# Patient Record
Sex: MALE | Race: WHITE | Employment: OTHER | URBAN - METROPOLITAN AREA
[De-identification: names, ages, dates, MRNs, and addresses within clinical notes are randomized per-mention and may not be internally consistent; named-entity substitution may affect disease eponyms.]

---

## 2017-01-01 ENCOUNTER — APPOINTMENT (OUTPATIENT)
Dept: CARDIOLOGY | Facility: CLINIC | Age: 72
End: 2017-01-01
Attending: INTERNAL MEDICINE
Payer: MEDICARE

## 2017-01-01 ENCOUNTER — TELEPHONE (OUTPATIENT)
Dept: CARDIOLOGY | Facility: CLINIC | Age: 72
End: 2017-01-01

## 2017-01-01 ENCOUNTER — APPOINTMENT (OUTPATIENT)
Dept: MEDSURG UNIT | Facility: CLINIC | Age: 72
End: 2017-01-01
Attending: INTERNAL MEDICINE
Payer: MEDICARE

## 2017-01-01 ENCOUNTER — TELEPHONE (OUTPATIENT)
Dept: FAMILY MEDICINE | Facility: CLINIC | Age: 72
End: 2017-01-01

## 2017-01-01 ENCOUNTER — APPOINTMENT (OUTPATIENT)
Dept: LAB | Facility: CLINIC | Age: 72
End: 2017-01-01
Attending: INTERNAL MEDICINE
Payer: MEDICARE

## 2017-01-01 ENCOUNTER — OFFICE VISIT (OUTPATIENT)
Dept: CARDIOLOGY | Facility: CLINIC | Age: 72
End: 2017-01-01
Attending: THORACIC SURGERY (CARDIOTHORACIC VASCULAR SURGERY)
Payer: MEDICARE

## 2017-01-01 ENCOUNTER — CARE COORDINATION (OUTPATIENT)
Dept: CARDIOLOGY | Facility: CLINIC | Age: 72
End: 2017-01-01

## 2017-01-01 ENCOUNTER — HOSPITAL ENCOUNTER (OUTPATIENT)
Facility: CLINIC | Age: 72
Discharge: HOME | End: 2017-12-20
Attending: INTERNAL MEDICINE | Admitting: INTERNAL MEDICINE
Payer: MEDICARE

## 2017-01-01 ENCOUNTER — OFFICE VISIT (OUTPATIENT)
Dept: FAMILY MEDICINE | Facility: CLINIC | Age: 72
End: 2017-01-01
Payer: MEDICARE

## 2017-01-01 ENCOUNTER — HOSPITAL ENCOUNTER (OUTPATIENT)
Facility: CLINIC | Age: 72
Setting detail: OBSERVATION
Discharge: HOME | End: 2017-11-18
Attending: INTERNAL MEDICINE | Admitting: INTERNAL MEDICINE
Payer: MEDICARE

## 2017-01-01 VITALS
TEMPERATURE: 97.9 F | WEIGHT: 205 LBS | DIASTOLIC BLOOD PRESSURE: 71 MMHG | OXYGEN SATURATION: 98 % | BODY MASS INDEX: 33.59 KG/M2 | RESPIRATION RATE: 24 BRPM | HEART RATE: 60 BPM | SYSTOLIC BLOOD PRESSURE: 139 MMHG

## 2017-01-01 VITALS
DIASTOLIC BLOOD PRESSURE: 63 MMHG | OXYGEN SATURATION: 99 % | HEIGHT: 66 IN | RESPIRATION RATE: 16 BRPM | TEMPERATURE: 97.3 F | SYSTOLIC BLOOD PRESSURE: 118 MMHG | HEART RATE: 69 BPM | WEIGHT: 220.02 LBS | BODY MASS INDEX: 35.36 KG/M2

## 2017-01-01 VITALS
SYSTOLIC BLOOD PRESSURE: 146 MMHG | DIASTOLIC BLOOD PRESSURE: 76 MMHG | WEIGHT: 203 LBS | OXYGEN SATURATION: 97 % | BODY MASS INDEX: 32.62 KG/M2 | TEMPERATURE: 98.3 F | HEIGHT: 66 IN | HEART RATE: 71 BPM

## 2017-01-01 VITALS
HEART RATE: 64 BPM | BODY MASS INDEX: 32.28 KG/M2 | SYSTOLIC BLOOD PRESSURE: 106 MMHG | RESPIRATION RATE: 20 BRPM | WEIGHT: 197 LBS | TEMPERATURE: 97.3 F | OXYGEN SATURATION: 97 % | DIASTOLIC BLOOD PRESSURE: 65 MMHG

## 2017-01-01 VITALS
SYSTOLIC BLOOD PRESSURE: 133 MMHG | RESPIRATION RATE: 16 BRPM | HEIGHT: 65 IN | BODY MASS INDEX: 33.32 KG/M2 | OXYGEN SATURATION: 99 % | WEIGHT: 200 LBS | TEMPERATURE: 98.3 F | DIASTOLIC BLOOD PRESSURE: 73 MMHG

## 2017-01-01 DIAGNOSIS — I25.10 CORONARY ARTERY DISEASE DUE TO LIPID RICH PLAQUE: ICD-10-CM

## 2017-01-01 DIAGNOSIS — Z98.61 POSTSURGICAL PERCUTANEOUS TRANSLUMINAL CORONARY ANGIOPLASTY STATUS: ICD-10-CM

## 2017-01-01 DIAGNOSIS — R79.89 ELEVATED SERUM CREATININE: ICD-10-CM

## 2017-01-01 DIAGNOSIS — I25.83 CORONARY ARTERY DISEASE DUE TO LIPID RICH PLAQUE: Primary | ICD-10-CM

## 2017-01-01 DIAGNOSIS — K62.5 RECTAL BLEEDING: Primary | ICD-10-CM

## 2017-01-01 DIAGNOSIS — L72.3 INFECTED SEBACEOUS CYST OF SKIN: Primary | ICD-10-CM

## 2017-01-01 DIAGNOSIS — I25.118 CORONARY ARTERY DISEASE OF NATIVE ARTERY OF NATIVE HEART WITH STABLE ANGINA PECTORIS (H): ICD-10-CM

## 2017-01-01 DIAGNOSIS — I25.83 CORONARY ARTERY DISEASE DUE TO LIPID RICH PLAQUE: ICD-10-CM

## 2017-01-01 DIAGNOSIS — Z98.61 STATUS POST CORONARY ANGIOPLASTY: ICD-10-CM

## 2017-01-01 DIAGNOSIS — I25.10 CORONARY ARTERY DISEASE DUE TO LIPID RICH PLAQUE: Primary | ICD-10-CM

## 2017-01-01 DIAGNOSIS — M54.50 BILATERAL LOW BACK PAIN WITHOUT SCIATICA, UNSPECIFIED CHRONICITY: ICD-10-CM

## 2017-01-01 DIAGNOSIS — Z98.61 STATUS POST PERCUTANEOUS TRANSLUMINAL CORONARY ANGIOPLASTY: Primary | ICD-10-CM

## 2017-01-01 DIAGNOSIS — I25.118 CORONARY ARTERY DISEASE OF NATIVE ARTERY OF NATIVE HEART WITH STABLE ANGINA PECTORIS (H): Primary | ICD-10-CM

## 2017-01-01 DIAGNOSIS — I25.10 CORONARY ARTERY DISEASE INVOLVING NATIVE HEART WITHOUT ANGINA PECTORIS, UNSPECIFIED VESSEL OR LESION TYPE: ICD-10-CM

## 2017-01-01 DIAGNOSIS — Z98.61 POSTSURGICAL PERCUTANEOUS TRANSLUMINAL CORONARY ANGIOPLASTY STATUS: Primary | ICD-10-CM

## 2017-01-01 DIAGNOSIS — D64.9 ANEMIA: Primary | ICD-10-CM

## 2017-01-01 DIAGNOSIS — L08.9 INFECTED SEBACEOUS CYST OF SKIN: Primary | ICD-10-CM

## 2017-01-01 LAB
ABO + RH BLD: NORMAL
ABO + RH BLD: NORMAL
ANION GAP SERPL CALCULATED.3IONS-SCNC: 10 MMOL/L (ref 3–14)
ANION GAP SERPL CALCULATED.3IONS-SCNC: 11 MMOL/L (ref 3–14)
ANION GAP SERPL CALCULATED.3IONS-SCNC: 11 MMOL/L (ref 3–14)
ANION GAP SERPL CALCULATED.3IONS-SCNC: 12 MMOL/L (ref 3–14)
ANION GAP SERPL CALCULATED.3IONS-SCNC: 13 MMOL/L (ref 3–14)
ANION GAP SERPL CALCULATED.3IONS-SCNC: 8 MMOL/L (ref 3–14)
APTT PPP: 33 SEC (ref 22–37)
BLD GP AB SCN SERPL QL: NORMAL
BLOOD BANK CMNT PATIENT-IMP: NORMAL
BUN SERPL-MCNC: 23 MG/DL (ref 7–30)
BUN SERPL-MCNC: 27 MG/DL (ref 7–30)
BUN SERPL-MCNC: 33 MG/DL (ref 7–30)
BUN SERPL-MCNC: 34 MG/DL (ref 7–30)
CALCIUM SERPL-MCNC: 8.7 MG/DL (ref 8.5–10.1)
CALCIUM SERPL-MCNC: 8.9 MG/DL (ref 8.5–10.1)
CALCIUM SERPL-MCNC: 9.1 MG/DL (ref 8.5–10.1)
CALCIUM SERPL-MCNC: 9.2 MG/DL (ref 8.5–10.1)
CALCIUM SERPL-MCNC: 9.4 MG/DL (ref 8.5–10.1)
CALCIUM SERPL-MCNC: 9.5 MG/DL (ref 8.5–10.1)
CHLORIDE SERPL-SCNC: 107 MMOL/L (ref 94–109)
CHLORIDE SERPL-SCNC: 110 MMOL/L (ref 94–109)
CHLORIDE SERPL-SCNC: 110 MMOL/L (ref 94–109)
CHLORIDE SERPL-SCNC: 114 MMOL/L (ref 94–109)
CO2 SERPL-SCNC: 17 MMOL/L (ref 20–32)
CO2 SERPL-SCNC: 17 MMOL/L (ref 20–32)
CO2 SERPL-SCNC: 18 MMOL/L (ref 20–32)
CO2 SERPL-SCNC: 18 MMOL/L (ref 20–32)
CO2 SERPL-SCNC: 19 MMOL/L (ref 20–32)
CO2 SERPL-SCNC: 20 MMOL/L (ref 20–32)
CREAT SERPL-MCNC: 2.43 MG/DL (ref 0.66–1.25)
CREAT SERPL-MCNC: 2.75 MG/DL (ref 0.66–1.25)
CREAT SERPL-MCNC: 2.77 MG/DL (ref 0.66–1.25)
CREAT SERPL-MCNC: 2.77 MG/DL (ref 0.66–1.25)
CREAT SERPL-MCNC: 2.89 MG/DL (ref 0.66–1.25)
CREAT SERPL-MCNC: 3.36 MG/DL (ref 0.66–1.25)
ERYTHROCYTE [DISTWIDTH] IN BLOOD BY AUTOMATED COUNT: 13.1 % (ref 10–15)
ERYTHROCYTE [DISTWIDTH] IN BLOOD BY AUTOMATED COUNT: 13.6 % (ref 10–15)
ERYTHROCYTE [DISTWIDTH] IN BLOOD BY AUTOMATED COUNT: 13.7 % (ref 10–15)
ERYTHROCYTE [DISTWIDTH] IN BLOOD BY AUTOMATED COUNT: 13.8 % (ref 10–15)
ERYTHROCYTE [DISTWIDTH] IN BLOOD BY AUTOMATED COUNT: 13.9 % (ref 10–15)
ERYTHROCYTE [DISTWIDTH] IN BLOOD BY AUTOMATED COUNT: 13.9 % (ref 10–15)
GFR SERPL CREATININE-BSD FRML MDRD: 18 ML/MIN/1.7M2
GFR SERPL CREATININE-BSD FRML MDRD: 21 ML/MIN/1.7M2
GFR SERPL CREATININE-BSD FRML MDRD: 23 ML/MIN/1.7M2
GFR SERPL CREATININE-BSD FRML MDRD: 26 ML/MIN/1.7M2
GLUCOSE SERPL-MCNC: 103 MG/DL (ref 70–99)
GLUCOSE SERPL-MCNC: 104 MG/DL (ref 70–99)
GLUCOSE SERPL-MCNC: 139 MG/DL (ref 70–99)
GLUCOSE SERPL-MCNC: 87 MG/DL (ref 70–99)
GLUCOSE SERPL-MCNC: 90 MG/DL (ref 70–99)
GLUCOSE SERPL-MCNC: 96 MG/DL (ref 70–99)
HCT VFR BLD AUTO: 27.7 % (ref 40–53)
HCT VFR BLD AUTO: 29.6 % (ref 40–53)
HCT VFR BLD AUTO: 32.4 % (ref 40–53)
HCT VFR BLD AUTO: 32.8 % (ref 40–53)
HCT VFR BLD AUTO: 33.3 % (ref 40–53)
HCT VFR BLD AUTO: 36.5 % (ref 40–53)
HGB BLD-MCNC: 10 G/DL (ref 13.3–17.7)
HGB BLD-MCNC: 10.5 G/DL (ref 13.3–17.7)
HGB BLD-MCNC: 10.9 G/DL (ref 13.3–17.7)
HGB BLD-MCNC: 11 G/DL (ref 13.3–17.7)
HGB BLD-MCNC: 11.2 G/DL (ref 13.3–17.7)
HGB BLD-MCNC: 12.3 G/DL (ref 13.3–17.7)
HGB BLD-MCNC: 8.9 G/DL (ref 13.3–17.7)
HGB BLD-MCNC: 9.8 G/DL (ref 13.3–17.7)
INR PPP: 1.02 (ref 0.86–1.14)
INR PPP: 1.24 (ref 0.86–1.14)
INTERPRETATION ECG - MUSE: NORMAL
KCT BLD-ACNC: 148 SEC (ref 105–167)
KCT BLD-ACNC: 176 SEC (ref 105–167)
KCT BLD-ACNC: 208 SEC (ref 105–167)
KCT BLD-ACNC: 249 SEC (ref 105–167)
KCT BLD-ACNC: 257 SEC (ref 105–167)
KCT BLD-ACNC: 257 SEC (ref 105–167)
KCT BLD-ACNC: 270 SEC (ref 105–167)
KCT BLD-ACNC: 302 SEC (ref 105–167)
MCH RBC QN AUTO: 28.9 PG (ref 26.5–33)
MCH RBC QN AUTO: 30.4 PG (ref 26.5–33)
MCH RBC QN AUTO: 30.6 PG (ref 26.5–33)
MCH RBC QN AUTO: 30.9 PG (ref 26.5–33)
MCH RBC QN AUTO: 31.4 PG (ref 26.5–33)
MCH RBC QN AUTO: 31.8 PG (ref 26.5–33)
MCHC RBC AUTO-ENTMCNC: 32.1 G/DL (ref 31.5–36.5)
MCHC RBC AUTO-ENTMCNC: 32.4 G/DL (ref 31.5–36.5)
MCHC RBC AUTO-ENTMCNC: 33.1 G/DL (ref 31.5–36.5)
MCHC RBC AUTO-ENTMCNC: 33.2 G/DL (ref 31.5–36.5)
MCHC RBC AUTO-ENTMCNC: 33.6 G/DL (ref 31.5–36.5)
MCHC RBC AUTO-ENTMCNC: 33.7 G/DL (ref 31.5–36.5)
MCV RBC AUTO: 90 FL (ref 78–100)
MCV RBC AUTO: 92 FL (ref 78–100)
MCV RBC AUTO: 92 FL (ref 78–100)
MCV RBC AUTO: 93 FL (ref 78–100)
MCV RBC AUTO: 94 FL (ref 78–100)
MCV RBC AUTO: 96 FL (ref 78–100)
PLATELET # BLD AUTO: 164 10E9/L (ref 150–450)
PLATELET # BLD AUTO: 170 10E9/L (ref 150–450)
PLATELET # BLD AUTO: 176 10E9/L (ref 150–450)
PLATELET # BLD AUTO: 184 10E9/L (ref 150–450)
PLATELET # BLD AUTO: 196 10E9/L (ref 150–450)
PLATELET # BLD AUTO: 276 10E9/L (ref 150–450)
POTASSIUM SERPL-SCNC: 4 MMOL/L (ref 3.4–5.3)
POTASSIUM SERPL-SCNC: 4.1 MMOL/L (ref 3.4–5.3)
POTASSIUM SERPL-SCNC: 4.3 MMOL/L (ref 3.4–5.3)
POTASSIUM SERPL-SCNC: 4.3 MMOL/L (ref 3.4–5.3)
POTASSIUM SERPL-SCNC: 4.7 MMOL/L (ref 3.4–5.3)
POTASSIUM SERPL-SCNC: 5.1 MMOL/L (ref 3.4–5.3)
RBC # BLD AUTO: 3.08 10E12/L (ref 4.4–5.9)
RBC # BLD AUTO: 3.08 10E12/L (ref 4.4–5.9)
RBC # BLD AUTO: 3.45 10E12/L (ref 4.4–5.9)
RBC # BLD AUTO: 3.56 10E12/L (ref 4.4–5.9)
RBC # BLD AUTO: 3.62 10E12/L (ref 4.4–5.9)
RBC # BLD AUTO: 3.92 10E12/L (ref 4.4–5.9)
SODIUM SERPL-SCNC: 135 MMOL/L (ref 133–144)
SODIUM SERPL-SCNC: 140 MMOL/L (ref 133–144)
SODIUM SERPL-SCNC: 141 MMOL/L (ref 133–144)
SODIUM SERPL-SCNC: 144 MMOL/L (ref 133–144)
SPECIMEN EXP DATE BLD: NORMAL
WBC # BLD AUTO: 10.1 10E9/L (ref 4–11)
WBC # BLD AUTO: 8.1 10E9/L (ref 4–11)
WBC # BLD AUTO: 8.6 10E9/L (ref 4–11)
WBC # BLD AUTO: 8.6 10E9/L (ref 4–11)
WBC # BLD AUTO: 8.8 10E9/L (ref 4–11)
WBC # BLD AUTO: 9.3 10E9/L (ref 4–11)

## 2017-01-01 PROCEDURE — 80048 BASIC METABOLIC PNL TOTAL CA: CPT | Performed by: INTERNAL MEDICINE

## 2017-01-01 PROCEDURE — 36415 COLL VENOUS BLD VENIPUNCTURE: CPT | Performed by: INTERNAL MEDICINE

## 2017-01-01 PROCEDURE — 25000132 ZZH RX MED GY IP 250 OP 250 PS 637: Mod: GY

## 2017-01-01 PROCEDURE — G0378 HOSPITAL OBSERVATION PER HR: HCPCS

## 2017-01-01 PROCEDURE — 86850 RBC ANTIBODY SCREEN: CPT | Performed by: STUDENT IN AN ORGANIZED HEALTH CARE EDUCATION/TRAINING PROGRAM

## 2017-01-01 PROCEDURE — 25000132 ZZH RX MED GY IP 250 OP 250 PS 637: Mod: GY | Performed by: INTERNAL MEDICINE

## 2017-01-01 PROCEDURE — 99153 MOD SED SAME PHYS/QHP EA: CPT

## 2017-01-01 PROCEDURE — 25000125 ZZHC RX 250: Performed by: INTERNAL MEDICINE

## 2017-01-01 PROCEDURE — 27210760 ZZH DEVICE INFLATION CR7

## 2017-01-01 PROCEDURE — 40000065 ZZH STATISTIC EKG NON-CHARGEABLE

## 2017-01-01 PROCEDURE — 85027 COMPLETE CBC AUTOMATED: CPT | Performed by: HOSPITALIST

## 2017-01-01 PROCEDURE — 40000172 ZZH STATISTIC PROCEDURE PREP ONLY

## 2017-01-01 PROCEDURE — 25000128 H RX IP 250 OP 636: Performed by: INTERNAL MEDICINE

## 2017-01-01 PROCEDURE — 02703ZZ DILATION OF CORONARY ARTERY, ONE ARTERY, PERCUTANEOUS APPROACH: ICD-10-PCS | Performed by: INTERNAL MEDICINE

## 2017-01-01 PROCEDURE — 4A033BC MEASUREMENT OF ARTERIAL PRESSURE, CORONARY, PERCUTANEOUS APPROACH: ICD-10-PCS | Performed by: INTERNAL MEDICINE

## 2017-01-01 PROCEDURE — 86900 BLOOD TYPING SEROLOGIC ABO: CPT | Performed by: STUDENT IN AN ORGANIZED HEALTH CARE EDUCATION/TRAINING PROGRAM

## 2017-01-01 PROCEDURE — 85027 COMPLETE CBC AUTOMATED: CPT | Performed by: NURSE PRACTITIONER

## 2017-01-01 PROCEDURE — 99152 MOD SED SAME PHYS/QHP 5/>YRS: CPT

## 2017-01-01 PROCEDURE — 99354 ZZC PROLONGED SERV,OFFICE,1ST HR: CPT | Performed by: INTERNAL MEDICINE

## 2017-01-01 PROCEDURE — C1769 GUIDE WIRE: HCPCS

## 2017-01-01 PROCEDURE — 27211236 ZZH CATHETER -FFR CR18

## 2017-01-01 PROCEDURE — 27210787 ZZH MANIFOLD CR2

## 2017-01-01 PROCEDURE — 27210998 ZZH ACCESS HEART CATH CR6

## 2017-01-01 PROCEDURE — 86901 BLOOD TYPING SEROLOGIC RH(D): CPT | Performed by: STUDENT IN AN ORGANIZED HEALTH CARE EDUCATION/TRAINING PROGRAM

## 2017-01-01 PROCEDURE — 92928 PRQ TCAT PLMT NTRAC ST 1 LES: CPT | Mod: RC | Performed by: INTERNAL MEDICINE

## 2017-01-01 PROCEDURE — 85730 THROMBOPLASTIN TIME PARTIAL: CPT | Performed by: HOSPITALIST

## 2017-01-01 PROCEDURE — 40000141 ZZH STATISTIC PERIPHERAL IV START W/O US GUIDANCE

## 2017-01-01 PROCEDURE — 85610 PROTHROMBIN TIME: CPT | Performed by: HOSPITALIST

## 2017-01-01 PROCEDURE — 92928 PRQ TCAT PLMT NTRAC ST 1 LES: CPT | Mod: LD | Performed by: INTERNAL MEDICINE

## 2017-01-01 PROCEDURE — 80048 BASIC METABOLIC PNL TOTAL CA: CPT | Performed by: HOSPITALIST

## 2017-01-01 PROCEDURE — A9270 NON-COVERED ITEM OR SERVICE: HCPCS | Mod: GY | Performed by: INTERNAL MEDICINE

## 2017-01-01 PROCEDURE — 25000132 ZZH RX MED GY IP 250 OP 250 PS 637: Mod: GY | Performed by: HOSPITALIST

## 2017-01-01 PROCEDURE — 85027 COMPLETE CBC AUTOMATED: CPT | Performed by: INTERNAL MEDICINE

## 2017-01-01 PROCEDURE — 96376 TX/PRO/DX INJ SAME DRUG ADON: CPT

## 2017-01-01 PROCEDURE — 99214 OFFICE O/P EST MOD 30 MIN: CPT | Performed by: NURSE PRACTITIONER

## 2017-01-01 PROCEDURE — 36415 COLL VENOUS BLD VENIPUNCTURE: CPT | Performed by: HOSPITALIST

## 2017-01-01 PROCEDURE — 36415 COLL VENOUS BLD VENIPUNCTURE: CPT | Performed by: NURSE PRACTITIONER

## 2017-01-01 PROCEDURE — C1887 CATHETER, GUIDING: HCPCS

## 2017-01-01 PROCEDURE — 80048 BASIC METABOLIC PNL TOTAL CA: CPT | Performed by: NURSE PRACTITIONER

## 2017-01-01 PROCEDURE — 85610 PROTHROMBIN TIME: CPT | Performed by: NURSE PRACTITIONER

## 2017-01-01 PROCEDURE — 27211089 ZZH KIT ACIST INJECTOR CR3

## 2017-01-01 PROCEDURE — 27210742 ZZH CATH CR1

## 2017-01-01 PROCEDURE — A9270 NON-COVERED ITEM OR SERVICE: HCPCS | Mod: GY | Performed by: HOSPITALIST

## 2017-01-01 PROCEDURE — C1894 INTRO/SHEATH, NON-LASER: HCPCS

## 2017-01-01 PROCEDURE — 93571 IV DOP VEL&/PRESS C FLO 1ST: CPT

## 2017-01-01 PROCEDURE — C1725 CATH, TRANSLUMIN NON-LASER: HCPCS

## 2017-01-01 PROCEDURE — 99217 ZZC OBSERVATION CARE DISCHARGE: CPT | Mod: GC | Performed by: INTERNAL MEDICINE

## 2017-01-01 PROCEDURE — C1874 STENT, COATED/COV W/DEL SYS: HCPCS

## 2017-01-01 PROCEDURE — 93005 ELECTROCARDIOGRAM TRACING: CPT

## 2017-01-01 PROCEDURE — 93010 ELECTROCARDIOGRAM REPORT: CPT | Performed by: INTERNAL MEDICINE

## 2017-01-01 PROCEDURE — 96374 THER/PROPH/DIAG INJ IV PUSH: CPT

## 2017-01-01 PROCEDURE — 92921 ZZHC PRQ TRLUML CORONARY ANGIOPLASTY ADDL BRANCH: CPT | Mod: LD

## 2017-01-01 PROCEDURE — 99220 ZZC INITIAL OBSERVATION CARE,LEVL III: CPT | Mod: AI | Performed by: INTERNAL MEDICINE

## 2017-01-01 PROCEDURE — C9600 PERC DRUG-EL COR STENT SING: HCPCS | Mod: LD

## 2017-01-01 PROCEDURE — C9600 PERC DRUG-EL COR STENT SING: HCPCS

## 2017-01-01 PROCEDURE — 36415 COLL VENOUS BLD VENIPUNCTURE: CPT | Performed by: STUDENT IN AN ORGANIZED HEALTH CARE EDUCATION/TRAINING PROGRAM

## 2017-01-01 PROCEDURE — 027036Z DILATION OF CORONARY ARTERY, ONE ARTERY WITH THREE DRUG-ELUTING INTRALUMINAL DEVICES, PERCUTANEOUS APPROACH: ICD-10-PCS | Performed by: INTERNAL MEDICINE

## 2017-01-01 PROCEDURE — 40000556 ZZH STATISTIC PERIPHERAL IV START W US GUIDANCE

## 2017-01-01 PROCEDURE — 25000125 ZZHC RX 250: Performed by: STUDENT IN AN ORGANIZED HEALTH CARE EDUCATION/TRAINING PROGRAM

## 2017-01-01 PROCEDURE — 85347 COAGULATION TIME ACTIVATED: CPT

## 2017-01-01 PROCEDURE — 85018 HEMOGLOBIN: CPT | Performed by: HOSPITALIST

## 2017-01-01 PROCEDURE — 85027 COMPLETE CBC AUTOMATED: CPT | Mod: 91 | Performed by: STUDENT IN AN ORGANIZED HEALTH CARE EDUCATION/TRAINING PROGRAM

## 2017-01-01 PROCEDURE — 27210946 ZZH KIT HC TOTES DISP CR8

## 2017-01-01 PROCEDURE — 99495 TRANSJ CARE MGMT MOD F2F 14D: CPT | Performed by: NURSE PRACTITIONER

## 2017-01-01 PROCEDURE — 92929 ZZHC PRQ TRLUML CORONARY BM STENT W/ANGIO ADDL ART/BRNCH: CPT | Mod: LD | Performed by: INTERNAL MEDICINE

## 2017-01-01 PROCEDURE — 93010 ELECTROCARDIOGRAM REPORT: CPT | Mod: 59 | Performed by: INTERNAL MEDICINE

## 2017-01-01 PROCEDURE — 99213 OFFICE O/P EST LOW 20 MIN: CPT | Mod: ZF

## 2017-01-01 PROCEDURE — A9270 NON-COVERED ITEM OR SERVICE: HCPCS | Mod: GY

## 2017-01-01 PROCEDURE — 99215 OFFICE O/P EST HI 40 MIN: CPT | Mod: GC | Performed by: INTERNAL MEDICINE

## 2017-01-01 RX ORDER — METOPROLOL TARTRATE 1 MG/ML
5 INJECTION, SOLUTION INTRAVENOUS EVERY 5 MIN PRN
Status: DISCONTINUED | OUTPATIENT
Start: 2017-01-01 | End: 2017-01-01 | Stop reason: HOSPADM

## 2017-01-01 RX ORDER — SODIUM CHLORIDE 9 MG/ML
INJECTION, SOLUTION INTRAVENOUS CONTINUOUS
Status: DISCONTINUED | OUTPATIENT
Start: 2017-01-01 | End: 2017-01-01 | Stop reason: HOSPADM

## 2017-01-01 RX ORDER — LORAZEPAM 2 MG/ML
.5-2 INJECTION INTRAMUSCULAR EVERY 4 HOURS PRN
Status: DISCONTINUED | OUTPATIENT
Start: 2017-01-01 | End: 2017-01-01 | Stop reason: HOSPADM

## 2017-01-01 RX ORDER — PROMETHAZINE HYDROCHLORIDE 25 MG/ML
6.25-25 INJECTION, SOLUTION INTRAMUSCULAR; INTRAVENOUS EVERY 4 HOURS PRN
Status: DISCONTINUED | OUTPATIENT
Start: 2017-01-01 | End: 2017-01-01 | Stop reason: HOSPADM

## 2017-01-01 RX ORDER — VERAPAMIL HYDROCHLORIDE 2.5 MG/ML
1-5 INJECTION, SOLUTION INTRAVENOUS
Status: DISCONTINUED | OUTPATIENT
Start: 2017-01-01 | End: 2017-01-01 | Stop reason: HOSPADM

## 2017-01-01 RX ORDER — ACETAMINOPHEN 325 MG/1
325-650 TABLET ORAL EVERY 4 HOURS PRN
Status: DISCONTINUED | OUTPATIENT
Start: 2017-01-01 | End: 2017-01-01 | Stop reason: HOSPADM

## 2017-01-01 RX ORDER — ASPIRIN 81 MG/1
81-324 TABLET, CHEWABLE ORAL
Status: DISCONTINUED | OUTPATIENT
Start: 2017-01-01 | End: 2017-01-01 | Stop reason: HOSPADM

## 2017-01-01 RX ORDER — FLUMAZENIL 0.1 MG/ML
0.2 INJECTION, SOLUTION INTRAVENOUS
Status: DISCONTINUED | OUTPATIENT
Start: 2017-01-01 | End: 2017-01-01 | Stop reason: HOSPADM

## 2017-01-01 RX ORDER — TIZANIDINE 2 MG/1
2-4 TABLET ORAL
Qty: 60 TABLET | Status: ON HOLD | OUTPATIENT
Start: 2017-01-01 | End: 2018-01-01

## 2017-01-01 RX ORDER — DIPHENHYDRAMINE HCL 25 MG
25 CAPSULE ORAL
Status: DISCONTINUED | OUTPATIENT
Start: 2017-01-01 | End: 2017-01-01 | Stop reason: HOSPADM

## 2017-01-01 RX ORDER — DOBUTAMINE HYDROCHLORIDE 200 MG/100ML
2-20 INJECTION INTRAVENOUS CONTINUOUS PRN
Status: DISCONTINUED | OUTPATIENT
Start: 2017-01-01 | End: 2017-01-01 | Stop reason: HOSPADM

## 2017-01-01 RX ORDER — DOPAMINE HYDROCHLORIDE 160 MG/100ML
2-20 INJECTION, SOLUTION INTRAVENOUS CONTINUOUS PRN
Status: DISCONTINUED | OUTPATIENT
Start: 2017-01-01 | End: 2017-01-01 | Stop reason: HOSPADM

## 2017-01-01 RX ORDER — POTASSIUM CHLORIDE 29.8 MG/ML
20 INJECTION INTRAVENOUS
Status: DISCONTINUED | OUTPATIENT
Start: 2017-01-01 | End: 2017-01-01 | Stop reason: HOSPADM

## 2017-01-01 RX ORDER — NITROGLYCERIN 20 MG/100ML
.07-2 INJECTION INTRAVENOUS CONTINUOUS PRN
Status: DISCONTINUED | OUTPATIENT
Start: 2017-01-01 | End: 2017-01-01 | Stop reason: HOSPADM

## 2017-01-01 RX ORDER — ATROPINE SULFATE 0.1 MG/ML
0.5 INJECTION INTRAVENOUS EVERY 5 MIN PRN
Status: DISCONTINUED | OUTPATIENT
Start: 2017-01-01 | End: 2017-01-01 | Stop reason: HOSPADM

## 2017-01-01 RX ORDER — PROTAMINE SULFATE 10 MG/ML
25-100 INJECTION, SOLUTION INTRAVENOUS EVERY 5 MIN PRN
Status: DISCONTINUED | OUTPATIENT
Start: 2017-01-01 | End: 2017-01-01 | Stop reason: HOSPADM

## 2017-01-01 RX ORDER — IOPAMIDOL 755 MG/ML
70 INJECTION, SOLUTION INTRAVASCULAR ONCE
Status: COMPLETED | OUTPATIENT
Start: 2017-01-01 | End: 2017-01-01

## 2017-01-01 RX ORDER — FENTANYL CITRATE 50 UG/ML
25-50 INJECTION, SOLUTION INTRAMUSCULAR; INTRAVENOUS
Status: DISCONTINUED | OUTPATIENT
Start: 2017-01-01 | End: 2017-01-01 | Stop reason: HOSPADM

## 2017-01-01 RX ORDER — DEXTROSE MONOHYDRATE 25 G/50ML
12.5-5 INJECTION, SOLUTION INTRAVENOUS EVERY 30 MIN PRN
Status: DISCONTINUED | OUTPATIENT
Start: 2017-01-01 | End: 2017-01-01 | Stop reason: HOSPADM

## 2017-01-01 RX ORDER — ASPIRIN 81 MG/1
81 TABLET ORAL DAILY
Status: DISCONTINUED | OUTPATIENT
Start: 2017-01-01 | End: 2017-01-01 | Stop reason: HOSPADM

## 2017-01-01 RX ORDER — PHENYLEPHRINE HCL IN 0.9% NACL 1 MG/10 ML
20-100 SYRINGE (ML) INTRAVENOUS
Status: DISCONTINUED | OUTPATIENT
Start: 2017-01-01 | End: 2017-01-01 | Stop reason: HOSPADM

## 2017-01-01 RX ORDER — METHYLPREDNISOLONE SODIUM SUCCINATE 125 MG/2ML
125 INJECTION, POWDER, LYOPHILIZED, FOR SOLUTION INTRAMUSCULAR; INTRAVENOUS
Status: DISCONTINUED | OUTPATIENT
Start: 2017-01-01 | End: 2017-01-01 | Stop reason: HOSPADM

## 2017-01-01 RX ORDER — HYDRALAZINE HYDROCHLORIDE 20 MG/ML
10-20 INJECTION INTRAMUSCULAR; INTRAVENOUS
Status: DISCONTINUED | OUTPATIENT
Start: 2017-01-01 | End: 2017-01-01 | Stop reason: HOSPADM

## 2017-01-01 RX ORDER — SODIUM NITROPRUSSIDE 25 MG/ML
100-200 INJECTION INTRAVENOUS
Status: DISCONTINUED | OUTPATIENT
Start: 2017-01-01 | End: 2017-01-01 | Stop reason: HOSPADM

## 2017-01-01 RX ORDER — NALOXONE HYDROCHLORIDE 0.4 MG/ML
.1-.4 INJECTION, SOLUTION INTRAMUSCULAR; INTRAVENOUS; SUBCUTANEOUS
Status: DISCONTINUED | OUTPATIENT
Start: 2017-01-01 | End: 2017-01-01 | Stop reason: HOSPADM

## 2017-01-01 RX ORDER — HEPARIN SODIUM 1000 [USP'U]/ML
1000-10000 INJECTION, SOLUTION INTRAVENOUS; SUBCUTANEOUS EVERY 5 MIN PRN
Status: DISCONTINUED | OUTPATIENT
Start: 2017-01-01 | End: 2017-01-01 | Stop reason: HOSPADM

## 2017-01-01 RX ORDER — HYDRALAZINE HYDROCHLORIDE 20 MG/ML
10 INJECTION INTRAMUSCULAR; INTRAVENOUS EVERY 6 HOURS PRN
Status: DISCONTINUED | OUTPATIENT
Start: 2017-01-01 | End: 2017-01-01 | Stop reason: HOSPADM

## 2017-01-01 RX ORDER — NITROGLYCERIN 5 MG/ML
100-500 VIAL (ML) INTRAVENOUS
Status: DISCONTINUED | OUTPATIENT
Start: 2017-01-01 | End: 2017-01-01 | Stop reason: HOSPADM

## 2017-01-01 RX ORDER — CLOPIDOGREL BISULFATE 75 MG/1
75 TABLET ORAL
Status: DISCONTINUED | OUTPATIENT
Start: 2017-01-01 | End: 2017-01-01 | Stop reason: HOSPADM

## 2017-01-01 RX ORDER — ONDANSETRON 2 MG/ML
4 INJECTION INTRAMUSCULAR; INTRAVENOUS EVERY 4 HOURS PRN
Status: DISCONTINUED | OUTPATIENT
Start: 2017-01-01 | End: 2017-01-01 | Stop reason: HOSPADM

## 2017-01-01 RX ORDER — ASPIRIN 325 MG
325 TABLET ORAL
Status: DISCONTINUED | OUTPATIENT
Start: 2017-01-01 | End: 2017-01-01 | Stop reason: HOSPADM

## 2017-01-01 RX ORDER — EPTIFIBATIDE 2 MG/ML
1 INJECTION, SOLUTION INTRAVENOUS CONTINUOUS PRN
Status: DISCONTINUED | OUTPATIENT
Start: 2017-01-01 | End: 2017-01-01 | Stop reason: HOSPADM

## 2017-01-01 RX ORDER — NITROGLYCERIN 0.4 MG/1
0.4 TABLET SUBLINGUAL EVERY 5 MIN PRN
Status: DISCONTINUED | OUTPATIENT
Start: 2017-01-01 | End: 2017-01-01 | Stop reason: HOSPADM

## 2017-01-01 RX ORDER — ATROPINE SULFATE 0.1 MG/ML
0.5 INJECTION INTRAVENOUS EVERY 5 MIN PRN
Status: DISCONTINUED | OUTPATIENT
Start: 2017-01-01 | End: 2017-01-01

## 2017-01-01 RX ORDER — NICARDIPINE HYDROCHLORIDE 2.5 MG/ML
100 INJECTION INTRAVENOUS
Status: DISCONTINUED | OUTPATIENT
Start: 2017-01-01 | End: 2017-01-01 | Stop reason: HOSPADM

## 2017-01-01 RX ORDER — NALOXONE HYDROCHLORIDE 0.4 MG/ML
.2-.4 INJECTION, SOLUTION INTRAMUSCULAR; INTRAVENOUS; SUBCUTANEOUS
Status: DISCONTINUED | OUTPATIENT
Start: 2017-01-01 | End: 2017-01-01 | Stop reason: HOSPADM

## 2017-01-01 RX ORDER — ARGATROBAN 1 MG/ML
350 INJECTION, SOLUTION INTRAVENOUS
Status: DISCONTINUED | OUTPATIENT
Start: 2017-01-01 | End: 2017-01-01 | Stop reason: HOSPADM

## 2017-01-01 RX ORDER — POTASSIUM CHLORIDE 7.45 MG/ML
10 INJECTION INTRAVENOUS
Status: DISCONTINUED | OUTPATIENT
Start: 2017-01-01 | End: 2017-01-01 | Stop reason: HOSPADM

## 2017-01-01 RX ORDER — DIPHENHYDRAMINE HYDROCHLORIDE 50 MG/ML
25-50 INJECTION INTRAMUSCULAR; INTRAVENOUS
Status: DISCONTINUED | OUTPATIENT
Start: 2017-01-01 | End: 2017-01-01 | Stop reason: HOSPADM

## 2017-01-01 RX ORDER — FUROSEMIDE 10 MG/ML
20-100 INJECTION INTRAMUSCULAR; INTRAVENOUS
Status: DISCONTINUED | OUTPATIENT
Start: 2017-01-01 | End: 2017-01-01 | Stop reason: HOSPADM

## 2017-01-01 RX ORDER — NITROGLYCERIN 5 MG/ML
100-200 VIAL (ML) INTRAVENOUS
Status: DISCONTINUED | OUTPATIENT
Start: 2017-01-01 | End: 2017-01-01 | Stop reason: HOSPADM

## 2017-01-01 RX ORDER — LIDOCAINE 40 MG/G
CREAM TOPICAL
Status: DISCONTINUED | OUTPATIENT
Start: 2017-01-01 | End: 2017-01-01 | Stop reason: HOSPADM

## 2017-01-01 RX ORDER — PRASUGREL 10 MG/1
10-60 TABLET, FILM COATED ORAL
Status: DISCONTINUED | OUTPATIENT
Start: 2017-01-01 | End: 2017-01-01 | Stop reason: HOSPADM

## 2017-01-01 RX ORDER — MAGNESIUM HYDROXIDE 1200 MG/15ML
1000 LIQUID ORAL CONTINUOUS PRN
Status: DISCONTINUED | OUTPATIENT
Start: 2017-01-01 | End: 2017-01-01 | Stop reason: HOSPADM

## 2017-01-01 RX ORDER — ATROPINE SULFATE 0.1 MG/ML
.5-1 INJECTION INTRAVENOUS
Status: DISCONTINUED | OUTPATIENT
Start: 2017-01-01 | End: 2017-01-01 | Stop reason: HOSPADM

## 2017-01-01 RX ORDER — NALOXONE HYDROCHLORIDE 0.4 MG/ML
0.4 INJECTION, SOLUTION INTRAMUSCULAR; INTRAVENOUS; SUBCUTANEOUS EVERY 5 MIN PRN
Status: DISCONTINUED | OUTPATIENT
Start: 2017-01-01 | End: 2017-01-01 | Stop reason: HOSPADM

## 2017-01-01 RX ORDER — FLUMAZENIL 0.1 MG/ML
0.2 INJECTION, SOLUTION INTRAVENOUS
Status: ACTIVE | OUTPATIENT
Start: 2017-01-01 | End: 2017-01-01

## 2017-01-01 RX ORDER — EPTIFIBATIDE 2 MG/ML
180 INJECTION, SOLUTION INTRAVENOUS EVERY 10 MIN PRN
Status: DISCONTINUED | OUTPATIENT
Start: 2017-01-01 | End: 2017-01-01 | Stop reason: HOSPADM

## 2017-01-01 RX ORDER — HYDROCODONE BITARTRATE AND ACETAMINOPHEN 5; 325 MG/1; MG/1
1-2 TABLET ORAL EVERY 4 HOURS PRN
Status: DISCONTINUED | OUTPATIENT
Start: 2017-01-01 | End: 2017-01-01 | Stop reason: HOSPADM

## 2017-01-01 RX ORDER — LORAZEPAM 2 MG/ML
1 INJECTION INTRAMUSCULAR ONCE
Status: COMPLETED | OUTPATIENT
Start: 2017-01-01 | End: 2017-01-01

## 2017-01-01 RX ORDER — ENALAPRILAT 1.25 MG/ML
1.25-2.5 INJECTION INTRAVENOUS
Status: DISCONTINUED | OUTPATIENT
Start: 2017-01-01 | End: 2017-01-01 | Stop reason: HOSPADM

## 2017-01-01 RX ORDER — LANOLIN ALCOHOL/MO/W.PET/CERES
3 CREAM (GRAM) TOPICAL
Status: DISCONTINUED | OUTPATIENT
Start: 2017-01-01 | End: 2017-01-01 | Stop reason: HOSPADM

## 2017-01-01 RX ORDER — EPINEPHRINE 1 MG/ML
0.3 INJECTION, SOLUTION, CONCENTRATE INTRAVENOUS
Status: DISCONTINUED | OUTPATIENT
Start: 2017-01-01 | End: 2017-01-01 | Stop reason: HOSPADM

## 2017-01-01 RX ORDER — IOPAMIDOL 755 MG/ML
30 INJECTION, SOLUTION INTRAVASCULAR ONCE
Status: COMPLETED | OUTPATIENT
Start: 2017-01-01 | End: 2017-01-01

## 2017-01-01 RX ORDER — CLOPIDOGREL BISULFATE 75 MG/1
300-600 TABLET ORAL
Status: DISCONTINUED | OUTPATIENT
Start: 2017-01-01 | End: 2017-01-01 | Stop reason: HOSPADM

## 2017-01-01 RX ORDER — ADENOSINE 3 MG/ML
12-12000 INJECTION, SOLUTION INTRAVENOUS
Status: DISCONTINUED | OUTPATIENT
Start: 2017-01-01 | End: 2017-01-01 | Stop reason: HOSPADM

## 2017-01-01 RX ORDER — ONDANSETRON 4 MG/1
4 TABLET, ORALLY DISINTEGRATING ORAL EVERY 6 HOURS PRN
Status: DISCONTINUED | OUTPATIENT
Start: 2017-01-01 | End: 2017-01-01 | Stop reason: HOSPADM

## 2017-01-01 RX ORDER — NIFEDIPINE 10 MG/1
10 CAPSULE ORAL
Status: DISCONTINUED | OUTPATIENT
Start: 2017-01-01 | End: 2017-01-01 | Stop reason: HOSPADM

## 2017-01-01 RX ORDER — PROTAMINE SULFATE 10 MG/ML
1-5 INJECTION, SOLUTION INTRAVENOUS
Status: DISCONTINUED | OUTPATIENT
Start: 2017-01-01 | End: 2017-01-01 | Stop reason: HOSPADM

## 2017-01-01 RX ORDER — CARVEDILOL 6.25 MG/1
12.5 TABLET ORAL 2 TIMES DAILY
Qty: 180 TABLET | Refills: 3 | Status: SHIPPED | OUTPATIENT
Start: 2017-01-01 | End: 2018-01-01

## 2017-01-01 RX ORDER — LIDOCAINE HYDROCHLORIDE 10 MG/ML
30 INJECTION, SOLUTION EPIDURAL; INFILTRATION; INTRACAUDAL; PERINEURAL
Status: DISCONTINUED | OUTPATIENT
Start: 2017-01-01 | End: 2017-01-01 | Stop reason: HOSPADM

## 2017-01-01 RX ORDER — FENTANYL CITRATE 50 UG/ML
25-50 INJECTION, SOLUTION INTRAMUSCULAR; INTRAVENOUS
Status: DISPENSED | OUTPATIENT
Start: 2017-01-01 | End: 2017-01-01

## 2017-01-01 RX ORDER — NALOXONE HYDROCHLORIDE 0.4 MG/ML
.2-.4 INJECTION, SOLUTION INTRAMUSCULAR; INTRAVENOUS; SUBCUTANEOUS
Status: ACTIVE | OUTPATIENT
Start: 2017-01-01 | End: 2017-01-01

## 2017-01-01 RX ORDER — SULFAMETHOXAZOLE/TRIMETHOPRIM 800-160 MG
1 TABLET ORAL 2 TIMES DAILY
Qty: 20 TABLET | Refills: 0 | Status: SHIPPED | OUTPATIENT
Start: 2017-01-01 | End: 2017-01-01

## 2017-01-01 RX ORDER — TIZANIDINE 2 MG/1
2-4 TABLET ORAL
Status: DISCONTINUED | OUTPATIENT
Start: 2017-01-01 | End: 2017-01-01 | Stop reason: HOSPADM

## 2017-01-01 RX ORDER — HALOPERIDOL 5 MG/ML
5 INJECTION INTRAMUSCULAR ONCE
Status: COMPLETED | OUTPATIENT
Start: 2017-01-01 | End: 2017-01-01

## 2017-01-01 RX ORDER — ATORVASTATIN CALCIUM 40 MG/1
40 TABLET, FILM COATED ORAL DAILY
Status: DISCONTINUED | OUTPATIENT
Start: 2017-01-01 | End: 2017-01-01 | Stop reason: HOSPADM

## 2017-01-01 RX ORDER — SODIUM CHLORIDE 9 MG/ML
INJECTION, SOLUTION INTRAVENOUS CONTINUOUS
Status: DISCONTINUED | OUTPATIENT
Start: 2017-01-01 | End: 2017-01-01

## 2017-01-01 RX ORDER — ONDANSETRON 2 MG/ML
4 INJECTION INTRAMUSCULAR; INTRAVENOUS EVERY 6 HOURS PRN
Status: DISCONTINUED | OUTPATIENT
Start: 2017-01-01 | End: 2017-01-01 | Stop reason: HOSPADM

## 2017-01-01 RX ORDER — ARGATROBAN 1 MG/ML
150 INJECTION, SOLUTION INTRAVENOUS
Status: DISCONTINUED | OUTPATIENT
Start: 2017-01-01 | End: 2017-01-01 | Stop reason: HOSPADM

## 2017-01-01 RX ORDER — SODIUM CHLORIDE 9 MG/ML
INJECTION, SOLUTION INTRAVENOUS CONTINUOUS
Status: ACTIVE | OUTPATIENT
Start: 2017-01-01 | End: 2017-01-01

## 2017-01-01 RX ADMIN — NITROGLYCERIN 0.4 MG: 0.4 TABLET SUBLINGUAL at 16:38

## 2017-01-01 RX ADMIN — MIDAZOLAM HYDROCHLORIDE 1 MG: 1 INJECTION, SOLUTION INTRAMUSCULAR; INTRAVENOUS at 16:31

## 2017-01-01 RX ADMIN — HALOPERIDOL LACTATE 5 MG: 5 INJECTION, SOLUTION INTRAMUSCULAR at 00:14

## 2017-01-01 RX ADMIN — HEPARIN SODIUM 10000 UNITS: 1000 INJECTION, SOLUTION INTRAVENOUS; SUBCUTANEOUS at 16:46

## 2017-01-01 RX ADMIN — ASPIRIN 325 MG ORAL TABLET 325 MG: 325 PILL ORAL at 14:16

## 2017-01-01 RX ADMIN — IOPAMIDOL 70 ML: 755 INJECTION, SOLUTION INTRAVASCULAR at 18:45

## 2017-01-01 RX ADMIN — TICAGRELOR 90 MG: 90 TABLET ORAL at 19:49

## 2017-01-01 RX ADMIN — TICAGRELOR 90 MG: 90 TABLET ORAL at 08:19

## 2017-01-01 RX ADMIN — HEPARIN SODIUM 6500 UNITS: 1000 INJECTION, SOLUTION INTRAVENOUS; SUBCUTANEOUS at 14:50

## 2017-01-01 RX ADMIN — LORAZEPAM 1 MG: 2 INJECTION INTRAMUSCULAR; INTRAVENOUS at 03:03

## 2017-01-01 RX ADMIN — MIDAZOLAM HYDROCHLORIDE 0.5 MG: 1 INJECTION, SOLUTION INTRAMUSCULAR; INTRAVENOUS at 16:35

## 2017-01-01 RX ADMIN — PANTOPRAZOLE SODIUM 40 MG: 40 INJECTION, POWDER, FOR SOLUTION INTRAVENOUS at 09:57

## 2017-01-01 RX ADMIN — HEPARIN SODIUM 2000 UNITS: 1000 INJECTION, SOLUTION INTRAVENOUS; SUBCUTANEOUS at 15:43

## 2017-01-01 RX ADMIN — HEPARIN SODIUM 1500 UNITS: 1000 INJECTION, SOLUTION INTRAVENOUS; SUBCUTANEOUS at 14:55

## 2017-01-01 RX ADMIN — HEPARIN SODIUM 1500 UNITS: 1000 INJECTION, SOLUTION INTRAVENOUS; SUBCUTANEOUS at 16:46

## 2017-01-01 RX ADMIN — TICAGRELOR 90 MG: 90 TABLET ORAL at 09:56

## 2017-01-01 RX ADMIN — HEPARIN SODIUM 500 UNITS: 1000 INJECTION, SOLUTION INTRAVENOUS; SUBCUTANEOUS at 16:46

## 2017-01-01 RX ADMIN — ATORVASTATIN CALCIUM 40 MG: 40 TABLET, FILM COATED ORAL at 13:35

## 2017-01-01 RX ADMIN — ASPIRIN 81 MG: 81 TABLET, COATED ORAL at 20:12

## 2017-01-01 RX ADMIN — TICAGRELOR 180 MG: 90 TABLET ORAL at 18:06

## 2017-01-01 RX ADMIN — PANTOPRAZOLE SODIUM 40 MG: 40 INJECTION, POWDER, FOR SOLUTION INTRAVENOUS at 08:20

## 2017-01-01 RX ADMIN — PANTOPRAZOLE SODIUM 80 MG: 40 INJECTION, POWDER, FOR SOLUTION INTRAVENOUS at 04:52

## 2017-01-01 RX ADMIN — HEPARIN SODIUM 3000 UNITS: 1000 INJECTION, SOLUTION INTRAVENOUS; SUBCUTANEOUS at 18:06

## 2017-01-01 RX ADMIN — IOPAMIDOL 30 ML: 755 INJECTION, SOLUTION INTRAVASCULAR at 16:15

## 2017-01-01 RX ADMIN — ATORVASTATIN CALCIUM 40 MG: 40 TABLET, FILM COATED ORAL at 08:19

## 2017-01-01 RX ADMIN — FENTANYL CITRATE 25 MCG: 50 INJECTION, SOLUTION INTRAMUSCULAR; INTRAVENOUS at 16:36

## 2017-01-01 RX ADMIN — ASPIRIN 81 MG: 81 TABLET, COATED ORAL at 09:56

## 2017-01-01 RX ADMIN — SODIUM CHLORIDE: 9 INJECTION, SOLUTION INTRAVENOUS at 14:56

## 2017-01-01 RX ADMIN — MIDAZOLAM HYDROCHLORIDE 0.5 MG: 1 INJECTION, SOLUTION INTRAMUSCULAR; INTRAVENOUS at 17:17

## 2017-01-01 RX ADMIN — FENTANYL CITRATE 50 MCG: 50 INJECTION, SOLUTION INTRAMUSCULAR; INTRAVENOUS at 16:30

## 2017-01-01 RX ADMIN — PANTOPRAZOLE SODIUM 40 MG: 40 INJECTION, POWDER, FOR SOLUTION INTRAVENOUS at 19:53

## 2017-01-01 RX ADMIN — LIDOCAINE HYDROCHLORIDE 10 ML: 20 JELLY TOPICAL at 21:57

## 2017-01-01 RX ADMIN — NITROGLYCERIN 200 MCG: 5 INJECTION, SOLUTION INTRAVENOUS at 15:50

## 2017-01-01 RX ADMIN — FENTANYL CITRATE 25 MCG: 50 INJECTION, SOLUTION INTRAMUSCULAR; INTRAVENOUS at 17:17

## 2017-01-01 RX ADMIN — ASPIRIN 81 MG: 81 TABLET, COATED ORAL at 08:19

## 2017-01-01 ASSESSMENT — PAIN DESCRIPTION - DESCRIPTORS: DESCRIPTORS: ACHING

## 2017-01-01 ASSESSMENT — PAIN SCALES - GENERAL: PAINLEVEL: NO PAIN (0)

## 2017-03-14 DIAGNOSIS — N18.4 CKD (CHRONIC KIDNEY DISEASE) STAGE 4, GFR 15-29 ML/MIN (H): Primary | ICD-10-CM

## 2017-03-14 NOTE — NURSING NOTE
Labs per clinic 2A protocol.  Follow up/CKD 3  Last OV: 10/31/16  Xaio Painting LPN  Nephrology  [facility]  [phone]

## 2017-03-17 ENCOUNTER — TELEPHONE (OUTPATIENT)
Dept: NEPHROLOGY | Facility: CLINIC | Age: 72
End: 2017-03-17

## 2017-03-28 ENCOUNTER — TELEPHONE (OUTPATIENT)
Dept: NEPHROLOGY | Facility: CLINIC | Age: 72
End: 2017-03-28

## 2017-03-28 NOTE — TELEPHONE ENCOUNTER
"Per Dr. Vyas, please call patient regarding follow up, ok in 6 months if that works for patient. Patient reports that he appreciated the appointments and was very satisfied with  and he felt that his PCP has good information, patient reports that \"I need to get my mind around what to do next and I do follow my PCP\". Will update Dr. Vyas. Provided number if patient has questions.  Xiao Painting LPN  Nephrology  [facility]  [phone]    "

## 2017-04-04 ENCOUNTER — OFFICE VISIT (OUTPATIENT)
Dept: FAMILY MEDICINE | Facility: CLINIC | Age: 72
End: 2017-04-04
Payer: MEDICARE

## 2017-04-04 VITALS
WEIGHT: 202.5 LBS | SYSTOLIC BLOOD PRESSURE: 179 MMHG | RESPIRATION RATE: 18 BRPM | OXYGEN SATURATION: 96 % | HEART RATE: 85 BPM | BODY MASS INDEX: 33.74 KG/M2 | TEMPERATURE: 97.4 F | HEIGHT: 65 IN | DIASTOLIC BLOOD PRESSURE: 82 MMHG

## 2017-04-04 DIAGNOSIS — I73.9 PERIPHERAL VASCULAR DISEASE (H): ICD-10-CM

## 2017-04-04 DIAGNOSIS — I10 HYPERTENSION GOAL BP (BLOOD PRESSURE) < 130/80: ICD-10-CM

## 2017-04-04 DIAGNOSIS — L02.419 CELLULITIS AND ABSCESS OF LEG: Primary | ICD-10-CM

## 2017-04-04 DIAGNOSIS — L03.119 CELLULITIS AND ABSCESS OF LEG: Primary | ICD-10-CM

## 2017-04-04 DIAGNOSIS — N18.4 CKD (CHRONIC KIDNEY DISEASE) STAGE 4, GFR 15-29 ML/MIN (H): ICD-10-CM

## 2017-04-04 PROCEDURE — 99214 OFFICE O/P EST MOD 30 MIN: CPT | Performed by: NURSE PRACTITIONER

## 2017-04-04 RX ORDER — SULFAMETHOXAZOLE/TRIMETHOPRIM 800-160 MG
1 TABLET ORAL 2 TIMES DAILY
Qty: 20 TABLET | Refills: 0 | Status: SHIPPED | OUTPATIENT
Start: 2017-04-04 | End: 2017-06-26

## 2017-04-04 NOTE — PROGRESS NOTES
SUBJECTIVE:                                                    Michael Lira is a 72 year old male who presents to clinic today for the following health issues:    Derm problem/ Bruise on leg      Duration: x 1 week     Description (location/character/radiation): bruise on right thigh - swollen     Accompanying signs and symptoms: none    History (similar episodes/previous evaluation): None    Therapies tried and outcome: tried cortisone cream - it helps heals it.      Very tender to touch.  No discharge or drainage.  No known injury or trauma.      BP out of control.  Forgot to take his medications the last few days.  Denies chest pain, WELDON, SOB, dizziness or lightheadedness.  No pain radiating to left arm or jaw.  No reflux.        Problem list and histories reviewed & adjusted, as indicated.  Additional history:     Patient Active Problem List   Diagnosis     CVA     Peripheral vascular disease (H)     Cerebral infarction due to occlusion or stenosis of carotid artery     Hyperlipidemia LDL goal <100     Hypertension goal BP (blood pressure) < 130/80     CKD (chronic kidney disease) stage 3, GFR 30-59 ml/min     Advanced directives, counseling/discussion     CKD (chronic kidney disease) stage 4, GFR 15-29 ml/min (H)     Past Surgical History:   Procedure Laterality Date     SURGICAL HISTORY OF -   10/02    angioplasty and stenting of left and internal carotid artery at the bifurcation     SURGICAL HISTORY OF - 11/11    right common iliac artery stent      VASCULAR SURGERY  2001    stent placed in carotid        Social History   Substance Use Topics     Smoking status: Former Smoker     Smokeless tobacco: Former User     Quit date: 8/1/1994      Comment: quit 15 years ago after his stroke     Alcohol use Yes      Comment: every evening 1-2 drinks     Family History   Problem Relation Age of Onset     C.A.D. Father      Hypertension Father      Prostate Cancer Father      C.A.RA Brother      MI      Hypertension Brother      Arthritis Sister      DIABETES No family hx of      CEREBROVASCULAR DISEASE No family hx of      Cancer - colorectal No family hx of          Current Outpatient Prescriptions   Medication Sig Dispense Refill     sulfamethoxazole-trimethoprim (BACTRIM DS/SEPTRA DS) 800-160 MG per tablet Take 1 tablet by mouth 2 times daily 20 tablet 0     losartan (COZAAR) 100 MG tablet Take 1 tablet (100 mg) by mouth daily 90 tablet 1     tiZANidine (ZANAFLEX) 2 MG tablet Take 1-2 tablets (2-4 mg) by mouth nightly as needed 60 tablet PRN     atorvastatin (LIPITOR) 40 MG tablet Take 1 tablet (40 mg) by mouth daily 90 tablet PRN     amLODIPine (NORVASC) 10 MG tablet Take 1 tablet (10 mg) by mouth daily 90 tablet PRN     atenolol (TENORMIN) 100 MG tablet Take 1 tablet (100 mg) by mouth daily 90 tablet PRN     cilostazol (PLETAL) 100 MG tablet Take 1 tablet (100 mg) by mouth 2 times daily on an empty stomach. 180 tablet PRN     Saw Palmetto, Serenoa repens, (SAW PALMETTO PO) Take by mouth daily       [DISCONTINUED] olmesartan-hydrochlorothiazide (BENICAR HCT) 40-25 MG per tablet Take 1 tablet by mouth daily 90 tablet 3     niacin 500 MG tablet Take 1 tablet (500 mg) by mouth At Bedtime 30 tablet PRN     Multiple Vitamins-Minerals (MULTIVITAMIN & MINERAL PO) Take 1 tablet by mouth daily.       diphenhydrAMINE (BENADRYL) 25 MG capsule Take 25 mg by mouth nightly as needed. sleep       lactobacillus rhamnosus, GG, (CULTURELLE) 10 B CELL capsule Take 1 capsule by mouth daily.       ASPIRIN 325 MG OR TBEC 1 tab po QD (Once per day) 100 3     Allergies   Allergen Reactions     No Known Drug Allergies        Reviewed and updated as needed this visit by clinical staff  Tobacco  Allergies  Meds  Problems  Med Hx  Surg Hx  Fam Hx  Soc Hx        Reviewed and updated as needed this visit by Provider  Allergies  Meds  Problems  Med Hx  Surg Hx  Fam Hx         ROS:  Constitutional, HEENT, cardiovascular,  "pulmonary, GI, , musculoskeletal, neuro, skin, endocrine and psych systems are negative, except as otherwise noted.    OBJECTIVE:                                                    /82 (BP Location: Right arm, Patient Position: Chair, Cuff Size: Adult Large)  Pulse 85  Temp 97.4  F (36.3  C) (Oral)  Resp 18  Ht 5' 5.25\" (1.657 m)  Wt 202 lb 8 oz (91.9 kg)  SpO2 96%  BMI 33.44 kg/m2  Body mass index is 33.44 kg/(m^2).  GENERAL: healthy, alert and no distress  EYES: Eyes grossly normal to inspection, PERRL and conjunctivae and sclerae normal  HENT: ear canals and TM's normal, nose and mouth without ulcers or lesions  NECK: no adenopathy, no asymmetry, masses, or scars and thyroid normal to palpation  RESP: lungs clear to auscultation - no rales, rhonchi or wheezes  CV: regular rate and rhythm, normal S1 S2, no S3 or S4, no murmur, click or rub, no peripheral edema and peripheral pulses strong  ABDOMEN: soft, nontender, no hepatosplenomegaly, no masses and bowel sounds normal  MS: no gross musculoskeletal defects noted, no edema  SKIN: 3 cm x 2 cm erythematous firm nodule on posterior right thigh.  Not fluctuant.  Otherwise no suspicious lesions or rashes         ASSESSMENT/PLAN:                                                        ICD-10-CM    1. Cellulitis and abscess of leg L02.419 sulfamethoxazole-trimethoprim (BACTRIM DS/SEPTRA DS) 800-160 MG per tablet    L03.119    2. Peripheral vascular disease (H) I73.9    3. Hypertension goal BP (blood pressure) < 130/80 I10    4. CKD (chronic kidney disease) stage 4, GFR 15-29 ml/min (H) N18.4      Discussed I and D vs oral antibiotics.  I and D less likely effective when not fluctuant.  Will start 10 days bactrim BID, if worsening or persisting may consider I and D.  F/u in 1 week.       See Patient Instructions    CUONG Du [facility]  "

## 2017-04-04 NOTE — MR AVS SNAPSHOT
"              After Visit Summary   2017    Michael Lira    MRN: [id number]           Patient Information     Date Of Birth          1945        Visit Information        Provider Department      2017 11:40 AM Angeles Odell APRN CNP [facility]        Today's Diagnoses     Cellulitis and abscess of leg    -  1    Peripheral vascular disease (H)        Hypertension goal BP (blood pressure) < 130/80        CKD (chronic kidney disease) stage 4, GFR 15-29 ml/min (H)           Follow-ups after your visit        Who to contact     If you have questions or need follow up information about today's clinic visit or your schedule please contact [facility] directly at [phone].  Normal or non-critical lab and imaging results will be communicated to you by [employer], letter or phone within 4 business days after the clinic has received the results. If you do not hear from us within 7 days, please contact the clinic through [employer] or phone. If you have a critical or abnormal lab result, we will notify you by phone as soon as possible.  Submit refill requests through [employer] or call your pharmacy and they will forward the refill request to us. Please allow 3 business days for your refill to be completed.          Additional Information About Your Visit        MyChart Information     [employer] lets you send messages to your doctor, view your test results, renew your prescriptions, schedule appointments and more. To sign up, go to [email or web address] . Click on \"Log in\" on the left side of the screen, which will take you to the Welcome page. Then click on \"Sign up Now\" on the right side of the page.     You will be asked to enter the access code listed below, as well as some personal information. Please follow the directions to create your username and password.     Your access code is: [email or web address]  Expires: 2017  6:30 AM     Your access code will  in " "90 days. If you need help or a new code, please call your [facility] or [phone].        Care EveryWhere ID     This is your Care EveryWhere ID. This could be used by other organizations to access your [place] medical records  [id number]        Your Vitals Were     Pulse Temperature Respirations Height Pulse Oximetry BMI (Body Mass Index)    85 97.4  F (36.3  C) (Oral) 18 5' 5.25\" (1.657 m) 96% 33.44 kg/m2       Blood Pressure from Last 3 Encounters:   04/04/17 179/82   11/14/16 136/83   10/31/16 (!) 179/91    Weight from Last 3 Encounters:   04/04/17 202 lb 8 oz (91.9 kg)   10/31/16 212 lb 11.2 oz (96.5 kg)   10/03/16 210 lb 12.8 oz (95.6 kg)              Today, you had the following     No orders found for display         Today's Medication Changes          These changes are accurate as of: 4/4/17 11:59 PM.  If you have any questions, ask your nurse or doctor.               Start taking these medicines.        Dose/Directions    sulfamethoxazole-trimethoprim 800-160 MG per tablet   Commonly known as:  BACTRIM DS/SEPTRA DS   Used for:  Cellulitis and abscess of leg   Started by:  Angeles Odell APRN CNP        Dose:  1 tablet   Take 1 tablet by mouth 2 times daily   Quantity:  20 tablet   Refills:  0            Where to get your medicines      These medications were sent to [id number] - [facility] [address]  [address] [facility] [address]     Phone:  [phone]     sulfamethoxazole-trimethoprim 800-160 MG per tablet                Primary Care Provider Office Phone # Fax #    Rebeca Sandoval [phone]       [facility] [address]  [facility] [address]        Thank you!     Thank you for choosing [facility]  for your care. Our goal is always to provide you with excellent care. Hearing back from our patients is one way we can continue to improve our services. Please take a few minutes to complete the " written survey that you may receive in the mail after your visit with us. Thank you!             Your Updated Medication List - Protect others around you: Learn how to safely use, store and throw away your medicines at www.disposemymeds.org.          This list is accurate as of: 4/4/17 11:59 PM.  Always use your most recent med list.                   Brand Name Dispense Instructions for use    amLODIPine 10 MG tablet    NORVASC    90 tablet    Take 1 tablet (10 mg) by mouth daily       aspirin 325 MG EC tablet     100    1 tab po QD (Once per day)       atenolol 100 MG tablet    TENORMIN    90 tablet    Take 1 tablet (100 mg) by mouth daily       atorvastatin 40 MG tablet    LIPITOR    90 tablet    Take 1 tablet (40 mg) by mouth daily       BENADRYL 25 MG capsule   Generic drug:  diphenhydrAMINE      Take 25 mg by mouth nightly as needed. sleep       cilostazol 100 MG tablet    PLETAL    180 tablet    Take 1 tablet (100 mg) by mouth 2 times daily on an empty stomach.       lactobacillus rhamnosus (GG) 10 B CELL capsule    CULTURELLE     Take 1 capsule by mouth daily.       losartan 100 MG tablet    COZAAR    90 tablet    Take 1 tablet (100 mg) by mouth daily       MULTIVITAMIN & MINERAL PO      Take 1 tablet by mouth daily.       niacin 500 MG tablet     30 tablet    Take 1 tablet (500 mg) by mouth At Bedtime       SAW PALMETTO PO      Take by mouth daily       sulfamethoxazole-trimethoprim 800-160 MG per tablet    BACTRIM DS/SEPTRA DS    20 tablet    Take 1 tablet by mouth 2 times daily       tiZANidine 2 MG tablet    ZANAFLEX    60 tablet    Take 1-2 tablets (2-4 mg) by mouth nightly as needed

## 2017-04-04 NOTE — NURSING NOTE
"Chief Complaint   Patient presents with     Derm Problem     bruise on leg       Initial /82 (BP Location: Right arm, Patient Position: Chair, Cuff Size: Adult Large)  Pulse 85  Temp 97.4  F (36.3  C) (Oral)  Resp 18  Ht 5' 5.25\" (1.657 m)  Wt 202 lb 8 oz (91.9 kg)  SpO2 96%  BMI 33.44 kg/m2 Estimated body mass index is 33.44 kg/(m^2) as calculated from the following:    Height as of this encounter: 5' 5.25\" (1.657 m).    Weight as of this encounter: 202 lb 8 oz (91.9 kg).  Medication Reconciliation: complete     Mimi Santa MA      "

## 2017-04-27 ENCOUNTER — TELEPHONE (OUTPATIENT)
Dept: FAMILY MEDICINE | Facility: CLINIC | Age: 72
End: 2017-04-27

## 2017-04-27 NOTE — TELEPHONE ENCOUNTER
Triaged call . Patient having rectal bleeding episodes over course of weeks. No dizziness or weakness.  He was told to have repeat colonoscopy in one year after the 2013 procedure due to numerous polyp removal. He wants to see provider first to get labs.  He knows he will need to be seen by GI for another colonscopy.  appt made with SB whom he has seen before.   Carol Holguin RN

## 2017-04-28 ENCOUNTER — OFFICE VISIT (OUTPATIENT)
Dept: FAMILY MEDICINE | Facility: CLINIC | Age: 72
End: 2017-04-28
Payer: MEDICARE

## 2017-04-28 VITALS
BODY MASS INDEX: 33.57 KG/M2 | WEIGHT: 201.5 LBS | DIASTOLIC BLOOD PRESSURE: 79 MMHG | RESPIRATION RATE: 16 BRPM | HEART RATE: 73 BPM | SYSTOLIC BLOOD PRESSURE: 129 MMHG | OXYGEN SATURATION: 97 % | HEIGHT: 65 IN | TEMPERATURE: 97.4 F

## 2017-04-28 DIAGNOSIS — K62.5 RECTAL BLEEDING: Primary | ICD-10-CM

## 2017-04-28 LAB — HGB BLD-MCNC: 11.5 G/DL (ref 13.3–17.7)

## 2017-04-28 PROCEDURE — 85018 HEMOGLOBIN: CPT | Performed by: NURSE PRACTITIONER

## 2017-04-28 PROCEDURE — 99214 OFFICE O/P EST MOD 30 MIN: CPT | Performed by: NURSE PRACTITIONER

## 2017-04-28 PROCEDURE — 36415 COLL VENOUS BLD VENIPUNCTURE: CPT | Performed by: NURSE PRACTITIONER

## 2017-04-28 NOTE — MR AVS SNAPSHOT
After Visit Summary   4/28/2017    Michael Lira    MRN: [id number]           Patient Information     Date Of Birth          1945        Visit Information        Provider Department      4/28/2017 2:20 PM Angeles Odell APRN CNP [facility]        Today's Diagnoses     Rectal bleeding    -  1       Follow-ups after your visit        Additional Services     GASTROENTEROLOGY ADULT REF PROCEDURE ONLY       Last Lab Result: Creatinine (mg/dL)       Date                     Value                 11/14/2016               1.82 (H)         ----------  Body mass index is 33.27 kg/(m^2).     Needed:  No  Language:  English    Patient will be contacted to schedule procedure.     Please be aware that coverage of these services is subject to the terms and limitations of your health insurance plan.  Call member services at your health plan with any benefit or coverage questions.  Any procedures must be performed at a [facility] facility OR coordinated by your clinic's referral office.    Please bring the following with you to your appointment:    (1) Any X-Rays, CTs or MRIs which have been performed.  Contact the facility where they were done to arrange for  prior to your scheduled appointment.    (2) List of current medications   (3) This referral request   (4) Any documents/labs given to you for this referral                  Who to contact     If you have questions or need follow up information about today's clinic visit or your schedule please contact [facility] directly at [phone].  Normal or non-critical lab and imaging results will be communicated to you by MyChart, letter or phone within 4 business days after the clinic has received the results. If you do not hear from us within 7 days, please contact the clinic through MyChart or phone. If you have a critical or abnormal lab result, we will notify you by phone as soon as  "possible.  Submit refill requests through [employer] or call your pharmacy and they will forward the refill request to us. Please allow 3 business days for your refill to be completed.          Additional Information About Your Visit        [employer] Information     [employer] lets you send messages to your doctor, view your test results, renew your prescriptions, schedule appointments and more. To sign up, go to [email or web address] . Click on \"Log in\" on the left side of the screen, which will take you to the Welcome page. Then click on \"Sign up Now\" on the right side of the page.     You will be asked to enter the access code listed below, as well as some personal information. Please follow the directions to create your username and password.     Your access code is: [email or web address]  Expires: 2017  6:30 AM     Your access code will  in 90 days. If you need help or a new code, please call your [place] clinic or [phone].        Care EveryWhere ID     This is your Care EveryWhere ID. This could be used by other organizations to access your [place] medical records  [id number]        Your Vitals Were     Pulse Temperature Respirations Height Pulse Oximetry BMI (Body Mass Index)    73 97.4  F (36.3  C) (Oral) 16 5' 5.25\" (1.657 m) 97% 33.27 kg/m2       Blood Pressure from Last 3 Encounters:   17 129/79   17 179/82   16 136/83    Weight from Last 3 Encounters:   17 201 lb 8 oz (91.4 kg)   17 202 lb 8 oz (91.9 kg)   10/31/16 212 lb 11.2 oz (96.5 kg)              We Performed the Following     GASTROENTEROLOGY ADULT REF PROCEDURE ONLY     Hemoglobin        Primary Care Provider Office Phone # Fax #    Rebeca Sandoval [phone]       [facility] [address]  [facility] [address]        Thank you!     Thank you for choosing [facility]  for your care. Our goal is always to provide you with excellent care. Hearing back from " our patients is one way we can continue to improve our services. Please take a few minutes to complete the written survey that you may receive in the mail after your visit with us. Thank you!             Your Updated Medication List - Protect others around you: Learn how to safely use, store and throw away your medicines at www.disposemymeds.org.          This list is accurate as of: 4/28/17  4:47 PM.  Always use your most recent med list.                   Brand Name Dispense Instructions for use    amLODIPine 10 MG tablet    NORVASC    90 tablet    Take 1 tablet (10 mg) by mouth daily       aspirin 325 MG EC tablet     100    1 tab po QD (Once per day)       atenolol 100 MG tablet    TENORMIN    90 tablet    Take 1 tablet (100 mg) by mouth daily       atorvastatin 40 MG tablet    LIPITOR    90 tablet    Take 1 tablet (40 mg) by mouth daily       BENADRYL 25 MG capsule   Generic drug:  diphenhydrAMINE      Take 25 mg by mouth nightly as needed. sleep       cilostazol 100 MG tablet    PLETAL    180 tablet    Take 1 tablet (100 mg) by mouth 2 times daily on an empty stomach.       lactobacillus rhamnosus (GG) 10 B CELL capsule    CULTURELLE     Take 1 capsule by mouth daily.       losartan 100 MG tablet    COZAAR    90 tablet    Take 1 tablet (100 mg) by mouth daily       MULTIVITAMIN & MINERAL PO      Take 1 tablet by mouth daily.       niacin 500 MG tablet     30 tablet    Take 1 tablet (500 mg) by mouth At Bedtime       SAW PALMETTO PO      Take by mouth daily       sulfamethoxazole-trimethoprim 800-160 MG per tablet    BACTRIM DS/SEPTRA DS    20 tablet    Take 1 tablet by mouth 2 times daily       tiZANidine 2 MG tablet    ZANAFLEX    60 tablet    Take 1-2 tablets (2-4 mg) by mouth nightly as needed

## 2017-04-28 NOTE — PROGRESS NOTES
SUBJECTIVE:                                                    Michael Lira is a 72 year old male who presents to clinic today for the following health issues:    Hemorrhoids      Duration: x 5- 6 days    Description:   Pain: no  Itching: no     Accompanying signs and symptoms:   Blood in stool: YES  Changes in stool pattern: no     History (similar episodes/previous evaluation):yes     Therapies tried and outcome: imodium - helps with the stool      Due for a repeat colonoscopy due to many polyps.  Would like to get hgb checked prior to going in for this.    Had blood on the tissue, then got worse and was dripping into the toilet for 2 days and dripping onto his pants when he stood up.  Took some imodium and the bleeding has since stopped 2 days ago.  Denied lightheadedness  Had MANY polyps removed at last colonoscopy and was due for repeat in 1 year (2014).  Pt never went in for the repeat scope.      Denies nausea or vomiting.  No abd pain or urinary problems.  Normal diet.  No exercise.      Denies chest pain, WELDON, SOB, dizziness or lightheadedness.  No pain radiating to left arm or jaw.  No reflux.      .    Problem list and histories reviewed & adjusted, as indicated.  Additional history: as documented    Patient Active Problem List   Diagnosis     CVA     Peripheral vascular disease (H)     Cerebral infarction due to occlusion or stenosis of carotid artery     Hyperlipidemia LDL goal <100     Hypertension goal BP (blood pressure) < 130/80     CKD (chronic kidney disease) stage 3, GFR 30-59 ml/min     Advanced directives, counseling/discussion     CKD (chronic kidney disease) stage 4, GFR 15-29 ml/min (H)     Past Surgical History:   Procedure Laterality Date     SURGICAL HISTORY OF -   10/02    angioplasty and stenting of left and internal carotid artery at the bifurcation     SURGICAL HISTORY OF - 11/11    right common iliac artery stent      VASCULAR SURGERY  2001    stent placed in carotid         Social History   Substance Use Topics     Smoking status: Former Smoker     Smokeless tobacco: Former User     Quit date: 8/1/1994      Comment: quit 15 years ago after his stroke     Alcohol use Yes      Comment: every evening 1-2 drinks     Family History   Problem Relation Age of Onset     C.A.D. Father      Hypertension Father      Prostate Cancer Father      C.A.D. Brother      MI     Hypertension Brother      Arthritis Sister      DIABETES No family hx of      CEREBROVASCULAR DISEASE No family hx of      Cancer - colorectal No family hx of          Current Outpatient Prescriptions   Medication Sig Dispense Refill     sulfamethoxazole-trimethoprim (BACTRIM DS/SEPTRA DS) 800-160 MG per tablet Take 1 tablet by mouth 2 times daily 20 tablet 0     losartan (COZAAR) 100 MG tablet Take 1 tablet (100 mg) by mouth daily 90 tablet 1     tiZANidine (ZANAFLEX) 2 MG tablet Take 1-2 tablets (2-4 mg) by mouth nightly as needed 60 tablet PRN     atorvastatin (LIPITOR) 40 MG tablet Take 1 tablet (40 mg) by mouth daily 90 tablet PRN     amLODIPine (NORVASC) 10 MG tablet Take 1 tablet (10 mg) by mouth daily 90 tablet PRN     atenolol (TENORMIN) 100 MG tablet Take 1 tablet (100 mg) by mouth daily 90 tablet PRN     cilostazol (PLETAL) 100 MG tablet Take 1 tablet (100 mg) by mouth 2 times daily on an empty stomach. 180 tablet PRN     Saw Palmetto, Serenoa repens, (SAW PALMETTO PO) Take by mouth daily       niacin 500 MG tablet Take 1 tablet (500 mg) by mouth At Bedtime 30 tablet PRN     Multiple Vitamins-Minerals (MULTIVITAMIN & MINERAL PO) Take 1 tablet by mouth daily.       diphenhydrAMINE (BENADRYL) 25 MG capsule Take 25 mg by mouth nightly as needed. sleep       lactobacillus rhamnosus, GG, (CULTURELLE) 10 B CELL capsule Take 1 capsule by mouth daily.       ASPIRIN 325 MG OR TBEC 1 tab po QD (Once per day) 100 3     [DISCONTINUED] olmesartan-hydrochlorothiazide (BENICAR HCT) 40-25 MG per tablet Take 1 tablet by mouth daily  "90 tablet 3     Allergies   Allergen Reactions     No Known Drug Allergies        Reviewed and updated as needed this visit by clinical staff  Tobacco  Allergies  Meds  Problems  Med Hx  Surg Hx  Fam Hx  Soc Hx        Reviewed and updated as needed this visit by Provider  Allergies  Meds  Problems  Med Hx  Surg Hx  Fam Hx         ROS:  Constitutional, HEENT, cardiovascular, pulmonary, GI, , musculoskeletal, neuro, skin, endocrine and psych systems are negative, except as otherwise noted.    OBJECTIVE:                                                    /79 (BP Location: Left arm, Patient Position: Chair, Cuff Size: Adult Large)  Pulse 73  Temp 97.4  F (36.3  C) (Oral)  Resp 16  Ht 5' 5.25\" (1.657 m)  Wt 201 lb 8 oz (91.4 kg)  SpO2 97%  BMI 33.27 kg/m2  Body mass index is 33.27 kg/(m^2).  GENERAL: healthy, alert and no distress  RESP: lungs clear to auscultation - no rales, rhonchi or wheezes  CV: regular rate and rhythm, normal S1 S2, no S3 or S4, no murmur, click or rub, no peripheral edema and peripheral pulses strong  ABDOMEN: soft, nontender, no hepatosplenomegaly, no masses and bowel sounds normal  RECTAL: normal sphincter tone, no rectal masses  MS: no gross musculoskeletal defects noted, no edema  SKIN: no suspicious lesions or rashes    Results for orders placed or performed in visit on 04/28/17   Hemoglobin   Result Value Ref Range    Hemoglobin 11.5 (L) 13.3 - 17.7 g/dL          ASSESSMENT/PLAN:                                                        ICD-10-CM    1. Rectal bleeding K62.5 Hemoglobin     GASTROENTEROLOGY ADULT REF PROCEDURE ONLY     Recommend repeat colonoscopy.  hgb low.  Recommend iron supplement.    See Patient Instructions    CUONG Du [facility]  "

## 2017-04-28 NOTE — NURSING NOTE
"Chief Complaint   Patient presents with     Rectal Problem     blood in stool        Initial /79 (BP Location: Left arm, Patient Position: Chair, Cuff Size: Adult Large)  Pulse 73  Temp 97.4  F (36.3  C) (Oral)  Resp 16  Ht 5' 5.25\" (1.657 m)  Wt 201 lb 8 oz (91.4 kg)  SpO2 97%  BMI 33.27 kg/m2 Estimated body mass index is 33.27 kg/(m^2) as calculated from the following:    Height as of this encounter: 5' 5.25\" (1.657 m).    Weight as of this encounter: 201 lb 8 oz (91.4 kg).  Medication Reconciliation: complete     Mimi Santa MA      "

## 2017-05-05 ENCOUNTER — HOSPITAL ENCOUNTER (OUTPATIENT)
Facility: CLINIC | Age: 72
End: 2017-05-05
Attending: INTERNAL MEDICINE | Admitting: INTERNAL MEDICINE
Payer: COMMERCIAL

## 2017-05-08 ENCOUNTER — TELEPHONE (OUTPATIENT)
Dept: FAMILY MEDICINE | Facility: CLINIC | Age: 72
End: 2017-05-08

## 2017-05-08 NOTE — TELEPHONE ENCOUNTER
Routing to provider - Jaime - please review and advise as appropriate  1. Patient is scheduled for colonoscopy 5/19/217  2. Medication question:  Anticoagulants   Patient requested med list - writer discussed procedure is within [place] and GI should have list available  3. Do you have recommendations for holding anticoagulants prior to colonoscopy   -ASPIRIN 325 MG OR TBEC   -cilostazol (PLETAL) 100 MG tablet?    Thank you,  Sherif Loya RN

## 2017-05-08 NOTE — TELEPHONE ENCOUNTER
Huddled with Jaime - OK to restart 24 hours later - unless directed otherwise by GI    Writer gave return call to patient and discussed provider response    Writer attempted to call and give verbal orders to [facility] - closed      HP Triage - please call [facility] and give orders for holding medications - [phone]       Thank you,  Sherif Loya RN

## 2017-05-09 ENCOUNTER — TELEPHONE (OUTPATIENT)
Dept: GASTROENTEROLOGY | Facility: CLINIC | Age: 72
End: 2017-05-09

## 2017-05-09 DIAGNOSIS — Z12.11 ENCOUNTER FOR SCREENING COLONOSCOPY: Primary | ICD-10-CM

## 2017-05-09 NOTE — TELEPHONE ENCOUNTER
Writer gave a call to KALIA - Rebeca Euceda - left detailed message with orders regarding holding anticoagulants prior to colonoscopy [phone]     Advised in the future this communication patient should not be involved in trying to gather orders -  this should be a direct call from KALIA Loya RN

## 2017-05-09 NOTE — TELEPHONE ENCOUNTER
Patient scheduled for Colonoscopy    Indication for procedure. Rectal bleeding    Referring Provider. Angeles Odell APRN CNP    ? Not Needed    Arrival time verified? Yes, 0915    Facility location verified? Yes, [address]    Instructions given regarding prep and procedure    Prep Type Cande    Are you taking any anticoagulants or blood thinners? Cilostazol and Aspirin    Instructions given? Per nurse Robert from [facility] clinic, patient is to stop Cilostazol and Aspirin 5 days prior to procedure.     Electronic implanted devices? No    Pre procedure teaching completed? Yes    Transportation from procedure? Sister, Sister will stay with patient after procedure    H&P / Pre op physical completed? N/A    Nurse, Yesica, from [facility] working with Rebeca Sandoval NP called and left message stating that patient was to stop Cilostazol and Aspirin 5 days prior to procedure. Confirmed this with patient. Patient states that he was also informed of this by provider.

## 2017-05-19 ENCOUNTER — HOSPITAL ENCOUNTER (OUTPATIENT)
Facility: CLINIC | Age: 72
Discharge: HOME | End: 2017-05-19
Attending: INTERNAL MEDICINE | Admitting: INTERNAL MEDICINE
Payer: MEDICARE

## 2017-05-19 ENCOUNTER — TELEPHONE (OUTPATIENT)
Dept: FAMILY MEDICINE | Facility: CLINIC | Age: 72
End: 2017-05-19

## 2017-05-19 ENCOUNTER — SURGERY (OUTPATIENT)
Age: 72
End: 2017-05-19

## 2017-05-19 ENCOUNTER — TELEPHONE (OUTPATIENT)
Dept: NURSING | Facility: CLINIC | Age: 72
End: 2017-05-19

## 2017-05-19 VITALS
HEART RATE: 61 BPM | BODY MASS INDEX: 36.65 KG/M2 | WEIGHT: 220 LBS | RESPIRATION RATE: 16 BRPM | DIASTOLIC BLOOD PRESSURE: 85 MMHG | HEIGHT: 65 IN | OXYGEN SATURATION: 96 % | SYSTOLIC BLOOD PRESSURE: 141 MMHG

## 2017-05-19 DIAGNOSIS — K63.9 DYSPLASIA-ASSOCIATED LESION OR MASS (DALM) OF COLON: Primary | ICD-10-CM

## 2017-05-19 DIAGNOSIS — K62.9 RECTAL LESION: Primary | ICD-10-CM

## 2017-05-19 LAB — COLONOSCOPY: NORMAL

## 2017-05-19 PROCEDURE — 45381 COLONOSCOPY SUBMUCOUS NJX: CPT | Performed by: INTERNAL MEDICINE

## 2017-05-19 PROCEDURE — 88341 IMHCHEM/IMCYTCHM EA ADD ANTB: CPT | Performed by: INTERNAL MEDICINE

## 2017-05-19 PROCEDURE — 99153 MOD SED SAME PHYS/QHP EA: CPT | Performed by: INTERNAL MEDICINE

## 2017-05-19 PROCEDURE — 88342 IMHCHEM/IMCYTCHM 1ST ANTB: CPT | Performed by: INTERNAL MEDICINE

## 2017-05-19 PROCEDURE — 25000125 ZZHC RX 250: Performed by: INTERNAL MEDICINE

## 2017-05-19 PROCEDURE — 45380 COLONOSCOPY AND BIOPSY: CPT | Performed by: INTERNAL MEDICINE

## 2017-05-19 PROCEDURE — 25000132 ZZH RX MED GY IP 250 OP 250 PS 637: Mod: GY | Performed by: INTERNAL MEDICINE

## 2017-05-19 PROCEDURE — 45385 COLONOSCOPY W/LESION REMOVAL: CPT | Mod: 52 | Performed by: INTERNAL MEDICINE

## 2017-05-19 PROCEDURE — G0500 MOD SEDAT ENDO SERVICE >5YRS: HCPCS | Performed by: INTERNAL MEDICINE

## 2017-05-19 PROCEDURE — 88305 TISSUE EXAM BY PATHOLOGIST: CPT | Performed by: INTERNAL MEDICINE

## 2017-05-19 PROCEDURE — 25000128 H RX IP 250 OP 636: Performed by: INTERNAL MEDICINE

## 2017-05-19 RX ORDER — LIDOCAINE 40 MG/G
CREAM TOPICAL
Status: DISCONTINUED | OUTPATIENT
Start: 2017-05-19 | End: 2017-05-19 | Stop reason: HOSPADM

## 2017-05-19 RX ORDER — FENTANYL CITRATE 50 UG/ML
INJECTION, SOLUTION INTRAMUSCULAR; INTRAVENOUS PRN
Status: DISCONTINUED | OUTPATIENT
Start: 2017-05-19 | End: 2017-05-19 | Stop reason: HOSPADM

## 2017-05-19 RX ORDER — ONDANSETRON 2 MG/ML
4 INJECTION INTRAMUSCULAR; INTRAVENOUS
Status: DISCONTINUED | OUTPATIENT
Start: 2017-05-19 | End: 2017-05-19 | Stop reason: HOSPADM

## 2017-05-19 RX ORDER — SIMETHICONE
LIQUID (ML) MISCELLANEOUS PRN
Status: DISCONTINUED | OUTPATIENT
Start: 2017-05-19 | End: 2017-05-19 | Stop reason: HOSPADM

## 2017-05-19 RX ADMIN — FENTANYL CITRATE 100 MCG: 50 INJECTION, SOLUTION INTRAMUSCULAR; INTRAVENOUS at 11:34

## 2017-05-19 RX ADMIN — MIDAZOLAM HYDROCHLORIDE 1 MG: 1 INJECTION, SOLUTION INTRAMUSCULAR; INTRAVENOUS at 11:34

## 2017-05-19 RX ADMIN — Medication 2 ML: at 11:34

## 2017-05-19 RX ADMIN — MIDAZOLAM HYDROCHLORIDE 1 MG: 1 INJECTION, SOLUTION INTRAMUSCULAR; INTRAVENOUS at 11:37

## 2017-05-19 NOTE — TELEPHONE ENCOUNTER
DAX pt.  She is out of the office.      Last week had BRBPR hgb drpped, ref for colonoscopy    Prior colonoscopy with high grade dysplasia.      Likely cancer.  biopsy pending.      Poor prep so he did not complete the scan    Next steps wait for pathology  CT chest abd and pelvis with contrast.  Repeat colonscopy with 2 gallons prep in the next few days or week.    Refer to colorectal and oncology.      Advised pt, sister and  and the next steps.  He does not drive.  Sister does not live with him.     Do not resume ASA and pletal until cleared by MD due to bleeding risks.       Discussed dizziness, palp or weakness to go to ER.

## 2017-05-19 NOTE — TELEPHONE ENCOUNTER
DOD>>Received call from nurse- requesting that Dr White get a call back by the end of the day.  He performed a colonoscopy on this pt today and found colon cancer.  Please call this doctor back by the end of the day. His contact info is above.  Thanks!     Génesis Johnson RN

## 2017-05-19 NOTE — TELEPHONE ENCOUNTER
Clinic Care Coordination Contact    RN CC spoke with pt family and they will find out more on Monday regarding the plan for scheduling more tests and possible resection next wk  RN CC provided phone contact in the event that they have further questions or needs  Estefania Akers RN Clinic Care Coordinator  [facility] [phone]

## 2017-05-19 NOTE — IP AVS SNAPSHOT
[facility], [place], Endoscopy    [address] [facility] [address]    Phone:  [phone]                                       After Visit Summary   5/19/2017    Michael Lira    MRN: [id number]           After Visit Summary Signature Page     I have received my discharge instructions, and my questions have been answered. I have discussed any challenges I see with this plan with the nurse or doctor.    ..........................................................................................................................................  Patient/Patient Representative Signature      ..........................................................................................................................................  Patient Representative Print Name and Relationship to Patient    ..................................................               ................................................  Date                                            Time    ..........................................................................................................................................  Reviewed by Signature/Title    ...................................................              ..............................................  Date                                                            Time

## 2017-05-19 NOTE — TELEPHONE ENCOUNTER
Pt still in post colonoscopy recovery  Left message on voicemail for sister  Care coordination referral done per Angeles Odell CNP request  Ultimately, we need to help pt get to a CT on Monday- has not been scheduled yet  Génesis Johnson RN

## 2017-05-19 NOTE — TELEPHONE ENCOUNTER
"They told him not to drink before procedure. \"He's an hidden alcoholic\". Sister afraid he'll go into DTs. Please call sister, Kim. Should he completely stop drinking? Does he need to be hospitalized earlier? She thinks Michael hasn't told anyone about his alcoholism.  Please call about this issue. She thinks the clonoscopy is scheduled for Wednesday.  Ania Hoffmann RN-[facility]    "

## 2017-05-19 NOTE — IP AVS SNAPSHOT
MRN:[id number]                      After Visit Summary   5/19/2017    Michael Lira    MRN: [id number]           Thank you!     Thank you for choosing [facility] for your care. Our goal is always to provide you with excellent care. Hearing back from our patients is one way we can continue to improve our services. Please take a few minutes to complete the written survey that you may receive in the mail after you visit with us. Thank you!        Patient Information     Date Of Birth          1945        About your hospital stay     You were admitted on:  May 19, 2017 You last received care in the:  [facility], Endoscopy    You were discharged on:  May 19, 2017       Who to Call     For medical emergencies, please call 911.  For non-urgent questions about your medical care, please call your primary care provider or clinic, [phone]  For questions related to your surgery, please call your surgery clinic        Attending Provider     Provider Specialty    Maximus Dinero MD Hepatology       Primary Care Provider Office Phone # Fax #    Rebeca Sandoval [phone]       [facility] [address] [facility] [address]        Further instructions from your care team       Discharge Instructions after Colonoscopy - Dr. Dinero     Today you had a __x__ Colonoscopy     Activity and Diet  You were given medicine for pain. You may be dizzy or sleepy.  For 24 hours:    Do not drive or use heavy equipment.    Do not make important decisions.    Do not drink any alcohol.  You may return to your normal diet and medicines.    Discomfort    Air was placed in your colon during the exam in order to see it. Walking helps to pass the air.    You may take Tylenol (acetaminophen) for pain unless your doctor has told you not to.  Do not take aspirin or ibuprofen (Advil, Motrin, or other anti-inflammatory  drugs) for _3____ days.    Follow-up  _x___ We took small tissue  "samples or polyps to study. Your doctor will call you or send you the results  within two weeks.    When to call:    Call right away if you have:    Unusual pain in belly or chest pain not relieved with passing air.    More than 1 to 2 Tablespoons of bleeding from your rectum.    Fever above 100.6  F (37.5  C).    If you have severe pain, bleeding, or shortness of breath, go to an emergency room.    If you have questions, call:  Monday to Friday, 7 a.m. to 4:30 p.m.  Endoscopy: [phone] (We may have to call you back)    After hours  Hospital: [phone] (Ask for the GI fellow on call)    Pending Results     No orders found from 2017 to 2017.            Admission Information     Date & Time Provider Department Dept. Phone    2017 Maximus Dinero MD [facility], [place], Endoscopy [phone]      Your Vitals Were     Blood Pressure Pulse Respirations Height Weight Pulse Oximetry    141/85 61 16 1.651 m (5' 5\") 99.8 kg (220 lb) 96%    BMI (Body Mass Index)                   36.61 kg/m2           MyChart Information     [facility] lets you send messages to your doctor, view your test results, renew your prescriptions, schedule appointments and more. To sign up, go to [email or web address] . Click on \"Log in\" on the left side of the screen, which will take you to the Welcome page. Then click on \"Sign up Now\" on the right side of the page.     You will be asked to enter the access code listed below, as well as some personal information. Please follow the directions to create your username and password.     Your access code is: [email or web address]  Expires: 2017  6:30 AM     Your access code will  in 90 days. If you need help or a new code, please call your [place] clinic or [phone].        Care EveryWhere ID     This is your Care EveryWhere ID. This could be used by other organizations to access your [place] medical records  [id number]           Review of your medicines      UNREVIEWED " medicines. Ask your doctor about these medicines        Dose / Directions    amLODIPine 10 MG tablet   Commonly known as:  NORVASC   Used for:  Essential hypertension with goal blood pressure less than 130/80        Dose:  10 mg   Take 1 tablet (10 mg) by mouth daily   Quantity:  90 tablet   Refills:  PRN       aspirin 325 MG EC tablet        1 tab po QD (Once per day)   Quantity:  100   Refills:  3       atenolol 100 MG tablet   Commonly known as:  TENORMIN   Used for:  Essential hypertension with goal blood pressure less than 130/80        Dose:  100 mg   Take 1 tablet (100 mg) by mouth daily   Quantity:  90 tablet   Refills:  PRN       atorvastatin 40 MG tablet   Commonly known as:  LIPITOR   Used for:  Hyperlipidemia LDL goal <100        Dose:  40 mg   Take 1 tablet (40 mg) by mouth daily   Quantity:  90 tablet   Refills:  PRN       BENADRYL 25 MG capsule   Generic drug:  diphenhydrAMINE        Dose:  25 mg   Take 25 mg by mouth nightly as needed. sleep   Refills:  0       cilostazol 100 MG tablet   Commonly known as:  PLETAL   Used for:  Peripheral vascular disease (H)        Dose:  100 mg   Take 1 tablet (100 mg) by mouth 2 times daily on an empty stomach.   Quantity:  180 tablet   Refills:  PRN       lactobacillus rhamnosus (GG) 10 B CELL capsule   Commonly known as:  CULTURELLE        Dose:  1 capsule   Take 1 capsule by mouth daily.   Refills:  0       losartan 100 MG tablet   Commonly known as:  COZAAR   Used for:  Essential hypertension with goal blood pressure less than 130/80        Dose:  100 mg   Take 1 tablet (100 mg) by mouth daily   Quantity:  90 tablet   Refills:  1       MULTIVITAMIN & MINERAL PO        Dose:  1 tablet   Take 1 tablet by mouth daily.   Refills:  0       niacin 500 MG tablet   Used for:  Hyperlipidemia LDL goal <100        Dose:  500 mg   Take 1 tablet (500 mg) by mouth At Bedtime   Quantity:  30 tablet   Refills:  PRN       SAW PALMETTO PO        Take by mouth daily   Refills:  0        sulfamethoxazole-trimethoprim 800-160 MG per tablet   Commonly known as:  BACTRIM DS/SEPTRA DS   Used for:  Cellulitis and abscess of leg        Dose:  1 tablet   Take 1 tablet by mouth 2 times daily   Quantity:  20 tablet   Refills:  0       tiZANidine 2 MG tablet   Commonly known as:  ZANAFLEX   Used for:  Bilateral low back pain without sciatica, unspecified chronicity        Dose:  2-4 mg   Take 1-2 tablets (2-4 mg) by mouth nightly as needed   Quantity:  60 tablet   Refills:  PRN                Protect others around you: Learn how to safely use, store and throw away your medicines at www.disposemymeds.org.             Medication List: This is a list of all your medications and when to take them. Check marks below indicate your daily home schedule. Keep this list as a reference.      Medications           Morning Afternoon Evening Bedtime As Needed    amLODIPine 10 MG tablet   Commonly known as:  NORVASC   Take 1 tablet (10 mg) by mouth daily                                aspirin 325 MG EC tablet   1 tab po QD (Once per day)                                atenolol 100 MG tablet   Commonly known as:  TENORMIN   Take 1 tablet (100 mg) by mouth daily                                atorvastatin 40 MG tablet   Commonly known as:  LIPITOR   Take 1 tablet (40 mg) by mouth daily                                BENADRYL 25 MG capsule   Take 25 mg by mouth nightly as needed. sleep   Generic drug:  diphenhydrAMINE                                cilostazol 100 MG tablet   Commonly known as:  PLETAL   Take 1 tablet (100 mg) by mouth 2 times daily on an empty stomach.                                lactobacillus rhamnosus (GG) 10 B CELL capsule   Commonly known as:  CULTURELLE   Take 1 capsule by mouth daily.                                losartan 100 MG tablet   Commonly known as:  COZAAR   Take 1 tablet (100 mg) by mouth daily                                MULTIVITAMIN & MINERAL PO   Take 1 tablet by mouth daily.                                 niacin 500 MG tablet   Take 1 tablet (500 mg) by mouth At Bedtime                                SAW PALMETTO PO   Take by mouth daily                                sulfamethoxazole-trimethoprim 800-160 MG per tablet   Commonly known as:  BACTRIM DS/SEPTRA DS   Take 1 tablet by mouth 2 times daily                                tiZANidine 2 MG tablet   Commonly known as:  ZANAFLEX   Take 1-2 tablets (2-4 mg) by mouth nightly as needed

## 2017-05-19 NOTE — OR NURSING
Colonoscopy not fully completed do to poor prep. Polypectomy with hot snare (2 effect and 25 vera) and biopsies of mass. Oxygen given per nasal cannula at 2 L. Vital signs stable. Pt with mild discomfort given PRN mediation as instructed.  Tolerated procedure well. Returned to recovery area.

## 2017-05-19 NOTE — DISCHARGE INSTRUCTIONS
Discharge Instructions after Colonoscopy - Dr. Dinero     Today you had a __x__ Colonoscopy     Activity and Diet  You were given medicine for pain. You may be dizzy or sleepy.  For 24 hours:    Do not drive or use heavy equipment.    Do not make important decisions.    Do not drink any alcohol.  You may return to your normal diet and medicines.    Discomfort    Air was placed in your colon during the exam in order to see it. Walking helps to pass the air.    You may take Tylenol (acetaminophen) for pain unless your doctor has told you not to.  Do not take aspirin or ibuprofen (Advil, Motrin, or other anti-inflammatory  drugs) for _3____ days.    Follow-up  _x___ We took small tissue samples or polyps to study. Your doctor will call you or send you the results  within two weeks.    When to call:    Call right away if you have:    Unusual pain in belly or chest pain not relieved with passing air.    More than 1 to 2 Tablespoons of bleeding from your rectum.    Fever above 100.6  F (37.5  C).    If you have severe pain, bleeding, or shortness of breath, go to an emergency room.    If you have questions, call:  Monday to Friday, 7 a.m. to 4:30 p.m.  Endoscopy: [phone] (We may have to call you back)    After hours  Hospital: [phone] (Ask for the GI fellow on call)

## 2017-05-22 ENCOUNTER — TELEPHONE (OUTPATIENT)
Dept: FAMILY MEDICINE | Facility: CLINIC | Age: 72
End: 2017-05-22

## 2017-05-22 ENCOUNTER — CARE COORDINATION (OUTPATIENT)
Dept: GASTROENTEROLOGY | Facility: CLINIC | Age: 72
End: 2017-05-22

## 2017-05-22 ENCOUNTER — CARE COORDINATION (OUTPATIENT)
Dept: CARE COORDINATION | Facility: CLINIC | Age: 72
End: 2017-05-22

## 2017-05-22 ENCOUNTER — TELEPHONE (OUTPATIENT)
Dept: GASTROENTEROLOGY | Facility: CLINIC | Age: 72
End: 2017-05-22

## 2017-05-22 DIAGNOSIS — Z12.11 ENCOUNTER FOR SCREENING COLONOSCOPY: Primary | ICD-10-CM

## 2017-05-22 RX ORDER — BISACODYL 5 MG/1
30 TABLET, DELAYED RELEASE ORAL ONCE
Qty: 6 TABLET | Refills: 0 | Status: SHIPPED | OUTPATIENT
Start: 2017-05-22 | End: 2017-05-22

## 2017-05-22 NOTE — PROGRESS NOTES
Clinic Care Coordination Contact  OUTREACH    Referral Information:  Referral Source: CTS  Reason for Contact: post discharge colonoscopy , unable to have full view of colon and pt will go back for 2nd colonoscopy on 5/24/17    Care Conference: Yes     Universal Utilization:   ED Visits in last year: 0  Hospital visits in last year: 1   Last PCP appointment: 04/28/17     Concerns: alcohol use  Multiple Providers or Specialists: Dr Dinero, gastro    Clinical Concerns:  Current Medical Concerns: pt has cancer of the colon and a partial colonoscopy from last wk as they were unable to visualize past a partial prep  Current Behavioral Concerns: family is concerned with pt's use of a daily drink of whisky   Education Provided to patient: reinforcement of bowel prep that starts today  Clinical Pathway Name: None  Clinical Pathway: None    Medication Management:  Current Outpatient Prescriptions   Medication     bisacodyl (DULCOLAX) 5 MG EC tablet     polyethylene glycol (GOLYTELY/NULYTELY) 236 G suspension     sulfamethoxazole-trimethoprim (BACTRIM DS/SEPTRA DS) 800-160 MG per tablet     losartan (COZAAR) 100 MG tablet     tiZANidine (ZANAFLEX) 2 MG tablet     atorvastatin (LIPITOR) 40 MG tablet     amLODIPine (NORVASC) 10 MG tablet     atenolol (TENORMIN) 100 MG tablet     cilostazol (PLETAL) 100 MG tablet     Saw Palmetto, Serenoa repens, (SAW PALMETTO PO)     [DISCONTINUED] olmesartan-hydrochlorothiazide (BENICAR HCT) 40-25 MG per tablet     niacin 500 MG tablet     Multiple Vitamins-Minerals (MULTIVITAMIN & MINERAL PO)     diphenhydrAMINE (BENADRYL) 25 MG capsule     lactobacillus rhamnosus, GG, (CULTURELLE) 10 B CELL capsule     ASPIRIN 325 MG OR TBEC     No current facility-administered medications for this visit.           Functional Status:  Mobility Status: Independent     Transportation: pt has a ride by his sister for wednesday procedure        Psychosocial:  Current living arrangement:: I live  alone  Financial/Insurance:Medicare       Resources and Interventions:  Current Resources:  Family support                 Goals:   Goal 1 Statement: Patient will be safe for all procedures  Goal 1 Progression Percent: 75%  Goal 1 Progression Date: 05/22/17              Barriers: alcohol use  Strengths: family support  Patient/Caregiver understanding: yes           Plan: RN CC will follow pt as active as he has at least one upcoming procedure and possible surgeries in the future    Estefania Akers RN Clinic Care Coordinator  [facility] [phone]

## 2017-05-22 NOTE — PROGRESS NOTES
Clinic Care Coordination Contact  Care Team Conversations    RN CC received note from RN Triage stating that pt's sister had called concerned with pt's use of alcohol and upcoming procedure in tow days  RN CC outreach to Charge GERMANIA Maira in [facility] center asking duration that pt should be off alcohol  Pt should be off for minimum of 2 days per Crow RN    RN CC LM for Estefania Mcgarry LPN to call back. She is from Dr Dinero's office who is the physician for May 24 procedure    Plan   Outreach to pt once message passed on to Estefania Mcgarry LPN  Outreach to pt within 24 hours  Estefania Akers RN Clinic Care Coordinator  [facility] [phone]

## 2017-05-22 NOTE — TELEPHONE ENCOUNTER
Patient's sister, Kim, called and spoke with writer.    Per Kim:  1. Patient has alcoholism   A. He was told not to drink before repeat colonoscopy   B. First colonoscopy found cancer-which was staged   C. Concerned over danger of sudden alcohol cessation  2. Will patient be admitted?   A. Thinks this will be in his best interest  3. Unsure if patient was drinking over weekend   A. Patient reports drinking 1 whiskey per night  4. Wants to notify surgeon of patient's alcoholism and that patient is off blood thinner medication  5. Patient has history of stroke and kidney failure    Writer explained to Kim:  1. Typically, admission would be determined day of procedure, depending on what is found and what treatment plan is.  Admission would likely originate from GI or Oncology, family practice providers do not admit patient's.  2. Will get message to RN care coordinator, who spoke with family on 5/19/17, PCP and HP triage.    Kim verbalized understanding.    Estefania-Please contact Kim at your earliest convenience.    Arlet and Triage-Please review.  Writer unsure if Dr. White is aware of patient's alcohol use.    Thank you!  NATALIE Hair, BSN, RN

## 2017-05-22 NOTE — LETTER
Health Care Home - Access Care Plan    About Me  Patient Name:  Michael Monk    YOB: 1945  Age:                            72 year old   Cathryn MRN:         [id number] Telephone Information:     Home Phone [phone]   Mobile Not on file.       Address:    [address] [place] [address] [address]  [place] [address] Email address:  No e-mail address on record      Emergency Contact(s)  Name Relationship Lgl Grd Work Phone Home Phone Mobile Phone   1. NALINI NICHOLSON Sister   [phone]   2. ANGELO MONK Sister  [phone]               Health Maintenance:      My Access Plan  Medical Emergency 911   Questions or concerns during clinic hours Primary Clinic Line, I will call the clinic directly: Primary Clinic: [facility] [phone]   24 Hour Appointment Line [phone] or  [phone] ([phone])  (toll free)   24 Hour Nurse Line [phone] (toll free)   Questions or concerns outside clinic hours 24 Hour Appointment Line, I will call the after-hours on-call line:   [facility] [phone] or [phone] ([phone]) (toll-free)   Preferred Urgent Care Preferred Urgent Care: [facility], [phone]   Preferred Hospital Preferred Hospital: [facility]  [phone]   Preferred Pharmacy [facility] Crisis Line [facility], [phone] or [phone]     My Care Team Members  Patient Care Team       Relationship Specialty Notifications Start End    Rebeca Sandoval, NP PCP - General   3/28/03     Phone: [phone] Fax: [phone]         [facility] [address] [place] [facility] [address]    Lance Eason MD Resident Student in organized health care education/training program  7/20/16     Phone: [phone] Fax: [phone]         [facility] [address] [facility] [address]    Tolu  Orion GIL MD MD Radiology  8/16/16     Phone: [phone] Fax: [phone]         [address] [address]    Estefania Akers, RN Registered Nurse  Admissions 5/22/17     Comment:  ELLA Ramos RN [phone]        My Medical and Care Information  Problem List   Patient Active Problem List   Diagnosis     CVA     Peripheral vascular disease (H)     Cerebral infarction due to occlusion or stenosis of carotid artery     Hyperlipidemia LDL goal <100     Hypertension goal BP (blood pressure) < 130/80     CKD (chronic kidney disease) stage 3, GFR 30-59 ml/min     Advanced directives, counseling/discussion     CKD (chronic kidney disease) stage 4, GFR 15-29 ml/min (H)      Current Medications and Allergies:  See printed Medication Report

## 2017-05-22 NOTE — PROGRESS NOTES
Clinic Care Coordination Contact  Care Team Conversations    Pt's sister states that her brother has drank for many years and has at least one whisky every pm  Pt does not act drunk when he drinks and he does not fall  Kim states that he used to drink a lot more in years past  Estefania Akers RN Clinic Care Coordinator  [facility] [phone]

## 2017-05-22 NOTE — PROGRESS NOTES
5/22/2017  1:35 PM      RN Care Coordination   Received a message from the RN TIMOTHY Christine from [facility] with concerns that the above patient has been actively drinking prior to his colonoscopy that will be performed by Dr. Dinero on 5/24/17. I did connect with patient who states he stopped drinking 2 days ago and denies fever, chills, tremors, nausea or any other possible alcohol withdrawal symptom. Patient states he consumes 1-2 drinks daily of hard liquor mixed with water. He states at times he has gone a few days without consuming alcohol. I did update Dr. Dinero on the conversation with patient. Per provider patient can continue with his prep for the colonoscopy and the scheduled date but if patient does start to have symptoms of alcohol withdrawal prior to the procedure, he will need to be evaluated in the ER and the colonoscopy rescheduled. Patient verbalizes understanding and is aware to contact his [facility] PCP with any further questions or concerns.     Both patient and GERMANIA Mac, had questions regarding further f/u with GI or Oncology. The medical concern is GI/ONC related not Liver related. Dr. Dinero is trained in performing a colonoscopy but this does not mean patient has established care in the Hepatology department. I did f/u and recommend for GERMANIA Mac from [facility] clinic, to assist patient with setting up a f/u visit in clinic with either GI/ONC by discussing this with the referring PCP at her clinic. I am more then happy to answer any other questions both parties may have. My direct contact information was given if there are any further questions or concerns.         Ericka Jesus RN, BSN, PHN  [facility]   RN Care Coordinator for Hepatology Specialty Clinic/Program

## 2017-05-22 NOTE — PROGRESS NOTES
"Clinic Care Coordination Contact  Care Team Conversations    RN CC received call from pt sister who dos not have a consent to communicate on file from her brother the pt  \"GERMANIA AGUIRRE encouraged Kim to talk with her brother to ask him to sign papers to allow us to speak with her if needed  Destiny is concerned that \"she and her  will need to be pt's legal guardian if something bad happens to him\"  GERMANIA AGUIRRE encouraged Kim to speak with her brother regarding her concerns and that legal guardian is not instituted until someone is not able to make decisions for themselves  Kim states that she tends to worry a lot and that she is probably way ahead of herself  RN CC encouraged her to have a notebook handy at appointments to write down important information and for questions as she thinks of them  Kim states that she feels better and will obtain a notebook and that she will try not to worry   RN CC encouraged her to speak with her brother and to call clinic any time with concerns although she cannot get answers at this time  Estefania Akers RN Clinic Care Coordinator  [facility] [phone]        "

## 2017-05-22 NOTE — TELEPHONE ENCOUNTER
Patient scheduled for Colonoscopy     Indication for procedure. Rectal lesion     Referring Provider. Maximus Dinero MD     ? Not Needed     Arrival time verified? Yes, 0900     Facility location verified? Yes, [address]     Instructions given regarding prep and procedure     Prep Type 2 day Golyoni (Had past poor prep)     Are you taking any anticoagulants or blood thinners? Cilostazol and Aspirin     Instructions given? Per nurse Robert from Rebeca Sandoval NP clinic, patient is to stop Cilostazol and Aspirin 5 days prior to procedure.      Electronic implanted devices? No     Pre procedure teaching completed? Yes     Transportation from procedure? Brother In [facility]     H&P / Pre op physical completed? N/A     Nurse, Yesica, from [facility] working with Rebeca Sandoval NP called and left message stating that patient was to stop Cilostazol and Aspirin 5 days prior to procedure. Confirmed this with patient. Patient states that he was also informed of this by provider.

## 2017-05-22 NOTE — TELEPHONE ENCOUNTER
Note      Clinic Care Coordination Contact  Care Team Conversations     RN CC received note from RN Triage stating that pt's sister had called concerned with pt's use of alcohol and upcoming procedure in tow days  RN CC outreach to Charge GERMANIA Maria in [facility] asking duration that pt should be off alcohol  Pt should be off for minimum of 2 days per Crow RN     RN CC LM for Estefania Mcgarry LPN to call back. She is from Dr Dinero's office who is the physician for May 24 procedure     Plan   Outreach to pt once message passed on to Estefania Mcgarry LPN  Outreach to pt within 24 hours  Estefania Akers RN Clinic Care Coordinator  [facility] [phone]

## 2017-05-23 NOTE — PROGRESS NOTES
Clinic Care Coordination Contact  Care Team Ericka Alexander RN Frazin, Karen A, RN       Phone Number: [phone]                     Cody Mac,     Patient can go ahead with his colonoscopy. I already updated patient.     Per Dr. Dinero, this is a patient that should have all of his appointments set up by the referring provider for the other concerns. He could either follow up with Oncology or GI after the colonoscopy which will be done on 5/24/17. This patient was never seen in the hepatology clinic by Dr. Dinero, only for an outpatient procedure which is what Dr. Dinero performed. The other follow ups should be scheduled by the referring clinic where he has established care.       Usually for the patients who have established care here in our clinic, if we know they are having a procedure done, we usually schedule the f/u to the referring clinic such as ONC, GI, etc., a week or two later if that helps you in determining when to schedule the follow ups.       If the above patient was seen in our clinic and diagnosed with a liver ailment then we would take over for the concerning liver medical issue. Again, he has never been seen in the Hepatology clinic for a liver condition, he saw Dr. Dinero for the colonoscopy procedure.       Thank you,           Ericka Jesus RN, BSN, PHN   [facility]   RN Care Coordinator for Hepatology Specialty Clinic/Program

## 2017-05-23 NOTE — PROGRESS NOTES
Clinic Care Coordination Contact  [facility]/Voicemail    Referral Source: [facility]  Clinical Data: Care Coordinator Outreach  RN CC outreach attempt as pre procedure call for tomorrow  Phone was busy and unable to leave message on phone  Estefania Akers RN Clinic Care Coordinator  [facility] [phone]

## 2017-05-24 ENCOUNTER — SURGERY (OUTPATIENT)
Age: 72
End: 2017-05-24

## 2017-05-24 ENCOUNTER — HOSPITAL ENCOUNTER (OUTPATIENT)
Facility: CLINIC | Age: 72
Discharge: HOME | End: 2017-05-24
Attending: INTERNAL MEDICINE | Admitting: INTERNAL MEDICINE
Payer: MEDICARE

## 2017-05-24 VITALS
DIASTOLIC BLOOD PRESSURE: 77 MMHG | HEART RATE: 71 BPM | OXYGEN SATURATION: 93 % | WEIGHT: 220 LBS | BODY MASS INDEX: 36.65 KG/M2 | SYSTOLIC BLOOD PRESSURE: 139 MMHG | HEIGHT: 65 IN | RESPIRATION RATE: 20 BRPM

## 2017-05-24 LAB — COLONOSCOPY: NORMAL

## 2017-05-24 PROCEDURE — G0500 MOD SEDAT ENDO SERVICE >5YRS: HCPCS | Performed by: INTERNAL MEDICINE

## 2017-05-24 PROCEDURE — 99153 MOD SED SAME PHYS/QHP EA: CPT | Performed by: INTERNAL MEDICINE

## 2017-05-24 PROCEDURE — 25000128 H RX IP 250 OP 636: Performed by: INTERNAL MEDICINE

## 2017-05-24 PROCEDURE — 25000125 ZZHC RX 250: Performed by: INTERNAL MEDICINE

## 2017-05-24 PROCEDURE — 25000132 ZZH RX MED GY IP 250 OP 250 PS 637: Mod: GY | Performed by: INTERNAL MEDICINE

## 2017-05-24 PROCEDURE — 45385 COLONOSCOPY W/LESION REMOVAL: CPT | Performed by: INTERNAL MEDICINE

## 2017-05-24 PROCEDURE — 88305 TISSUE EXAM BY PATHOLOGIST: CPT | Performed by: INTERNAL MEDICINE

## 2017-05-24 PROCEDURE — 45384 COLONOSCOPY W/LESION REMOVAL: CPT | Performed by: INTERNAL MEDICINE

## 2017-05-24 RX ORDER — FENTANYL CITRATE 50 UG/ML
INJECTION, SOLUTION INTRAMUSCULAR; INTRAVENOUS PRN
Status: DISCONTINUED | OUTPATIENT
Start: 2017-05-24 | End: 2017-05-24 | Stop reason: HOSPADM

## 2017-05-24 RX ORDER — ONDANSETRON 2 MG/ML
4 INJECTION INTRAMUSCULAR; INTRAVENOUS
Status: DISCONTINUED | OUTPATIENT
Start: 2017-05-24 | End: 2017-05-24 | Stop reason: HOSPADM

## 2017-05-24 RX ORDER — LIDOCAINE 40 MG/G
CREAM TOPICAL
Status: DISCONTINUED | OUTPATIENT
Start: 2017-05-24 | End: 2017-05-24 | Stop reason: HOSPADM

## 2017-05-24 RX ORDER — DIPHENHYDRAMINE HYDROCHLORIDE 50 MG/ML
INJECTION INTRAMUSCULAR; INTRAVENOUS PRN
Status: DISCONTINUED | OUTPATIENT
Start: 2017-05-24 | End: 2017-05-24 | Stop reason: HOSPADM

## 2017-05-24 RX ORDER — SIMETHICONE
LIQUID (ML) MISCELLANEOUS PRN
Status: DISCONTINUED | OUTPATIENT
Start: 2017-05-24 | End: 2017-05-24 | Stop reason: HOSPADM

## 2017-05-24 RX ADMIN — DIPHENHYDRAMINE HYDROCHLORIDE 25 MG: 50 INJECTION, SOLUTION INTRAMUSCULAR; INTRAVENOUS at 10:37

## 2017-05-24 RX ADMIN — MIDAZOLAM HYDROCHLORIDE 1 MG: 1 INJECTION, SOLUTION INTRAMUSCULAR; INTRAVENOUS at 10:41

## 2017-05-24 RX ADMIN — MIDAZOLAM HYDROCHLORIDE 1 MG: 1 INJECTION, SOLUTION INTRAMUSCULAR; INTRAVENOUS at 10:38

## 2017-05-24 RX ADMIN — FENTANYL CITRATE 50 MCG: 50 INJECTION, SOLUTION INTRAMUSCULAR; INTRAVENOUS at 10:38

## 2017-05-24 RX ADMIN — Medication 2 ML: at 10:49

## 2017-05-24 RX ADMIN — FENTANYL CITRATE 50 MCG: 50 INJECTION, SOLUTION INTRAMUSCULAR; INTRAVENOUS at 10:41

## 2017-05-24 NOTE — OR NURSING
Colonoscopy done w/ removal of polyps via hot snare, sent to path.  Effect 2, max watt 25, ground on R hip w/ skin C/D/I following ground removal.  tolerated well, on 4L NC weaned to off by procedure end w/ sats in low - mid 90s.

## 2017-05-24 NOTE — IP AVS SNAPSHOT
"                  MRN:[id number]                      After Visit Summary   5/24/2017    Michael Lira    MRN: [id number]           Thank you!     Thank you for choosing [facility] for your care. Our goal is always to provide you with excellent care. Hearing back from our patients is one way we can continue to improve our services. Please take a few minutes to complete the written survey that you may receive in the mail after you visit with us. Thank you!        Patient Information     Date Of Birth          1945        About your hospital stay     You were admitted on:  May 24, 2017 You last received care in the:  [facility], Endoscopy    You were discharged on:  May 24, 2017       Who to Call     For medical emergencies, please call 911.  For non-urgent questions about your medical care, please call your primary care provider or clinic, [phone]  For questions related to your surgery, please call your surgery clinic        Attending Provider     Provider Specialty    Maximus Dinero MD Hepatology       Primary Care Provider Office Phone # Fax #    Rebeca Sandoval [phone]       [facility] [address] [facility] [address]        Pending Results     No orders found from 5/22/2017 to 5/25/2017.            Admission Information     Date & Time Provider Department Dept. Phone    5/24/2017 Maximus Dinero MD [facility], Endoscopy [phone]      Your Vitals Were     Blood Pressure Pulse Respirations Height Weight Pulse Oximetry    90/61 71 10 1.651 m (5' 5\") 99.8 kg (220 lb) 98%    BMI (Body Mass Index)                   36.61 kg/m2           [employer] Information     [employer] lets you send messages to your doctor, view your test results, renew your prescriptions, schedule appointments and more. To sign up, go to [email or web address] . Click on \"Log in\" on the left side of the screen, which will take you to the Welcome page. Then click on \"Sign up Now\" " on the right side of the page.     You will be asked to enter the access code listed below, as well as some personal information. Please follow the directions to create your username and password.     Your access code is: [email or web address]  Expires: 2017  6:30 AM     Your access code will  in 90 days. If you need help or a new code, please call your [place] clinic or [phone].        Care EveryWhere ID     This is your Care EveryWhere ID. This could be used by other organizations to access your [place] medical records  [id number]           Review of your medicines      UNREVIEWED medicines. Ask your doctor about these medicines        Dose / Directions    amLODIPine 10 MG tablet   Commonly known as:  NORVASC   Used for:  Essential hypertension with goal blood pressure less than 130/80        Dose:  10 mg   Take 1 tablet (10 mg) by mouth daily   Quantity:  90 tablet   Refills:  PRN       aspirin 325 MG EC tablet        1 tab po QD (Once per day)   Quantity:  100   Refills:  3       atenolol 100 MG tablet   Commonly known as:  TENORMIN   Used for:  Essential hypertension with goal blood pressure less than 130/80        Dose:  100 mg   Take 1 tablet (100 mg) by mouth daily   Quantity:  90 tablet   Refills:  PRN       atorvastatin 40 MG tablet   Commonly known as:  LIPITOR   Used for:  Hyperlipidemia LDL goal <100        Dose:  40 mg   Take 1 tablet (40 mg) by mouth daily   Quantity:  90 tablet   Refills:  PRN       BENADRYL 25 MG capsule   Generic drug:  diphenhydrAMINE        Dose:  25 mg   Take 25 mg by mouth nightly as needed. sleep   Refills:  0       cilostazol 100 MG tablet   Commonly known as:  PLETAL   Used for:  Peripheral vascular disease (H)        Dose:  100 mg   Take 1 tablet (100 mg) by mouth 2 times daily on an empty stomach.   Quantity:  180 tablet   Refills:  PRN       lactobacillus rhamnosus (GG) 10 B CELL capsule   Commonly known as:  CULTURELLE        Dose:  1 capsule   Take 1 capsule  by mouth daily.   Refills:  0       losartan 100 MG tablet   Commonly known as:  COZAAR   Used for:  Essential hypertension with goal blood pressure less than 130/80        Dose:  100 mg   Take 1 tablet (100 mg) by mouth daily   Quantity:  90 tablet   Refills:  1       MULTIVITAMIN & MINERAL PO        Dose:  1 tablet   Take 1 tablet by mouth daily.   Refills:  0       niacin 500 MG tablet   Used for:  Hyperlipidemia LDL goal <100        Dose:  500 mg   Take 1 tablet (500 mg) by mouth At Bedtime   Quantity:  30 tablet   Refills:  PRN       SAW PALMETTO PO        Take by mouth daily   Refills:  0       sulfamethoxazole-trimethoprim 800-160 MG per tablet   Commonly known as:  BACTRIM DS/SEPTRA DS   Used for:  Cellulitis and abscess of leg        Dose:  1 tablet   Take 1 tablet by mouth 2 times daily   Quantity:  20 tablet   Refills:  0       tiZANidine 2 MG tablet   Commonly known as:  ZANAFLEX   Used for:  Bilateral low back pain without sciatica, unspecified chronicity        Dose:  2-4 mg   Take 1-2 tablets (2-4 mg) by mouth nightly as needed   Quantity:  60 tablet   Refills:  PRN                Protect others around you: Learn how to safely use, store and throw away your medicines at www.disposemymeds.org.             Medication List: This is a list of all your medications and when to take them. Check marks below indicate your daily home schedule. Keep this list as a reference.      Medications           Morning Afternoon Evening Bedtime As Needed    amLODIPine 10 MG tablet   Commonly known as:  NORVASC   Take 1 tablet (10 mg) by mouth daily                                aspirin 325 MG EC tablet   1 tab po QD (Once per day)                                atenolol 100 MG tablet   Commonly known as:  TENORMIN   Take 1 tablet (100 mg) by mouth daily                                atorvastatin 40 MG tablet   Commonly known as:  LIPITOR   Take 1 tablet (40 mg) by mouth daily                                BENADRYL 25  MG capsule   Take 25 mg by mouth nightly as needed. sleep   Generic drug:  diphenhydrAMINE                                cilostazol 100 MG tablet   Commonly known as:  PLETAL   Take 1 tablet (100 mg) by mouth 2 times daily on an empty stomach.                                lactobacillus rhamnosus (GG) 10 B CELL capsule   Commonly known as:  CULTURELLE   Take 1 capsule by mouth daily.                                losartan 100 MG tablet   Commonly known as:  COZAAR   Take 1 tablet (100 mg) by mouth daily                                MULTIVITAMIN & MINERAL PO   Take 1 tablet by mouth daily.                                niacin 500 MG tablet   Take 1 tablet (500 mg) by mouth At Bedtime                                SAW PALMETTO PO   Take by mouth daily                                sulfamethoxazole-trimethoprim 800-160 MG per tablet   Commonly known as:  BACTRIM DS/SEPTRA DS   Take 1 tablet by mouth 2 times daily                                tiZANidine 2 MG tablet   Commonly known as:  ZANAFLEX   Take 1-2 tablets (2-4 mg) by mouth nightly as needed

## 2017-05-24 NOTE — IP AVS SNAPSHOT
[facility], [place], Endoscopy    [address] [facility] [address]    Phone:  [phone]                                       After Visit Summary   5/24/2017    Michael Lira    MRN: [id number]           After Visit Summary Signature Page     I have received my discharge instructions, and my questions have been answered. I have discussed any challenges I see with this plan with the nurse or doctor.    ..........................................................................................................................................  Patient/Patient Representative Signature      ..........................................................................................................................................  Patient Representative Print Name and Relationship to Patient    ..................................................               ................................................  Date                                            Time    ..........................................................................................................................................  Reviewed by Signature/Title    ...................................................              ..............................................  Date                                                            Time

## 2017-05-24 NOTE — OR NURSING
Patient appears to have a tremor in his arms post procedure. Patient states he typically wakes up that way. Not unusual. Family confirms he has had a tremor for a number of years. May Jose RN

## 2017-05-25 LAB
COPATH REPORT: NORMAL
COPATH REPORT: NORMAL

## 2017-06-16 ENCOUNTER — CARE COORDINATION (OUTPATIENT)
Dept: CARE COORDINATION | Facility: CLINIC | Age: 72
End: 2017-06-16

## 2017-06-16 NOTE — PROGRESS NOTES
Dr Jha and Rebeca HUTCHINS    Pt is requesting the results of lates CT  Please advise pt  Thank you, Giuliana SOLO

## 2017-06-19 ENCOUNTER — TELEPHONE (OUTPATIENT)
Dept: GASTROENTEROLOGY | Facility: CLINIC | Age: 72
End: 2017-06-19

## 2017-06-19 NOTE — PROGRESS NOTES
RN CC outreach to pt regarding results  Pt has not heard anything as of yet and he called and LM with Dr Jha's  this am  Pt understands to call back if he does not hear anything by the end of the day today  Estefania Akers RN Clinic Care Coordinator  [facility] [phone]

## 2017-06-19 NOTE — TELEPHONE ENCOUNTER
Patient left message stating he has not hear back about his test results from May 24, 2017. Patient had colonoscopy with [facility]. Will send message to Dr. Rosette Draper RN

## 2017-06-20 NOTE — PROGRESS NOTES
6/19/17  3:55pm    RN Care Coordination Hepatology   Patient contacted the clinic wanting to the results of his MRI, his colonoscopy and all other tests ordered by his PCP Rebeca Sandoval NP. This RN CC connected with Dr. Dinero for further recommendations;       ----- Message -----      From: Maximus Dinero MD      Sent: 6/16/2017   2:23 PM        To: Ericka Jesus RN     All we can share is the colonoscopy. But imaging and the rest of the plan is directed by his PCP and he should follow up with them for those questions, including oncology and colorectal referrals.     Please send him a copy of the pathology report (the last pathology report of the polyps which were not cancerous).     He should've had these appointments by now, I see the referral made since 5/19, what's causing the delay?        I did connect with patient and asked him if he had set up his appointments for f/u with Oncology and Colorectal that were referrals. Patient stated no because he wanted this results first. I did review his colonoscopy results and recommended for patient to set up an appointment with his PCP. I offered to help patient set up an Oncology visit while on the phone and patient refused stating he wanted to speak with his PCP. I asked patient to please set up an appointment with his PCP in the next week since the referrals were placed about a month ago. Patient agreed to POC. Patient did appear forgetful at times, I made sure patient wrote down appointments needed and I also gave patient my direct contact information in case he needs any further assistance. Patient is not a hepatology patient, Dr. Dinero only provided the colonoscopy procedure but I am more then happy to assist with guiding patient to the right care coordinator for continuity of care.           Ericka Jesus RN, BSN, PHN  [facility]   RN Care Coordinator for Hepatology Specialty Clinic/Program

## 2017-06-22 NOTE — PROGRESS NOTES
It looks like he was called with the other colonoscopy results.  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  Can you help get this scheduled?

## 2017-06-26 ENCOUNTER — OFFICE VISIT (OUTPATIENT)
Dept: FAMILY MEDICINE | Facility: CLINIC | Age: 72
End: 2017-06-26
Payer: MEDICARE

## 2017-06-26 VITALS
WEIGHT: 201.25 LBS | OXYGEN SATURATION: 97 % | HEART RATE: 66 BPM | BODY MASS INDEX: 33.49 KG/M2 | TEMPERATURE: 98.1 F | RESPIRATION RATE: 18 BRPM | SYSTOLIC BLOOD PRESSURE: 168 MMHG | DIASTOLIC BLOOD PRESSURE: 80 MMHG

## 2017-06-26 DIAGNOSIS — I73.9 PERIPHERAL VASCULAR DISEASE (H): ICD-10-CM

## 2017-06-26 DIAGNOSIS — I10 ESSENTIAL HYPERTENSION WITH GOAL BLOOD PRESSURE LESS THAN 130/80: ICD-10-CM

## 2017-06-26 DIAGNOSIS — Z00.00 ENCOUNTER FOR ROUTINE ADULT HEALTH EXAMINATION WITHOUT ABNORMAL FINDINGS: Primary | ICD-10-CM

## 2017-06-26 DIAGNOSIS — E78.5 HYPERLIPIDEMIA LDL GOAL <100: ICD-10-CM

## 2017-06-26 DIAGNOSIS — R91.8 PULMONARY NODULES: ICD-10-CM

## 2017-06-26 DIAGNOSIS — C20 RECTAL ADENOCARCINOMA (H): ICD-10-CM

## 2017-06-26 DIAGNOSIS — I25.10 CORONARY ARTERY CALCIFICATION: ICD-10-CM

## 2017-06-26 LAB
ALBUMIN SERPL-MCNC: 3.7 G/DL (ref 3.4–5)
ALP SERPL-CCNC: 97 U/L (ref 40–150)
ALT SERPL W P-5'-P-CCNC: 11 U/L (ref 0–70)
ANION GAP SERPL CALCULATED.3IONS-SCNC: 9 MMOL/L (ref 3–14)
AST SERPL W P-5'-P-CCNC: 14 U/L (ref 0–45)
BILIRUB SERPL-MCNC: 0.6 MG/DL (ref 0.2–1.3)
BUN SERPL-MCNC: 19 MG/DL (ref 7–30)
CALCIUM SERPL-MCNC: 10 MG/DL (ref 8.5–10.1)
CHLORIDE SERPL-SCNC: 106 MMOL/L (ref 94–109)
CHOLEST SERPL-MCNC: 165 MG/DL
CO2 SERPL-SCNC: 25 MMOL/L (ref 20–32)
CREAT SERPL-MCNC: 1.94 MG/DL (ref 0.66–1.25)
ERYTHROCYTE [DISTWIDTH] IN BLOOD BY AUTOMATED COUNT: 12.8 % (ref 10–15)
GFR SERPL CREATININE-BSD FRML MDRD: 34 ML/MIN/1.7M2
GLUCOSE SERPL-MCNC: 100 MG/DL (ref 70–99)
HCT VFR BLD AUTO: 40.1 % (ref 40–53)
HDLC SERPL-MCNC: 46 MG/DL
HGB BLD-MCNC: 14 G/DL (ref 13.3–17.7)
LDLC SERPL CALC-MCNC: 83 MG/DL
MCH RBC QN AUTO: 31.5 PG (ref 26.5–33)
MCHC RBC AUTO-ENTMCNC: 34.9 G/DL (ref 31.5–36.5)
MCV RBC AUTO: 90 FL (ref 78–100)
NONHDLC SERPL-MCNC: 119 MG/DL
PLATELET # BLD AUTO: 217 10E9/L (ref 150–450)
POTASSIUM SERPL-SCNC: 3.9 MMOL/L (ref 3.4–5.3)
PROT SERPL-MCNC: 7.3 G/DL (ref 6.8–8.8)
RBC # BLD AUTO: 4.45 10E12/L (ref 4.4–5.9)
SODIUM SERPL-SCNC: 140 MMOL/L (ref 133–144)
TRIGL SERPL-MCNC: 182 MG/DL
WBC # BLD AUTO: 9.3 10E9/L (ref 4–11)

## 2017-06-26 PROCEDURE — 85027 COMPLETE CBC AUTOMATED: CPT | Performed by: NURSE PRACTITIONER

## 2017-06-26 PROCEDURE — 80061 LIPID PANEL: CPT | Performed by: NURSE PRACTITIONER

## 2017-06-26 PROCEDURE — G0439 PPPS, SUBSEQ VISIT: HCPCS | Performed by: NURSE PRACTITIONER

## 2017-06-26 PROCEDURE — 80053 COMPREHEN METABOLIC PANEL: CPT | Performed by: NURSE PRACTITIONER

## 2017-06-26 PROCEDURE — 36415 COLL VENOUS BLD VENIPUNCTURE: CPT | Performed by: NURSE PRACTITIONER

## 2017-06-26 RX ORDER — AMLODIPINE BESYLATE 10 MG/1
10 TABLET ORAL DAILY
Qty: 90 TABLET | Status: ON HOLD | OUTPATIENT
Start: 2017-06-26 | End: 2018-01-01

## 2017-06-26 RX ORDER — CILOSTAZOL 100 MG/1
100 TABLET ORAL 2 TIMES DAILY
Qty: 180 TABLET | Status: SHIPPED | OUTPATIENT
Start: 2017-06-26 | End: 2017-07-06

## 2017-06-26 RX ORDER — ATENOLOL 100 MG/1
100 TABLET ORAL DAILY
Qty: 90 TABLET | Status: ON HOLD | OUTPATIENT
Start: 2017-06-26 | End: 2017-01-01

## 2017-06-26 RX ORDER — VALSARTAN 320 MG/1
320 TABLET ORAL DAILY
Qty: 90 TABLET | Refills: 3 | Status: ON HOLD | OUTPATIENT
Start: 2017-06-26 | End: 2017-01-01

## 2017-06-26 RX ORDER — ATORVASTATIN CALCIUM 40 MG/1
40 TABLET, FILM COATED ORAL DAILY
Qty: 90 TABLET | Status: SHIPPED | OUTPATIENT
Start: 2017-06-26 | End: 2018-01-01

## 2017-06-26 NOTE — NURSING NOTE
"Chief Complaint   Patient presents with     Medicare Visit       Initial /77  Pulse 66  Temp 98.1  F (36.7  C) (Oral)  Resp 18  Wt 201 lb 4 oz (91.3 kg)  SpO2 97%  BMI 33.49 kg/m2 Estimated body mass index is 33.49 kg/(m^2) as calculated from the following:    Height as of 5/24/17: 5' 5\" (1.651 m).    Weight as of this encounter: 201 lb 4 oz (91.3 kg).  Medication Reconciliation: complete       Anu Lopez MA      "

## 2017-06-26 NOTE — LETTER
[facility]   [address], [place]  [address]  [phone]      June 27, 2017      Michael Lira  [address]   [place] [address]              Dear Mr. Lira,    Kidney function is stable.  Glucose level continues to be borderline elevated.  Cholesterol level is normal.  White blood cell count and hemoglobin are normal.    Results for orders placed or performed in visit on 06/26/17   Lipid Profile with reflex to direct LDL   Result Value Ref Range    Cholesterol 165 <200 mg/dL    Triglycerides 182 (H) <150 mg/dL    HDL Cholesterol 46 >39 mg/dL    LDL Cholesterol Calculated 83 <100 mg/dL    Non HDL Cholesterol 119 <130 mg/dL   Comprehensive metabolic panel   Result Value Ref Range    Sodium 140 133 - 144 mmol/L    Potassium 3.9 3.4 - 5.3 mmol/L    Chloride 106 94 - 109 mmol/L    Carbon Dioxide 25 20 - 32 mmol/L    Anion Gap 9 3 - 14 mmol/L    Glucose 100 (H) 70 - 99 mg/dL    Urea Nitrogen 19 7 - 30 mg/dL    Creatinine 1.94 (H) 0.66 - 1.25 mg/dL    GFR Estimate 34 (L) >60 mL/min/1.7m2    GFR Estimate If Black 41 (L) >60 mL/min/1.7m2    Calcium 10.0 8.5 - 10.1 mg/dL    Bilirubin Total 0.6 0.2 - 1.3 mg/dL    Albumin 3.7 3.4 - 5.0 g/dL    Protein Total 7.3 6.8 - 8.8 g/dL    Alkaline Phosphatase 97 40 - 150 U/L    ALT 11 0 - 70 U/L    AST 14 0 - 45 U/L   CBC with platelets   Result Value Ref Range    WBC 9.3 4.0 - 11.0 10e9/L    RBC Count 4.45 4.4 - 5.9 10e12/L    Hemoglobin 14.0 13.3 - 17.7 g/dL    Hematocrit 40.1 40.0 - 53.0 %    MCV 90 78 - 100 fl    MCH 31.5 26.5 - 33.0 pg    MCHC 34.9 31.5 - 36.5 g/dL    RDW 12.8 10.0 - 15.0 %    Platelet Count 217 150 - 450 10e9/L           Sincerely,    Rebeca Sandoval, VJ/nr

## 2017-06-26 NOTE — PROGRESS NOTES
SUBJECTIVE:                                                            Michael Lira is a 72 year old male who presents for Preventive Visit.    Pt stopped taking Pletal 100 MG on a regular basis. He takes  in the morning when her remembers. Told by colonoscopy doctor to stop taking.   Needs new batteries for BP monitor at home.  Are you in the first 12 months of your Medicare Part B coverage?  No    Healthy Habits:    Do you get at least three servings of calcium containing foods daily (dairy, green leafy vegetables, etc.)? Yes, takes multivitamin.     Amount of exercise or daily activities, outside of work: none and walking. Pt states walking can be painful.     Problems taking medications regularly Yes, forgetting to take medications.     Medication side effects: No     Have you had an eye exam in the past two years? No, due to cost     Do you see a dentist twice per year? No, was getting teeth cleaning regularly in the past. Stopped due to cost.     Do you have sleep apnea, excessive snoring or daytime drowsiness? No, but pt takes several naps during the day.     COGNITIVE SCREEN  1) Repeat 3 items (Banana, Sunrise, Chair)    2) Clock draw: ABNORMAL Clock   3) 3 item recall: Recalls 3 objects   Results: ABNORMAL clock, 3 items recalled: PROBABLE COGNITIVE IMPAIRMENT, **INFORM PROVIDER**    Mini-CogTM Copyright S Amy. Licensed by the author for use in [facility]; reprinted with permission ([email or web address]@.[email or web address]). All rights reserved.    He was recently diagnosed with rectal adenocarcinoma after a biopsy of a rectal mass on partial colonoscopy due to rectal bleeding.  He went back for a full colonoscopy a week later and all other biopsies were benign.  He did have a CT scan of the chest, abdomen, and pelvis.  There was no lymphadenopathy, evidence of hepatic metastatic disease.  There were pulmonary nodules, abdominal aortic ectasia and extensive atherosclerotic calcifications.  He does have PVD  and a history of a CVA.   He does not have an appointment with a colorectal surgeon or oncology yet.             Reviewed and updated as needed this visit by clinical staff  Allergies  Meds         Reviewed and updated as needed this visit by Provider        Social History   Substance Use Topics     Smoking status: Former Smoker     Quit date: 8/5/1994     Smokeless tobacco: Former User     Quit date: 8/1/1994      Comment: quit 15 years ago after his stroke     Alcohol use Yes      Comment: every evening 1-2 drinks       The patient does not drink >3 drinks per day nor >7 drinks per week.    Today's PHQ-2 Score:   PHQ-2 ( 1999 [employer]) 6/26/2017 4/28/2017   Q1: Little interest or pleasure in doing things - 0   Q2: Feeling down, depressed or hopeless - 0   PHQ-2 Score - 0   PHQ-2 Score Incomplete -       Do you feel safe in your environment - Yes    Do you have a Health Care Directive?: No: Advance care planning was reviewed with patient; patient declined at this time.    Current providers sharing in care for this patient include:   Patient Care Team:  Rebeca Sandvoal NP as PCP - General  Lance Eason MD as Resident (Student in organized health care education/training program)  Orion Motley MD as MD (Radiology)  Estefania Akers RN as Registered Nurse      Hearing impairment: No    Ability to successfully perform activities of daily living: Yes, no assistance needed     Fall risk:       Home safety:  none identified      The following health maintenance items are reviewed in Epic and correct as of today:  Health Maintenance   Topic Date Due     ADVANCE DIRECTIVE PLANNING Q5 YRS  08/08/2016     MEDICARE ANNUAL WELLNESS VISIT  09/15/2016     FALL RISK ASSESSMENT  09/15/2016     PNEUMOCOCCAL (2 of 2 - PPSV23) 09/15/2016     LIPID MONITORING Q1 YEAR  07/05/2017     INFLUENZA VACCINE (SYSTEM ASSIGNED)  09/01/2017     MICROALBUMIN Q1 YEAR  10/03/2017     BMP Q1 YR  11/14/2017     HEMOGLOBIN Q1 YR   "04/28/2018     TETANUS IMMUNIZATION (SYSTEM ASSIGNED)  07/16/2023     COLON CANCER SCREEN (SYSTEM ASSIGNED)  05/24/2027     AORTIC ANEURYSM SCREENING (SYSTEM ASSIGNED)  Completed              ROS:  C: NEGATIVE for fever, chills, change in weight  E/M: NEGATIVE for ear, mouth and throat problems  R: NEGATIVE for significant cough or SOB  CV: NEGATIVE for chest pain, palpitations or peripheral edema  GI: NEGATIVE for nausea, abdominal pain, heartburn  MUSCULOSKELETAL: claudication  NEURO: tremor      OBJECTIVE:                                                            /77  Pulse 66  Temp 98.1  F (36.7  C) (Oral)  Resp 18  Wt 201 lb 4 oz (91.3 kg)  SpO2 97%  BMI 33.49 kg/m2 Estimated body mass index is 33.49 kg/(m^2) as calculated from the following:    Height as of 5/24/17: 5' 5\" (1.651 m).    Weight as of this encounter: 201 lb 4 oz (91.3 kg).  EXAM:   GENERAL: healthy, alert and no distress  RESP: lungs clear to auscultation - no rales, rhonchi or wheezes  CV: regular rate and rhythm, normal S1 S2, no S3 or S4, no murmur, click or rub, no peripheral edema and peripheral pulses strong  ABDOMEN: soft, nontender, no hepatosplenomegaly, no masses and bowel sounds normal  PSYCH: mentation appears normal, affect flattened    ASSESSMENT / PLAN:                                                            1. Encounter for routine adult health examination without abnormal findings      2. Rectal adenocarcinoma (H)  Will refer to colorectal surgery, oncology and he will also need cardiac clearance in anticipation of surgery.   - CARDIO  ADULT REFERRAL  - COLORECTAL SURGERY REFERRAL  - ONC/HEME ADULT REFERRAL  - Comprehensive metabolic panel  - CBC with platelets    3. Peripheral vascular disease (H)  PVD, history of stents carotid artery and iliac artery; history of CVA  Refer to cardiology.  - cilostazol (PLETAL) 100 MG tablet; Take 1 tablet (100 mg) by mouth 2 times daily on an empty stomach.  Dispense: " "180 tablet; Refill: PRN  - CARDIO  ADULT REFERRAL    4. Coronary artery calcification  See above.   - CARDIO  ADULT REFERRAL    5. Essential hypertension with goal blood pressure less than 130/80  Not at goal  Will change Losartan to Diovan.  Recheck blood pressure in 2 weeks.  - amLODIPine (NORVASC) 10 MG tablet; Take 1 tablet (10 mg) by mouth daily  Dispense: 90 tablet; Refill: PRN  - atenolol (TENORMIN) 100 MG tablet; Take 1 tablet (100 mg) by mouth daily  Dispense: 90 tablet; Refill: PRN  - valsartan (DIOVAN) 320 MG tablet; Take 1 tablet (320 mg) by mouth daily  Dispense: 90 tablet; Refill: 3  - Comprehensive metabolic panel    Pulmonary Nodules  Plan: Will get repeat CT scan in 3-6 months.      6. Hyperlipidemia LDL goal <100    - atorvastatin (LIPITOR) 40 MG tablet; Take 1 tablet (40 mg) by mouth daily  Dispense: 90 tablet; Refill: PRN  - Lipid Profile with reflex to direct LDL    End of Life Planning:  Patient currently has an advanced directive: No.  I have verified the patient's ablity to prepare an advanced directive/make health care decisions.  Literature was provided to assist patient in preparing an advanced directive.    COUNSELING:  Reviewed preventive health counseling, as reflected in patient instructions        Estimated body mass index is 33.49 kg/(m^2) as calculated from the following:    Height as of 5/24/17: 5' 5\" (1.651 m).    Weight as of this encounter: 201 lb 4 oz (91.3 kg).  Weight management plan: Discussed healthy diet and exercise guidelines and patient will follow up in 12 months in clinic to re-evaluate.   reports that he quit smoking about 22 years ago. He quit smokeless tobacco use about 22 years ago.      Appropriate preventive services were discussed with this patient, including applicable screening as appropriate for cardiovascular disease, diabetes, osteopenia/osteoporosis, and glaucoma.  As appropriate for age/gender, discussed screening for colorectal cancer, " prostate cancer, breast cancer, and cervical cancer. Checklist reviewing preventive services available has been given to the patient.    Reviewed patients plan of care and provided an AVS. The Basic Care Plan (routine screening as documented in Health Maintenance) for Edward meets the Care Plan requirement. This Care Plan has been established and reviewed with the Patient.    Counseling Resources:  ATP IV Guidelines  Pooled Cohorts Equation Calculator  Breast Cancer Risk Calculator  FRAX Risk Assessment  ICSI Preventive Guidelines  Dietary Guidelines for Americans, 2010  USDA's MyPlate  ASA Prophylaxis  Lung CA Screening    Rebeca Sandoval NP  [facility]

## 2017-06-26 NOTE — MR AVS SNAPSHOT
After Visit Summary   6/26/2017    Michael Lira    MRN: [id number]           Patient Information     Date Of Birth          1945        Visit Information        Provider Department      6/26/2017 10:30 AM Rebeca Sandoval NP [facility]        Today's Diagnoses     Encounter for routine adult health examination without abnormal findings    -  1    Rectal adenocarcinoma (H)        Peripheral vascular disease (H)        Coronary artery calcification        Essential hypertension with goal blood pressure less than 130/80        Hyperlipidemia LDL goal <100          Care Instructions      Preventive Health Recommendations:       Male Ages 65 and over    Yearly exam:             See your health care provider every year in order to  o   Review health changes.   o   Discuss preventive care.    o   Review your medicines if your doctor has prescribed any.    Talk with your health care provider about whether you should have a test to screen for prostate cancer (PSA).    Every 3 years, have a diabetes test (fasting glucose). If you are at risk for diabetes, you should have this test more often.    Every 5 years, have a cholesterol test. Have this test more often if you are at risk for high cholesterol or heart disease.     Every 10 years, have a colonoscopy. Or, have a yearly FIT test (stool test). These exams will check for colon cancer.    Talk to with your health care provider about screening for Abdominal Aortic Aneurysm if you have a family history of AAA or have a history of smoking.  Shots:     Get a flu shot each year.     Get a tetanus shot every 10 years.     Talk to your doctor about your pneumonia vaccines. There are now two you should receive - Pneumovax (PPSV 23) and Prevnar (PCV 13).    Talk to your doctor about a shingles vaccine.     Talk to your doctor about the hepatitis B vaccine.  Nutrition:     Eat at least 5 servings of fruits and vegetables each day.      Eat whole-grain bread, whole-wheat pasta and brown rice instead of white grains and rice.     Talk to your doctor about Calcium and Vitamin D.   Lifestyle    Exercise for at least 150 minutes a week (30 minutes a day, 5 days a week). This will help you control your weight and prevent disease.     Limit alcohol to one drink per day.     No smoking.     Wear sunscreen to prevent skin cancer.     See your dentist every six months for an exam and cleaning.     See your eye doctor every 1 to 2 years to screen for conditions such as glaucoma, macular degeneration and cataracts.          Follow-ups after your visit        Additional Services     CARDIO  ADULT REFERRAL       [facility] is referring you to Cardiology Services.       The  Representative will assist you in the coordination of your Cardiology care as prescribed by your physician.    The  Representative will call you within 24 hours to help schedule your appointment, or you may contact the  Representative at: [phone].         Type of Referral: New Cardiology Referral            Timeframe requested: within 1-2 weeks   - will need cardiology clearance prior to surgery - recent diagnosis of rectal cancer    Coverage of these services is subject to the terms and limitations of your health insurance plan.  Please call member services at your health plan with any benefit or coverage questions.      If X-rays, CT or MRI's have been performed, please contact the facility where they were done to arrange for , prior to your scheduled appointment.  Please bring this referral request to your appointment and present it to your specialist.            COLORECTAL SURGERY REFERRAL       Your provider has referred you to: [facility]: Colon and Rectal Surgery Clinic [facility] [phone]   [email or web address]    Referral Reason(s): rectal cancer  Special Concerns:  None  This referral is: Urgent (24 - 72 hours)  It is OK to leave a message on patient's voicemail.    Please be aware that coverage of these services is subject to the terms and limitations of your health insurance plan.  Call member services at your health plan with any benefit or coverage questions.      Please bring the following with you to your appointment:    (1) Any X-Rays, CTs or MRIs which have been performed.  Contact the facility where they were done to arrange for  prior to your scheduled appointment.    (2) List of current medications  (3) This referral request   (4) Any documents/labs given to you for this referral            ONC/HEME ADULT REFERRAL       Your provider has referred you to: [facility]: [facility] - [place] [phone]   [email or web address]    Please be aware that coverage of these services is subject to the terms and limitations of your health insurance plan.  Call member services at your health plan with any benefit or coverage questions.      Please bring the following with you to your appointment:    (1) Any X-Rays, CTs or MRIs which have been performed.  Contact the facility where they were done to arrange for  prior to your scheduled appointment.   (2) List of current medications  (3) This referral request   (4) Any documents/labs given to you for this referral                  Who to contact     If you have questions or need follow up information about today's clinic visit or your schedule please contact [facility] directly at [phone].  Normal or non-critical lab and imaging results will be communicated to you by MyChart, letter or phone within 4 business days after the clinic has received the results. If you do not hear from us within 7 days, please contact the clinic through MyChart or phone. If you have a critical or abnormal lab result, we will notify you by phone as soon as possible.  Submit  "refill requests through [employer] or call your pharmacy and they will forward the refill request to us. Please allow 3 business days for your refill to be completed.          Additional Information About Your Visit        [employer] Information     [employer] [employer] lets you send messages to your doctor, view your test results, renew your prescriptions, schedule appointments and more. To sign up, go to [email or web address] . Click on \"Log in\" on the left side of the screen, which will take you to the Welcome page. Then click on \"Sign up Now\" on the right side of the page.     You will be asked to enter the access code listed below, as well as some personal information. Please follow the directions to create your username and password.     Your access code is: [email or web address]  Expires: 2017 11:36 AM     Your access code will  in 90 days. If you need help or a new code, please call your [facility] clinic or [phone].        Care EveryWhere ID     This is your Care EveryWhere ID. This could be used by other organizations to access your [facility] medical records  [id number]        Your Vitals Were     Pulse Temperature Respirations Pulse Oximetry BMI (Body Mass Index)       66 98.1  F (36.7  C) (Oral) 18 97% 33.49 kg/m2        Blood Pressure from Last 3 Encounters:   17 168/80   17 139/77   17 141/85    Weight from Last 3 Encounters:   17 201 lb 4 oz (91.3 kg)   17 220 lb (99.8 kg)   17 220 lb (99.8 kg)              We Performed the Following     CARDIO  ADULT REFERRAL     CBC with platelets     COLORECTAL SURGERY REFERRAL     Comprehensive metabolic panel     Lipid Profile with reflex to direct LDL     ONC/HEME ADULT REFERRAL          Today's Medication Changes          These changes are accurate as of: 17 11:36 AM.  If you have any questions, ask your nurse or doctor.               Start taking these medicines.        Dose/Directions    valsartan 320 MG tablet "   Commonly known as:  DIOVAN   Used for:  Essential hypertension with goal blood pressure less than 130/80   Replaces:  losartan 100 MG tablet        Dose:  320 mg   Take 1 tablet (320 mg) by mouth daily   Quantity:  90 tablet   Refills:  3         Stop taking these medicines if you haven't already. Please contact your care team if you have questions.     losartan 100 MG tablet   Commonly known as:  COZAAR   Replaced by:  valsartan 320 MG tablet                Where to get your medicines      These medications were sent to [id number] - [place], [place] - [address]  [address] [address]     Phone:  [phone]     amLODIPine 10 MG tablet    atenolol 100 MG tablet    atorvastatin 40 MG tablet    cilostazol 100 MG tablet    valsartan 320 MG tablet                Primary Care Provider Office Phone # Fax #    Rebeca Sandoval [phone]       [facility] [address]  [facility] [address]        Equal Access to Services     RIGO STORY AH: Hadii aad ku hadasho Soomaali, waaxda luqadaha, qaybta kaalmada adeegyada, waxay idiin hayaan adeeg kharareuben la'neetu . So [facility] [phone].    ATENCIÓN: Si habla español, tiene a adorno disposición servicios gratuitos de asistencia lingüística. [facility] [phone].    We comply with applicable federal civil rights laws and [place] laws. We do not discriminate on the basis of race, color, national origin, age, disability sex, sexual orientation or gender identity.            Thank you!     Thank you for choosing [facility]  for your care. Our goal is always to provide you with excellent care. Hearing back from our patients is one way we can continue to improve our services. Please take a few minutes to complete the written survey that you may receive in the mail after your visit with us. Thank you!             Your Updated Medication List - Protect others around you: Learn how to safely use, store  and throw away your medicines at www.disposemymeds.org.          This list is accurate as of: 6/26/17 11:36 AM.  Always use your most recent med list.                   Brand Name Dispense Instructions for use Diagnosis    amLODIPine 10 MG tablet    NORVASC    90 tablet    Take 1 tablet (10 mg) by mouth daily    Essential hypertension with goal blood pressure less than 130/80       aspirin 325 MG EC tablet     100    PRN        atenolol 100 MG tablet    TENORMIN    90 tablet    Take 1 tablet (100 mg) by mouth daily    Essential hypertension with goal blood pressure less than 130/80       atorvastatin 40 MG tablet    LIPITOR    90 tablet    Take 1 tablet (40 mg) by mouth daily    Hyperlipidemia LDL goal <100       BENADRYL 25 MG capsule   Generic drug:  diphenhydrAMINE      Take 25 mg by mouth nightly as needed. sleep        cilostazol 100 MG tablet    PLETAL    180 tablet    Take 1 tablet (100 mg) by mouth 2 times daily on an empty stomach.    Peripheral vascular disease (H)       lactobacillus rhamnosus (GG) 10 B CELL capsule    CULTURELLE     Take 1 capsule by mouth daily.        MULTIVITAMIN & MINERAL PO      Take 1 tablet by mouth daily.        niacin 500 MG tablet     30 tablet    Take 1 tablet (500 mg) by mouth At Bedtime    Hyperlipidemia LDL goal <100       SAW PALMETTO PO      Take by mouth daily        tiZANidine 2 MG tablet    ZANAFLEX    60 tablet    Take 1-2 tablets (2-4 mg) by mouth nightly as needed    Bilateral low back pain without sciatica, unspecified chronicity       valsartan 320 MG tablet    DIOVAN    90 tablet    Take 1 tablet (320 mg) by mouth daily    Essential hypertension with goal blood pressure less than 130/80

## 2017-06-27 ENCOUNTER — TELEPHONE (OUTPATIENT)
Dept: FAMILY MEDICINE | Facility: CLINIC | Age: 72
End: 2017-06-27

## 2017-06-27 ENCOUNTER — TELEPHONE (OUTPATIENT)
Dept: SURGERY | Facility: CLINIC | Age: 72
End: 2017-06-27

## 2017-06-27 PROBLEM — R91.8 PULMONARY NODULES: Status: ACTIVE | Noted: 2017-06-27

## 2017-06-27 NOTE — TELEPHONE ENCOUNTER
Update: for DAX    July 6 sees Dr Raza and then sees oncology following that.   just making sure that everything is covered.   Carol Holguin RN

## 2017-06-27 NOTE — TELEPHONE ENCOUNTER
Please call Zainab @ [facility] @ [phone].  Has questions about the referral sent to their clinic.  OK to leave message on voicemail.

## 2017-06-27 NOTE — TELEPHONE ENCOUNTER
----- Message from Velia Perla sent at 6/27/2017 12:51 PM CDT -----  Regarding: New Rectal Cancer Pt- Needs scheduling   Contact: [phone]  Cody Ramos,     Pt referred to clinic for Rectal adenocarcinoma by PCP Rebeca Sandoval NP. Pt can be reached at [phone] and okay to .     Thank you!     [facility]  Call Center    Please DO NOT send this message and/or reply back to sender.  Call Center Representatives DO NOT respond to messages.

## 2017-06-27 NOTE — TELEPHONE ENCOUNTER
Per task, discussed with Dr. Raza who has agreed to see the patient 07/06/17 at 1:30 PM.  Confirmed with Dr. Raza that we will wait to schedule MRI 3T until clinic consult is completed.  Called and spoke with patient.  Patient confirms date, time, and location. Informed patient that Dr. Raza will perform a  flexible sigmoidoscopy  I explained to patient what a flexible sigmoidoscopy is and what prep it entails.  Patient would prefer to prep in clinic.  Confirmed with patient.  Patient has my direct number for questions or concerns.

## 2017-07-06 ENCOUNTER — ONCOLOGY VISIT (OUTPATIENT)
Dept: ONCOLOGY | Facility: CLINIC | Age: 72
End: 2017-07-06
Attending: INTERNAL MEDICINE
Payer: MEDICARE

## 2017-07-06 ENCOUNTER — OFFICE VISIT (OUTPATIENT)
Dept: SURGERY | Facility: CLINIC | Age: 72
End: 2017-07-06

## 2017-07-06 VITALS
HEART RATE: 60 BPM | BODY MASS INDEX: 33.99 KG/M2 | SYSTOLIC BLOOD PRESSURE: 131 MMHG | HEIGHT: 65 IN | OXYGEN SATURATION: 93 % | TEMPERATURE: 97.6 F | WEIGHT: 204 LBS | DIASTOLIC BLOOD PRESSURE: 73 MMHG

## 2017-07-06 VITALS
BODY MASS INDEX: 34.14 KG/M2 | HEART RATE: 60 BPM | DIASTOLIC BLOOD PRESSURE: 73 MMHG | SYSTOLIC BLOOD PRESSURE: 131 MMHG | WEIGHT: 204.9 LBS | HEIGHT: 65 IN | OXYGEN SATURATION: 93 % | TEMPERATURE: 97.6 F

## 2017-07-06 DIAGNOSIS — K63.9 DYSPLASIA-ASSOCIATED LESION OR MASS (DALM) OF COLON: ICD-10-CM

## 2017-07-06 DIAGNOSIS — C18.7 MALIGNANT TUMOR OF SIGMOID COLON (H): ICD-10-CM

## 2017-07-06 DIAGNOSIS — C20 RECTAL ADENOCARCINOMA (H): ICD-10-CM

## 2017-07-06 DIAGNOSIS — C18.7 MALIGNANT TUMOR OF SIGMOID COLON (H): Primary | ICD-10-CM

## 2017-07-06 DIAGNOSIS — Z86.73 HISTORY OF CVA (CEREBROVASCULAR ACCIDENT): ICD-10-CM

## 2017-07-06 LAB
ALBUMIN SERPL-MCNC: 3.8 G/DL (ref 3.4–5)
ALP SERPL-CCNC: 94 U/L (ref 40–150)
ALT SERPL W P-5'-P-CCNC: 14 U/L (ref 0–70)
ANION GAP SERPL CALCULATED.3IONS-SCNC: 9 MMOL/L (ref 3–14)
AST SERPL W P-5'-P-CCNC: 19 U/L (ref 0–45)
BILIRUB SERPL-MCNC: 0.5 MG/DL (ref 0.2–1.3)
BUN SERPL-MCNC: 25 MG/DL (ref 7–30)
CALCIUM SERPL-MCNC: 9.2 MG/DL (ref 8.5–10.1)
CEA SERPL-MCNC: 1.4 UG/L (ref 0–2.5)
CHLORIDE SERPL-SCNC: 106 MMOL/L (ref 94–109)
CO2 SERPL-SCNC: 25 MMOL/L (ref 20–32)
CREAT SERPL-MCNC: 2.5 MG/DL (ref 0.66–1.25)
ERYTHROCYTE [DISTWIDTH] IN BLOOD BY AUTOMATED COUNT: 12.9 % (ref 10–15)
GFR SERPL CREATININE-BSD FRML MDRD: 25 ML/MIN/1.7M2
GLUCOSE SERPL-MCNC: 94 MG/DL (ref 70–99)
HCT VFR BLD AUTO: 39.6 % (ref 40–53)
HGB BLD-MCNC: 13.6 G/DL (ref 13.3–17.7)
MCH RBC QN AUTO: 30.7 PG (ref 26.5–33)
MCHC RBC AUTO-ENTMCNC: 34.3 G/DL (ref 31.5–36.5)
MCV RBC AUTO: 89 FL (ref 78–100)
PLATELET # BLD AUTO: 256 10E9/L (ref 150–450)
POTASSIUM SERPL-SCNC: 4.1 MMOL/L (ref 3.4–5.3)
PREALB SERPL IA-MCNC: 36 MG/DL (ref 15–45)
PROT SERPL-MCNC: 7.3 G/DL (ref 6.8–8.8)
RBC # BLD AUTO: 4.43 10E12/L (ref 4.4–5.9)
SODIUM SERPL-SCNC: 140 MMOL/L (ref 133–144)
WBC # BLD AUTO: 10.3 10E9/L (ref 4–11)

## 2017-07-06 PROCEDURE — 82378 CARCINOEMBRYONIC ANTIGEN: CPT | Performed by: COLON & RECTAL SURGERY

## 2017-07-06 PROCEDURE — 99205 OFFICE O/P NEW HI 60 MIN: CPT | Mod: ZP | Performed by: INTERNAL MEDICINE

## 2017-07-06 PROCEDURE — 99212 OFFICE O/P EST SF 10 MIN: CPT | Mod: ZF

## 2017-07-06 ASSESSMENT — PAIN SCALES - GENERAL
PAINLEVEL: NO PAIN (0)
PAINLEVEL: NO PAIN (0)

## 2017-07-06 NOTE — NURSING NOTE
"Chief Complaint   Patient presents with     Clinic Care Coordination - Initial     New patient consult, rectal cancer.        Vitals:    07/06/17 1332   BP: 131/73   BP Location: Left arm   Patient Position: Chair   Cuff Size: Adult Large   Pulse: 60   Temp: 97.6  F (36.4  C)   TempSrc: Oral   SpO2: 93%   Weight: 204 lb 14.4 oz   Height: 5' 5\"       Body mass index is 34.1 kg/(m^2).    Dora TRAN LPN                     "

## 2017-07-06 NOTE — LETTER
7/6/2017       RE: Jose Lira  [address]   [place] [address]     Dear Colleague,    Thank you for referring your patient, Jose Lira, to the [facility] COLON AND RECTAL SURGERY at [facility]. Please see a copy of my visit note below.    [facility] Colorectal Surgery Clinic History and Physical    Michael Lira MRN# [id number]   Age: 72 year old YOB: 1945     Primary care provider: Rebeca Sandoval          Assessment and Plan:   Assessment:   72 year old male with distal sigmoid/rectosigmoid adenocarcinoma      Plan:   - We discussed the location of the tumor and management strategies with the patient and his family in detail today in clinic. This appears to be a rectosigmoid cancer so we would recommend treatment with primary surgery.  We will discuss his case at tumor conference this Monday as he does have two small lung nodules, although we suspect these can be followed with repeat imaging in 6 months.  He needs a thorough evaluation of his cardiopulmonary status and carotid stent.  He will see oncology this afternoon and we will arrange for him to get a CEA drawn.  We discussed surgery with the patient including the indications and risks to low anterior resection.  We discussed potential bleeding, infection, cardiovascular and pulmonary complications, anastomotic leak, potential need for a temporary stoma, intra-abdominal abscess and changes in bladder or sexual function.  We also discussed the possibility of low anterior syndrome.  We will arrange for the patients necessary visits, discuss him at tumor board and schedule him for surgery at his earliest convenience barring unforseen events.      The patient was seen with Dr. Raza            Chief Complaint:   - Rectosigmoid cancer    History is obtained from the patient    -  This is a very pleasant 72 year old male here for surgical opinion regarding a  rectosigmoid adenocarcinoma.  The patient has an intermittent history of bleeding per rectum which he attributed to hemorrhoids.  He has had no weight loss, abdominal pain.  He possibly has had some change in stool caliber and some intermittent fatigue.   His appetite decreased but he has gained a little weight as he now eats mainly ice cream.  He presented to his PCP who arranged for colonoscopy which was performed in 2017.  He had two scopes as the prep on the first was poor.  He had a rectosigmoid mass which was biopsied and 10 tubular adenomas removed from the rest of the colon.  Previously on a scope in  he had had 4 tubular adenomas removed, one with high grade dysplasia in the sigmoid colon.  The biopsies returned as adenocarcinoma.  The patient subsequently had a CT chest, abdomen and pelvis and was referred for surgical opinion.           Past Medical History:   I have reviewed this patient's past medical history         Past Surgical History:   I have reviewed this patient's past surgical history  Carotid and iliac stenting, no abdominal operations         Social History:   - Former smoker, quit in   - Former heavy drinker, no drinks 1-2 drinks/day  No drug use  - Lives alone         Family History:   - Negative for colorectal, gastric, endometrial, ovarian cancers.  Father  of prostate cancer         Immunizations:   Immunization status is unknown         Allergies:   All allergies reviewed and addressed         Medications:     Current Outpatient Prescriptions   Medication Sig     atorvastatin (LIPITOR) 40 MG tablet Take 1 tablet (40 mg) by mouth daily     amLODIPine (NORVASC) 10 MG tablet Take 1 tablet (10 mg) by mouth daily     atenolol (TENORMIN) 100 MG tablet Take 1 tablet (100 mg) by mouth daily     valsartan (DIOVAN) 320 MG tablet Take 1 tablet (320 mg) by mouth daily     tiZANidine (ZANAFLEX) 2 MG tablet Take 1-2 tablets (2-4 mg) by mouth nightly as needed     Saw Elena Jesus  repens, (SAW PALMETTO PO) Take by mouth daily     niacin 500 MG tablet Take 1 tablet (500 mg) by mouth At Bedtime     Multiple Vitamins-Minerals (MULTIVITAMIN & MINERAL PO) Take 1 tablet by mouth daily.     diphenhydrAMINE (BENADRYL) 25 MG capsule Take 25 mg by mouth nightly as needed. sleep     lactobacillus rhamnosus, GG, (CULTURELLE) 10 B CELL capsule Take 1 capsule by mouth daily.     ASPIRIN 325 MG OR TBEC PRN     [DISCONTINUED] olmesartan-hydrochlorothiazide (BENICAR HCT) 40-25 MG per tablet Take 1 tablet by mouth daily     No current facility-administered medications for this visit.              Review of Systems:   A comprehensive review of systems was performed and found to be negative except as described in this note    Physical Exam:  Awake and alert  NCAT  PERRL, no icterus  Neck supple, trachea midline  RRR  CTA b/l  Soft, non tender, non distended, obese, no HSM, no stigmata of liver disease  No c/c/e  ARSALAN: normal external exam, no masses or lesions appreciated on internal exam  Neuro: tremor noted in all 4 ext, otherwise grossly intact  Psych: Normal mood and affect  Skin: no rashes or lesions    Flex Sig: After informed consent obtained patient placed in left lateral decubitus position. The patient was prepped previously with enemas. ARSALAN performed without ability to palpate the mass.  Flexible endoscope passed into anal canal.  There was a moderate amount of stool.  Scope advanced to 35 cm and withdrawn.  Proximal and distal tattoos noted.  There is an ulcerating mass at the rectosigmoid junction approximately 1/3 the circumference of the lumen.  The bowel was decompressed and scope removed.  The patient tolerated the procedure well.           Data:   All laboratory data reviewed  All imaging studies reviewed by me.  CT C/A/P - two small <8 mm nodules left apex and right upper lobe, no hepatic metastases, distal sigmoid thickening    Gallito Wright MD  CRS Fellow  Pager        I saw  and examined the patient, led the discussion and edited the resident note.  I agree with the assessment and plan as outlined.  The patient has significant ASCVD and ASPVD and needs further evaluation before surgery.  We discussed the medical and surgical risks associated with the anterior resection, including but not limited to bleeding and transfusion risks, infection, adjacent organ injury, myocardial infarction (increased risk), cerebrovascular accident (increased risk), deep venous thrombosis, pulmonary embolism, pneumonia, anastomotic leak with its attendant and sometimes severe consequences, and death.  We discussed the variable functional consequences following surgery and the possibility of severe low anterior resection syndrome, which can include stool frequency, urgency, clustering, fragmentation, and incontinence.  We specifically discussed the risk of pelvic nerve injury with resultant sexual and or urinary dysfunction.   I answered all the patient's questions to his stated satisfaction.  He expressed his understanding and agreement with the proposed plan.  We will do our best to expedite his evaluation and get his surgery scheduled.    Total time 45 minutes excluding sigmoidscopy time.  >50% of the time was spent counseling.      Sharan Raza MD  Professor of Surgery

## 2017-07-06 NOTE — PROGRESS NOTES
Oncology Initial visit:  Date on this visit: 7/6/2017    Michael Lira  is referred by Dr.Jennifer IDALIA Sandoval for an oncology consultation. 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 He denies any nausea vomiting diarrhea or constipation or any change in the caliber of the stool    He does have a stable low energy which is going on for the last couple of years and he has to take a couple of naps during the day. He does have residual weakness on the left side from his previous stroke but this has been stable. He denies any new neurological problems. He denies any neuropathy. He denies any infections. He denies any weight loss. He denies any trouble breathing.    ROS:  A comprehensive ROS was otherwise neg    Past Medical/Surgical History:  Past Medical History:   Diagnosis Date     Acute, but ill-defined, cerebrovascular disease 1994     Occlusion and stenosis of carotid artery without mention of cerebral infarction      Peripheral vascular disease, unspecified (H)      Pure hypercholesterolemia      Unspecified essential hypertension      Past Surgical History:   Procedure Laterality Date     COLONOSCOPY N/A 5/19/2017    Procedure: COMBINED COLONOSCOPY, SINGLE OR MULTIPLE BIOPSY/POLYPECTOMY BY BIOPSY;  Rectal bleeding;  Surgeon: Maximus Dinero MD;  Location:  GI     SURGICAL HISTORY OF -   10/02    angioplasty and stenting  of left and internal carotid artery at the bifurcation     SURGICAL HISTORY OF -   11/11    right common iliac artery stent      VASCULAR SURGERY  2001    stent placed in carotid      Cancer History:   As above    Allergies:  Allergies as of 07/06/2017 - Hank as Reviewed 07/06/2017   Allergen Reaction Noted     No known drug allergies  11/11/2004     Current Medications:  Current Outpatient Prescriptions   Medication Sig Dispense Refill     atorvastatin (LIPITOR) 40 MG tablet Take 1 tablet (40 mg) by mouth daily 90 tablet PRN     amLODIPine (NORVASC) 10 MG tablet Take 1 tablet (10 mg) by mouth daily 90 tablet PRN     atenolol (TENORMIN) 100 MG tablet Take 1 tablet (100 mg) by mouth daily 90 tablet PRN     valsartan (DIOVAN) 320 MG tablet Take 1 tablet (320 mg) by mouth daily 90 tablet 3     tiZANidine (ZANAFLEX) 2 MG tablet Take 1-2 tablets (2-4 mg) by mouth nightly as needed 60 tablet PRN     Saw Palmetto, Serenoa repens, (SAW PALMETTO PO) Take by mouth daily       [DISCONTINUED] olmesartan-hydrochlorothiazide (BENICAR HCT) 40-25 MG per tablet Take 1 tablet by mouth daily 90 tablet 3     niacin 500 MG tablet Take 1 tablet (500 mg) by mouth At Bedtime 30 tablet PRN     Multiple Vitamins-Minerals (MULTIVITAMIN & MINERAL PO) Take 1 tablet by mouth daily.       diphenhydrAMINE (BENADRYL) 25 MG capsule Take 25 mg by mouth nightly as needed. sleep       lactobacillus rhamnosus, GG, (CULTURELLE) 10 B CELL capsule Take 1 capsule by mouth daily.       ASPIRIN 325 MG OR TBEC  3      Family History:  Family History   Problem Relation Age of Onset     C.A.D. Father      Hypertension Father      Prostate Cancer Father      C.A.D. Brother      MI     Hypertension Brother      Arthritis Sister      DIABETES No family hx of      CEREBROVASCULAR DISEASE No family hx of      Cancer - colorectal No family hx of     father had prostate cancer next Sister. had breast cancer   patient does not have any kids  Social  "History:  Social History     Social History     Marital status: Single     Spouse name: N/A     Number of children: N/A     Years of education: N/A     Occupational History     Not on file.     Social History Main Topics     Smoking status: Former Smoker     Quit date: 8/5/1994     Smokeless tobacco: Former User     Quit date: 8/1/1994      Comment: quit 15 years ago after his stroke     Alcohol use Yes      Comment: every evening 1-2 drinks     Drug use: No     Sexual activity: No     Other Topics Concern     Parent/Sibling W/ Cabg, Mi Or Angioplasty Before 65f 55m? Yes     brother and father     Social History Narrative    he used to smoke but quit in 1994. He used to drink alcohol heavily but now only drinks occasionally. He lives alone  Physical Exam:  /73  Pulse 60  Temp 97.6  F (36.4  C) (Oral)  Ht 1.651 m (5' 5\")  Wt 92.5 kg (204 lb)  SpO2 93%  BMI 33.95 kg/m2  CONSTITUTIONAL: no acute distress  EYES: PERRLA, no palor or icterus.   ENT/MOUTH: no mouth lesions. Oropharynx normal  CVS: s1s2 no m r g .   RESPIRATORY: clear to auscultation b/l  GI: soft non tender no hepatosplenomegaly  NEURO: AAOX3  residual left-sided weakness  INTEGUMENT: no obvious rashes  LYMPHATIC: no palpable cervical, supraclavicular, axillary or inguinal LAD  MUSCULOSKELETAL: Unremarkable. No bony tenderness.   EXTREMITIES: Trace edema  PSYCH: Mentation, mood and affect are normal. Decision making capacity is intact    Laboratory/Imaging Studies  Results for orders placed or performed in visit on 06/26/17   Lipid Profile with reflex to direct LDL   Result Value Ref Range    Cholesterol 165 <200 mg/dL    Triglycerides 182 (H) <150 mg/dL    HDL Cholesterol 46 >39 mg/dL    LDL Cholesterol Calculated 83 <100 mg/dL    Non HDL Cholesterol 119 <130 mg/dL   Comprehensive metabolic panel   Result Value Ref Range    Sodium 140 133 - 144 mmol/L    Potassium 3.9 3.4 - 5.3 mmol/L    Chloride 106 94 - 109 mmol/L    Carbon Dioxide 25 20 - " 32 mmol/L    Anion Gap 9 3 - 14 mmol/L    Glucose 100 (H) 70 - 99 mg/dL    Urea Nitrogen 19 7 - 30 mg/dL    Creatinine 1.94 (H) 0.66 - 1.25 mg/dL    GFR Estimate 34 (L) >60 mL/min/1.7m2    GFR Estimate If Black 41 (L) >60 mL/min/1.7m2    Calcium 10.0 8.5 - 10.1 mg/dL    Bilirubin Total 0.6 0.2 - 1.3 mg/dL    Albumin 3.7 3.4 - 5.0 g/dL    Protein Total 7.3 6.8 - 8.8 g/dL    Alkaline Phosphatase 97 40 - 150 U/L    ALT 11 0 - 70 U/L    AST 14 0 - 45 U/L   CBC with platelets   Result Value Ref Range    WBC 9.3 4.0 - 11.0 10e9/L    RBC Count 4.45 4.4 - 5.9 10e12/L    Hemoglobin 14.0 13.3 - 17.7 g/dL    Hematocrit 40.1 40.0 - 53.0 %    MCV 90 78 - 100 fl    MCH 31.5 26.5 - 33.0 pg    MCHC 34.9 31.5 - 36.5 g/dL    RDW 12.8 10.0 - 15.0 %    Platelet Count 217 150 - 450 10e9/L     COLONOSCOPY 5/24/17  Findings:        The perianal exam findings include non-thrombosed external hemorrhoids.          Ten multi-lobulated and sessile and semi-pedunculated polyps were found          in the descending colon, transverse colon, ascending colon and cecum.          The polyps were 2 to 7 mm in size. These polyps were removed with a hot          snare. Resection and retrieval were complete. Verification of patient          identification for the specimen was done. Estimated blood loss was          minimal.          An infiltrative non-obstructing large mass was found in the rectum. The mass was partially circumferential (involving two-thirds of the lumen circumference). The mass measured at 3-4 cm in length. No bleeding was          present. consistent with the previously biopsied lesion, prelim:          adenocarcinoma          Multiple small and large-mouthed diverticula were found in the          recto-sigmoid colon and descending colon.                                                                                     Impression:            - Non-thrombosed external hemorrhoids found on perianal                          exam.                           - Ten 2 to 7 mm polyps in the descending colon, in the                          transverse colon, in the ascending colon and in the                          cecum, removed with a hot snare. Resected and retrieved.                          - Mass lesion in the rectum as previously described,                          non-bleeding, partially circumferential.                          - Diverticulosis in the recto-sigmoid colon and in the                          descending colon.     CT CAP 6/2/17  IMPRESSION:   1. No focal colonic mass or evidence of hepatic metastatic disease on  this noncontrast exam. (Note known rectal masses not well-visualized).    No lymphadenopathy in the chest, abdomen, or pelvis.  2. Solid and part solid pulmonary nodules measuring 7 mm. Recommend  correlation with previous imaging or follow-up CT in 3-6 months per  Fleischner Society guidelines.  3. Infrarenal abdominal aortic ectasia. Extensive atherosclerotic  calcifications, including the coronary arteries.    5/19/17  SPECIMEN(S):   A: Rectal biopsy, mass   B: Colon polyp, ascending     FINAL DIAGNOSIS:   A: Rectum, mass, biopsy:   - Adenocarcinoma, low-grade (well-differentiated-moderately   differentiated)   - Background high-grade dysplasia present   - See comment for mismatch repair proteins immunohistochemistry results     B: Large intestine, ascending colon polyp, polypectomy:   - Tubular adenoma   - No evidence of high-grade dysplasia or carcinoma     COMMENT:   Immunohistochemical stains for mismatch repair proteins (PMS2, MLH1,   MSH2, MHS6) are performed on part A1 with appropriately reacting   on-slide controls and have intact nuclear staining in the neoplastic   cells.     Report Name: Colon and Rectum Biomarker   Status: Submitted     Part(s) Involved:   A: Rectal biopsy, mass     Synoptic Report:     RESULTS     Mismatch Repair       Immunohistochemistry (IHC) Testing For Mismatch Repair (MMR)   Proteins:            - MLH1           - MSH2           - MSH6           - PMS2           - Background nonneoplastic tissue / internal control with intact   nuclear           expression         MLH1 Result:             - Intact nuclear expression         MSH2 Result:             - Intact nuclear expression         MSH6 Result:             - Intact nuclear expression         PMS2 Result:             - Intact nuclear expression         IHC Interpretation:             - No loss of nuclear expression of MMR proteins: low   probability of             MSI-H     5/24/17    SPECIMEN(S):   A: Colon polyp, ascending   B: Cecal polyp   C: Colon polyp, proximal ascending   D: Colon polyp, transverse   E: Colon polyp, descending     FINAL DIAGNOSIS:   A. COLON, ASCENDING POLYP x 4, POLYPECTOMIES:   - Four tubular adenomas   - Negative for high-grade dysplasia or invasive malignancy     B. COLON, CECAL POLYP, POLYPECTOMY:   - Tubular adenoma   - Negative for high-grade dysplasia or invasive malignancy     C. COLON, PROXIMAL ASCENDING POLYP x 2, POLYPECTOMIES:   - Two tubular adenomas   - Negative for high-grade dysplasia or invasive malignancy     D. COLON, TRANSVERSE POLYP x 2, POLYPECTOMIES:   - Two tubular adenomas   - Negative for high-grade dysplasia or invasive malignancy     E. COLON, DESCENDING POLYP, POLYPECTOMY:   - Tubular adenoma   - Negative for high-grade dysplasia or invasive malignancy     ASSESSMENT/PLAN:  Colorectal Cancer:  He tells me that when Dr. Raza did a flexible sigmoidoscopy today this lesion was above the rectum so the plan is to take him to surgery up front and then based upon that we will decide whether he needs any adjuvant treatment or not  I will discuss with Dr. Raza because if he is truly has a sigmoid cancer than this plan makes sense. But if he has a rectal cancer than I would have liked to do more total workup with a pelvic MRI to see if he needs any neoadjuvant treatment or not    He does have  pulmonary nodules which are indeterminate and at this time my plan would be to repeat his scans in a few months to see if they are growing or not    I would like to see him back a few weeks after his surgery to discuss further therapy options    All questions were answered to his satisfaction and he is agreeable and comfortable with this plan    Cher Yadav

## 2017-07-06 NOTE — MR AVS SNAPSHOT
After Visit Summary   2017    Jose Lira    MRN: [id number]           Patient Information     Date Of Birth          1945        Visit Information        Provider Department      2017 1:30 PM Sharan Raza MD [facility] Colon and Rectal Surgery        Today's Diagnoses     Malignant tumor of sigmoid colon (H)    -  1    History of CVA (cerebrovascular accident)           Follow-ups after your visit        Additional Services     CARDIOLOGY PROCEDURE ADULT REFERRAL           PAC Visit Referral (For [facility] Only)                 Who to contact     Please call your clinic at [phone] to:    Ask questions about your health    Make or cancel appointments    Discuss your medicines    Learn about your test results    Speak to your doctor   If you have compliments or concerns about an experience at your clinic, or if you wish to file a complaint, please contact [facility] Patient Relations at [phone] or email us at [email or web address]         Additional Information About Your Visit        MyChart Information     Usentrict is an electronic gateway that provides easy, online access to your medical records. With [employer], you can request a clinic appointment, read your test results, renew a prescription or communicate with your care team.     To sign up for Usentrict visit the website at [email or web address]   You will be asked to enter the access code listed below, as well as some personal information. Please follow the directions to create your username and password.     Your access code is: [email or web address]  Expires: 2017 11:36 AM     Your access code will  in 90 days. If you need help or a new code, please contact your [facility] Clinic or call [phone] for assistance.        Care EveryWhere ID     This is your Care EveryWhere ID. This could be used by other organizations to access your [facility]  "records  [id number]        Your Vitals Were     Pulse Temperature Height Pulse Oximetry BMI (Body Mass Index)       60 97.6  F (36.4  C) (Oral) 1.651 m (5' 5\") 93% 34.1 kg/m2        Blood Pressure from Last 3 Encounters:   08/03/17 120/78   07/31/17 127/73   07/06/17 131/73    Weight from Last 3 Encounters:   08/03/17 91.5 kg (201 lb 12.8 oz)   07/31/17 91.1 kg (200 lb 12.8 oz)   07/06/17 92.5 kg (204 lb)              We Performed the Following     CARDIOLOGY PROCEDURE ADULT REFERRAL     PAC Visit Referral (For [facility] Only)        Primary Care Provider Office Phone # Fax #    Rebeca Sandoval [phone]       [facility] [address] [facility] [address]        Equal Access to Services     RIGO STORY : Hadii aad ku hadasho Soomaali, waaxda luqadaha, qaybta kaalmada adeegyada, haresh castillo hayromeon kody love . So [facility] [phone].    ATENCIÓN: Si habla español, tiene a adorno disposición servicios gratuitos de asistencia lingüística. Llame al [phone].    We comply with applicable federal civil rights laws and [place] laws. We do not discriminate on the basis of race, color, national origin, age, disability sex, sexual orientation or gender identity.            Thank you!     Thank you for choosing [facility] COLON AND RECTAL SURGERY  for your care. Our goal is always to provide you with excellent care. Hearing back from our patients is one way we can continue to improve our services. Please take a few minutes to complete the written survey that you may receive in the mail after your visit with us. Thank you!             Your Updated Medication List - Protect others around you: Learn how to safely use, store and throw away your medicines at www.disposemymeds.org.          This list is accurate as of: 7/6/17 11:59 PM.  Always use your most recent med list.                   Brand Name Dispense Instructions for use Diagnosis    amLODIPine 10 MG tablet    NORVASC    90 " tablet    Take 1 tablet (10 mg) by mouth daily    Essential hypertension with goal blood pressure less than 130/80       atenolol 100 MG tablet    TENORMIN    90 tablet    Take 1 tablet (100 mg) by mouth daily    Essential hypertension with goal blood pressure less than 130/80       atorvastatin 40 MG tablet    LIPITOR    90 tablet    Take 1 tablet (40 mg) by mouth daily    Hyperlipidemia LDL goal <100       BENADRYL 25 MG capsule   Generic drug:  diphenhydrAMINE      Take 25 mg by mouth nightly as needed. sleep        lactobacillus rhamnosus (GG) 10 B CELL capsule    CULTURELLE     Take 1 capsule by mouth every morning        MULTIVITAMIN & MINERAL PO      Take 1 tablet by mouth every morning        niacin 500 MG tablet     30 tablet    Take 1 tablet (500 mg) by mouth At Bedtime    Hyperlipidemia LDL goal <100       SAW PALMETTO PO      Take by mouth every morning        tiZANidine 2 MG tablet    ZANAFLEX    60 tablet    Take 1-2 tablets (2-4 mg) by mouth nightly as needed    Bilateral low back pain without sciatica, unspecified chronicity       valsartan 320 MG tablet    DIOVAN    90 tablet    Take 1 tablet (320 mg) by mouth daily    Essential hypertension with goal blood pressure less than 130/80

## 2017-07-06 NOTE — PATIENT INSTRUCTIONS
I will discuss with Dr Raza and if the plan is to take you to surgery, then I will see you 4 weeks after the surgery

## 2017-07-06 NOTE — MR AVS SNAPSHOT
"              After Visit Summary   7/6/2017    Michael Lira    MRN: [id number]           Patient Information     Date Of Birth          1945        Visit Information        Provider Department      7/6/2017 2:30 PM Cher Yadav MD [facility]        Today's Diagnoses     Rectal adenocarcinoma (H)        Dysplasia-associated lesion or mass (DALM) of colon          Care Instructions    I will discuss with Dr Raza and if the plan is to take you to surgery, then I will see you 4 weeks after the surgery              Follow-ups after your visit        Who to contact     If you have questions or need follow up information about today's clinic visit or your schedule please contact [facility] directly at [phone].  Normal or non-critical lab and imaging results will be communicated to you by AgilOnehart, letter or phone within 4 business days after the clinic has received the results. If you do not hear from us within 7 days, please contact the clinic through MyChart or phone. If you have a critical or abnormal lab result, we will notify you by phone as soon as possible.  Submit refill requests through Fabbeo or call your pharmacy and they will forward the refill request to us. Please allow 3 business days for your refill to be completed.          Additional Information About Your Visit        AgilOneharStellaris Information     Fabbeo lets you send messages to your doctor, view your test results, renew your prescriptions, schedule appointments and more. To sign up, go to [email or web address] . Click on \"Log in\" on the left side of the screen, which will take you to the Welcome page. Then click on \"Sign up Now\" on the right side of the page.     You will be asked to enter the access code listed below, as well as some personal information. Please follow the directions to create your username and password.     Your access code is: [email or web address]  Expires: 9/24/2017 11:36 AM   " "  Your access code will  in 90 days. If you need help or a new code, please call your [place] clinic or [phone].        Care EveryWhere ID     This is your Care EveryWhere ID. This could be used by other organizations to access your [place] medical records  [id number]        Your Vitals Were     Pulse Temperature Height Pulse Oximetry BMI (Body Mass Index)       60 97.6  F (36.4  C) (Oral) 1.651 m (5' 5\") 93% 33.95 kg/m2        Blood Pressure from Last 3 Encounters:   17 131/73   17 131/73   17 168/80    Weight from Last 3 Encounters:   17 92.5 kg (204 lb)   17 92.9 kg (204 lb 14.4 oz)   17 91.3 kg (201 lb 4 oz)              Today, you had the following     No orders found for display       Primary Care Provider Office Phone # Fax #    Rebeca Sandoval [phone]       [facility] [address] [facility] [address]        Equal Access to Services     [facility]: Hadii aad ku hadasho Soomaali, waaxda luqadaha, qaybta kaalmada adeegyada, waxay idiin hayaan kody love . So [facility] [phone].    ATENCIÓN: Si habla español, tiene a adorno disposición servicios gratuitos de asistencia lingüística. Llame al [phone].    We comply with applicable federal civil rights laws and [place] laws. We do not discriminate on the basis of race, color, national origin, age, disability sex, sexual orientation or gender identity.            Thank you!     Thank you for choosing [facility]  for your care. Our goal is always to provide you with excellent care. Hearing back from our patients is one way we can continue to improve our services. Please take a few minutes to complete the written survey that you may receive in the mail after your visit with us. Thank you!             Your Updated Medication List - Protect others around you: Learn how to safely use, store and throw away your medicines at " www.disposemymeds.org.          This list is accurate as of: 7/6/17 11:59 PM.  Always use your most recent med list.                   Brand Name Dispense Instructions for use Diagnosis    amLODIPine 10 MG tablet    NORVASC    90 tablet    Take 1 tablet (10 mg) by mouth daily    Essential hypertension with goal blood pressure less than 130/80       aspirin 325 MG EC tablet     100    PRN        atenolol 100 MG tablet    TENORMIN    90 tablet    Take 1 tablet (100 mg) by mouth daily    Essential hypertension with goal blood pressure less than 130/80       atorvastatin 40 MG tablet    LIPITOR    90 tablet    Take 1 tablet (40 mg) by mouth daily    Hyperlipidemia LDL goal <100       BENADRYL 25 MG capsule   Generic drug:  diphenhydrAMINE      Take 25 mg by mouth nightly as needed. sleep        lactobacillus rhamnosus (GG) 10 B CELL capsule    CULTURELLE     Take 1 capsule by mouth daily.        MULTIVITAMIN & MINERAL PO      Take 1 tablet by mouth daily.        niacin 500 MG tablet     30 tablet    Take 1 tablet (500 mg) by mouth At Bedtime    Hyperlipidemia LDL goal <100       SAW PALMETTO PO      Take by mouth daily        tiZANidine 2 MG tablet    ZANAFLEX    60 tablet    Take 1-2 tablets (2-4 mg) by mouth nightly as needed    Bilateral low back pain without sciatica, unspecified chronicity       valsartan 320 MG tablet    DIOVAN    90 tablet    Take 1 tablet (320 mg) by mouth daily    Essential hypertension with goal blood pressure less than 130/80

## 2017-07-06 NOTE — NURSING NOTE
"Oncology Rooming Note    July 6, 2017 3:19 PM   Michael Lira is a 72 year old male who presents for:    Chief Complaint   Patient presents with     Oncology Clinic Visit     Patient is seeing provider relating to CT result     Initial Vitals: /73  Pulse 60  Temp 97.6  F (36.4  C) (Oral)  Ht 1.651 m (5' 5\")  Wt 74.9 kg (165 lb 1.6 oz)  SpO2 93%  BMI 27.47 kg/m2 Estimated body mass index is 27.47 kg/(m^2) as calculated from the following:    Height as of this encounter: 1.651 m (5' 5\").    Weight as of this encounter: 74.9 kg (165 lb 1.6 oz). Body surface area is 1.85 meters squared.  No Pain (0) Comment: Data Unavailable   No LMP for male patient.  Allergies reviewed: Yes  Medications reviewed: Yes    Medications: Medication refills not needed today.  Pharmacy name entered into eCardio:    [id number] - [place], [place] - [address]    Clinical concerns: Patient is not in any pain today.     6 minutes for nursing intake (face to face time)     Carmen Colmenares LPN            "

## 2017-07-06 NOTE — LETTER
7/6/2017        RE: Michael Lira  [address]   [place] [address]     Dear Colleague,    Thank you for referring your patient, Michael Lira, to the [facility]. Please see a copy of my visit note below.    Oncology Initial visit:  Date on this visit: 7/6/2017    Michael Lira  is referred by Dr.Jennifer IDALIA Sandoval for an oncology consultation. 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 He denies any nausea vomiting diarrhea or constipation or any change in the caliber of the stool    He does have a stable low energy which is going on for the last couple of years and he has to take a couple of naps during the day. He does have residual weakness on the left side from his previous stroke but this has been stable. He denies any new neurological problems. He denies any neuropathy. He denies any infections. He denies any weight loss. He denies any trouble breathing.    ROS:  A comprehensive ROS was otherwise neg    Past Medical/Surgical History:  Past Medical History:   Diagnosis Date     Acute, but ill-defined, cerebrovascular disease 1994     Occlusion and stenosis of carotid artery without mention of cerebral infarction      Peripheral vascular disease, unspecified (H)      Pure hypercholesterolemia      Unspecified essential hypertension      Past Surgical History:    Procedure Laterality Date     COLONOSCOPY N/A 5/19/2017    Procedure: COMBINED COLONOSCOPY, SINGLE OR MULTIPLE BIOPSY/POLYPECTOMY BY BIOPSY;  Rectal bleeding;  Surgeon: Maximus Dinero MD;  Location:  GI     SURGICAL HISTORY OF -   10/02    angioplasty and stenting of left and internal carotid artery at the bifurcation     SURGICAL HISTORY OF - 11/11    right common iliac artery stent      VASCULAR SURGERY  2001    stent placed in carotid      Cancer History:   As above    Allergies:  Allergies as of 07/06/2017 - Hank as Reviewed 07/06/2017   Allergen Reaction Noted     No known drug allergies  11/11/2004     Current Medications:  Current Outpatient Prescriptions   Medication Sig Dispense Refill     atorvastatin (LIPITOR) 40 MG tablet Take 1 tablet (40 mg) by mouth daily 90 tablet PRN     amLODIPine (NORVASC) 10 MG tablet Take 1 tablet (10 mg) by mouth daily 90 tablet PRN     atenolol (TENORMIN) 100 MG tablet Take 1 tablet (100 mg) by mouth daily 90 tablet PRN     valsartan (DIOVAN) 320 MG tablet Take 1 tablet (320 mg) by mouth daily 90 tablet 3     tiZANidine (ZANAFLEX) 2 MG tablet Take 1-2 tablets (2-4 mg) by mouth nightly as needed 60 tablet PRN     Saw Palmetto, Serenoa repens, (SAW PALMETTO PO) Take by mouth daily       [DISCONTINUED] olmesartan-hydrochlorothiazide (BENICAR HCT) 40-25 MG per tablet Take 1 tablet by mouth daily 90 tablet 3     niacin 500 MG tablet Take 1 tablet (500 mg) by mouth At Bedtime 30 tablet PRN     Multiple Vitamins-Minerals (MULTIVITAMIN & MINERAL PO) Take 1 tablet by mouth daily.       diphenhydrAMINE (BENADRYL) 25 MG capsule Take 25 mg by mouth nightly as needed. sleep       lactobacillus rhamnosus, GG, (CULTURELLE) 10 B CELL capsule Take 1 capsule by mouth daily.       ASPIRIN 325 MG OR TBEC  3      Family History:  Family History   Problem Relation Age of Onset     C.A.D. Father      Hypertension Father      Prostate Cancer Father      C.A.D. Brother      MI      "Hypertension Brother      Arthritis Sister      DIABETES No family hx of      CEREBROVASCULAR DISEASE No family hx of      Cancer - colorectal No family hx of     father had prostate cancer next Sister. had breast cancer   patient does not have any kids  Social History:  Social History     Social History     Marital status: Single     Spouse name: N/A     Number of children: N/A     Years of education: N/A     Occupational History     Not on file.     Social History Main Topics     Smoking status: Former Smoker     Quit date: 8/5/1994     Smokeless tobacco: Former User     Quit date: 8/1/1994      Comment: quit 15 years ago after his stroke     Alcohol use Yes      Comment: every evening 1-2 drinks     Drug use: No     Sexual activity: No     Other Topics Concern     Parent/Sibling W/ Cabg, Mi Or Angioplasty Before 65f 55m? Yes     brother and father     Social History Narrative    he used to smoke but quit in 1994. He used to drink alcohol heavily but now only drinks occasionally. He lives alone  Physical Exam:  /73  Pulse 60  Temp 97.6  F (36.4  C) (Oral)  Ht 1.651 m (5' 5\")  Wt 92.5 kg (204 lb)  SpO2 93%  BMI 33.95 kg/m2  CONSTITUTIONAL: no acute distress  EYES: PERRLA, no palor or icterus.   ENT/MOUTH: no mouth lesions. Oropharynx normal  CVS: s1s2 no m r g .   RESPIRATORY: clear to auscultation b/l  GI: soft non tender no hepatosplenomegaly  NEURO: AAOX3  residual left-sided weakness  INTEGUMENT: no obvious rashes  LYMPHATIC: no palpable cervical, supraclavicular, axillary or inguinal LAD  MUSCULOSKELETAL: Unremarkable. No bony tenderness.   EXTREMITIES: Trace edema  PSYCH: Mentation, mood and affect are normal. Decision making capacity is intact    Laboratory/Imaging Studies  Results for orders placed or performed in visit on 06/26/17   Lipid Profile with reflex to direct LDL   Result Value Ref Range    Cholesterol 165 <200 mg/dL    Triglycerides 182 (H) <150 mg/dL    HDL Cholesterol 46 >39 mg/dL "    LDL Cholesterol Calculated 83 <100 mg/dL    Non HDL Cholesterol 119 <130 mg/dL   Comprehensive metabolic panel   Result Value Ref Range    Sodium 140 133 - 144 mmol/L    Potassium 3.9 3.4 - 5.3 mmol/L    Chloride 106 94 - 109 mmol/L    Carbon Dioxide 25 20 - 32 mmol/L    Anion Gap 9 3 - 14 mmol/L    Glucose 100 (H) 70 - 99 mg/dL    Urea Nitrogen 19 7 - 30 mg/dL    Creatinine 1.94 (H) 0.66 - 1.25 mg/dL    GFR Estimate 34 (L) >60 mL/min/1.7m2    GFR Estimate If Black 41 (L) >60 mL/min/1.7m2    Calcium 10.0 8.5 - 10.1 mg/dL    Bilirubin Total 0.6 0.2 - 1.3 mg/dL    Albumin 3.7 3.4 - 5.0 g/dL    Protein Total 7.3 6.8 - 8.8 g/dL    Alkaline Phosphatase 97 40 - 150 U/L    ALT 11 0 - 70 U/L    AST 14 0 - 45 U/L   CBC with platelets   Result Value Ref Range    WBC 9.3 4.0 - 11.0 10e9/L    RBC Count 4.45 4.4 - 5.9 10e12/L    Hemoglobin 14.0 13.3 - 17.7 g/dL    Hematocrit 40.1 40.0 - 53.0 %    MCV 90 78 - 100 fl    MCH 31.5 26.5 - 33.0 pg    MCHC 34.9 31.5 - 36.5 g/dL    RDW 12.8 10.0 - 15.0 %    Platelet Count 217 150 - 450 10e9/L     COLONOSCOPY 5/24/17  Findings:        The perianal exam findings include non-thrombosed external hemorrhoids.          Ten multi-lobulated and sessile and semi-pedunculated polyps were found          in the descending colon, transverse colon, ascending colon and cecum.          The polyps were 2 to 7 mm in size. These polyps were removed with a hot          snare. Resection and retrieval were complete. Verification of patient          identification for the specimen was done. Estimated blood loss was          minimal.          An infiltrative non-obstructing large mass was found in the rectum. The mass was partially circumferential (involving two-thirds of the lumen circumference). The mass measured at 3-4 cm in length. No bleeding was          present. consistent with the previously biopsied lesion, prelim:          adenocarcinoma          Multiple small and large-mouthed diverticula were  found in the          recto-sigmoid colon and descending colon.                                                                                     Impression:            - Non-thrombosed external hemorrhoids found on perianal                          exam.                          - Ten 2 to 7 mm polyps in the descending colon, in the                          transverse colon, in the ascending colon and in the                          cecum, removed with a hot snare. Resected and retrieved.                          - Mass lesion in the rectum as previously described,                          non-bleeding, partially circumferential.                          - Diverticulosis in the recto-sigmoid colon and in the                          descending colon.     CT CAP 6/2/17  IMPRESSION:   1. No focal colonic mass or evidence of hepatic metastatic disease on  this noncontrast exam. (Note known rectal masses not well-visualized).    No lymphadenopathy in the chest, abdomen, or pelvis.  2. Solid and part solid pulmonary nodules measuring 7 mm. Recommend  correlation with previous imaging or follow-up CT in 3-6 months per  Fleischner Society guidelines.  3. Infrarenal abdominal aortic ectasia. Extensive atherosclerotic  calcifications, including the coronary arteries.    5/19/17  SPECIMEN(S):   A: Rectal biopsy, mass   B: Colon polyp, ascending     FINAL DIAGNOSIS:   A: Rectum, mass, biopsy:   - Adenocarcinoma, low-grade (well-differentiated-moderately   differentiated)   - Background high-grade dysplasia present   - See comment for mismatch repair proteins immunohistochemistry results     B: Large intestine, ascending colon polyp, polypectomy:   - Tubular adenoma   - No evidence of high-grade dysplasia or carcinoma     COMMENT:   Immunohistochemical stains for mismatch repair proteins (PMS2, MLH1,   MSH2, MHS6) are performed on part A1 with appropriately reacting   on-slide controls and have intact nuclear staining in the  neoplastic   cells.     Report Name: Colon and Rectum Biomarker   Status: Submitted     Part(s) Involved:   A: Rectal biopsy, mass     Synoptic Report:     RESULTS     Mismatch Repair       Immunohistochemistry (IHC) Testing For Mismatch Repair (MMR)   Proteins:           - MLH1           - MSH2           - MSH6           - PMS2           - Background nonneoplastic tissue / internal control with intact   nuclear           expression         MLH1 Result:             - Intact nuclear expression         MSH2 Result:             - Intact nuclear expression         MSH6 Result:             - Intact nuclear expression         PMS2 Result:             - Intact nuclear expression         IHC Interpretation:             - No loss of nuclear expression of MMR proteins: low   probability of             MSI-H     5/24/17    SPECIMEN(S):   A: Colon polyp, ascending   B: Cecal polyp   C: Colon polyp, proximal ascending   D: Colon polyp, transverse   E: Colon polyp, descending     FINAL DIAGNOSIS:   A. COLON, ASCENDING POLYP x 4, POLYPECTOMIES:   - Four tubular adenomas   - Negative for high-grade dysplasia or invasive malignancy     B. COLON, CECAL POLYP, POLYPECTOMY:   - Tubular adenoma   - Negative for high-grade dysplasia or invasive malignancy     C. COLON, PROXIMAL ASCENDING POLYP x 2, POLYPECTOMIES:   - Two tubular adenomas   - Negative for high-grade dysplasia or invasive malignancy     D. COLON, TRANSVERSE POLYP x 2, POLYPECTOMIES:   - Two tubular adenomas   - Negative for high-grade dysplasia or invasive malignancy     E. COLON, DESCENDING POLYP, POLYPECTOMY:   - Tubular adenoma   - Negative for high-grade dysplasia or invasive malignancy     ASSESSMENT/PLAN:  Colorectal Cancer:  He tells me that when Dr. Raza did a flexible sigmoidoscopy today this lesion was above the rectum so the plan is to take him to surgery up front and then based upon that we will decide whether he needs any adjuvant treatment or not  I will  discuss with Dr. Raza because if he is truly has a sigmoid cancer than this plan makes sense. But if he has a rectal cancer than I would have liked to do more total workup with a pelvic MRI to see if he needs any neoadjuvant treatment or not    He does have pulmonary nodules which are indeterminate and at this time my plan would be to repeat his scans in a few months to see if they are growing or not    I would like to see him back a few weeks after his surgery to discuss further therapy options    All questions were answered to his satisfaction and he is agreeable and comfortable with this plan    Cher Yadav

## 2017-07-06 NOTE — PROGRESS NOTES
[facility] Colorectal Surgery Clinic History and Physical    Michael Lira MRN# [id number]   Age: 72 year old YOB: 1945     Primary care provider: Rebeca Sandoval          Assessment and Plan:   Assessment:   72 year old male with distal sigmoid/rectosigmoid adenocarcinoma      Plan:   - We discussed the location of the tumor and management strategies with the patient and his family in detail today in clinic. This appears to be a rectosigmoid cancer so we would recommend treatment with primary surgery.  We will discuss his case at tumor conference this Monday as he does have two small lung nodules, although we suspect these can be followed with repeat imaging in 6 months.  He needs a thorough evaluation of his cardiopulmonary status and carotid stent.  He will see oncology this afternoon and we will arrange for him to get a CEA drawn.  We discussed surgery with the patient including the indications and risks to low anterior resection.  We discussed potential bleeding, infection, cardiovascular and pulmonary complications, anastomotic leak, potential need for a temporary stoma, intra-abdominal abscess and changes in bladder or sexual function.  We also discussed the possibility of low anterior syndrome.  We will arrange for the patients necessary visits, discuss him at tumor board and schedule him for surgery at his earliest convenience barring unforseen events.      The patient was seen with Dr. Raza            Chief Complaint:   - Rectosigmoid cancer    History is obtained from the patient    -  This is a very pleasant 72 year old male here for surgical opinion regarding a rectosigmoid adenocarcinoma.  The patient has an intermittent history of bleeding per rectum which he attributed to hemorrhoids.  He has had no weight loss, abdominal pain.  He possibly has had some change in stool caliber and some intermittent fatigue.   His appetite decreased but he has gained a little weight as he now eats  mainly ice cream.  He presented to his PCP who arranged for colonoscopy which was performed in 2017.  He had two scopes as the prep on the first was poor.  He had a rectosigmoid mass which was biopsied and 10 tubular adenomas removed from the rest of the colon.  Previously on a scope in  he had had 4 tubular adenomas removed, one with high grade dysplasia in the sigmoid colon.  The biopsies returned as adenocarcinoma.  The patient subsequently had a CT chest, abdomen and pelvis and was referred for surgical opinion.           Past Medical History:   I have reviewed this patient's past medical history         Past Surgical History:   I have reviewed this patient's past surgical history  Carotid and iliac stenting, no abdominal operations         Social History:   - Former smoker, quit in   - Former heavy drinker, no drinks 1-2 drinks/day  No drug use  - Lives alone         Family History:   - Negative for colorectal, gastric, endometrial, ovarian cancers.  Father  of prostate cancer         Immunizations:   Immunization status is unknown         Allergies:   All allergies reviewed and addressed         Medications:     Current Outpatient Prescriptions   Medication Sig     atorvastatin (LIPITOR) 40 MG tablet Take 1 tablet (40 mg) by mouth daily     amLODIPine (NORVASC) 10 MG tablet Take 1 tablet (10 mg) by mouth daily     atenolol (TENORMIN) 100 MG tablet Take 1 tablet (100 mg) by mouth daily     valsartan (DIOVAN) 320 MG tablet Take 1 tablet (320 mg) by mouth daily     tiZANidine (ZANAFLEX) 2 MG tablet Take 1-2 tablets (2-4 mg) by mouth nightly as needed     Saw Palmetto, Serenoa repens, (SAW PALMETTO PO) Take by mouth daily     niacin 500 MG tablet Take 1 tablet (500 mg) by mouth At Bedtime     Multiple Vitamins-Minerals (MULTIVITAMIN & MINERAL PO) Take 1 tablet by mouth daily.     diphenhydrAMINE (BENADRYL) 25 MG capsule Take 25 mg by mouth nightly as needed. sleep     lactobacillus rhamnosus, GG,  (CULTURELLE) 10 B CELL capsule Take 1 capsule by mouth daily.     ASPIRIN 325 MG OR TBEC PRN     [DISCONTINUED] olmesartan-hydrochlorothiazide (BENICAR HCT) 40-25 MG per tablet Take 1 tablet by mouth daily     No current facility-administered medications for this visit.              Review of Systems:   A comprehensive review of systems was performed and found to be negative except as described in this note    Physical Exam:  Awake and alert  NCAT  PERRL, no icterus  Neck supple, trachea midline  RRR  CTA b/l  Soft, non tender, non distended, obese, no HSM, no stigmata of liver disease  No c/c/e  ARSALAN: normal external exam, no masses or lesions appreciated on internal exam  Neuro: tremor noted in all 4 ext, otherwise grossly intact  Psych: Normal mood and affect  Skin: no rashes or lesions    Flex Sig: After informed consent obtained patient placed in left lateral decubitus position. The patient was prepped previously with enemas. ARSALAN performed without ability to palpate the mass.  Flexible endoscope passed into anal canal.  There was a moderate amount of stool.  Scope advanced to 35 cm and withdrawn.  Proximal and distal tattoos noted.  There is an ulcerating mass at the rectosigmoid junction approximately 1/3 the circumference of the lumen.  The bowel was decompressed and scope removed.  The patient tolerated the procedure well.           Data:   All laboratory data reviewed  All imaging studies reviewed by me.  CT C/A/P - two small <8 mm nodules left apex and right upper lobe, no hepatic metastases, distal sigmoid thickening    Gallito Wright MD  CRS Fellow  Pager        I saw and examined the patient, led the discussion and edited the resident note.  I agree with the assessment and plan as outlined.  The patient has significant ASCVD and ASPVD and needs further evaluation before surgery.  We discussed the medical and surgical risks associated with the anterior resection, including but not limited  to bleeding and transfusion risks, infection, adjacent organ injury, myocardial infarction (increased risk), cerebrovascular accident (increased risk), deep venous thrombosis, pulmonary embolism, pneumonia, anastomotic leak with its attendant and sometimes severe consequences, and death.  We discussed the variable functional consequences following surgery and the possibility of severe low anterior resection syndrome, which can include stool frequency, urgency, clustering, fragmentation, and incontinence.  We specifically discussed the risk of pelvic nerve injury with resultant sexual and or urinary dysfunction.   I answered all the patient's questions to his stated satisfaction.  He expressed his understanding and agreement with the proposed plan.  We will do our best to expedite his evaluation and get his surgery scheduled.    Total time 45 minutes excluding sigmoidscopy time.  >50% of the time was spent counseling.      Sharan Raza MD  Professor of Surgery

## 2017-07-18 ENCOUNTER — TELEPHONE (OUTPATIENT)
Dept: SURGERY | Facility: CLINIC | Age: 72
End: 2017-07-18

## 2017-07-18 NOTE — TELEPHONE ENCOUNTER
I received a call from [place] in Cardiology, stating patient can be seen tomorrow at 2:00 pm for cardiology consult. Called and spoke with patient.  Patient states cardiology did contact him with the appointment, however he will not be able to make tomorrow as he will be in [place].  Patient states that cardiology will be following up to reschedule the appointment.  Patient has my direct number for additional questions or concerns.

## 2017-07-24 DIAGNOSIS — C19 MALIGNANT NEOPLASM OF RECTOSIGMOID JUNCTION (H): Primary | ICD-10-CM

## 2017-07-25 ENCOUNTER — TELEPHONE (OUTPATIENT)
Dept: SURGERY | Facility: CLINIC | Age: 72
End: 2017-07-25

## 2017-07-25 NOTE — TELEPHONE ENCOUNTER
Called patient to discuss surgery date.  Offered 08/09/17 vs. 9/27/17.  Patient states he has a [employer] cleaning inspection for his house on 08/09/17, but will speak with his landlord to see if he can reschedule.  Patient states he will contact me tomorrow with an update.

## 2017-07-27 NOTE — TELEPHONE ENCOUNTER
Patient left a message returning my call.  Patient states he spoke with his landlord, and would like to finalize surgery 08/09/17.  Called and spoke with patient.  Patient is finalized for surgery 08/09/17 at 7:30 am. Patient is also scheduled for the following appointments on 07/31/17:    PAC- 11:00 am  Cardiology consult- 1:30 pm  Carotid U/S- 2:30 pm  WOC- 3:30 pm    Instructed patient to check in at clinic 4J for PAC appointment.  Informed patient I will stop in at that appointment to go over his surgery packet, and instruct him where to check in for all his other appointments.  Patient verbalized understanding, and has my direct number for additional questions or concerns.

## 2017-07-28 DIAGNOSIS — C18.9 COLON CANCER (H): Primary | ICD-10-CM

## 2017-07-28 RX ORDER — METRONIDAZOLE 500 MG/1
500 TABLET ORAL EVERY 8 HOURS
Qty: 3 TABLET | Refills: 0 | Status: SHIPPED | OUTPATIENT
Start: 2017-07-28 | End: 2017-07-29

## 2017-07-28 RX ORDER — NEOMYCIN SULFATE 500 MG/1
1000 TABLET ORAL EVERY 8 HOURS
Qty: 6 TABLET | Refills: 0 | Status: SHIPPED | OUTPATIENT
Start: 2017-07-28 | End: 2017-07-29

## 2017-07-28 RX ORDER — ONDANSETRON 4 MG/1
4 TABLET, FILM COATED ORAL EVERY 6 HOURS PRN
Qty: 3 TABLET | Refills: 0 | Status: ON HOLD | OUTPATIENT
Start: 2017-07-28 | End: 2017-08-15

## 2017-07-31 ENCOUNTER — TELEPHONE (OUTPATIENT)
Dept: SURGERY | Facility: CLINIC | Age: 72
End: 2017-07-31

## 2017-07-31 ENCOUNTER — PRE VISIT (OUTPATIENT)
Dept: CARDIOLOGY | Facility: CLINIC | Age: 72
End: 2017-07-31

## 2017-07-31 ENCOUNTER — OFFICE VISIT (OUTPATIENT)
Dept: CARDIOLOGY | Facility: CLINIC | Age: 72
End: 2017-07-31
Attending: INTERNAL MEDICINE
Payer: MEDICARE

## 2017-07-31 VITALS
DIASTOLIC BLOOD PRESSURE: 73 MMHG | HEIGHT: 65 IN | OXYGEN SATURATION: 96 % | SYSTOLIC BLOOD PRESSURE: 127 MMHG | WEIGHT: 200.8 LBS | BODY MASS INDEX: 33.45 KG/M2 | HEART RATE: 59 BPM

## 2017-07-31 DIAGNOSIS — I10 HYPERTENSION GOAL BP (BLOOD PRESSURE) < 130/80: Primary | ICD-10-CM

## 2017-07-31 DIAGNOSIS — E78.5 HYPERLIPIDEMIA LDL GOAL <100: ICD-10-CM

## 2017-07-31 DIAGNOSIS — Z01.810 PRE-OPERATIVE CARDIOVASCULAR EXAMINATION: ICD-10-CM

## 2017-07-31 PROCEDURE — 99203 OFFICE O/P NEW LOW 30 MIN: CPT | Mod: ZP | Performed by: INTERNAL MEDICINE

## 2017-07-31 PROCEDURE — 99213 OFFICE O/P EST LOW 20 MIN: CPT | Mod: ZF

## 2017-07-31 ASSESSMENT — PAIN SCALES - GENERAL: PAINLEVEL: NO PAIN (0)

## 2017-07-31 NOTE — PROGRESS NOTES
Cardiology Clinic    CC: preoperative assessment    HPI: 73 yo male with history including HTN, HLD, prior CVA, PAD s/p stenting of left internal carotid and right common iliac artery, and CKD 3 recently diagnosed with colorectal carcinoma with plan for surgical resection here for preoperative cardiovascular assessment.    He is very limited in exercise by claudication. He reports being able to walk only about 1/2 block before stopping due to pain. Pain involves his bilateral calves and occasionally the thighs. Pain goes away after a few minutes of resting. He denies chest pain, dyspnea, orthopnea, and PND. He does have LE edema which has been stable over the last 3 years. He is compliant with all medications. Of note, he reports taking a full dose aspirin when he remembers but does not take it daily. He is a former smoker but quit in 1994 after a stroke.    PAST MEDICAL HISTORY:  Past Medical History:   Diagnosis Date     Acute, but ill-defined, cerebrovascular disease 1994     CKD (chronic kidney disease)      History of CVA (cerebrovascular accident)      Occlusion and stenosis of carotid artery without mention of cerebral infarction      Peripheral vascular disease, unspecified (H)      Pure hypercholesterolemia      Unspecified essential hypertension        FAMILY HISTORY:  Family History   Problem Relation Age of Onset     C.A.D. Father      Hypertension Father      Prostate Cancer Father      C.A.D. Brother      MI     Hypertension Brother      Arthritis Sister      DIABETES No family hx of      CEREBROVASCULAR DISEASE No family hx of      Cancer - colorectal No family hx of        SOCIAL HISTORY:  Social History     Social History     Marital status: Single     Spouse name: N/A     Number of children: N/A     Years of education: N/A     Social History Main Topics     Smoking status: Former Smoker     Quit date: 8/5/1994     Smokeless tobacco: Former User     Quit date: 8/1/1994      Comment: quit 15 years  ago after his stroke     Alcohol use Yes      Comment: every evening 1-2 drinks     Drug use: No     Sexual activity: No     Other Topics Concern     Parent/Sibling W/ Cabg, Mi Or Angioplasty Before 65f 55m? Yes     brother and father     Social History Narrative       CURRENT MEDICATIONS:  Current Outpatient Prescriptions   Medication Sig Dispense Refill     atorvastatin (LIPITOR) 40 MG tablet Take 1 tablet (40 mg) by mouth daily 90 tablet PRN     amLODIPine (NORVASC) 10 MG tablet Take 1 tablet (10 mg) by mouth daily 90 tablet PRN     atenolol (TENORMIN) 100 MG tablet Take 1 tablet (100 mg) by mouth daily 90 tablet PRN     valsartan (DIOVAN) 320 MG tablet Take 1 tablet (320 mg) by mouth daily 90 tablet 3     tiZANidine (ZANAFLEX) 2 MG tablet Take 1-2 tablets (2-4 mg) by mouth nightly as needed 60 tablet PRN     Saw Palmetto, Serenoa repens, (SAW PALMETTO PO) Take by mouth daily       niacin 500 MG tablet Take 1 tablet (500 mg) by mouth At Bedtime 30 tablet PRN     Multiple Vitamins-Minerals (MULTIVITAMIN & MINERAL PO) Take 1 tablet by mouth daily.       diphenhydrAMINE (BENADRYL) 25 MG capsule Take 25 mg by mouth nightly as needed. sleep       ASPIRIN 325 MG OR TBEC  3     polyethylene glycol (GOLYTELY/NULYTELY) 236 G suspension Take as directed in your surgery packet. (Patient not taking: Reported on 7/31/2017) 4000 mL 0     ondansetron (ZOFRAN) 4 MG tablet Take 1 tablet (4 mg) by mouth every 6 hours as needed for nausea when taking Neomycin and Flagyl. (Patient not taking: Reported on 7/31/2017) 3 tablet 0     [DISCONTINUED] olmesartan-hydrochlorothiazide (BENICAR HCT) 40-25 MG per tablet Take 1 tablet by mouth daily 90 tablet 3     lactobacillus rhamnosus, GG, (CULTURELLE) 10 B CELL capsule Take 1 capsule by mouth daily.         ROS:   Constitutional: No fever, chills, or sweats. No weight gain/loss.   ENT: No visual disturbance, ear ache, epistaxis, sore throat.   Respiratory: No dyspnea, cough,  "hemoptysis.   Cardiovascular: As per HPI.   GI: No nausea, vomiting, hematemesis, melena, or hematochezia.   : No urinary frequency, dysuria, or hematuria.   Integument: No rash, pururitis.   Psychiatric: No depression, anxiety, insomnia.   Neuro: No weakness, tingling, numbness, dysphasia.   Endocrinology: No heat/cold intolerance, polyuria, polydipsia.   Musculoskeletal: No muscle aches, arthritis, joint swelling.    EXAM:  /73 (BP Location: Left arm, Patient Position: Chair, Cuff Size: Adult Large)  Pulse 59  Ht 1.651 m (5' 5\")  Wt 91.1 kg (200 lb 12.8 oz)  SpO2 96%  BMI 33.41 kg/m2  General: appears comfortable, alert and articulate  HEENT: NC/AT.  PERRLA.  EOMI.  Sclerae white, not injected.  Nares clear.  Pharynx without erythema or exudate.  Dentition intact.    Neck: No adenopathy.  No thyromegaly. Carotids +4/4 bilaterally without bruits.  No jugular venous distension.   Heart: RRR. Normal S1, S2. No murmur, rub, click, or gallop. The PMI is in the 5th ICS in the midclavicular line. There is no heave.    Lungs: CTA.  No ronchi, wheezes, rales.  No dullness to percussion.   Abdomen: Soft, nontender, nondistended. No organomegaly.  No bruits.   Extremities: Trace LE edema. No clubbing, cyanosis.    Neurologic: Alert and oriented to person/place/time, normal speech, gait and affect  Skin: No petechiae, purpura or rash.    Labs:  CBC RESULTS:  Lab Results   Component Value Date    WBC 10.3 07/06/2017    RBC 4.43 07/06/2017    HGB 13.6 07/06/2017    HCT 39.6 (L) 07/06/2017    MCV 89 07/06/2017    MCH 30.7 07/06/2017    MCHC 34.3 07/06/2017    RDW 12.9 07/06/2017     07/06/2017       CMP RESULTS:  Lab Results   Component Value Date     07/06/2017    POTASSIUM 4.1 07/06/2017    CHLORIDE 106 07/06/2017    CO2 25 07/06/2017    ANIONGAP 9 07/06/2017    GLC 94 07/06/2017    BUN 25 07/06/2017    CR 2.50 (H) 07/06/2017    GFRESTIMATED 25 (L) 07/06/2017    GFRESTBLACK 31 (L) 07/06/2017    ADRIAN " "9.2 07/06/2017    BILITOTAL 0.5 07/06/2017    ALBUMIN 3.8 07/06/2017    ALKPHOS 94 07/06/2017    ALT 14 07/06/2017    AST 19 07/06/2017        INR RESULTS:  Lab Results   Component Value Date    INR 1.12 11/09/2011       No components found for: CK  No results found for: MAG  No results found for: NTBNP    Cardiac Studies:  ECG 11/9/11: normal sinus rhythm    Adenosine Stress MPI 9/26/2002  1. No evidence of ischemia  2. Apical akinesis  3. EF 74%    Assessment and Plan: 73 yo male with history including HTN, HLD, prior CVA, PAD s/p stenting of left internal carotid and right common iliac artery, and CKD 3 recently diagnosed with colorectal carcinoma with plan for surgical resection here for preoperative cardiovascular assessment.    1.Preoperative cardiovascular risk assessment: Patient scheduled to undergo surgical resection of a colorectal tumor. He is unable to exercise to 4 METs given claudication. Accordingly, he will require ischemic evaluation prior to surgery. He reports feeling \"wierd\" for 6 months after his last adenosine MPI in 2002 and recommends that he not undergo adenosine/regadenoson. Recommend dobutamine stress echo which will also be beneficial as he has never had an echocardiogram in our system despite extensive vascular disease history.  -dobutamine stress echo pending    2.Peripheral arterial disease: On aspirin and statin. Prior smoker. Will recommend that he take ASA 81 mg daily (as opposed to 325 mg PRN as he is doing now). He is followed closely for his PAD by Dr. Islas.  -aspirin 81 mg daily  -atorvastatin 40 mg daily    3. Hypertension: BP at goal on current regimen.  -amlodipine 10 mg  -atenolol 100 mg  -valsartan 320 mg    4.Hyperlipidemia: On high-intensity statin.  -continue atorvastatin 40 mg daily  -continue niacin    RTC PRN    Patient seen and discussed with Dr. Jose Willett, attending physician, who agrees with the assessment and plan above.   Idris Singh MD  Cardiovascular " Disease Fellow  [phone]    Patient Care Team:  Rebeca Sandoval NP as PCP - General  Lance Eason MD as Resident (Student in organized health care education/training program)  Orion Motley MD as MD (Radiology)  Estefania Akers, RN as Registered Nurse  Cher Yadav MD as MD (Hematology & Oncology)  Cynthia Mondragon, GERMANIA as Nurse Coordinator (Oncology)      ATTENDING ATTESTATION: Patient has been seen and evaluated by me on 7/31/17. I have reviewed today's vital signs, medications, labs, and imaging results. I have reviewed and edited, as necessary, the history, review of systems, physical examination, and assessment and plan. I have discussed my assessment and plan with the fellow.     Thank you for allowing us to take part in the care of this very pleasant patient.    Jose Willett MD, PhD  Interventional/Critical Care Cardiology  [phone]    July 31, 2017

## 2017-07-31 NOTE — PATIENT INSTRUCTIONS
You were seen at the [facility] today.  You saw Dr. Willett  Here are your Instructions:    1.  Take 81mg daily.  2.  Dobutamine stress test.        Felecia Bundy RN  Nurse Care Coordinator  Office:  [phone] option #1, then #3 & ask for Felecia (nurse line)  Fax:  [phone]  After Hours:  [phone]  Appointments:  [phone] option #1, then option #1

## 2017-07-31 NOTE — MR AVS SNAPSHOT
After Visit Summary   7/31/2017    Jose Lira    MRN: [id number]           Patient Information     Date Of Birth          1945        Visit Information        Provider Department      7/31/2017 1:30 PM Jose Willett MD [facility]        Today's Diagnoses     Hypertension goal BP (blood pressure) < 130/80    -  1    Hyperlipidemia LDL goal <100          Care Instructions    You were seen at the [facility] today.  You saw Dr. Willett  Here are your Instructions:    1.  Take 81mg daily.  2.  Dobutamine stress test.        Felecia Bundy RN  Nurse Care Coordinator  Office:  [phone] option #1, then #3 & ask for Felecia (nurse line)  Fax:  [phone]  After Hours:  [phone]  Appointments:  [phone] option #1, then option #1                        Follow-ups after your visit        Your next 10 appointments already scheduled     Jul 31, 2017  2:30 PM CDT   US CAROTID BILATERAL with [email or web address]   [facility] ([facility])    [address] [place] [address] [phone]           Please bring a list of your medicines (including vitamins, minerals and over-the-counter drugs). Also, tell your doctor about any allergies you may have. Wear comfortable clothes and leave your valuables at home.  You do not need to do anything special to prepare for your exam.  Please call the Imaging Department at your exam site with any questions.            Jul 31, 2017  3:30 PM CDT   NEW OSTOMY NURSE VISIT with Donnie Vallecillo RN   [facility] ([facility])    [address] [place] [address] [phone]            Aug 03, 2017  1:30 PM CDT   (Arrive by 1:15 PM)   PAC EVALUATION with  Pac Paul 9   [facility] ([facility])    [address] [place] [address]  [place] [address]   [phone]            Aug 03, 2017  2:30 PM CDT   (Arrive by 2:15 PM)   PAC RN ASSESSMENT with Magali Pac Rn   [facility] [place] ([facility])    [address] [place] [address]  4th [facility] [address]   [phone]            Aug 03, 2017  3:10 PM CDT   (Arrive by 2:55 PM)   PAC Anesthesia Consult with Magali Pac Anesthesiologist   [facility] [place] ([facility])    [address] [place] [address]   [phone]            Aug 04, 2017  1:00 PM CDT   Ech Dobutamine Stress Test with [id number]   [facility], [place],  [facility] ([facility])    [address] [facility] [address]   [phone]           1.  Please bring or wear a comfortable two-piece outfit and walking shoes. 2.  Stop eating 3 hours before the test. You may drink water or juice. 3.  Stop all caffeine 12 hours before the test. This includes coffee, tea, soda pop, chocolate and certain medicines (such as Anacin and Excederin). Also avoid decaf coffee and tea, as these contain small amounts of caffeine. 4.  No alcohol, smoking or use of other tobacco products for 12 hours before the test. 5.  Refer to your provider instructions to see if you need to stop any medications (such as beta-blockers or nitrates) for this test. 6.  For patients with diabetes: -   If you take insulin, call your diabetes care team. Ask if you should take a   dose the morning of your test. -   If you take diabetes medicine by mouth, don't take it on the morning of your test. Bring it with you to take after the test.  (If you have questions, call your diabetes care team) 7.  When you arrive, please tell us if: -   You have diabetes. -   You have taken Viagra, Cialis or Levitra in the past 48 hours. 8.  For any questions that cannot be answered, please contact the ordering physician            Aug 09, 2017  "  Procedure with Sharan Raza MD   [facility], [place], Same Day Surgery (--)    [address]  [place] [address] [phone]              Future tests that were ordered for you today     Open Future Orders        Priority Expected Expires Ordered    Dobutamine Stress Echocardiogram Routine  2018            Who to contact     If you have questions or need follow up information about today's clinic visit or your schedule please contact [facility] directly at [phone].  Normal or non-critical lab and imaging results will be communicated to you by Revo Roundhart, letter or phone within 4 business days after the clinic has received the results. If you do not hear from us within 7 days, please contact the clinic through [facility] or phone. If you have a critical or abnormal lab result, we will notify you by phone as soon as possible.  Submit refill requests through [employer] or call your pharmacy and they will forward the refill request to us. Please allow 3 business days for your refill to be completed.          Additional Information About Your Visit        [employer] Information     [employer] lets you send messages to your doctor, view your test results, renew your prescriptions, schedule appointments and more. To sign up, go to [email or web address] . Click on \"Log in\" on the left side of the screen, which will take you to the Welcome page. Then click on \"Sign up Now\" on the right side of the page.     You will be asked to enter the access code listed below, as well as some personal information. Please follow the directions to create your username and password.     Your access code is: [email or web address]  Expires: 2017 11:36 AM     Your access code will  in 90 days. If you need help or a new code, please call your [facility] clinic or [phone].        Care EveryWhere ID     This is your Care EveryWhere ID. This could be used by other organizations to access your [facility] medical " "records  [id number]        Your Vitals Were     Pulse Height Pulse Oximetry BMI (Body Mass Index)          59 1.651 m (5' 5\") 96% 33.41 kg/m2         Blood Pressure from Last 3 Encounters:   07/31/17 127/73   07/06/17 131/73   07/06/17 131/73    Weight from Last 3 Encounters:   07/31/17 91.1 kg (200 lb 12.8 oz)   07/06/17 92.5 kg (204 lb)   07/06/17 92.9 kg (204 lb 14.4 oz)                 Today's Medication Changes          These changes are accurate as of: 7/31/17  2:23 PM.  If you have any questions, ask your nurse or doctor.               These medicines have changed or have updated prescriptions.        Dose/Directions    * aspirin 325 MG EC tablet   This may have changed:  Another medication with the same name was added. Make sure you understand how and when to take each.   Changed by:  Rebeca Sandoval NP        PRN   Quantity:  100   Refills:  3       * aspirin 81 MG EC tablet   This may have changed:  You were already taking a medication with the same name, and this prescription was added. Make sure you understand how and when to take each.   Used for:  Hypertension goal BP (blood pressure) < 130/80, Hyperlipidemia LDL goal <100   Changed by:  Jose Willett MD        Dose:  81 mg   Take 1 tablet (81 mg) by mouth daily   Quantity:  30 tablet   Refills:  0       * Notice:  This list has 2 medication(s) that are the same as other medications prescribed for you. Read the directions carefully, and ask your doctor or other care provider to review them with you.         Where to get your medicines      Some of these will need a paper prescription and others can be bought over the counter.  Ask your nurse if you have questions.     You don't need a prescription for these medications     aspirin 81 MG EC tablet                Primary Care Provider Office Phone # Fax #    Rebeca Sandoval [phone]       [facility] [address] [facility] [address]        Equal Access " to Services     RIGO STORY : Jeremiah Olvera, waminida luqadaha, qaybta kaalmashira romero, haresh campo. So [facility] [phone].    ATENCIÓN: Si habla español, tiene a adorno disposición servicios gratuitos de asistencia lingüística. Llame al [phone].    We comply with applicable federal civil rights laws and [place] laws. We do not discriminate on the basis of race, color, national origin, age, disability sex, sexual orientation or gender identity.            Thank you!     Thank you for choosing [facility]  for your care. Our goal is always to provide you with excellent care. Hearing back from our patients is one way we can continue to improve our services. Please take a few minutes to complete the written survey that you may receive in the mail after your visit with us. Thank you!             Your Updated Medication List - Protect others around you: Learn how to safely use, store and throw away your medicines at www.disposemymeds.org.          This list is accurate as of: 7/31/17  2:23 PM.  Always use your most recent med list.                   Brand Name Dispense Instructions for use Diagnosis    amLODIPine 10 MG tablet    NORVASC    90 tablet    Take 1 tablet (10 mg) by mouth daily    Essential hypertension with goal blood pressure less than 130/80       * aspirin 325 MG EC tablet     100    PRN        * aspirin 81 MG EC tablet     30 tablet    Take 1 tablet (81 mg) by mouth daily    Hypertension goal BP (blood pressure) < 130/80, Hyperlipidemia LDL goal <100       atenolol 100 MG tablet    TENORMIN    90 tablet    Take 1 tablet (100 mg) by mouth daily    Essential hypertension with goal blood pressure less than 130/80       atorvastatin 40 MG tablet    LIPITOR    90 tablet    Take 1 tablet (40 mg) by mouth daily    Hyperlipidemia LDL goal <100       BENADRYL 25 MG capsule   Generic drug:  diphenhydrAMINE      Take 25 mg by mouth nightly as needed. sleep         lactobacillus rhamnosus (GG) 10 B CELL capsule    CULTURELLE     Take 1 capsule by mouth daily.        MULTIVITAMIN & MINERAL PO      Take 1 tablet by mouth daily.        niacin 500 MG tablet     30 tablet    Take 1 tablet (500 mg) by mouth At Bedtime    Hyperlipidemia LDL goal <100       ondansetron 4 MG tablet    ZOFRAN    3 tablet    Take 1 tablet (4 mg) by mouth every 6 hours as needed for nausea when taking Neomycin and Flagyl.    Colon cancer (H)       polyethylene glycol 236 G suspension    GoLYTELY/NuLYTELY    4000 mL    Take as directed in your surgery packet.    Colon cancer (H)       SAW PALMETTO PO      Take by mouth daily        tiZANidine 2 MG tablet    ZANAFLEX    60 tablet    Take 1-2 tablets (2-4 mg) by mouth nightly as needed    Bilateral low back pain without sciatica, unspecified chronicity       valsartan 320 MG tablet    DIOVAN    90 tablet    Take 1 tablet (320 mg) by mouth daily    Essential hypertension with goal blood pressure less than 130/80       * Notice:  This list has 2 medication(s) that are the same as other medications prescribed for you. Read the directions carefully, and ask your doctor or other care provider to review them with you.

## 2017-07-31 NOTE — LETTER
7/31/2017      RE: Jose Lira  [address]   [place] [address]       Dear Colleague,    Thank you for the opportunity to participate in the care of your patient, Jose Lira, at the [facility] HEART [facility]. Please see a copy of my visit note below.    Cardiology Clinic    CC: preoperative assessment    HPI: 73 yo male with history including HTN, HLD, prior CVA, PAD s/p stenting of left internal carotid and right common iliac artery, and CKD 3 recently diagnosed with colorectal carcinoma with plan for surgical resection here for preoperative cardiovascular assessment.    He is very limited in exercise by claudication. He reports being able to walk only about 1/2 block before stopping due to pain. Pain involves his bilateral calves and occasionally the thighs. Pain goes away after a few minutes of resting. He denies chest pain, dyspnea, orthopnea, and PND. He does have LE edema which has been stable over the last 3 years. He is compliant with all medications. Of note, he reports taking a full dose aspirin when he remembers but does not take it daily. He is a former smoker but quit in 1994 after a stroke.    PAST MEDICAL HISTORY:  Past Medical History:   Diagnosis Date     Acute, but ill-defined, cerebrovascular disease 1994     CKD (chronic kidney disease)      History of CVA (cerebrovascular accident)      Occlusion and stenosis of carotid artery without mention of cerebral infarction      Peripheral vascular disease, unspecified (H)      Pure hypercholesterolemia      Unspecified essential hypertension        FAMILY HISTORY:  Family History   Problem Relation Age of Onset     C.A.D. Father      Hypertension Father      Prostate Cancer Father      C.A.D. Brother      MI     Hypertension Brother      Arthritis Sister      DIABETES No family hx of      CEREBROVASCULAR DISEASE No family hx of      Cancer - colorectal No family hx  of        SOCIAL HISTORY:  Social History     Social History     Marital status: Single     Spouse name: N/A     Number of children: N/A     Years of education: N/A     Social History Main Topics     Smoking status: Former Smoker     Quit date: 8/5/1994     Smokeless tobacco: Former User     Quit date: 8/1/1994      Comment: quit 15 years ago after his stroke     Alcohol use Yes      Comment: every evening 1-2 drinks     Drug use: No     Sexual activity: No     Other Topics Concern     Parent/Sibling W/ Cabg, Mi Or Angioplasty Before 65f 55m? Yes     brother and father     Social History Narrative       CURRENT MEDICATIONS:  Current Outpatient Prescriptions   Medication Sig Dispense Refill     atorvastatin (LIPITOR) 40 MG tablet Take 1 tablet (40 mg) by mouth daily 90 tablet PRN     amLODIPine (NORVASC) 10 MG tablet Take 1 tablet (10 mg) by mouth daily 90 tablet PRN     atenolol (TENORMIN) 100 MG tablet Take 1 tablet (100 mg) by mouth daily 90 tablet PRN     valsartan (DIOVAN) 320 MG tablet Take 1 tablet (320 mg) by mouth daily 90 tablet 3     tiZANidine (ZANAFLEX) 2 MG tablet Take 1-2 tablets (2-4 mg) by mouth nightly as needed 60 tablet PRN     Saw Palmetto, Serenoa repens, (SAW PALMETTO PO) Take by mouth daily       niacin 500 MG tablet Take 1 tablet (500 mg) by mouth At Bedtime 30 tablet PRN     Multiple Vitamins-Minerals (MULTIVITAMIN & MINERAL PO) Take 1 tablet by mouth daily.       diphenhydrAMINE (BENADRYL) 25 MG capsule Take 25 mg by mouth nightly as needed. sleep       ASPIRIN 325 MG OR TBEC  3     polyethylene glycol (GOLYTELY/NULYTELY) 236 G suspension Take as directed in your surgery packet. (Patient not taking: Reported on 7/31/2017) 4000 mL 0     ondansetron (ZOFRAN) 4 MG tablet Take 1 tablet (4 mg) by mouth every 6 hours as needed for nausea when taking Neomycin and Flagyl. (Patient not taking: Reported on 7/31/2017) 3 tablet 0     [DISCONTINUED] olmesartan-hydrochlorothiazide (BENICAR HCT)  "40-25 MG per tablet Take 1 tablet by mouth daily 90 tablet 3     lactobacillus rhamnosus, GG, (CULTURELLE) 10 B CELL capsule Take 1 capsule by mouth daily.         ROS:   Constitutional: No fever, chills, or sweats. No weight gain/loss.   ENT: No visual disturbance, ear ache, epistaxis, sore throat.   Respiratory: No dyspnea, cough, hemoptysis.   Cardiovascular: As per HPI.   GI: No nausea, vomiting, hematemesis, melena, or hematochezia.   : No urinary frequency, dysuria, or hematuria.   Integument: No rash, pururitis.   Psychiatric: No depression, anxiety, insomnia.   Neuro: No weakness, tingling, numbness, dysphasia.   Endocrinology: No heat/cold intolerance, polyuria, polydipsia.   Musculoskeletal: No muscle aches, arthritis, joint swelling.    EXAM:  /73 (BP Location: Left arm, Patient Position: Chair, Cuff Size: Adult Large)  Pulse 59  Ht 1.651 m (5' 5\")  Wt 91.1 kg (200 lb 12.8 oz)  SpO2 96%  BMI 33.41 kg/m2  General: appears comfortable, alert and articulate  HEENT: NC/AT.  PERRLA.  EOMI.  Sclerae white, not injected.  Nares clear.  Pharynx without erythema or exudate.  Dentition intact.    Neck: No adenopathy.  No thyromegaly. Carotids +4/4 bilaterally without bruits.  No jugular venous distension.   Heart: RRR. Normal S1, S2. No murmur, rub, click, or gallop. The PMI is in the 5th ICS in the midclavicular line. There is no heave.    Lungs: CTA.  No ronchi, wheezes, rales.  No dullness to percussion.   Abdomen: Soft, nontender, nondistended. No organomegaly.  No bruits.   Extremities: Trace LE edema. No clubbing, cyanosis.    Neurologic: Alert and oriented to person/place/time, normal speech, gait and affect  Skin: No petechiae, purpura or rash.    Labs:  CBC RESULTS:  Lab Results   Component Value Date    WBC 10.3 07/06/2017    RBC 4.43 07/06/2017    HGB 13.6 07/06/2017    HCT 39.6 (L) 07/06/2017    MCV 89 07/06/2017    MCH 30.7 07/06/2017    MCHC 34.3 07/06/2017    RDW 12.9 07/06/2017    PLT " "256 07/06/2017       CMP RESULTS:  Lab Results   Component Value Date     07/06/2017    POTASSIUM 4.1 07/06/2017    CHLORIDE 106 07/06/2017    CO2 25 07/06/2017    ANIONGAP 9 07/06/2017    GLC 94 07/06/2017    BUN 25 07/06/2017    CR 2.50 (H) 07/06/2017    GFRESTIMATED 25 (L) 07/06/2017    GFRESTBLACK 31 (L) 07/06/2017    ADRIAN 9.2 07/06/2017    BILITOTAL 0.5 07/06/2017    ALBUMIN 3.8 07/06/2017    ALKPHOS 94 07/06/2017    ALT 14 07/06/2017    AST 19 07/06/2017        INR RESULTS:  Lab Results   Component Value Date    INR 1.12 11/09/2011       No components found for: CK  No results found for: MAG  No results found for: NTBNP    Cardiac Studies:  ECG 11/9/11: normal sinus rhythm    Adenosine Stress MPI 9/26/2002  1. No evidence of ischemia  2. Apical akinesis  3. EF 74%    Assessment and Plan: 73 yo male with history including HTN, HLD, prior CVA, PAD s/p stenting of left internal carotid and right common iliac artery, and CKD 3 recently diagnosed with colorectal carcinoma with plan for surgical resection here for preoperative cardiovascular assessment.    1.Preoperative cardiovascular risk assessment: Patient scheduled to undergo surgical resection of a colorectal tumor. He is unable to exercise to 4 METs given claudication. Accordingly, he will require ischemic evaluation prior to surgery. He reports feeling \"wierd\" for 6 months after his last adenosine MPI in 2002 and recommends that he not undergo adenosine/regadenoson. Recommend dobutamine stress echo which will also be beneficial as he has never had an echocardiogram in our system despite extensive vascular disease history.  -dobutamine stress echo pending    2.Peripheral arterial disease: On aspirin and statin. Prior smoker. Will recommend that he take ASA 81 mg daily (as opposed to 325 mg PRN as he is doing now). He is followed closely for his PAD by Dr. Islas.  -aspirin 81 mg daily  -atorvastatin 40 mg daily    3. Hypertension: BP at goal on current " regimen.  -amlodipine 10 mg  -atenolol 100 mg  -valsartan 320 mg    4.Hyperlipidemia: On high-intensity statin.  -continue atorvastatin 40 mg daily  -continue niacin    RTC PRN    Patient seen and discussed with Dr. Jose Willett, attending physician, who agrees with the assessment and plan above.   Idris Singh MD  Cardiovascular Disease Fellow  [phone]    Patient Care Team:  Rebeca Sandoval NP as PCP - Lance Mccallum MD as Resident (Student in organized health care education/training program)  Orion Motley MD as MD (Radiology)  Estefania Akers RN as Registered Nurse  Cher Yadav MD as MD (Hematology & Oncology)  Cynthia Mondragon, GERMANIA as Nurse Coordinator (Oncology)      ATTENDING ATTESTATION: Patient has been seen and evaluated by me on 7/31/17. I have reviewed today's vital signs, medications, labs, and imaging results. I have reviewed and edited, as necessary, the history, review of systems, physical examination, and assessment and plan. I have discussed my assessment and plan with the fellow.     Thank you for allowing us to take part in the care of this very pleasant patient.    Jose Willett MD, PhD  Interventional/Critical Care Cardiology  [phone]    July 31, 2017

## 2017-07-31 NOTE — TELEPHONE ENCOUNTER
Patient stopped by clinic today requesting to speak with me.  I went out to speak with patient, however patient left to go to his cardiology appointment.  Patient had a WOC appointment today at 3:30 pm; therefore I planned to speak with patient at that time, however patient no-showed to WOC appointment.  Called patient and left a message apologizing that I missed him today.  Provided my direct clinic number where I can be reached tomorrow to discuss rescheduling WOC appointment, and address any questions he may have.

## 2017-07-31 NOTE — NURSING NOTE
Chief Complaint   Patient presents with     Consult For     pre-surgery cardiology consult     Vitals were taken and medications were reconciled.    Agnes Diaz CMA     1:30 PM

## 2017-08-01 ENCOUNTER — TELEPHONE (OUTPATIENT)
Dept: SURGERY | Facility: CLINIC | Age: 72
End: 2017-08-01

## 2017-08-01 NOTE — TELEPHONE ENCOUNTER
Reached out to patient today to reschedule WOC appointment and inquire why PAC appointment was rescheduled.  Patient states he was asked to reschedule his PAC appointment, but wasn't sure why.  Patient is rescheduled to Thursday at 1:30 pm.  Patient is rescheduled to see WOC Thursday at 3:30 pm.  Informed patient that I will go over his surgery packet during his PAC appointment.  Patient states there were a bunch of prescriptions sent to his pharmacy, but he has no instructions.  Informed patient that I will have the instructions listed in his surgery packet.  Patient has my direct number for additional questions or concerns.

## 2017-08-03 ENCOUNTER — ALLIED HEALTH/NURSE VISIT (OUTPATIENT)
Dept: SURGERY | Facility: CLINIC | Age: 72
End: 2017-08-03

## 2017-08-03 ENCOUNTER — ALLIED HEALTH/NURSE VISIT (OUTPATIENT)
Dept: ONCOLOGY | Facility: CLINIC | Age: 72
End: 2017-08-03

## 2017-08-03 ENCOUNTER — OFFICE VISIT (OUTPATIENT)
Dept: SURGERY | Facility: CLINIC | Age: 72
End: 2017-08-03

## 2017-08-03 ENCOUNTER — OFFICE VISIT (OUTPATIENT)
Dept: WOUND CARE | Facility: CLINIC | Age: 72
End: 2017-08-03

## 2017-08-03 ENCOUNTER — ANESTHESIA EVENT (OUTPATIENT)
Dept: SURGERY | Facility: CLINIC | Age: 72
End: 2017-08-03

## 2017-08-03 ENCOUNTER — TELEPHONE (OUTPATIENT)
Dept: CARDIOLOGY | Facility: CLINIC | Age: 72
End: 2017-08-03

## 2017-08-03 VITALS
SYSTOLIC BLOOD PRESSURE: 120 MMHG | TEMPERATURE: 97.6 F | WEIGHT: 201.8 LBS | OXYGEN SATURATION: 95 % | BODY MASS INDEX: 32.43 KG/M2 | DIASTOLIC BLOOD PRESSURE: 78 MMHG | HEIGHT: 66 IN | RESPIRATION RATE: 16 BRPM | HEART RATE: 72 BPM

## 2017-08-03 DIAGNOSIS — Z71.89 OSTOMY NURSE CONSULTATION: Primary | ICD-10-CM

## 2017-08-03 DIAGNOSIS — Z71.9 VISIT FOR COUNSELING: Primary | ICD-10-CM

## 2017-08-03 DIAGNOSIS — Z01.818 PREOP EXAMINATION: Primary | ICD-10-CM

## 2017-08-03 DIAGNOSIS — C19 RECTOSIGMOID CANCER (H): ICD-10-CM

## 2017-08-03 ASSESSMENT — LIFESTYLE VARIABLES: TOBACCO_USE: 1

## 2017-08-03 NOTE — TELEPHONE ENCOUNTER
PAC called triage today asking when patient can hold his ASA for surgical procedure (laproscopic colectomy) on the 9th.  I told them he has yet to be evaluated for ischemia (DSE, scheduled for tomorrow).  Will route to Dr. Willett

## 2017-08-03 NOTE — MR AVS SNAPSHOT
After Visit Summary   8/3/2017    Jose Lira    MRN: [id number]           Patient Information     Date Of Birth          1945        Visit Information        Provider Department      8/3/2017 3:02 PM Aimee Jasso, [facility]        Today's Diagnoses     Visit for counseling    -  1       Follow-ups after your visit        Who to contact     Please call your clinic at [phone] to:    Ask questions about your health    Make or cancel appointments    Discuss your medicines    Learn about your test results    Speak to your doctor   If you have compliments or concerns about an experience at your clinic, or if you wish to file a complaint, please contact [facility] Patient Relations at [phone] or email us at [email or web address]         Additional Information About Your Visit        MyChart Information     SCOUPY is an electronic gateway that provides easy, online access to your medical records. With SCOUPY, you can request a clinic appointment, read your test results, renew a prescription or communicate with your care team.     To sign up for [email or web address] visit the website at [email or web address]   You will be asked to enter the access code listed below, as well as some personal information. Please follow the directions to create your username and password.     Your access code is: [email or web address]  Expires: 2017 11:36 AM     Your access code will  in 90 days. If you need help or a new code, please contact your [facility] or call [phone] for assistance.        Care EveryWhere ID     This is your Care EveryWhere ID. This could be used by other organizations to access your [facility] medical records  [id number]         Blood Pressure from Last 3 Encounters:   17 (!) 164/94   17 155/75   17 135/79    Weight from Last 3 Encounters:   17 95.2 kg (209 lb 12.8 oz)    08/03/17 91.5 kg (201 lb 12.8 oz)   07/31/17 91.1 kg (200 lb 12.8 oz)              Today, you had the following     No orders found for display         Today's Medication Changes          These changes are accurate as of: 8/3/17 11:59 PM.  If you have any questions, ask your nurse or doctor.               These medicines have changed or have updated prescriptions.        Dose/Directions    amLODIPine 10 MG tablet   Commonly known as:  NORVASC   This may have changed:  when to take this   Used for:  Essential hypertension with goal blood pressure less than 130/80        Dose:  10 mg   Take 1 tablet (10 mg) by mouth daily   Quantity:  90 tablet   Refills:  PRN       aspirin 81 MG EC tablet   This may have changed:  when to take this   Used for:  Hypertension goal BP (blood pressure) < 130/80, Hyperlipidemia LDL goal <100        Dose:  81 mg   Take 1 tablet (81 mg) by mouth daily   Quantity:  30 tablet   Refills:  0       atenolol 100 MG tablet   Commonly known as:  TENORMIN   This may have changed:  when to take this   Used for:  Essential hypertension with goal blood pressure less than 130/80        Dose:  100 mg   Take 1 tablet (100 mg) by mouth daily   Quantity:  90 tablet   Refills:  PRN       atorvastatin 40 MG tablet   Commonly known as:  LIPITOR   This may have changed:  when to take this   Used for:  Hyperlipidemia LDL goal <100        Dose:  40 mg   Take 1 tablet (40 mg) by mouth daily   Quantity:  90 tablet   Refills:  PRN       valsartan 320 MG tablet   Commonly known as:  DIOVAN   This may have changed:  when to take this   Used for:  Essential hypertension with goal blood pressure less than 130/80        Dose:  320 mg   Take 1 tablet (320 mg) by mouth daily   Quantity:  90 tablet   Refills:  3                Primary Care Provider Office Phone # Fax #    Rebeca Sandoval [phone] [address] [facility] [address]        Equal Access to Services     RIGO STORY AH: Jeremiah  kylee Olvera, waminida luqadaha, qaybta kaalmada heather, waxsoumya anna mauriceeaston portersylviareuben love jahaira. So [facility] [phone].    ATENCIÓN: Si habla español, tiene a adorno disposición servicios gratuitos de asistencia lingüística. Nithyaame al [phone].    We comply with applicable federal civil rights laws and [place] laws. We do not discriminate on the basis of race, color, national origin, age, disability sex, sexual orientation or gender identity.            Thank you!     Thank you for choosing [facility] CANCER [facility]  for your care. Our goal is always to provide you with excellent care. Hearing back from our patients is one way we can continue to improve our services. Please take a few minutes to complete the written survey that you may receive in the mail after your visit with us. Thank you!             Your Updated Medication List - Protect others around you: Learn how to safely use, store and throw away your medicines at www.disposemymeds.org.          This list is accurate as of: 8/3/17 11:59 PM.  Always use your most recent med list.                   Brand Name Dispense Instructions for use Diagnosis    amLODIPine 10 MG tablet    NORVASC    90 tablet    Take 1 tablet (10 mg) by mouth daily    Essential hypertension with goal blood pressure less than 130/80       aspirin 81 MG EC tablet     30 tablet    Take 1 tablet (81 mg) by mouth daily    Hypertension goal BP (blood pressure) < 130/80, Hyperlipidemia LDL goal <100       atenolol 100 MG tablet    TENORMIN    90 tablet    Take 1 tablet (100 mg) by mouth daily    Essential hypertension with goal blood pressure less than 130/80       atorvastatin 40 MG tablet    LIPITOR    90 tablet    Take 1 tablet (40 mg) by mouth daily    Hyperlipidemia LDL goal <100       BENADRYL 25 MG capsule   Generic drug:  diphenhydrAMINE      Take 25 mg by mouth nightly as needed. sleep        lactobacillus rhamnosus (GG) 10 B CELL capsule    CULTURELLE     Take 1 capsule by  mouth every morning        MULTIVITAMIN & MINERAL PO      Take 1 tablet by mouth every morning        niacin 500 MG tablet     30 tablet    Take 1 tablet (500 mg) by mouth At Bedtime    Hyperlipidemia LDL goal <100       polyethylene glycol 236 G suspension    GoLYTELY/NuLYTELY    4000 mL    Take as directed in your surgery packet.    Colon cancer (H)       SAW PALMETTO PO      Take by mouth every morning        tiZANidine 2 MG tablet    ZANAFLEX    60 tablet    Take 1-2 tablets (2-4 mg) by mouth nightly as needed    Bilateral low back pain without sciatica, unspecified chronicity       valsartan 320 MG tablet    DIOVAN    90 tablet    Take 1 tablet (320 mg) by mouth daily    Essential hypertension with goal blood pressure less than 130/80

## 2017-08-03 NOTE — PROGRESS NOTES
WOC Preoperative Ostomy    Referral from Dr. Raza  Consult attended by patient and brother and sister  Diagnosis: colorectal carcinoma Date of Surgery: 08/09/2017   Type of Surgery: Laparoscopic Assisted Low Anterior Resection Possible Loop Ileostomy, Anesthesia Block  Emotional readiness for surgery: no acute distress  Physical Limitations: With the following limitations:  Pt asked many questions but was able to understand the concepts. Might need trasitional care afterwards, Will not be able to do his own care right away  Abdomen assessed for site by: Patient's ability to visiualize area, avoidance of skin creases and scars, palpating for rectus abdominus muscle and clothing fit  Teaching: Anatomy/picture of stoma, stoma/bowel function postop, intro to pouches, returning to work/lifting, written/media offered and role of WOC/postop followup explained  Stoma site marking:  Marking pen and tegaderm   Location chosen: right abdomen  Dr. Kailey Quiles, NP was available for supervision of care if needed or if questions should arise and regarding plan of care.  Conchita Duque RN CWON

## 2017-08-03 NOTE — PROGRESS NOTES
Social Work: Outpatient Pre-Operative Assessment with Discharge Plan    Patient Name: Jose Lira  : 1945  Age: 72 year old  MRN: [id number]  Completed assessment with: pt, sister Kim and her     Presenting Information   Reason for Referral: Pre-Operative discharge planning, MRAPT  Date of intake: August 3, 2017  Referral Source: PAC  Reason for Admission: surgery  Decision Maker: has a copy of an advance care directive; plans to complete  Alternate Decision Maker: not completed yet but plan to  Health Care Directive: NO  Living Situation:lives alone, in a [facility] in [place]  Previous Functional Status:  independent  Patient and family understanding of hospitalization: about a 5 day hospitalization and may need help after  Cultural/Language/Spiritual Considerations: none identified  Coping with Illness: is thoughtful about how aggressive he wants treatment to be    Mental Health/Chemical Dependency:   Diagnosis: colon cancer  Support/Services in Place: none  Services Needed/Recommended: home care or SNF    Support System  Significant Relationship at Present time:  Sister and   Family of Origin is available for support: sister  Other support available:  none  Current in home services: none  Gaps in Support System: none at this time  Community services receiving at home:  in the [facility]    Provider Information   Primary Care Physician:Rebeca Sandoval [phone]      Clinic: [place] [address] [facility]    /Care Coordinator: may be, patient not certain    Financial   Financial Concerns:  None identified  Insurance:   Payor/Plan Subscriber Name Rel Member # Group #   Medicare  [employer]      Discharge Plan   Patient and family discharge goal: SNF if needed  Provided Education on discharge plan:  yes    A list of Medicare Certified Facilities was provided to the patient and/or family to encourage patient  choice. Patient's choices for facility are: those within 5 mile area of his home  Patient's choices for Medicare Certified Skilled Home Care are:  [facility], [facility] provide Skilled rehabilitation or complex medical:   yes    General information regarding anticipated insurance coverage and possible out of pocket cost was discussed. Patient and patient's family are aware patient may incur the cost of transportation to the facility, pending insurance payment:  yes  Barriers to discharge: none    Discharge Recommendations   Disposition: Home, home with homecare, or SNF. To be determined by medical team after surgery.  Transportation Plan: sister and her  will provide ride at d/c      Additional comments   Inpatient social work/care coordination team to follow upon admission.    Please involve inpt team (social work and care coordinator) in discharge planning    Aimee Jasso, [facility]  [phone]

## 2017-08-03 NOTE — PATIENT INSTRUCTIONS
Preparing for Your Surgery      Name:  Jose Lira   MRN:  [id number]   :  1945   Today's Date:  8/3/2017     Arriving for surgery:  Surgery date:  2017  Surgery time:  7:30am  Arrival time:  5:15am  Please come to:       [facility] [address]  [address] [place]  [address]    -   parking is available in front of the hospital from 5:15 am to 8:00 pm    -  Stop at the Information Desk in the lobby    -   Inform the information person that you are here for surgery. An escort to 3c will be provided. If you would not like an escort, please proceed to 3C on the 3rd floor. [phone]     What can I eat or drink?  -  You may have solid food or milk products until 8 hours prior to your surgery.  -  You may have water, apple juice or 7up/Sprite until 2 hours prior to your surgery.    Which medicines can I take?  -  Do NOT take these medications in the morning, the day of surgery:  Valsartan   Follow cardiology instructions with your aspirin    -  Please take these medications the day of surgery:  Amlodipine, atenolol, lipitor     How do I prepare myself?  -  Take two showers: one the night before surgery; and one the morning of surgery.         Use Scrubcare or Hibiclens to wash from neck down.  You may use your own shampoo and conditioner. No other hair products.   -  Do NOT use lotion, powder, deodorant, or antiperspirant the day of your surgery.  -  Do NOT wear any makeup, fingernail polish or jewelry.  -  Begin using Incentive Spirometer 1 week prior to surgery.  Use 4 times per day, up to 5-10 breaths each time.  Bring Incentive Spirometer to hospital.  -Do not bring your own medications to the hospital, except for inhalers and eye drops.  -  Bring your ID and insurance card.    Questions or Concerns:  If you have questions or concerns, please call the  Preoperative Assessment Center, Monday-Friday 7AM-7PM:  [phone]  Enhanced Recovery After  Surgery     This is a team effort, including you, to get you back on your feet, eating and drinking normally and out of the hospital as quickly as possible.  The goals are: 1) NO INFECTIONS and   2) RETURN TO NORMAL DIET    How can we achieve these goals?  1) STAY ACTIVE: Walk every day before your surgery; try to increase the amount every day.  Walk after surgery as much as you can-the nurses will help you.  Walking speeds healing and gets you home quicker, you heal better at home and have less risk of infection.     2) INCENTIVE SPIROMETER: Practice your incentive spirometer 4 times per day with 5-10 repetitions each time.  Using the incentive spirometer can strengthen your muscles between your ribs and help you have a strong cough after surgery.  A more effective cough can help prevent problems with your lungs.    3) STAY HYDRATED: Drink clear liquids up until 2 hours before your surgery. We would like you to purchase a drink such as Gatorade.  Drink this before bedtime and on the way into the hospital, drink between 8-12 ounces or until you feel hydrated.  Keeping well hydrated leads to your veins being plump, you wake up faster, and you are less likely to be nauseated. Start drinking water as soon as you can after surgery and advance to clear liquids and food as tolerated.  IV fluids contain salt, drinking fluids will minimize the amount of IV fluids you need and decrease the amount of salt you get.     4) PAIN MANAGEMENT: If we minimize the amount of opioids and narcotics, and use regional blocks (which numb the area where your surgery is) along with oral pain medications; you will have less side effects of nausea and constipation. Narcotics can slow down your bowels and cause you to stay in the hospital longer.     Our goal is to keep you comfortable; eating and drinking normally and back home safely.     AFTER YOUR SURGERY  Breathing exercises   Breathing exercises help you recover faster. Take deep breaths  and let the air out slowly. This will:     Help you wake up after surgery.    Help prevent complications like pneumonia.  Preventing complications will help you go home sooner.   We may give you a breathing device (incentive spirometer) to encourage you to breathe deeply.   Nausea and vomiting   You may feel sick to your stomach after surgery; if so, let your nurse know.    Pain control:  After surgery, you may have pain. Our goal is to help you manage your pain. Pain medicine will help you feel comfortable enough to do activities that will help you heal.  These activities may include breathing exercises, walking and physical therapy.   To help your health care team treat your pain we will ask: 1) If you have pain  2) where it is located 3) describe your pain in your words  Methods of pain control include medications given by mouth, vein or by nerve block for some surgeries.  We may give you a pain control pump that will:  1) Deliver the medicine through a tube placed in your vein  2) Control the amount of medicine you receive  3) Allow you to push a button to deliver a dose of pain medicine  Sequential Compression Device (SCD) or Pneumo Boots:  You may need to wear SCD S on your legs or feet. These are wraps connected to a machine that pumps in air and releases it. The repeated pumping helps prevent blood clots from forming.

## 2017-08-03 NOTE — ANESTHESIA PREPROCEDURE EVALUATION
Anesthesia Evaluation     . Pt has had prior anesthetic. Type: General and MAC    No history of anesthetic complications          ROS/MED HX    ENT/Pulmonary:     (+)DAPHNE risk factors hypertension, tobacco use, Past use 3, 90 pack years, quit 1994 packs/day  , . .    Neurologic:     (+)CVA date: 1994 with deficits- residual left sided weakness,     Cardiovascular:     (+) hypertension-range: 90/60 - 168/80, Peripheral Vascular Disease-- Symptomatic, --. : . . . :. . 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      Psychiatric:  - neg psychiatric ROS       Infectious Disease:  - neg infectious disease ROS       Malignancy:   (+) Malignancy History of GI  GI CA Active status post,         Other:    (+) no H/O Chronic Pain,                   Physical Exam  Normal systems: cardiovascular and pulmonary    Airway   Mallampati: I  TM distance: >3 FB  Neck ROM: full    Dental   (+) missing    Cardiovascular   Rhythm and rate: regular and normal      Pulmonary    breath sounds clear to auscultation    Other findings: Lab Results       Component                Value               Date                       WBC                      10.3                07/06/2017                 HGB                      13.6                07/06/2017                 HCT                      39.6 (L)            07/06/2017                 PLT                      256                 07/06/2017                 INR                      1.12                11/09/2011                 PTT                       32                  10/18/2011                 NA                       140                 07/06/2017                 POTASSIUM                4.1                 07/06/2017                 ADRIAN                      9.2                 07/06/2017                 GLC                      94                  07/06/2017                 CR                       2.50 (H)            07/06/2017                 BUN                      25                  07/06/2017                 CO2                      25                  07/06/2017                 ALT                      14                  07/06/2017                 AST                      19                  07/06/2017                 BILITOTAL                0.5                 07/06/2017                 ALKPHOS                  94                  07/06/2017                     PAC Discussion and Assessment    ASA Classification: 3  Case is suitable for: [place]  Anesthetic techniques and relevant risks discussed: GA  Invasive monitoring and risk discussed:   Types:   Possibility and Risk of blood transfusion discussed:   NPO instructions given:   Additional anesthetic preparation and risks discussed:   Needs early admission to pre-op area:   Other:     PAC Resident/NP Anesthesia Assessment:  Buddy Lira is a 72 year old male who is scheduled for a Laparoscopic Assisted Low Anterior Resection Possible Loop Ileostomy on 8/9/17 with Sharan Raza MD for Malignant Neoplasm Of the Rectosigmoid Junction at the [facility]. .  PAC referral for risk assessment and optimization for anesthesia with comorbid conditions of:    Pre-operative considerations:  1.  Cardiovascular:  Functional status METS = 1    Risk of Major Adverse Cardiac event: 6.6%  -Decreased METS due to claudication  -Cardiology Preop 7/31/17, Dobutamine Stress Echo ordered for 8/4/17  -PAD s/p stenting of left internal carotid and right  common iliac artery in 2002   - prior CVA 1994, residual left sided weakness, taking asa 81 mg  -ECG 11/9/11: normal sinus rhythm   -Adenosine Stress MPI 9/26/2002:  No evidence of ischemia, Apical akinesia, EF 74%  2.  Pulm:   DAPHNE risk: Intermediate risk  -Former smoker, quit 1994, smoked 30 years, 3 ppd      3.  GI:  Risk of PONV score = 1,  If > 2, anti-emetic intervention recommended.  -Rectal adenocarcinoma  4.  Meds:  -asa 81 mg plan:  Cardiology to determine plan after evaluating results of stress test scheduled for 8/4/17    Patient is optimized and is acceptable candidate for the proposed procedure.  No further diagnostic evaluation is needed.     Patient also evaluated by Dr. Parsons. See recommendations below.     Grisel CAREY-C  08/03/17 3:21 PM        Mid-Level Provider/Resident:   Date:   Time:     Attending Anesthesiologist Anesthesia Assessment:  I saw the patient and discussed with the GIULIA as above.  Patient currently medically optimized for the proposed procedure.   Final anesthetic plan and recommendations to be made by the attending anesthesiologist on the day of surgery.   Pending the results from his stress echo, patient is appropriate for the planned procedure.     Reviewed and Signed by PAC Anesthesiologist  Anesthesiologist: АНДРЕЙ  Date: 8/3/17  Time: 1422  Pass/Fail: Pass  Disposition:     PAC Pharmacist Assessment:        Pharmacist:   Date:   Time:                           .

## 2017-08-03 NOTE — H&P
Pre-Operative H & P     CC:  Preoperative exam to assess for increased cardiopulmonary risk while undergoing surgery and anesthesia.    Date of Encounter: August 3, 2017   Primary Care Physician:  Rebeca Sandoval  Buddy Lira is a 72 year old male who is scheduled for a Laparoscopic Assisted Low Anterior Resection Possible Loop Ileostomy on 8/9/17 with Sharan Raza MD for Malignant Neoplasm Of the Rectosigmoid Junction at the [facility].      Mr. Lira started noticing bright red blood per rectum for a couple of years but recently it got worse and he had a colonoscopy done which showed a rectal mass.   The biopsy showed adenocarcinoma.  He has significant history of PAD s/p stenting of the left internal carotid and right common iliac artery in 2002.  He did see [facility] cardiology 7/31/17for preop clearance and it was recommended he have a dobutamine stress echo.  This is scheduled for 8/4/17.  He had a stroke in 1994 and did quit smoking at that time.  He does have a 30 year smoking history of 3 ppds.  He also has CKD stage 3-4.       History is obtained from the patient and medical record including Care Everywhere.    Past Medical History  Past Medical History:   Diagnosis Date     Acute, but ill-defined, cerebrovascular disease 1994     CKD (chronic kidney disease)     Stage 3-4     History of CVA (cerebrovascular accident)      Hx of endarterectomy 2002    Left and right     Peripheral vascular disease, unspecified (H)      Pulmonary nodules      Pure hypercholesterolemia      Unspecified essential hypertension          Past Surgical History  Past Surgical History:   Procedure Laterality Date     COLONOSCOPY N/A 5/19/2017    Procedure: COMBINED COLONOSCOPY, SINGLE OR MULTIPLE BIOPSY/POLYPECTOMY BY BIOPSY;  Rectal bleeding;  Surgeon: Maximus Dinero MD;  Location:  GI     SURGICAL HISTORY OF -   10/02    angioplasty and stenting of left and internal  carotid artery at the bifurcation     SURGICAL HISTORY OF -   11/11    right common iliac artery stent      VASCULAR SURGERY  2001    stent placed in carotid          Prior to Admission Medications  Current Outpatient Prescriptions   Medication Sig Dispense Refill     aspirin 81 MG EC tablet Take 1 tablet (81 mg) by mouth daily (Patient taking differently: Take 81 mg by mouth every morning ) 30 tablet      atorvastatin (LIPITOR) 40 MG tablet Take 1 tablet (40 mg) by mouth daily (Patient taking differently: Take 40 mg by mouth every morning ) 90 tablet PRN     amLODIPine (NORVASC) 10 MG tablet Take 1 tablet (10 mg) by mouth daily (Patient taking differently: Take 10 mg by mouth every morning ) 90 tablet PRN     atenolol (TENORMIN) 100 MG tablet Take 1 tablet (100 mg) by mouth daily (Patient taking differently: Take 100 mg by mouth every morning ) 90 tablet PRN     valsartan (DIOVAN) 320 MG tablet Take 1 tablet (320 mg) by mouth daily (Patient taking differently: Take 320 mg by mouth every morning ) 90 tablet 3     tiZANidine (ZANAFLEX) 2 MG tablet Take 1-2 tablets (2-4 mg) by mouth nightly as needed 60 tablet PRN     Saw Palmetto, Serenoa repens, (SAW PALMETTO PO) Take by mouth every morning        niacin 500 MG tablet Take 1 tablet (500 mg) by mouth At Bedtime 30 tablet PRN     Multiple Vitamins-Minerals (MULTIVITAMIN & MINERAL PO) Take 1 tablet by mouth every morning        diphenhydrAMINE (BENADRYL) 25 MG capsule Take 25 mg by mouth nightly as needed. sleep       lactobacillus rhamnosus, GG, (CULTURELLE) 10 B CELL capsule Take 1 capsule by mouth every morning        polyethylene glycol (GOLYTELY/NULYTELY) 236 G suspension Take as directed in your surgery packet. (Patient not taking: Reported on 7/31/2017) 4000 mL 0     ondansetron (ZOFRAN) 4 MG tablet Take 1 tablet (4 mg) by mouth every 6 hours as needed for nausea when taking Neomycin and Flagyl. (Patient not taking: Reported on 7/31/2017) 3 tablet 0      [DISCONTINUED] olmesartan-hydrochlorothiazide (BENICAR HCT) 40-25 MG per tablet Take 1 tablet by mouth daily 90 tablet 3         Allergies  No known drug allergies     Social History  Social History     Social History     Marital status: Single     Spouse name: N/A     Number of children: N/A     Years of education: N/A     Occupational History     Not on file.     Social History Main Topics     Smoking status: Former Smoker     Packs/day: 3.00     Years: 30.00     Quit date: 8/5/1994     Smokeless tobacco: Former User     Quit date: 8/1/1994      Comment: quit 15 years ago after his stroke     Alcohol use Yes      Comment: every evening 1-2 drinks     Drug use: No     Sexual activity: No     Other Topics Concern     Parent/Sibling W/ Cabg, Mi Or Angioplasty Before 65f 55m? Yes     brother and father     Social History Narrative          Family History  Family History   Problem Relation Age of Onset     C.A.D. Father      Hypertension Father      Prostate Cancer Father      C.A.D. Brother      MI     Hypertension Brother      Arthritis Sister      DIABETES No family hx of      CEREBROVASCULAR DISEASE No family hx of      Cancer - colorectal No family hx of         ROS  10 point review of systems performed.  All pertinent positives noted, otherwise Negative.      Cardiac Testing  -ECG 11/9/11: normal sinus rhythm   -Adenosine Stress MPI 9/26/2002:  No evidence of ischemia, Apical akinesia, EF 74%    Labs: (personally reviewed):  Lab Results   Component Value Date    WBC 10.3 07/06/2017    HGB 13.6 07/06/2017    HCT 39.6 (L) 07/06/2017     07/06/2017    INR 1.12 11/09/2011    PTT 32 10/18/2011     07/06/2017    POTASSIUM 4.1 07/06/2017    ADRIAN 9.2 07/06/2017    GLC 94 07/06/2017    CR 2.50 (H) 07/06/2017    BUN 25 07/06/2017    CO2 25 07/06/2017    ALT 14 07/06/2017    AST 19 07/06/2017    BILITOTAL 0.5 07/06/2017    ALKPHOS 94 07/06/2017           Physical Exam:  No LMP for male patient.   Vital  "signs:  /78  Pulse 72  Temp 97.6  F (36.4  C) (Oral)  Resp 16  Ht 1.664 m (5' 5.5\")  Wt 91.5 kg (201 lb 12.8 oz)  SpO2 95%  BMI 33.07 kg/m2    Constitutional: Awake, alert, cooperative, no apparent distress, and appears stated age.  Eyes: Pupils equal, round and reactive to light, sclera clear, conjunctiva normal.  HENT: Normocephalic, oral pharynx with moist mucus membranes. No goiter appreciated.   Respiratory: Clear to auscultation bilaterally, no crackles or wheezing.  Cardiovascular: Regular rate and rhythm, normal S1 and S2, and no murmur noted.  Carotids +2, no bruits. No edema. Palpable pulses to radial  DP and PT arteries.   GI: Normal bowel sounds, soft, non-distended, non-tender, no masses palpated  Skin: Warm and dry.  No rashes at anticipated surgical site.   Musculoskeletal: Full ROM of neck. There is no redness, warmth, or swelling of the exposed joints. Gross motor strength is normal.    Neurologic: Awake, alert, oriented to name, place and time.  Gait is normal.   Neuropsychiatric: Calm, cooperative. Normal affect.     Assessment/Plan  Buddy Lira is a 72 year old male who is scheduled for a Laparoscopic Assisted Low Anterior Resection Possible Loop Ileostomy on 8/9/17 with Sharan Raza MD for Malignant Neoplasm Of the [facility]igmoid J[facility]. .  PAC referral for risk assessment and optimization for anesthesia with comorbid conditions of:    Pre-operative considerations:  1.  Cardiovascular:  Functional status METS = 1    Risk of Major Adverse Cardiac event: 6.6%  -Decreased METS due to claudication  -Cardiology Preop 7/31/17, Dobutamine Stress Echo ordered for 8/4/17  -PAD s/p stenting of left internal carotid and right common iliac artery in 2002   - prior CVA 1994, residual left sided weakness, taking asa 81 mg  -ECG 11/9/11: normal sinus rhythm   -Adenosine Stress MPI 9/26/2002:  No evidence of ischemia, Apical akinesia, " EF 74%  2.  Pulm:   DAPHNE risk: Intermediate risk  -Former smoker, quit 1994, smoked 30 years, 3 ppd      3.  GI:  Risk of PONV score = 1,  If > 2, anti-emetic intervention recommended.  -Rectal adenocarcinoma  4.  Meds:  -asa 81 mg plan:  Cardiology to determine plan after evaluating results of stress test scheduled for 8/4/17    [facility] Cardiology to determine if the  patient is optimized for the above procedure after reviewing results of the Dobutamine Stress Echo scheduled for 8/4/17    Grisel Beatty MS PA-C   Preoperative Assessment Center  [facility]  Clinic and Surgery Center  Phone: [phone]  Fax: [phone]

## 2017-08-03 NOTE — MR AVS SNAPSHOT
After Visit Summary   8/3/2017    Jose Lira    MRN: [id number]           Patient Information     Date Of Birth          1945        Visit Information        Provider Department      8/3/2017 3:30 PM Conchita Duque RN [facility] Wound Ostomy         Follow-ups after your visit        Your next 10 appointments already scheduled     Aug 03, 2017  2:30 PM CDT   (Arrive by 2:15 PM)   PAC RN ASSESSMENT with  Pac Rn   [facility] ([facility])    [address] [place] [id number]   [phone]            Aug 03, 2017  3:10 PM CDT   (Arrive by 2:55 PM)   PAC Anesthesia Consult with  Pac Anesthesiologist   [facility] ([facility])    [address] [place] [id number]   [phone]            Aug 03, 2017  3:30 PM CDT   NEW OSTOMY NURSE VISIT with Conchita Duque RN   [facility] Wound Ostomy ([facility])    [address] [place] [phone]   [phone]            Aug 03, 2017  4:30 PM CDT   LAB with  LAB   [facility] Lab ([facility])    [address] [place] [phone]   [phone]           Patient must bring picture ID. Patient should be prepared to give a urine specimen  Please do not eat 10-12 hours before your appointment if you are coming in fasting for labs on lipids, cholesterol, or glucose (sugar). Pregnant women should follow their Care Team instructions. Water with medications is okay. Do not drink coffee or other fluids. If you have concerns about taking  your medications, please ask at office or if scheduling via OOYYOt, send a message by clicking on Secure Messaging, Message Your Care Team.            Aug 04, 2017  1:00 PM CDT   Ech Dobutamine Stress Test with UJOHAN   [facility]  [facility])    [address] [facility] [id number] [phone]           1.  Please bring or wear a comfortable two-piece outfit and walking shoes. 2.  Stop eating 3 hours before the test. You may drink water or juice. 3.  Stop all caffeine 12 hours before the test. This includes coffee, tea, soda pop, chocolate and certain medicines (such as Anacin and Excederin). Also avoid decaf coffee and tea, as these contain small amounts of caffeine. 4.  No alcohol, smoking or use of other tobacco products for 12 hours before the test. 5.  Refer to your provider instructions to see if you need to stop any medications (such as beta-blockers or nitrates) for this test. 6.  For patients with diabetes: -   If you take insulin, call your diabetes care team. Ask if you should take a   dose the morning of your test. -   If you take diabetes medicine by mouth, don't take it on the morning of your test. Bring it with you to take after the test.  (If you have questions, call your diabetes care team) 7.  When you arrive, please tell us if: -   You have diabetes. -   You have taken Viagra, Cialis or Levitra in the past 48 hours. 8.  For any questions that cannot be answered, please contact the ordering physician            Aug 09, 2017   Procedure with Sharan Raza MD   [facility] [place], Same Day Surgery (--)    [address] [facility] [phone]              Who to contact     Please call your clinic at [phone] to:    Ask questions about your health    Make or cancel appointments    Discuss your medicines    Learn about your test results    Speak to your doctor   If you have compliments or concerns about an experience at your clinic, or if you wish to file a complaint, please contact [facility] Patient Relations at [phone] or email us at [email or web address]         Additional Information About Your Visit        MyChart Information      [employer] is an electronic gateway that provides easy, online access to your medical records. With [employer], you can request a clinic appointment, read your test results, renew a prescription or communicate with your care team.     To sign up for [employer] visit the website at [email or web address]   You will be asked to enter the access code listed below, as well as some personal information. Please follow the directions to create your username and password.     Your access code is: [email or web address]  Expires: 2017 11:36 AM     Your access code will  in 90 days. If you need help or a new code, please contact your [facility] or call [phone] for assistance.        Care EveryWhere ID     This is your Care EveryWhere ID. This could be used by other organizations to access your [facility] medical records  [id number]         Blood Pressure from Last 3 Encounters:   17 120/78   17 127/73   17 131/73    Weight from Last 3 Encounters:   17 201 lb 12.8 oz   17 200 lb 12.8 oz   17 204 lb              Today, you had the following     No orders found for display         Today's Medication Changes          These changes are accurate as of: 8/3/17  1:41 PM.  If you have any questions, ask your nurse or doctor.               These medicines have changed or have updated prescriptions.        Dose/Directions    amLODIPine 10 MG tablet   Commonly known as:  NORVASC   This may have changed:  when to take this   Used for:  Essential hypertension with goal blood pressure less than 130/80        Dose:  10 mg   Take 1 tablet (10 mg) by mouth daily   Quantity:  90 tablet   Refills:  PRN       aspirin 81 MG EC tablet   This may have changed:  when to take this   Used for:  Hypertension goal BP (blood pressure) < 130/80, Hyperlipidemia LDL goal <100        Dose:  81 mg   Take 1 tablet (81 mg) by mouth daily   Quantity:  30 tablet   Refills:  0       atenolol 100  MG tablet   Commonly known as:  TENORMIN   This may have changed:  when to take this   Used for:  Essential hypertension with goal blood pressure less than 130/80        Dose:  100 mg   Take 1 tablet (100 mg) by mouth daily   Quantity:  90 tablet   Refills:  PRN       atorvastatin 40 MG tablet   Commonly known as:  LIPITOR   This may have changed:  when to take this   Used for:  Hyperlipidemia LDL goal <100        Dose:  40 mg   Take 1 tablet (40 mg) by mouth daily   Quantity:  90 tablet   Refills:  PRN       valsartan 320 MG tablet   Commonly known as:  DIOVAN   This may have changed:  when to take this   Used for:  Essential hypertension with goal blood pressure less than 130/80        Dose:  320 mg   Take 1 tablet (320 mg) by mouth daily   Quantity:  90 tablet   Refills:  3                Primary Care Provider Office Phone # Fax #    Rebeca IDALIA Sandoval [phone]       [facility] [address] [facility] [address]        Equal Access to Services     RIGO STORY : Hadii kylee jon hadasho Soomaali, waaxda luqadaha, qaybta kaalmada adeegyada, waxay tranin hayneetu love . So [facility] [phone].    ATENCIÓN: Si habla español, tiene a adorno disposición servicios gratuitos de asistencia lingüística. [facility] [phone].    We comply with applicable federal civil rights laws and [place] laws. We do not discriminate on the basis of race, color, national origin, age, disability sex, sexual orientation or gender identity.            Thank you!     Thank you for choosing [facility]  for your care. Our goal is always to provide you with excellent care. Hearing back from our patients is one way we can continue to improve our services. Please take a few minutes to complete the written survey that you may receive in the mail after your visit with us. Thank you!             Your Updated Medication List - Protect others around you: Learn how to safely use, store and throw  away your medicines at www.disposemymeds.org.          This list is accurate as of: 8/3/17  1:41 PM.  Always use your most recent med list.                   Brand Name Dispense Instructions for use Diagnosis    amLODIPine 10 MG tablet    NORVASC    90 tablet    Take 1 tablet (10 mg) by mouth daily    Essential hypertension with goal blood pressure less than 130/80       aspirin 81 MG EC tablet     30 tablet    Take 1 tablet (81 mg) by mouth daily    Hypertension goal BP (blood pressure) < 130/80, Hyperlipidemia LDL goal <100       atenolol 100 MG tablet    TENORMIN    90 tablet    Take 1 tablet (100 mg) by mouth daily    Essential hypertension with goal blood pressure less than 130/80       atorvastatin 40 MG tablet    LIPITOR    90 tablet    Take 1 tablet (40 mg) by mouth daily    Hyperlipidemia LDL goal <100       BENADRYL 25 MG capsule   Generic drug:  diphenhydrAMINE      Take 25 mg by mouth nightly as needed. sleep        lactobacillus rhamnosus (GG) 10 B CELL capsule    CULTURELLE     Take 1 capsule by mouth every morning        MULTIVITAMIN & MINERAL PO      Take 1 tablet by mouth every morning        niacin 500 MG tablet     30 tablet    Take 1 tablet (500 mg) by mouth At Bedtime    Hyperlipidemia LDL goal <100       ondansetron 4 MG tablet    ZOFRAN    3 tablet    Take 1 tablet (4 mg) by mouth every 6 hours as needed for nausea when taking Neomycin and Flagyl.    Colon cancer (H)       polyethylene glycol 236 G suspension    GoLYTELY/NuLYTELY    4000 mL    Take as directed in your surgery packet.    Colon cancer (H)       SAW PALMETTO PO      Take by mouth every morning        tiZANidine 2 MG tablet    ZANAFLEX    60 tablet    Take 1-2 tablets (2-4 mg) by mouth nightly as needed    Bilateral low back pain without sciatica, unspecified chronicity       valsartan 320 MG tablet    DIOVAN    90 tablet    Take 1 tablet (320 mg) by mouth daily    Essential hypertension with goal blood pressure less than 130/80

## 2017-08-04 ENCOUNTER — HOSPITAL ENCOUNTER (OUTPATIENT)
Dept: CARDIOLOGY | Facility: CLINIC | Age: 72
Discharge: HOME | End: 2017-08-04
Attending: INTERNAL MEDICINE | Admitting: INTERNAL MEDICINE
Payer: MEDICARE

## 2017-08-04 DIAGNOSIS — E78.5 HYPERLIPIDEMIA LDL GOAL <100: ICD-10-CM

## 2017-08-04 DIAGNOSIS — I10 HYPERTENSION GOAL BP (BLOOD PRESSURE) < 130/80: ICD-10-CM

## 2017-08-04 LAB
ABO + RH BLD: NORMAL
ABO + RH BLD: NORMAL
BLD GP AB SCN SERPL QL: NORMAL
BLOOD BANK CMNT PATIENT-IMP: NORMAL
BLOOD BANK CMNT PATIENT-IMP: NORMAL
SPECIMEN EXP DATE BLD: NORMAL

## 2017-08-04 PROCEDURE — 93016 CV STRESS TEST SUPVJ ONLY: CPT | Performed by: INTERNAL MEDICINE

## 2017-08-04 PROCEDURE — 40000264 ECHO DOBUTAMINE STRESS TEST WITH DEFINITY

## 2017-08-04 PROCEDURE — 25500064 ZZH RX 255 OP 636: Performed by: INTERNAL MEDICINE

## 2017-08-04 PROCEDURE — 93325 DOPPLER ECHO COLOR FLOW MAPG: CPT | Mod: 26 | Performed by: INTERNAL MEDICINE

## 2017-08-04 PROCEDURE — 93321 DOPPLER ECHO F-UP/LMTD STD: CPT | Mod: 26 | Performed by: INTERNAL MEDICINE

## 2017-08-04 PROCEDURE — 93018 CV STRESS TEST I&R ONLY: CPT | Performed by: INTERNAL MEDICINE

## 2017-08-04 PROCEDURE — 93350 STRESS TTE ONLY: CPT | Mod: 26 | Performed by: INTERNAL MEDICINE

## 2017-08-04 PROCEDURE — 25000128 H RX IP 250 OP 636: Performed by: INTERNAL MEDICINE

## 2017-08-04 PROCEDURE — 25000125 ZZHC RX 250: Performed by: INTERNAL MEDICINE

## 2017-08-04 RX ORDER — DOBUTAMINE HYDROCHLORIDE 200 MG/100ML
5-50 INJECTION INTRAVENOUS CONTINUOUS PRN
Status: COMPLETED | OUTPATIENT
Start: 2017-08-04 | End: 2017-08-04

## 2017-08-04 RX ORDER — METOPROLOL TARTRATE 1 MG/ML
.5-5 INJECTION, SOLUTION INTRAVENOUS
Status: DISCONTINUED | OUTPATIENT
Start: 2017-08-04 | End: 2017-08-05 | Stop reason: HOSPADM

## 2017-08-04 RX ADMIN — DOBUTAMINE IN DEXTROSE 30 MCG/KG/MIN: 200 INJECTION, SOLUTION INTRAVENOUS at 13:49

## 2017-08-04 RX ADMIN — PERFLUTREN 10 ML: 6.52 INJECTION, SUSPENSION INTRAVENOUS at 13:45

## 2017-08-04 RX ADMIN — ATROPINE SULFATE 0.8 MG: 0.4 INJECTION, SOLUTION INTRAMUSCULAR; INTRAVENOUS; SUBCUTANEOUS at 13:54

## 2017-08-04 RX ADMIN — METOPROLOL TARTRATE 5 MG: 5 INJECTION INTRAVENOUS at 14:01

## 2017-08-04 NOTE — PROGRESS NOTES
Patient achieved his target heart rate for this test despite having taken 100 mg of Atenolol this morning.  VSS.  A total of 40 mcg/kg/mn and 0.8 mg of atropine were used to achieve target.  Patient denied any cardiac symptoms throughout the stress test.  ECG quality was difficult to achieve and maintain.  Patient is monitored x 15 mn post stress test and then is escorted on foot back to the gold waiting area.

## 2017-08-07 ENCOUNTER — PRE VISIT (OUTPATIENT)
Dept: CARDIOLOGY | Facility: CLINIC | Age: 72
End: 2017-08-07

## 2017-08-07 NOTE — TELEPHONE ENCOUNTER
Per Dr. Willett - Mr. Lira's stress test was positive.  Please call him and schedule him for a cath.  I will contact his colorectal surgeon to inform them of the possible need for stents and dual anti-platelet therapy for at least 1 month.  Spoke to pt - he is in agreement to plan, will discuss w/ his sister when she is able to provide a ride home & I will call him tomorrow to arrange.

## 2017-08-09 RX ORDER — LIDOCAINE 40 MG/G
CREAM TOPICAL
Status: CANCELLED | OUTPATIENT
Start: 2017-08-09

## 2017-08-09 RX ORDER — SODIUM CHLORIDE 9 MG/ML
INJECTION, SOLUTION INTRAVENOUS CONTINUOUS
Status: CANCELLED | OUTPATIENT
Start: 2017-08-09

## 2017-08-09 NOTE — TELEPHONE ENCOUNTER
Pt will have cath next week w/ Dr. Willett, his sister will bring him & take him home & stay w/ him after.  Nothing to eat/drink after midnight, but can take meds in the morning w/ sips of water.  Take ASA 325mg morning of procedure.

## 2017-08-09 NOTE — NURSING NOTE
Pt needs bmp before angio per Dr. Willett due to elevated creat - pt will have labs at 830 before angio, pt notified.

## 2017-08-15 ENCOUNTER — APPOINTMENT (OUTPATIENT)
Dept: CARDIOLOGY | Facility: CLINIC | Age: 72
End: 2017-08-15
Attending: INTERNAL MEDICINE
Payer: MEDICARE

## 2017-08-15 ENCOUNTER — APPOINTMENT (OUTPATIENT)
Dept: GENERAL RADIOLOGY | Facility: CLINIC | Age: 72
End: 2017-08-15
Attending: PHYSICIAN ASSISTANT
Payer: MEDICARE

## 2017-08-15 ENCOUNTER — APPOINTMENT (OUTPATIENT)
Dept: ULTRASOUND IMAGING | Facility: CLINIC | Age: 72
End: 2017-08-15
Attending: PHYSICIAN ASSISTANT
Payer: MEDICARE

## 2017-08-15 ENCOUNTER — HOSPITAL ENCOUNTER (OUTPATIENT)
Facility: CLINIC | Age: 72
Discharge: HOME | End: 2017-08-15
Attending: INTERNAL MEDICINE | Admitting: INTERNAL MEDICINE
Payer: MEDICARE

## 2017-08-15 ENCOUNTER — APPOINTMENT (OUTPATIENT)
Dept: MEDSURG UNIT | Facility: CLINIC | Age: 72
End: 2017-08-15
Payer: MEDICARE

## 2017-08-15 VITALS
RESPIRATION RATE: 22 BRPM | SYSTOLIC BLOOD PRESSURE: 163 MMHG | TEMPERATURE: 96.7 F | DIASTOLIC BLOOD PRESSURE: 91 MMHG | HEART RATE: 66 BPM | OXYGEN SATURATION: 95 %

## 2017-08-15 DIAGNOSIS — I10 HYPERTENSION GOAL BP (BLOOD PRESSURE) < 130/80: ICD-10-CM

## 2017-08-15 DIAGNOSIS — E78.5 HYPERLIPIDEMIA LDL GOAL <100: ICD-10-CM

## 2017-08-15 DIAGNOSIS — Z01.810 PRE-OPERATIVE CARDIOVASCULAR EXAMINATION: ICD-10-CM

## 2017-08-15 PROBLEM — Z98.890 STATUS POST CORONARY ANGIOGRAM: Status: ACTIVE | Noted: 2017-08-15

## 2017-08-15 LAB
ABO + RH BLD: NORMAL
ABO + RH BLD: NORMAL
ALBUMIN UR-MCNC: 30 MG/DL
ANION GAP SERPL CALCULATED.3IONS-SCNC: 8 MMOL/L (ref 3–14)
APPEARANCE UR: ABNORMAL
BILIRUB UR QL STRIP: NEGATIVE
BLD GP AB SCN SERPL QL: NORMAL
BLOOD BANK CMNT PATIENT-IMP: NORMAL
BUN SERPL-MCNC: 32 MG/DL (ref 7–30)
CALCIUM SERPL-MCNC: 8.8 MG/DL (ref 8.5–10.1)
CHLORIDE SERPL-SCNC: 111 MMOL/L (ref 94–109)
CO2 SERPL-SCNC: 21 MMOL/L (ref 20–32)
COLOR UR AUTO: ABNORMAL
CREAT SERPL-MCNC: 2.48 MG/DL (ref 0.66–1.25)
ERYTHROCYTE [DISTWIDTH] IN BLOOD BY AUTOMATED COUNT: 13.6 % (ref 10–15)
GFR SERPL CREATININE-BSD FRML MDRD: 26 ML/MIN/1.7M2
GLUCOSE SERPL-MCNC: 97 MG/DL (ref 70–99)
GLUCOSE UR STRIP-MCNC: NEGATIVE MG/DL
HBA1C MFR BLD: 5.4 % (ref 4.3–6)
HCT VFR BLD AUTO: 37.1 % (ref 40–53)
HGB BLD-MCNC: 12.4 G/DL (ref 13.3–17.7)
HGB UR QL STRIP: NEGATIVE
INR PPP: 1.1 (ref 0.86–1.14)
KETONES UR STRIP-MCNC: NEGATIVE MG/DL
LEUKOCYTE ESTERASE UR QL STRIP: NEGATIVE
MCH RBC QN AUTO: 30 PG (ref 26.5–33)
MCHC RBC AUTO-ENTMCNC: 33.4 G/DL (ref 31.5–36.5)
MCV RBC AUTO: 90 FL (ref 78–100)
NITRATE UR QL: NEGATIVE
PH UR STRIP: 6 PH (ref 5–7)
PLATELET # BLD AUTO: 186 10E9/L (ref 150–450)
POTASSIUM SERPL-SCNC: 4.4 MMOL/L (ref 3.4–5.3)
RBC # BLD AUTO: 4.13 10E12/L (ref 4.4–5.9)
RBC #/AREA URNS AUTO: <1 /HPF (ref 0–2)
SODIUM SERPL-SCNC: 140 MMOL/L (ref 133–144)
SOURCE: ABNORMAL
SP GR UR STRIP: 1.02 (ref 1–1.03)
SPECIMEN EXP DATE BLD: NORMAL
UROBILINOGEN UR STRIP-MCNC: NORMAL MG/DL (ref 0–2)
WBC # BLD AUTO: 10.8 10E9/L (ref 4–11)
WBC #/AREA URNS AUTO: 1 /HPF (ref 0–2)

## 2017-08-15 PROCEDURE — 93970 EXTREMITY STUDY: CPT

## 2017-08-15 PROCEDURE — 25000125 ZZHC RX 250: Performed by: INTERNAL MEDICINE

## 2017-08-15 PROCEDURE — C1894 INTRO/SHEATH, NON-LASER: HCPCS

## 2017-08-15 PROCEDURE — C1769 GUIDE WIRE: HCPCS

## 2017-08-15 PROCEDURE — 25000128 H RX IP 250 OP 636: Performed by: INTERNAL MEDICINE

## 2017-08-15 PROCEDURE — 81001 URINALYSIS AUTO W/SCOPE: CPT | Performed by: PHYSICIAN ASSISTANT

## 2017-08-15 PROCEDURE — 80048 BASIC METABOLIC PNL TOTAL CA: CPT | Performed by: INTERNAL MEDICINE

## 2017-08-15 PROCEDURE — 36005 INJECTION EXT VENOGRAPHY: CPT | Mod: 59 | Performed by: INTERNAL MEDICINE

## 2017-08-15 PROCEDURE — 4A033BC MEASUREMENT OF ARTERIAL PRESSURE, CORONARY, PERCUTANEOUS APPROACH: ICD-10-PCS | Performed by: INTERNAL MEDICINE

## 2017-08-15 PROCEDURE — 93454 CORONARY ARTERY ANGIO S&I: CPT | Mod: 26 | Performed by: INTERNAL MEDICINE

## 2017-08-15 PROCEDURE — 93571 IV DOP VEL&/PRESS C FLO 1ST: CPT | Mod: 26 | Performed by: INTERNAL MEDICINE

## 2017-08-15 PROCEDURE — 27210787 ZZH MANIFOLD CR2

## 2017-08-15 PROCEDURE — 27210946 ZZH KIT HC TOTES DISP CR8

## 2017-08-15 PROCEDURE — 93571 IV DOP VEL&/PRESS C FLO 1ST: CPT

## 2017-08-15 PROCEDURE — 83036 HEMOGLOBIN GLYCOSYLATED A1C: CPT | Performed by: PHYSICIAN ASSISTANT

## 2017-08-15 PROCEDURE — B2111ZZ FLUOROSCOPY OF MULTIPLE CORONARY ARTERIES USING LOW OSMOLAR CONTRAST: ICD-10-PCS | Performed by: INTERNAL MEDICINE

## 2017-08-15 PROCEDURE — 86850 RBC ANTIBODY SCREEN: CPT | Performed by: PHYSICIAN ASSISTANT

## 2017-08-15 PROCEDURE — 99153 MOD SED SAME PHYS/QHP EA: CPT

## 2017-08-15 PROCEDURE — 99152 MOD SED SAME PHYS/QHP 5/>YRS: CPT

## 2017-08-15 PROCEDURE — 86900 BLOOD TYPING SEROLOGIC ABO: CPT | Performed by: PHYSICIAN ASSISTANT

## 2017-08-15 PROCEDURE — 93005 ELECTROCARDIOGRAM TRACING: CPT

## 2017-08-15 PROCEDURE — 27211089 ZZH KIT ACIST INJECTOR CR3

## 2017-08-15 PROCEDURE — 40000065 ZZH STATISTIC EKG NON-CHARGEABLE

## 2017-08-15 PROCEDURE — 40000172 ZZH STATISTIC PROCEDURE PREP ONLY

## 2017-08-15 PROCEDURE — 27210998 ZZH ACCESS HEART CATH CR6

## 2017-08-15 PROCEDURE — 93454 CORONARY ARTERY ANGIO S&I: CPT

## 2017-08-15 PROCEDURE — 86901 BLOOD TYPING SEROLOGIC RH(D): CPT | Performed by: PHYSICIAN ASSISTANT

## 2017-08-15 PROCEDURE — 93572 IV DOP VEL&/PRESS C FLO EA: CPT

## 2017-08-15 PROCEDURE — 85610 PROTHROMBIN TIME: CPT | Performed by: PHYSICIAN ASSISTANT

## 2017-08-15 PROCEDURE — 75820 VEIN X-RAY ARM/LEG: CPT | Mod: 26 | Performed by: INTERNAL MEDICINE

## 2017-08-15 PROCEDURE — 71020 XR CHEST 2 VW: CPT

## 2017-08-15 PROCEDURE — 27210762 ZZH DEVICE SUTURELESS SECUREMENT EA CR2

## 2017-08-15 PROCEDURE — 85027 COMPLETE CBC AUTOMATED: CPT | Performed by: INTERNAL MEDICINE

## 2017-08-15 RX ORDER — ASPIRIN 81 MG/1
81-324 TABLET, CHEWABLE ORAL
Status: DISCONTINUED | OUTPATIENT
Start: 2017-08-15 | End: 2017-08-15 | Stop reason: HOSPADM

## 2017-08-15 RX ORDER — DEXTROSE MONOHYDRATE 25 G/50ML
12.5-5 INJECTION, SOLUTION INTRAVENOUS EVERY 30 MIN PRN
Status: DISCONTINUED | OUTPATIENT
Start: 2017-08-15 | End: 2017-08-15 | Stop reason: HOSPADM

## 2017-08-15 RX ORDER — ADENOSINE 3 MG/ML
12-12000 INJECTION, SOLUTION INTRAVENOUS
Status: DISCONTINUED | OUTPATIENT
Start: 2017-08-15 | End: 2017-08-15 | Stop reason: HOSPADM

## 2017-08-15 RX ORDER — PRASUGREL 10 MG/1
10-60 TABLET, FILM COATED ORAL
Status: DISCONTINUED | OUTPATIENT
Start: 2017-08-15 | End: 2017-08-15 | Stop reason: HOSPADM

## 2017-08-15 RX ORDER — NALOXONE HYDROCHLORIDE 0.4 MG/ML
0.4 INJECTION, SOLUTION INTRAMUSCULAR; INTRAVENOUS; SUBCUTANEOUS EVERY 5 MIN PRN
Status: DISCONTINUED | OUTPATIENT
Start: 2017-08-15 | End: 2017-08-15 | Stop reason: HOSPADM

## 2017-08-15 RX ORDER — FENTANYL CITRATE 50 UG/ML
25-50 INJECTION, SOLUTION INTRAMUSCULAR; INTRAVENOUS
Status: DISCONTINUED | OUTPATIENT
Start: 2017-08-15 | End: 2017-08-15 | Stop reason: HOSPADM

## 2017-08-15 RX ORDER — PROTAMINE SULFATE 10 MG/ML
25-100 INJECTION, SOLUTION INTRAVENOUS EVERY 5 MIN PRN
Status: DISCONTINUED | OUTPATIENT
Start: 2017-08-15 | End: 2017-08-15 | Stop reason: HOSPADM

## 2017-08-15 RX ORDER — LIDOCAINE 40 MG/G
CREAM TOPICAL
Status: DISCONTINUED | OUTPATIENT
Start: 2017-08-15 | End: 2017-08-15 | Stop reason: HOSPADM

## 2017-08-15 RX ORDER — HYDRALAZINE HYDROCHLORIDE 20 MG/ML
10-20 INJECTION INTRAMUSCULAR; INTRAVENOUS
Status: DISCONTINUED | OUTPATIENT
Start: 2017-08-15 | End: 2017-08-15 | Stop reason: HOSPADM

## 2017-08-15 RX ORDER — NITROGLYCERIN 5 MG/ML
100-500 VIAL (ML) INTRAVENOUS
Status: DISCONTINUED | OUTPATIENT
Start: 2017-08-15 | End: 2017-08-15 | Stop reason: HOSPADM

## 2017-08-15 RX ORDER — FLUMAZENIL 0.1 MG/ML
0.2 INJECTION, SOLUTION INTRAVENOUS
Status: DISCONTINUED | OUTPATIENT
Start: 2017-08-15 | End: 2017-08-15 | Stop reason: HOSPADM

## 2017-08-15 RX ORDER — PHENYLEPHRINE HCL IN 0.9% NACL 1 MG/10 ML
20-100 SYRINGE (ML) INTRAVENOUS
Status: DISCONTINUED | OUTPATIENT
Start: 2017-08-15 | End: 2017-08-15 | Stop reason: HOSPADM

## 2017-08-15 RX ORDER — ARGATROBAN 1 MG/ML
350 INJECTION, SOLUTION INTRAVENOUS
Status: DISCONTINUED | OUTPATIENT
Start: 2017-08-15 | End: 2017-08-15 | Stop reason: HOSPADM

## 2017-08-15 RX ORDER — VERAPAMIL HYDROCHLORIDE 2.5 MG/ML
1-5 INJECTION, SOLUTION INTRAVENOUS
Status: DISCONTINUED | OUTPATIENT
Start: 2017-08-15 | End: 2017-08-15 | Stop reason: HOSPADM

## 2017-08-15 RX ORDER — PROTAMINE SULFATE 10 MG/ML
1-5 INJECTION, SOLUTION INTRAVENOUS
Status: DISCONTINUED | OUTPATIENT
Start: 2017-08-15 | End: 2017-08-15 | Stop reason: HOSPADM

## 2017-08-15 RX ORDER — LIDOCAINE HYDROCHLORIDE 10 MG/ML
30 INJECTION, SOLUTION EPIDURAL; INFILTRATION; INTRACAUDAL; PERINEURAL
Status: DISCONTINUED | OUTPATIENT
Start: 2017-08-15 | End: 2017-08-15 | Stop reason: HOSPADM

## 2017-08-15 RX ORDER — ARGATROBAN 1 MG/ML
150 INJECTION, SOLUTION INTRAVENOUS
Status: DISCONTINUED | OUTPATIENT
Start: 2017-08-15 | End: 2017-08-15 | Stop reason: HOSPADM

## 2017-08-15 RX ORDER — NALOXONE HYDROCHLORIDE 0.4 MG/ML
.1-.4 INJECTION, SOLUTION INTRAMUSCULAR; INTRAVENOUS; SUBCUTANEOUS
Status: DISCONTINUED | OUTPATIENT
Start: 2017-08-15 | End: 2017-08-15 | Stop reason: HOSPADM

## 2017-08-15 RX ORDER — CLOPIDOGREL BISULFATE 75 MG/1
300-600 TABLET ORAL
Status: DISCONTINUED | OUTPATIENT
Start: 2017-08-15 | End: 2017-08-15 | Stop reason: HOSPADM

## 2017-08-15 RX ORDER — POTASSIUM CHLORIDE 7.45 MG/ML
10 INJECTION INTRAVENOUS
Status: DISCONTINUED | OUTPATIENT
Start: 2017-08-15 | End: 2017-08-15 | Stop reason: HOSPADM

## 2017-08-15 RX ORDER — PROMETHAZINE HYDROCHLORIDE 25 MG/ML
6.25-25 INJECTION, SOLUTION INTRAMUSCULAR; INTRAVENOUS EVERY 4 HOURS PRN
Status: DISCONTINUED | OUTPATIENT
Start: 2017-08-15 | End: 2017-08-15 | Stop reason: HOSPADM

## 2017-08-15 RX ORDER — NICARDIPINE HYDROCHLORIDE 2.5 MG/ML
100 INJECTION INTRAVENOUS
Status: DISCONTINUED | OUTPATIENT
Start: 2017-08-15 | End: 2017-08-15 | Stop reason: HOSPADM

## 2017-08-15 RX ORDER — NITROGLYCERIN 0.4 MG/1
0.4 TABLET SUBLINGUAL EVERY 5 MIN PRN
Status: DISCONTINUED | OUTPATIENT
Start: 2017-08-15 | End: 2017-08-15 | Stop reason: HOSPADM

## 2017-08-15 RX ORDER — ASPIRIN 325 MG
325 TABLET ORAL
Status: DISCONTINUED | OUTPATIENT
Start: 2017-08-15 | End: 2017-08-15 | Stop reason: HOSPADM

## 2017-08-15 RX ORDER — POTASSIUM CHLORIDE 29.8 MG/ML
20 INJECTION INTRAVENOUS
Status: DISCONTINUED | OUTPATIENT
Start: 2017-08-15 | End: 2017-08-15 | Stop reason: HOSPADM

## 2017-08-15 RX ORDER — METHYLPREDNISOLONE SODIUM SUCCINATE 125 MG/2ML
125 INJECTION, POWDER, LYOPHILIZED, FOR SOLUTION INTRAMUSCULAR; INTRAVENOUS
Status: DISCONTINUED | OUTPATIENT
Start: 2017-08-15 | End: 2017-08-15 | Stop reason: HOSPADM

## 2017-08-15 RX ORDER — HYDRALAZINE HYDROCHLORIDE 20 MG/ML
10 INJECTION INTRAMUSCULAR; INTRAVENOUS EVERY 8 HOURS PRN
Status: DISCONTINUED | OUTPATIENT
Start: 2017-08-15 | End: 2017-08-15 | Stop reason: HOSPADM

## 2017-08-15 RX ORDER — MAGNESIUM HYDROXIDE 1200 MG/15ML
1000 LIQUID ORAL CONTINUOUS PRN
Status: DISCONTINUED | OUTPATIENT
Start: 2017-08-15 | End: 2017-08-15 | Stop reason: HOSPADM

## 2017-08-15 RX ORDER — ONDANSETRON 2 MG/ML
4 INJECTION INTRAMUSCULAR; INTRAVENOUS EVERY 4 HOURS PRN
Status: DISCONTINUED | OUTPATIENT
Start: 2017-08-15 | End: 2017-08-15 | Stop reason: HOSPADM

## 2017-08-15 RX ORDER — ATROPINE SULFATE 0.1 MG/ML
.5-1 INJECTION INTRAVENOUS
Status: DISCONTINUED | OUTPATIENT
Start: 2017-08-15 | End: 2017-08-15 | Stop reason: HOSPADM

## 2017-08-15 RX ORDER — SODIUM NITROPRUSSIDE 25 MG/ML
100-200 INJECTION INTRAVENOUS
Status: DISCONTINUED | OUTPATIENT
Start: 2017-08-15 | End: 2017-08-15 | Stop reason: HOSPADM

## 2017-08-15 RX ORDER — DOPAMINE HYDROCHLORIDE 160 MG/100ML
2-20 INJECTION, SOLUTION INTRAVENOUS CONTINUOUS PRN
Status: DISCONTINUED | OUTPATIENT
Start: 2017-08-15 | End: 2017-08-15 | Stop reason: HOSPADM

## 2017-08-15 RX ORDER — NITROGLYCERIN 20 MG/100ML
.07-2 INJECTION INTRAVENOUS CONTINUOUS PRN
Status: DISCONTINUED | OUTPATIENT
Start: 2017-08-15 | End: 2017-08-15 | Stop reason: HOSPADM

## 2017-08-15 RX ORDER — HEPARIN SODIUM 1000 [USP'U]/ML
1000-10000 INJECTION, SOLUTION INTRAVENOUS; SUBCUTANEOUS EVERY 5 MIN PRN
Status: DISCONTINUED | OUTPATIENT
Start: 2017-08-15 | End: 2017-08-15 | Stop reason: HOSPADM

## 2017-08-15 RX ORDER — NIFEDIPINE 10 MG/1
10 CAPSULE ORAL
Status: DISCONTINUED | OUTPATIENT
Start: 2017-08-15 | End: 2017-08-15 | Stop reason: HOSPADM

## 2017-08-15 RX ORDER — LORAZEPAM 2 MG/ML
.5-2 INJECTION INTRAMUSCULAR EVERY 4 HOURS PRN
Status: DISCONTINUED | OUTPATIENT
Start: 2017-08-15 | End: 2017-08-15 | Stop reason: HOSPADM

## 2017-08-15 RX ORDER — NITROGLYCERIN 5 MG/ML
100-200 VIAL (ML) INTRAVENOUS
Status: DISCONTINUED | OUTPATIENT
Start: 2017-08-15 | End: 2017-08-15 | Stop reason: HOSPADM

## 2017-08-15 RX ORDER — DOBUTAMINE HYDROCHLORIDE 200 MG/100ML
2-20 INJECTION INTRAVENOUS CONTINUOUS PRN
Status: DISCONTINUED | OUTPATIENT
Start: 2017-08-15 | End: 2017-08-15 | Stop reason: HOSPADM

## 2017-08-15 RX ORDER — EPTIFIBATIDE 2 MG/ML
180 INJECTION, SOLUTION INTRAVENOUS EVERY 10 MIN PRN
Status: DISCONTINUED | OUTPATIENT
Start: 2017-08-15 | End: 2017-08-15 | Stop reason: HOSPADM

## 2017-08-15 RX ORDER — CLOPIDOGREL BISULFATE 75 MG/1
75 TABLET ORAL
Status: DISCONTINUED | OUTPATIENT
Start: 2017-08-15 | End: 2017-08-15 | Stop reason: HOSPADM

## 2017-08-15 RX ORDER — SODIUM CHLORIDE 9 MG/ML
INJECTION, SOLUTION INTRAVENOUS CONTINUOUS
Status: DISCONTINUED | OUTPATIENT
Start: 2017-08-15 | End: 2017-08-15 | Stop reason: HOSPADM

## 2017-08-15 RX ORDER — METOPROLOL TARTRATE 1 MG/ML
5 INJECTION, SOLUTION INTRAVENOUS EVERY 5 MIN PRN
Status: DISCONTINUED | OUTPATIENT
Start: 2017-08-15 | End: 2017-08-15 | Stop reason: HOSPADM

## 2017-08-15 RX ORDER — EPTIFIBATIDE 2 MG/ML
1 INJECTION, SOLUTION INTRAVENOUS CONTINUOUS PRN
Status: DISCONTINUED | OUTPATIENT
Start: 2017-08-15 | End: 2017-08-15 | Stop reason: HOSPADM

## 2017-08-15 RX ORDER — ENALAPRILAT 1.25 MG/ML
1.25-2.5 INJECTION INTRAVENOUS
Status: DISCONTINUED | OUTPATIENT
Start: 2017-08-15 | End: 2017-08-15 | Stop reason: HOSPADM

## 2017-08-15 RX ORDER — NALOXONE HYDROCHLORIDE 0.4 MG/ML
.2-.4 INJECTION, SOLUTION INTRAMUSCULAR; INTRAVENOUS; SUBCUTANEOUS
Status: DISCONTINUED | OUTPATIENT
Start: 2017-08-15 | End: 2017-08-15 | Stop reason: HOSPADM

## 2017-08-15 RX ORDER — FUROSEMIDE 10 MG/ML
20-100 INJECTION INTRAMUSCULAR; INTRAVENOUS
Status: DISCONTINUED | OUTPATIENT
Start: 2017-08-15 | End: 2017-08-15 | Stop reason: HOSPADM

## 2017-08-15 RX ORDER — DIPHENHYDRAMINE HYDROCHLORIDE 50 MG/ML
25-50 INJECTION INTRAMUSCULAR; INTRAVENOUS
Status: DISCONTINUED | OUTPATIENT
Start: 2017-08-15 | End: 2017-08-15 | Stop reason: HOSPADM

## 2017-08-15 RX ORDER — ATROPINE SULFATE 0.1 MG/ML
0.5 INJECTION INTRAVENOUS EVERY 5 MIN PRN
Status: DISCONTINUED | OUTPATIENT
Start: 2017-08-15 | End: 2017-08-15 | Stop reason: HOSPADM

## 2017-08-15 RX ADMIN — NITROGLYCERIN 200 MCG: 5 INJECTION, SOLUTION INTRAVENOUS at 11:11

## 2017-08-15 RX ADMIN — HYDRALAZINE HYDROCHLORIDE 10 MG: 20 INJECTION INTRAMUSCULAR; INTRAVENOUS at 13:56

## 2017-08-15 RX ADMIN — SODIUM CHLORIDE: 9 INJECTION, SOLUTION INTRAVENOUS at 10:03

## 2017-08-15 RX ADMIN — HEPARIN SODIUM 500 UNITS: 1000 INJECTION, SOLUTION INTRAVENOUS; SUBCUTANEOUS at 11:06

## 2017-08-15 RX ADMIN — FENTANYL CITRATE 50 MCG: 50 INJECTION, SOLUTION INTRAMUSCULAR; INTRAVENOUS at 11:02

## 2017-08-15 RX ADMIN — MIDAZOLAM HYDROCHLORIDE 1 MG: 1 INJECTION, SOLUTION INTRAMUSCULAR; INTRAVENOUS at 11:06

## 2017-08-15 RX ADMIN — HEPARIN SODIUM 5000 UNITS: 1000 INJECTION, SOLUTION INTRAVENOUS; SUBCUTANEOUS at 11:10

## 2017-08-15 RX ADMIN — HEPARIN SODIUM 1500 UNITS: 1000 INJECTION, SOLUTION INTRAVENOUS; SUBCUTANEOUS at 11:02

## 2017-08-15 RX ADMIN — NICARDIPINE HYDROCHLORIDE 200 MCG: 2.5 INJECTION INTRAVENOUS at 11:10

## 2017-08-15 RX ADMIN — HEPARIN SODIUM 5000 UNITS: 1000 INJECTION, SOLUTION INTRAVENOUS; SUBCUTANEOUS at 11:25

## 2017-08-15 NOTE — PROGRESS NOTES
"             Cardiovascular and Thoracic Surgery Consultation    Jose Lira MRN# [id number]   YOB: 1945 Age: 72 year old   Date of Admission: 8/15/2017     Reason for consult: Possible coronary artery bypass surgery             Chief Complaint:   No chest pain reported today  No shortness of breath reported today  Patient reports significant fatigue throughout the day spanning the last three years. Significant bilateral PAD with claudication limits activity, subsequently he reports no other cardiac symptoms.      Medical records reviewed    History is obtained from the patient and from previous H & P (August 3rd)           History of Present Illness:   This patient is a 72 year old male who presents with discovered during preoperative testing for a laparoscopic colon cancer resection. Per the patient, his GI surgeon would like the coronary artery disease addressed before proceeding with the procedure. Patient endorses fatigue and vascular claudication limiting mobility to \"a few yards\". Patient denies chest pain, shortness of breath, nausea, or weakness.              Past Medical History:     I have reviewed and updated this patient's past medical history  Past Medical History:   Diagnosis Date     Acute, but ill-defined, cerebrovascular disease 1994     CKD (chronic kidney disease)     Stage 3-4     History of CVA (cerebrovascular accident)      Hx of endarterectomy 2002    Left and right     Peripheral vascular disease, unspecified (H)      Pulmonary nodules      Pure hypercholesterolemia      Unspecified essential hypertension    Coronary artery disease          Past Surgical History:     I have reviewed this patient's past surgical history  Past Surgical History:   Procedure Laterality Date     COLONOSCOPY N/A 5/19/2017    Procedure: COMBINED COLONOSCOPY, SINGLE OR MULTIPLE BIOPSY/POLYPECTOMY BY BIOPSY;  Rectal bleeding;  Surgeon: Maximus Dinero MD;  Location:  GI     SURGICAL HISTORY " OF -   10/02    angioplasty and stenting of left and internal carotid artery at the bifurcation     SURGICAL HISTORY OF - 11/11    right common iliac artery stent      VASCULAR SURGERY  2001    stent placed in carotid    Denies bleed/clotting issues, apnea, or anesthesia concerns during previous surgeries.            Social History:     I have reviewed this patient's social history  Social History   Substance Use Topics     Smoking status: Former Smoker     Packs/day: 3.00     Years: 30.00     Quit date: 8/5/1994     Smokeless tobacco: Former User     Quit date: 8/1/1994      Comment: quit 15 years ago after his stroke     Alcohol use Yes      Comment: every evening 1-2 drinks   [facility]. Has a sister and cousin in [place], [place].          Family History:     I have reviewed this patient's family history  Family History   Problem Relation Age of Onset     C.A.D. Father      Hypertension Father      Prostate Cancer Father      C.A.D. Brother      MI     Hypertension Brother      Arthritis Sister           Immunizations:     Immunization History   Administered Date(s) Administered     Pneumococcal (PCV 13) 09/15/2015     TDAP Vaccine (Adacel) 07/16/2013             Allergies:     Allergies   Allergen Reactions     No Known Drug Allergies              Medications:     Prescriptions Prior to Admission   Medication Sig Dispense Refill Last Dose     aspirin 81 MG EC tablet Take 1 tablet (81 mg) by mouth daily (Patient taking differently: Take 81 mg by mouth every morning ) 30 tablet  8/15/2017 at 0600     atorvastatin (LIPITOR) 40 MG tablet Take 1 tablet (40 mg) by mouth daily (Patient taking differently: Take 40 mg by mouth every morning ) 90 tablet PRN 8/15/2017 at 0600     amLODIPine (NORVASC) 10 MG tablet Take 1 tablet (10 mg) by mouth daily (Patient taking differently: Take 10 mg by mouth every morning ) 90 tablet PRN 8/15/2017 at 0600     atenolol (TENORMIN) 100 MG tablet Take 1 tablet  "(100 mg) by mouth daily (Patient taking differently: Take 100 mg by mouth every morning ) 90 tablet PRN 8/15/2017 at 0600     valsartan (DIOVAN) 320 MG tablet Take 1 tablet (320 mg) by mouth daily (Patient taking differently: Take 320 mg by mouth every morning ) 90 tablet 3 8/15/2017 at 0600     tiZANidine (ZANAFLEX) 2 MG tablet Take 1-2 tablets (2-4 mg) by mouth nightly as needed 60 tablet PRN 8/14/2017 at 2100     Saw [place], Serenoa repens, (SAW PALMETTO PO) Take by mouth every morning    8/15/2017 at 0600     niacin 500 MG tablet Take 1 tablet (500 mg) by mouth At Bedtime 30 tablet PRN 8/14/2017 at 2100     Multiple Vitamins-Minerals (MULTIVITAMIN & MINERAL PO) Take 1 tablet by mouth every morning    8/15/2017 at 0600     lactobacillus rhamnosus, GG, (CULTURELLE) 10 B CELL capsule Take 1 capsule by mouth every morning    8/15/2017 at 0600     polyethylene glycol (GOLYTELY/NULYTELY) 236 G suspension Take as directed in your surgery packet. (Patient not taking: Reported on 7/31/2017) 4000 mL 0 Not Taking     diphenhydrAMINE (BENADRYL) 25 MG capsule Take 25 mg by mouth nightly as needed. sleep   Unknown             Review of Systems:   CONSTITUTIONAL:  negative  RESPIRATORY:  negative for  dyspnea, wheezing, chest pain and pleuritic pain  CARDIOVASCULAR:  positive for  fatigue  negative for  chest pain, dyspnea, palpitations, exertional chest pressure/discomfort  VASCULAR: Bilateral lower leg claudication after walking \"a few yards\"  GASTROINTESTINAL:  positive for hemtochezia  GENITOURINARY:  positive for hesitancy  HEMATOLOGIC/LYMPHATIC:  negative  NEUROLOGICAL:  positive for muscular contraction of left arm, some memory problems           Physical Exam:   Vitals were reviewed  Temp: 96.7  F (35.9  C) Temp src: Axillary BP: (!) 163/91 Pulse: 66 Heart Rate: 73 Resp: 22 SpO2: 95 % O2 Device: None (Room air)    Gen: Awake, alert, NAD. Unreliable with health history.  Neck: Trachea midline, no JVD visible on " exam  Pulm: CTAB, no wheeze, crackles  CV: Irregular, normal rate, no murmur. Carotid bruit on left.  Abd: Soft, nontender, + Bowel sounds, no mass  Ext: Bilateral edema, 1+ pitting over tibia. Non-palpable lower extremity pulses bilaterally. Radial pulses equal bilaterally.  Neuro: CN II-XII grossly intact.          Data:   All laboratory data reviewed  EKG reviewed: Sinus bradycardia    Stress Echocardiogram Dobutamine results reviewed:   Left Ventricle  Left ventricular systolic function is normal. The visual ejection fraction is  estimated at 55-60%.  Right Ventricle  The right ventricle is normal in size and function.  Mitral Valve  The mitral valve is normal in structure and function.  Tricuspid Valve  The tricuspid valve is normal in structure and function.  Aortic Valve  The aortic valve is normal in structure and function.  Pericardium  There is no pericardial effusion.    Heart Catheterization  LM - 20% stenosis  LAD - Diffuse disease, multiple 70-90% stenoses.  Ramus - ?ostial lesion  LCx - Multiple 50-60% stenoses. Good OM target vessels.  RCA - Proximal and mid 80% stenoses. Good PDA target           Assessment and Plan:   72 year old male with 3V CAD, and rectal cancer awaiting surgery.  - Plan CABG next week  - Lower extremity vein mapping  - Needs updated lower extremity ABIs   - Needs vascular surgery consultation Re occluded right carotid and bilateral lower extremity abnormal ABIs in 2016  - Will need slightly higher perfusion pressures due to occluded right carotid  - Please call with questions or concerns     Attestation:  This patient has been seen and evaluated by me, Henry Hu.  Discussed with the house staff team or resident(s) and agree with the findings and plan in this note.        Henry Hu  Cardiothoracic surgery  [phone]

## 2017-08-15 NOTE — IP AVS SNAPSHOT
MRN:[id number]                      After Visit Summary   8/15/2017    Jose Lira    MRN: [id number]           Thank you!     Thank you for choosing [facility] for your care. Our goal is always to provide you with excellent care. Hearing back from our patients is one way we can continue to improve our services. Please take a few minutes to complete the written survey that you may receive in the mail after you visit with us. Thank you!        Patient Information     Date Of Birth          1945        About your hospital stay     You were admitted on:  August 15, 2017 You last received care in the:  Unit 6D Observation [facility]    You were discharged on:  August 15, 2017       Who to Call     For medical emergencies, please call 911.  For non-urgent questions about your medical care, please call your primary care provider or clinic, [phone]          Attending Provider     Provider Specialty    Jose Willett MD Cardiology       Primary Care Provider Office Phone # Fax #    Rebeca Sandoval [phone]      After Care Instructions     Discharge Instructions - IF on Metformin (Glucophage or Glucovance) or Metformin containing medications       IF on Metformin (Glucophage or Glucovance) or Metformin containing medications , schedule a Basic Metabolic Panel at [facility] or Primary Clinic in 48 - 72 hours post procedure and PRIOR TO resuming the Metformin or Metformin containing medications.  Hold Metformin (Glucophage or Glucovance) or Metformin containing medications until after the Basic Metabolic Panel on the 2nd or 3rd day following the procedure.  May resume after blood draw is complete.                  Further instructions from your care team       Going Home after Coronary Angiogram        Name: Jose Lira  Medical Record Number:  [id number]  Today's Date: August 15, 2017        For 24 hours:         Have an adult stay with you for 24 hours.          Relax and take it easy.         Drink plenty of fluids.         You may eat your normal diet, unless your doctor tells you otherwise.         Do NOT make any important or legal decisions.         Do NOT drive or operate machines at home or at work.         Do NOT drink alcohol.      Do NOT smoke.     Medicines:         If you have begun Plavix (clopidogrel), Effient (prasugrel), or Brilinta (ticagrelor), do not stop taking it until you talk to your heart doctor (cardiologist).         If you are on metformin (Glucophage), do not restart it until you have blood tests (within 2 to 3 days after discharge). When your doctor tells you it is safe, you may restart the metformin.         If you have stopped any other medicines, check with your nurse or provider about when to restart them.    Care of wrist or arm site:         It is normal to have soreness at the puncture site and mild tingling in your hand for up to 3 days.           Remove the Band-Aid after 24 hours. If there is minor oozing, apply another Band-aid and remove it after 12 hours.          Do NOT take a bath, or use a hot tub or pool for the next 48 hours. You may shower.          It is normal to have a small bruise.  There should not be a lump at the site.         Do not scrub the site.         Do not use lotion or powder near the puncture site for 3 days.         For 2 days, do not use your hand or arm to support your weight (such as rising from a chair) or bend your wrist (such as lifting a garage door).         For 2 days, do not lift more than 5 pounds or exercise your arm (tennis, golf or bowling).    If you start bleeding from the site in your arm: Sit down and press firmly on the site with your fingers for 10 minutes. Call your doctor as soon as you can.      Call 911 right away if you have bleeding that is heavy or does not stop.     Call your doctor if:         You have a large or growing hard lump around the site.         The site is red,  swollen, hot or tender.         Blood or fluid is draining from the site.         You have chills or a fever greater than 101 F (38 C).         Your leg or arm turns bluish, feels numb or cool.         You have hives, a rash or unusual itching.          Here are instructions for your upcoming surgery and clinic visit if scheduled.    [facility]    CARDIOTHORACIC SURGERY PRE-OP INSTRUCTIONS  Your Heart Surgery is scheduled with Dr. Fritz Quezada RN Care Coordinator will call you on Wednesday 8/16 about                                          surgery and if any other pre op tests or visits are necessary.     On day of surgery, report to the information desk in the front lobby of the [facility] at [address], [place] [address]. When you walk in the main entrance of the hospital it is directly in front of you. Ask for an escort or the  can help direct you. You will then be escorted or directed to  on the third floor for your surgery prep. Please ARRIVE at 5 AM. You have been pre-registered. There is  parking available from 5:15-8PM AM M-F in front of [facility] or parking in [facility] which can be validated at [facility] information desk. There is a walking tunnel to the hospital.    Significant others will be shown where to wait on the third floor surgery waiting room until your surgery is over. They will be called from the OR with updates 1-2 times and your surgeon will speak or call family members following the surgery. Family can then move up to the  [phone]) Cardiac Intensive Care Unit waiting room on fourth floor and wait to see you.  It will be around 30 minutes of recovery time before any family can go into the ICU once you arrive there.  Most likely, you will still be asleep from the surgery when they see you.    INSTRUCTIONS PRIOR TO SURGERY    MEDICATIONS  IF you are taking a blood thinner, (Coumadin,  Warfarin, Plavix, Pradaxa, or ibuprofen or any other) please inform your surgery team as soon as possible as  they need to be stopped for several days (3-5) prior your surgery.    Aspirin is okay to take up to the day before your surgery but NOT the day of your surgery. Take your other medications regularly until the day of surgery unless instructed otherwise.     Stop ALL SUPPLEMENTS 10 days prior to your surgery. A multivitamin or calcium supplement is okay to take up until the day of surgery. Do not take them the morning of surgery.    DENTAL CLEARANCE:  If you are undergoing HEART VALVE SURGERY, and have not seen dentist on a regular basis, (every 6 months) we will need a brief note from your dentist stating that your teeth have been examined and show no sign of infection or abscess. Please fax this note to the surgery pre-admissions office at [phone].  Phone number is [phone].    HISTORY AND PHYSICAL:  LABS and IMAGING  Please see your primary care physician within 30 days of your scheduled surgery for a history and physical if it was not done at clinic. If they are outside the [facility] system have them fax physical to [phone] the pre-admissions office. (We prefer you to come to our [facility] clinic for a pre-op H&P and labs. Call Cherie at [phone] for an appointment if this works for you).    These both must be within 30 days of your surgery date. If you have had a blood transfusion in the last three months, blood work will need to be done within three (3) days of your surgery date. We will need blood, urine, an EKG and a 2 view chest x-ray prior to your surgery. You do NOT need to fast for the blood work. You may not be seeing a provider at this visit.    Other specific heart tests may be needed, including a coronary angiogram, echocardiogram and possibly a CT scan of the chest, a carotid study of your neck vessels or vein mapping.  We will let you know what tests are necessary and  when.    SHOWER INSTRUCTIONS PRIOR TO SURGERY  To get ready for your upcoming heart surgery, you will take two showers with germ killing soap Hibiclens, one the evening before surgery and one the morning of surgery. This helps prevent infection and kills germs for up to 48 hours. You may have been given the soap at your pre-op visit, in the hospital or you can buy it from a drugstore or pharmacy.     THE NIGHT BEFORE SURGERY  Shower with surgical soap and wash your hair with your regular products.  Do not shave your chest, arms, or legs. Shaving increases your risk of infection and if needed will be done in the hospital.  With a clean washcloth, apply the surgical soap and wash, DO NOT SCRUB, for 10 minutes. Use a timer to make sure you wash for 10 minutes  Wash in this order:  Your chest and neck. Chin to belly button. Keep soap out of eyes.  Your arms, shoulders to wrists. Do not shave armpits.  Both of your legs, thigh to ankle.  Your groin, lower belly at leg creases to upper thigh.  Last wash your private area, perineal area.    Rinse all areas well. Gently dry with clean towels. Do not apply lotions, oils, powders, perfume or deodorant.  After your shower, wear newly washed clothes or pajamas and sleep in freshly washed bed linens. Wear freshly washed clothes to the hospital.        DO NOT EAT ANY SOLID FOODS AFTER MIDNIGHT or the morning of your surgery. NO MILK, MILK PRODUCTS, SMOOTHIES 8 HOURS PRIOR TO SURGERY.    No bowel preparation is needed prior to your surgery.    THE MORNING OF SURGERY   REPEAT the shower process. You do not need to wash your hair again. Apply clean, comfortable clothing. Do not apply deodorant, makeup, lotions or creams.    DO NOT EAT ANYTHING.  You may have any clear liquids black coffee (sugar okay), clear tea, water, sprite, gingerale, applejuice, gatorade or any clear liquid up to two hours before your surgery time. (No margie tea) No milk, or milk products, no smoothies or juice  with pulp. Stop time 5AM.      MEDICATIONS THE MORNING OF SURGERY  Carline will tell you what medications to take the morning of surgery.   Tell your anesthesiologist what medication you took and at what time.    DIABETIC  INSTRUCTIONS  If your primary care has given you instructions please follow those.  Otherwise  No oral diabetic medication the morning of surgery.  If you take long acting insulin in the evening, only take half of your dose. We will check your glucose upon arrival.    BELONGINGS  Do not bring personal belongings, jewelry, money, valuables, toiletries or medications to the hospital the morning of your surgery. You may pack a bag and give it to a family member or friend to bring the following day or when needed. No medications from home will be allowed. If you use a C-PAP machine at home bring it with you the morning of surgery.     ADDITIONAL INSTRUCTIONS  Carline will tell you if you have a pre op clinic visit  in the [facility], [address] [place] [address]. A  or Coordinator will assist you. The Pre-Op Assessment Clinic is on the fourth floor, Clinic 4. The laboratory and radiology is on the first floor. You do not need to be fasting (not eating) prior to this visit unless instructed to do so.    If you are seeing a surgeon go to the third floor, Cardiology Clinic. A Coordinator will assist you upon arrival.        Call Cherie our patient , with any questions or concerns about scheduling. She can be reached by phone at [phone] between 8 AM and 4:30PM Monday through Friday. Our answering service will  calls outside of those business hours.   If you have questions about your medications, test results or have a change in your health status (cold,flu,infection) our main office at [phone] and ask to speak to a Nurse, Surgeon, Nurse Practitioner, or Physician Assistant. One will be paged and return your call.     On weekends or after  "4:30 please call [phone] and ask the  to page the Cardiothoracic Fellow, Nurse Practitioner, Physician Assistant or Staff on call that weekend.      Please call me or our office with any questions.    Thank you,    Your Cardiothoracic Surgery Team  Carline Quezada RN Care Coordinator-  [phone]   Livia Kessler PA-C        SURGICAL QUESTIONS  Please call Carline Quezada with any surgical questions, her phone number is listed below.  She can assist you with your needs and contact other surgery care team members as indicated.    For general questions or concerns, please call the Cardiothoracic Surgery Department at [phone] 8-4:30 M-F.   On weekends or after hours, please call [phone] and ask the  to   page the Cardiothoracic Surgery fellow on call.      Thank you,    Your Cardiothoracic Surgery Team  Carline Quezada RN Care Coordinator-  [phone]   Livia Kessler PA-C                      Pending Results     Date and Time Order Name Status Description    8/15/2017 1539 XR Chest 2 Views In process     8/15/2017 0847 EKG 12-lead, tracing only Preliminary             Admission Information     Date & Time Provider Department Dept. Phone    8/15/2017 Jose Willett MD Unit 6D Observation [facility] [phone]      Your Vitals Were     Blood Pressure Pulse Temperature Respirations Pulse Oximetry       163/91 66 96.7  F (35.9  C) (Axillary) 22 95%       [employer] Information     [employer] lets you send messages to your doctor, view your test results, renew your prescriptions, schedule appointments and more. To sign up, go to [email or web address] . Click on \"Log in\" on the left side of the screen, which will take you to the Welcome page. Then click on \"Sign up Now\" on the right side of the page.     You will be asked to enter the access code listed below, as well as some personal information. " Please follow the directions to create your username and password.     Your access code is: [email or web address]  Expires: 2017 11:36 AM     Your access code will  in 90 days. If you need help or a new code, please call your [facility] clinic or [phone].        Care EveryWhere ID     This is your Care EveryWhere ID. This could be used by other organizations to access your [facility] medical records  [id number]        Equal Access to Services     RIGO STORY : Hadii aad ku hadasho Soomaali, waaxda luqadaha, qaybta kaalmada adeegyada, waxay idiin hayaan adeeg erasmo love . So [facility] [phone].    ATENCIÓN: Si habla español, tiene a adorno disposición servicios gratuitos de asistencia lingüística. Llame al [phone].    We comply with applicable federal civil rights laws and [place] laws. We do not discriminate on the basis of race, color, national origin, age, disability sex, sexual orientation or gender identity.               Review of your medicines      UNREVIEWED medicines. Ask your doctor about these medicines        Dose / Directions    amLODIPine 10 MG tablet   Commonly known as:  NORVASC   Used for:  Essential hypertension with goal blood pressure less than 130/80        Dose:  10 mg   Take 1 tablet (10 mg) by mouth daily   Quantity:  90 tablet   Refills:  PRN       aspirin 81 MG EC tablet   Used for:  Hypertension goal BP (blood pressure) < 130/80, Hyperlipidemia LDL goal <100        Dose:  81 mg   Take 1 tablet (81 mg) by mouth daily   Quantity:  30 tablet   Refills:  0       atenolol 100 MG tablet   Commonly known as:  TENORMIN   Used for:  Essential hypertension with goal blood pressure less than 130/80        Dose:  100 mg   Take 1 tablet (100 mg) by mouth daily   Quantity:  90 tablet   Refills:  PRN       atorvastatin 40 MG tablet   Commonly known as:  LIPITOR   Used for:  Hyperlipidemia LDL goal <100        Dose:  40 mg   Take 1 tablet (40 mg) by mouth daily   Quantity:  90 tablet    Refills:  PRN       BENADRYL 25 MG capsule   Generic drug:  diphenhydrAMINE        Dose:  25 mg   Take 25 mg by mouth nightly as needed. sleep   Refills:  0       lactobacillus rhamnosus (GG) 10 B CELL capsule   Commonly known as:  CULTURELLE        Dose:  1 capsule   Take 1 capsule by mouth every morning   Refills:  0       MULTIVITAMIN & MINERAL PO        Dose:  1 tablet   Take 1 tablet by mouth every morning   Refills:  0       niacin 500 MG tablet   Used for:  Hyperlipidemia LDL goal <100        Dose:  500 mg   Take 1 tablet (500 mg) by mouth At Bedtime   Quantity:  30 tablet   Refills:  PRN       polyethylene glycol 236 G suspension   Commonly known as:  GoLYTELY/NuLYTELY   Used for:  Colon cancer (H)        Take as directed in your surgery packet.   Quantity:  4000 mL   Refills:  0       SAW PALMETTO PO        Take by mouth every morning   Refills:  0       tiZANidine 2 MG tablet   Commonly known as:  ZANAFLEX   Used for:  Bilateral low back pain without sciatica, unspecified chronicity        Dose:  2-4 mg   Take 1-2 tablets (2-4 mg) by mouth nightly as needed   Quantity:  60 tablet   Refills:  PRN       valsartan 320 MG tablet   Commonly known as:  DIOVAN   Used for:  Essential hypertension with goal blood pressure less than 130/80        Dose:  320 mg   Take 1 tablet (320 mg) by mouth daily   Quantity:  90 tablet   Refills:  3                Protect others around you: Learn how to safely use, store and throw away your medicines at www.disposemymeds.org.             Medication List: This is a list of all your medications and when to take them. Check marks below indicate your daily home schedule. Keep this list as a reference.      Medications           Morning Afternoon Evening Bedtime As Needed    amLODIPine 10 MG tablet   Commonly known as:  NORVASC   Take 1 tablet (10 mg) by mouth daily                                aspirin 81 MG EC tablet   Take 1 tablet (81 mg) by mouth daily                                 atenolol 100 MG tablet   Commonly known as:  TENORMIN   Take 1 tablet (100 mg) by mouth daily                                atorvastatin 40 MG tablet   Commonly known as:  LIPITOR   Take 1 tablet (40 mg) by mouth daily                                BENADRYL 25 MG capsule   Take 25 mg by mouth nightly as needed. sleep   Generic drug:  diphenhydrAMINE                                lactobacillus rhamnosus (GG) 10 B CELL capsule   Commonly known as:  CULTURELLE   Take 1 capsule by mouth every morning                                MULTIVITAMIN & MINERAL PO   Take 1 tablet by mouth every morning                                niacin 500 MG tablet   Take 1 tablet (500 mg) by mouth At Bedtime                                polyethylene glycol 236 G suspension   Commonly known as:  GoLYTELY/NuLYTELY   Take as directed in your surgery packet.                                SAW PALMETTO PO   Take by mouth every morning                                tiZANidine 2 MG tablet   Commonly known as:  ZANAFLEX   Take 1-2 tablets (2-4 mg) by mouth nightly as needed                                valsartan 320 MG tablet   Commonly known as:  DIOVAN   Take 1 tablet (320 mg) by mouth daily

## 2017-08-15 NOTE — PROGRESS NOTES
Patient discharged home. PIV removed. Patient has all belongings, understands and agrees with d/c instructions.     1-patient is tolerating liquids and foods-yes  2- ambulating-yes  3- urinating-yes  4- puncture sites are stable ( no bleeding and no hematoma)-yes  5- and patient has a -yes  6-IF Vital Signs are within the patient's normal limits or systolic blood pressure greater than 90 mmHg and less than 160 mmHg-yes  7-Patient is alert and oriented-yes  8-If diabetic, blood glucose is in patient's usual normal range-NA    May Discharge when Answer: the following anesthesia criteria are met (list in comments)

## 2017-08-15 NOTE — DISCHARGE INSTRUCTIONS
Going Home after Coronary Angiogram        Name: Jose Lira  Medical Record Number:  [id number]  Today's Date: August 15, 2017        For 24 hours:         Have an adult stay with you for 24 hours.         Relax and take it easy.         Drink plenty of fluids.         You may eat your normal diet, unless your doctor tells you otherwise.         Do NOT make any important or legal decisions.         Do NOT drive or operate machines at home or at work.         Do NOT drink alcohol.      Do NOT smoke.     Medicines:         If you have begun Plavix (clopidogrel), Effient (prasugrel), or Brilinta (ticagrelor), do not stop taking it until you talk to your heart doctor (cardiologist).         If you are on metformin (Glucophage), do not restart it until you have blood tests (within 2 to 3 days after discharge). When your doctor tells you it is safe, you may restart the metformin.         If you have stopped any other medicines, check with your nurse or provider about when to restart them.    Care of wrist or arm site:         It is normal to have soreness at the puncture site and mild tingling in your hand for up to 3 days.           Remove the Band-Aid after 24 hours. If there is minor oozing, apply another Band-aid and remove it after 12 hours.          Do NOT take a bath, or use a hot tub or pool for the next 48 hours. You may shower.          It is normal to have a small bruise.  There should not be a lump at the site.         Do not scrub the site.         Do not use lotion or powder near the puncture site for 3 days.         For 2 days, do not use your hand or arm to support your weight (such as rising from a chair) or bend your wrist (such as lifting a garage door).         For 2 days, do not lift more than 5 pounds or exercise your arm (tennis, golf or bowling).    If you start bleeding from the site in your arm: Sit down and press firmly on the site with your fingers for 10 minutes. Call your doctor as  soon as you can.      Call 911 right away if you have bleeding that is heavy or does not stop.     Call your doctor if:         You have a large or growing hard lump around the site.         The site is red, swollen, hot or tender.         Blood or fluid is draining from the site.         You have chills or a fever greater than 101 F (38 C).         Your leg or arm turns bluish, feels numb or cool.         You have hives, a rash or unusual itching.          Here are instructions for your upcoming surgery and clinic visit if scheduled.    [facility]    CARDIOTHORACIC SURGERY PRE-OP INSTRUCTIONS  Your Heart Surgery is scheduled with Dr. Fritz Quezada RN Care Coordinator will call you on Wednesday 8/16 about                                          surgery and if any other pre op tests or visits are necessary.     On day of surgery, report to the information desk in the front lobby of the hospital at [address] [place] [address]. When you walk in the main entrance of the hospital it is directly in front of you. Ask for an escort or the  can help direct you. You will then be escorted or directed to  on the third floor for your surgery prep. Please ARRIVE at 5 AM. You have been pre-registered. There is  parking available from 5:15-8PM AM M-F in front of [facility] or parking in [facility] which can be validated at front information desk. There is a walking tunnel to the hospital.    Significant others will be shown where to wait on the third floor surgery waiting room until your surgery is over. They will be called from the OR with updates 1-2 times and your surgeon will speak or call family members following the surgery. Family can then move up to the  [phone]) Cardiac Intensive Care Unit waiting room on fourth floor and wait to see you.  It will be around 30 minutes of recovery time before any family can go  into the ICU once you arrive there.  Most likely, you will still be asleep from the surgery when they see you.    INSTRUCTIONS PRIOR TO SURGERY    MEDICATIONS  IF you are taking a blood thinner, (Coumadin, Warfarin, Plavix, Pradaxa, or ibuprofen or any other) please inform your surgery team as soon as possible as  they need to be stopped for several days (3-5) prior your surgery.    Aspirin is okay to take up to the day before your surgery but NOT the day of your surgery. Take your other medications regularly until the day of surgery unless instructed otherwise.     Stop ALL SUPPLEMENTS 10 days prior to your surgery. A multivitamin or calcium supplement is okay to take up until the day of surgery. Do not take them the morning of surgery.    DENTAL CLEARANCE:  If you are undergoing HEART VALVE SURGERY, and have not seen dentist on a regular basis, (every 6 months) we will need a brief note from your dentist stating that your teeth have been examined and show no sign of infection or abscess. Please fax this note to the surgery pre-admissions office at [phone].  Phone number is [phone].    HISTORY AND PHYSICAL:  LABS and IMAGING  Please see your primary care physician within 30 days of your scheduled surgery for a history and physical if it was not done at clinic. If they are outside the [facility] system have them fax physical to [phone] the pre-admissions office. (We prefer you to come to our [facility] for a pre-op H&P and labs. Call Cherie at [phone] for an appointment if this works for you).    These both must be within 30 days of your surgery date. If you have had a blood transfusion in the last three months, blood work will need to be done within three (3) days of your surgery date. We will need blood, urine, an EKG and a 2 view chest x-ray prior to your surgery. You do NOT need to fast for the blood work. You may not be seeing a provider at this visit.    Other specific heart tests may be  needed, including a coronary angiogram, echocardiogram and possibly a CT scan of the chest, a carotid study of your neck vessels or vein mapping.  We will let you know what tests are necessary and when.    SHOWER INSTRUCTIONS PRIOR TO SURGERY  To get ready for your upcoming heart surgery, you will take two showers with germ killing soap Hibiclens, one the evening before surgery and one the morning of surgery. This helps prevent infection and kills germs for up to 48 hours. You may have been given the soap at your pre-op visit, in the hospital or you can buy it from a drugstore or pharmacy.     THE NIGHT BEFORE SURGERY  Shower with surgical soap and wash your hair with your regular products.  Do not shave your chest, arms, or legs. Shaving increases your risk of infection and if needed will be done in the hospital.  With a clean washcloth, apply the surgical soap and wash, DO NOT SCRUB, for 10 minutes. Use a timer to make sure you wash for 10 minutes  Wash in this order:  Your chest and neck. Chin to belly button. Keep soap out of eyes.  Your arms, shoulders to wrists. Do not shave armpits.  Both of your legs, thigh to ankle.  Your groin, lower belly at leg creases to upper thigh.  Last wash your private area, perineal area.    Rinse all areas well. Gently dry with clean towels. Do not apply lotions, oils, powders, perfume or deodorant.  After your shower, wear newly washed clothes or pajamas and sleep in freshly washed bed linens. Wear freshly washed clothes to the hospital.        DO NOT EAT ANY SOLID FOODS AFTER MIDNIGHT or the morning of your surgery. NO MILK, MILK PRODUCTS, SMOOTHIES 8 HOURS PRIOR TO SURGERY.    No bowel preparation is needed prior to your surgery.    THE MORNING OF SURGERY   REPEAT the shower process. You do not need to wash your hair again. Apply clean, comfortable clothing. Do not apply deodorant, makeup, lotions or creams.    DO NOT EAT ANYTHING.  You may have any clear liquids black coffee  (sugar okay), clear tea, water, sprite, gingerale, applejuice, gatorade or any clear liquid up to two hours before your surgery time. (No margie tea) No milk, or milk products, no smoothies or juice with pulp. Stop time 5AM.      MEDICATIONS THE MORNING OF SURGERY  Carline will tell you what medications to take the morning of surgery.   Tell your anesthesiologist what medication you took and at what time.    DIABETIC  INSTRUCTIONS  If your primary care has given you instructions please follow those.  Otherwise  No oral diabetic medication the morning of surgery.  If you take long acting insulin in the evening, only take half of your dose. We will check your glucose upon arrival.    BELONGINGS  Do not bring personal belongings, jewelry, money, valuables, toiletries or medications to the hospital the morning of your surgery. You may pack a bag and give it to a family member or friend to bring the following day or when needed. No medications from home will be allowed. If you use a C-PAP machine at home bring it with you the morning of surgery.     ADDITIONAL INSTRUCTIONS  Carline will tell you if you have a pre op clinic visit  in the [facility], [address] [place] [address]. A  or Coordinator will assist you. The Pre-Op Assessment Clinic is on the fourth floor, Clinic 4. The laboratory and radiology is on the first floor. You do not need to be fasting (not eating) prior to this visit unless instructed to do so.    If you are seeing a surgeon go to the third floor, Cardiology Clinic. A Coordinator will assist you upon arrival.        Call Cherie our patient , with any questions or concerns about scheduling. She can be reached by phone at [phone] between 8 AM and 4:30PM Monday through Friday. Our answering service will  calls outside of those business hours.   If you have questions about your medications, test results or have a change in your health status  (cold,flu,infection) our main office at [phone] and ask to speak to a Nurse, Surgeon, Nurse Practitioner, or Physician Assistant. One will be paged and return your call.     On weekends or after 4:30 please call [phone] and ask the  to page the Cardiothoracic Fellow, Nurse Practitioner, Physician Assistant or Staff on call that weekend.      Please call me or our office with any questions.    Thank you,    Your Cardiothoracic Surgery Team  Carline Quezada RN Care Coordinator-  [phone]   Livia Kessler PA-C        SURGICAL QUESTIONS  Please call Carline Quezada with any surgical questions, her phone number is listed below.  She can assist you with your needs and contact other surgery care team members as indicated.    For general questions or concerns, please call the Cardiothoracic Surgery Department at [phone] 8-4:30 M-F.   On weekends or after hours, please call [phone] and ask the  to   page the Cardiothoracic Surgery fellow on call.      Thank you,    Your Cardiothoracic Surgery Team  Carline Quezada RN Care Coordinator-  [phone]   Livia Kessler PA-C

## 2017-08-15 NOTE — PROGRESS NOTES
1005 Pt on 2a prepped and ready for cardiac angiogram. PIV placed and labs sent. EKG done. H&P current. Family in gold waiting room. Pt consented.

## 2017-08-15 NOTE — IP AVS SNAPSHOT
Unit 6D Observation [address] [address]    Phone:  [phone]    Fax:  [phone]                                       After Visit Summary   8/15/2017    Jose Lira    MRN: [id number]           After Visit Summary Signature Page     I have received my discharge instructions, and my questions have been answered. I have discussed any challenges I see with this plan with the nurse or doctor.    ..........................................................................................................................................  Patient/Patient Representative Signature      ..........................................................................................................................................  Patient Representative Print Name and Relationship to Patient    ..................................................               ................................................  Date                                            Time    ..........................................................................................................................................  Reviewed by Signature/Title    ...................................................              ..............................................  Date                                                            Time

## 2017-08-15 NOTE — PROCEDURES
CARDIAC CATH REPORT:   PROCEDURES PERFORMED:   1. Selective left and right coronary angiography  2. Subclavian artery angiography    PHYSICIANS:  1. Attending Interventional Cardiology Staff: Dr. Tamie MD  2. Interventional Cardiology Fellow: Dr. Chel MD  3. Cardiology Fellow: Tiffanie Sunshine MD    INDICATION:  71 yo male with history including HTN, HLD, prior CVA, PAD s/p stenting of left internal carotid and right common iliac artery, and CKD 3 recently diagnosed with colorectal carcinoma with plan for surgical resection here for preoperative cardiovascular assessment. Recent positive stress test which showed septal hypokinesis at rest, with mid septal and apical lateral ischemia at peak stress.    Consent obtained with discussion of risks.  All questions were answered. Sterile prep and procedure.    DESCRIPTION:  1. Location: Left radial artery.   2. Access: Local anesthetic with lidocaine.  A micropuncture needle with ultrasound guidance was used to establish vascular access using a modified Seldinger technique.  3. Sheath: 6Fr slender  4. Catheters: 6Fr JR-4, JL4  5. Fluoroscopy time of 13.6 min.  6. Estimated blood loss of <5 mL.  7. See below for procedure details.    MEDICATIONS:  1. Contrast 70 mL IV.  2. Conscious sedation was given by the nursing staff under my supervision.  The patient received  The patient was assessed immediately before the first sedation medication was administered.  Midazolam 1 mg and Fentanyl 50 mcg were administered for a total sedation time of 72 minutes.  Heart rate, BP, respiration, oxygen saturation and patient responses were monitored throughout the procedure with the assistance of the RN.  3. Heparin, verapamil and nitroglycerin intra-arterial for radial artery spasm prophylaxis.  4. Heparin administered to achieve a goal ACT > 250 sec.     HEMODYNAMICS:  1. HR 53 bpm  2. NIBP 166/76/106 mmHg    CORONARY ANGIOGRAM:   1. Both coronary arteries arise from their respective  cusps.  2. Right dominant.  3. LM has diffuse disease up to 20% stenosis in the distal LM.   4. LAD supplies the entire apex (type 3) and gives rise to septal perforators, large high D1, a medium caliber D2 and trivial D3.  The proximal and mid-LAD are heavily calcified.  There is moderate(50-60%) diffuse disease noted in the pLAD with a focal 80% stenosis proximal to D2.  The mLAD has a long 99% stenosis just distal to D2.  The dLAD has moderate diffuse disease.  D1 has a focal eccentric 70% stenosis in the proximal portion followed by a discrete 60% stenosis in the mid-portion..   5. LCX is a large vessel giving rise to a trivial OM1 and large OM2.  The pLCx is heavily calcified.  There is diffuse 50% disease of the pLCx including the ostium with a focal 70% stenosis.  The mLCx and dLCx are small distal to the OM2 bifurcation.  OM2 has minimal irregularities.  6. RCA is a medium caliber vessel giving rise to the RPDA and RPLA branches. The RCA has sequential proximal to mid 50 to 80% stenoses with moderate calcification.  The dRCA mild luminal irregularities.  The ostial RPDA has 50% stenosis.  The RPLAs have mild luminal irregularities.       FUNCTIONAL ASSESSMENT:  Adequate anticoagulation was obtained with IV UFH. An [employer] FFR wire was passed through the 6Fr diagnostic catheter into the RCA and into the RPDA.  Hyperemia was induced with IC Adenosine (80 mcg).  The wire was then withdrawn and repeated FFR measurements were assessed at the RPDA, dRCA, mRCA, and ostial RCA.  The results are below:  RPDA: 0.39  DRCA: 0.41  MRCA: 0.74  Ostial RCA: 1.0     Limited Angiogram of the Subclavian artery:  The ostium and proximal left sublcavian artery are heavily calcified without a significant stenosis.    CLOSURE:  1. A radial TR band device.    COMPLICATIONS:  1. None    SUMMARY:   1. Three vessel coronary artery disease with disease in proximal/mid RCA that was significant by FFR, severely diseased LAD, and diseased  proximal to mid circumflex. This patient should be evaluated for CABG prior to consideration for colonic resection.  2. Would consider grafts to the mid/distal LAD, D1, D2, OM2, and RPDA  3. Subclavian artery with severe calcification but no hemodynamically significant stenosis    PLAN:   1. Aspirin 81 mg po daily lifelong.  2. Evaluation for CABG via CT surgery.   3. Bedrest per protocol.  4. Discharge today per protocol  5. Continued medical management and lifestyle modification for cardiovascular risk factor optimization.     The Attending Interventional Cardiologist was present for the entire procedure.    Please refer to the CVIS report for the final draft.    Tiffanie Sunshine MD  General Cardiology Fellow  Pager [phone]      ATTENDING ATTESTATION: I was present and supervised the cardiology fellow throughout the procedure and reviewed the findings with the fellow at the completion of the procedure.  I agree with the documentation above.    Jose Willett MD, PhD  Interventional/Critical Care Cardiology  [phone]    August 15, 2017

## 2017-08-16 ENCOUNTER — PRE VISIT (OUTPATIENT)
Dept: CARDIOLOGY | Facility: CLINIC | Age: 72
End: 2017-08-16

## 2017-08-16 ENCOUNTER — TELEPHONE (OUTPATIENT)
Dept: CARDIOLOGY | Facility: CLINIC | Age: 72
End: 2017-08-16

## 2017-08-16 DIAGNOSIS — I73.9 PERIPHERAL VASCULAR DISEASE (H): Primary | ICD-10-CM

## 2017-08-16 DIAGNOSIS — I25.10 CORONARY ARTERY DISEASE: ICD-10-CM

## 2017-08-16 DIAGNOSIS — Z01.818 PRE-OPERATIVE EXAMINATION: ICD-10-CM

## 2017-08-16 LAB — INTERPRETATION ECG - MUSE: NORMAL

## 2017-08-16 NOTE — TELEPHONE ENCOUNTER
ASKED BY REFERRING PHYSICIAN: Jose Willett MD    CHIEF COMPLAINT: Pre Visit Planning - Done (Consult for Davinci CABG)      HPI: Jose Lira is a 72 year old male who is here to discuss coronary artery bypass surgery.  The patient was scheduled for a Laparoscopic Assisted Low Anterior Resection Possible Loop Ileostomy on 8/9/17 with Sharan Raza MD for Malignant Neoplasm Of the Rectosigmoid Junction.  Mr Lira has a significant history of PAD s/p stenting of the left internal carotid and right common iliac artery in 2002.  He saw cardiology on 7/31/17 for preop clearance and it was recommended he have a dobutamine stress echo.  This was performed on 8/4/17 and showed some ST segment depression in the inferolateral leads. Patient then underwent coronary angiogram on 08/15/17 that showed 3 vessel coronary artery disease.  Patient has a history of a stroke in 1994 and he did quit smoking at that time.  He does have a 30 year smoking history of 3 ppds.  Patient also has CKD stage 3-4.           PROCEDURES/IMAGING:    DOBUTAMINE STRESS ECHO: 07/14/17  Abnormal stress echo.  Mid septal hypokinesis at rest, with mid septal and apical lateral ischemia at peak stress. Suspect multi-vessel CAD.  Target heart rate was achieved. Heart rate and blood pressure response to dobutamine were normal. With stress, the left ventricular ejection fraction increased from 55-60% to greater than 65% and the left ventricular size decreased appropriately.  No subjective symptoms  ST segment depression in inferolateral leads during recovery period, but not at peak stress.  No significant valve disease on screening doppler evaluation. The aortic root and visualized ascending aorta are normal.     Stress  The drug infusion was stopped due to target heart rate achieved.  The patient did not exhibit any symptoms during drug infusion.  Normal blood pressure response to medication.  ST segment depression in inferolateral leads during recovery  period, but not at peak stress.     Baseline  Normal baseline electrocardiogram.     Stress Results                                       Maximum Predicted HR:   147 bpm             Target HR: 125 bpm        % Maximum Predicted HR: 92 %                             Stage  DurationHeart Rate  BP                                 (mm:ss)   (bpm)                         Baseline            54    135/61                           Peak    7:10     135    141/68                            Stress Duration:   7:10 mm:ss *                      Maximum Stress HR: 135 bpm *     Left Ventricle  Left ventricular systolic function is normal. The visual ejection fraction is estimated at 55-60%.     Right Ventricle  The right ventricle is normal in size and function.     Mitral Valve  The mitral valve is normal in structure and function.        Tricuspid Valve  The tricuspid valve is normal in structure and function.     Aortic Valve  The aortic valve is normal in structure and function.     Pericardium  There is no pericardial effusion.     Procedure  Dobutamine Echo Complete. Contrast Definity.     MMode/2D Measurements & Calculations  asc Aorta Diam: 3.5 cm       ANGIOGRAM: 08/15/17           CAROTID US: 08/15/17  Impression:     1. Right internal carotid: Occluded, unchanged. Flow is visualized within the vertebral artery, consistent with angiogram 11/9/2011 and carotid ultrasound 6/20/2008.    2. Left carotid stent: Less than 50 percent narrowing within common carotid and internal carotid artery stent by velocity criteria.  3. The left external carotid artery demonstrate elevated velocities at 513 cm/sec, previously 380 cm/sec. Findings likely represent hemodynamically significant stenosis.       ASSESSMENT/PLAN:   Jose is a 72 year old male who presents with        Risks and benefits of surgery were discussed with patient (and all present) including risk of death, stroke, bleeding, cardiac ischemia, wound infection, renal  failure, arrhythmias and possible pacemaker implantation. Patient verbalized understanding and accepts these risks.     Approximately 50 minutes spent with this case including review of the clinical history and data; discussion with the patient and his family and coordination of the care    Thank you for including me in the care of this kind patient. Do not hesitate to contact me with any questions.    Dr. Eriberto Curry     Cardiothoracic Surgery  [facility]  Patient Care Team:  Rebeca Sandoval NP as PCP - General  Lance Eason MD as Resident (Student in organized health care education/training program)  Orion Motley MD as MD (Radiology)  Estefania Akers RN as Registered Nurse  Cher Yadav MD as MD (Hematology & Oncology)  Cynthia Mondragon, RN as Nurse Coordinator (Oncology)  Felecia Bundy, RN as Nurse Coordinator (Cardiology)  Carline Quezada, [place] as Nurse Coordinator (Thoracic Surgery (Cardiothoracic Vascular Surgery))

## 2017-08-16 NOTE — TELEPHONE ENCOUNTER
Per Dr. Hu, pt needs to schedule a appt with Dr. Curry to talk about surgery. Talked with pt and offered him a appt for 8/17 at 11. Pt ok with that. Will call If anything changes

## 2017-08-16 NOTE — PROGRESS NOTES
Patient underwent angiogram 08/15 and was recommended bypass suryery.  Patient also has PVD and blockages in the carotid arteries, patient will see vascular surgery prior to CABG with ABIs.  Orders placed and scheduling notified.    Carline Quezada RN

## 2017-08-17 ENCOUNTER — OFFICE VISIT (OUTPATIENT)
Dept: CARDIOLOGY | Facility: CLINIC | Age: 72
End: 2017-08-17
Attending: THORACIC SURGERY (CARDIOTHORACIC VASCULAR SURGERY)
Payer: MEDICARE

## 2017-08-17 VITALS
HEIGHT: 65 IN | OXYGEN SATURATION: 97 % | WEIGHT: 209.8 LBS | BODY MASS INDEX: 34.95 KG/M2 | HEART RATE: 75 BPM | DIASTOLIC BLOOD PRESSURE: 79 MMHG | SYSTOLIC BLOOD PRESSURE: 135 MMHG

## 2017-08-17 DIAGNOSIS — I25.118 CORONARY ARTERY DISEASE OF NATIVE ARTERY OF NATIVE HEART WITH STABLE ANGINA PECTORIS (H): Primary | ICD-10-CM

## 2017-08-17 PROCEDURE — 99213 OFFICE O/P EST LOW 20 MIN: CPT | Mod: ZF

## 2017-08-17 PROCEDURE — 99212 OFFICE O/P EST SF 10 MIN: CPT | Mod: ZF

## 2017-08-17 ASSESSMENT — PAIN SCALES - GENERAL: PAINLEVEL: NO PAIN (0)

## 2017-08-17 NOTE — MR AVS SNAPSHOT
"              After Visit Summary   2017    Jose Lira    MRN: [id number]           Patient Information     Date Of Birth          1945        Visit Information        Provider Department      2017 11:00 AM Eriberto Curry MD [facility]        Today's Diagnoses     Coronary artery disease of native artery of native heart with stable angina pectoris (H)    -  1       Follow-ups after your visit        Who to contact     If you have questions or need follow up information about today's clinic visit or your schedule please contact [facility] directly at [phone].  Normal or non-critical lab and imaging results will be communicated to you by MyChart, letter or phone within 4 business days after the clinic has received the results. If you do not hear from us within 7 days, please contact the clinic through [employer] or phone. If you have a critical or abnormal lab result, we will notify you by phone as soon as possible.  Submit refill requests through [employer] or call your pharmacy and they will forward the refill request to us. Please allow 3 business days for your refill to be completed.          Additional Information About Your Visit        MyChart Information     [employer] lets you send messages to your doctor, view your test results, renew your prescriptions, schedule appointments and more. To sign up, go to [email or web address] . Click on \"Log in\" on the left side of the screen, which will take you to the Welcome page. Then click on \"Sign up Now\" on the right side of the page.     You will be asked to enter the access code listed below, as well as some personal information. Please follow the directions to create your username and password.     Your access code is: [email or web address]  Expires: 2017 11:36 AM     Your access code will  in 90 days. If you need help or a new code, please call your [place] clinic or [phone].        Care EveryWhere ID     " "This is your Care EveryWhere ID. This could be used by other organizations to access your [facility] medical records  [id number]        Your Vitals Were     Pulse Height Pulse Oximetry BMI (Body Mass Index)          75 1.651 m (5' 5\") 97% 34.91 kg/m2         Blood Pressure from Last 3 Encounters:   08/21/17 155/75   08/17/17 135/79   08/15/17 (!) 163/91    Weight from Last 3 Encounters:   08/17/17 95.2 kg (209 lb 12.8 oz)   08/03/17 91.5 kg (201 lb 12.8 oz)   07/31/17 91.1 kg (200 lb 12.8 oz)              Today, you had the following     No orders found for display         Today's Medication Changes          These changes are accurate as of: 8/17/17 11:59 PM.  If you have any questions, ask your nurse or doctor.               These medicines have changed or have updated prescriptions.        Dose/Directions    amLODIPine 10 MG tablet   Commonly known as:  NORVASC   This may have changed:  when to take this   Used for:  Essential hypertension with goal blood pressure less than 130/80        Dose:  10 mg   Take 1 tablet (10 mg) by mouth daily   Quantity:  90 tablet   Refills:  PRN       aspirin 81 MG EC tablet   This may have changed:  when to take this   Used for:  Hypertension goal BP (blood pressure) < 130/80, Hyperlipidemia LDL goal <100        Dose:  81 mg   Take 1 tablet (81 mg) by mouth daily   Quantity:  30 tablet   Refills:  0       atenolol 100 MG tablet   Commonly known as:  TENORMIN   This may have changed:  when to take this   Used for:  Essential hypertension with goal blood pressure less than 130/80        Dose:  100 mg   Take 1 tablet (100 mg) by mouth daily   Quantity:  90 tablet   Refills:  PRN       atorvastatin 40 MG tablet   Commonly known as:  LIPITOR   This may have changed:  when to take this   Used for:  Hyperlipidemia LDL goal <100        Dose:  40 mg   Take 1 tablet (40 mg) by mouth daily   Quantity:  90 tablet   Refills:  PRN       valsartan 320 MG tablet   Commonly known as:  DIOVAN   This " may have changed:  when to take this   Used for:  Essential hypertension with goal blood pressure less than 130/80        Dose:  320 mg   Take 1 tablet (320 mg) by mouth daily   Quantity:  90 tablet   Refills:  3                Primary Care Provider Office Phone # Fax #    Rebeca FRIEDMAN JAYCEE Sandoval [phone] [address] [place]  [facility] [address]        Equal Access to Services     NAE STORY : Hadii aad ku hadasho Soomaali, waaxda luqadaha, qaybta kaalmada adeegyada, waxay idiin hayaan adeeg kharash la'aan ah. So [facility] [phone].    ATENCIÓN: Si habla español, tiene a adorno disposición servicios gratuitos de asistencia lingüística. Llame al [phone].    We comply with applicable federal civil rights laws and [place] laws. We do not discriminate on the basis of race, color, national origin, age, disability sex, sexual orientation or gender identity.            Thank you!     Thank you for choosing [facility]  for your care. Our goal is always to provide you with excellent care. Hearing back from our patients is one way we can continue to improve our services. Please take a few minutes to complete the written survey that you may receive in the mail after your visit with us. Thank you!             Your Updated Medication List - Protect others around you: Learn how to safely use, store and throw away your medicines at www.disposemymeds.org.          This list is accurate as of: 8/17/17 11:59 PM.  Always use your most recent med list.                   Brand Name Dispense Instructions for use Diagnosis    amLODIPine 10 MG tablet    NORVASC    90 tablet    Take 1 tablet (10 mg) by mouth daily    Essential hypertension with goal blood pressure less than 130/80       aspirin 81 MG EC tablet     30 tablet    Take 1 tablet (81 mg) by mouth daily    Hypertension goal BP (blood pressure) < 130/80, Hyperlipidemia LDL goal <100       atenolol 100 MG tablet    TENORMIN    90 tablet    Take 1  tablet (100 mg) by mouth daily    Essential hypertension with goal blood pressure less than 130/80       atorvastatin 40 MG tablet    LIPITOR    90 tablet    Take 1 tablet (40 mg) by mouth daily    Hyperlipidemia LDL goal <100       BENADRYL 25 MG capsule   Generic drug:  diphenhydrAMINE      Take 25 mg by mouth nightly as needed. sleep        lactobacillus rhamnosus (GG) 10 B CELL capsule    CULTURELLE     Take 1 capsule by mouth every morning        MULTIVITAMIN & MINERAL PO      Take 1 tablet by mouth every morning        niacin 500 MG tablet     30 tablet    Take 1 tablet (500 mg) by mouth At Bedtime    Hyperlipidemia LDL goal <100       polyethylene glycol 236 G suspension    GoLYTELY/NuLYTELY    4000 mL    Take as directed in your surgery packet.    Colon cancer (H)       SAW PALMETTO PO      Take by mouth every morning        tiZANidine 2 MG tablet    ZANAFLEX    60 tablet    Take 1-2 tablets (2-4 mg) by mouth nightly as needed    Bilateral low back pain without sciatica, unspecified chronicity       valsartan 320 MG tablet    DIOVAN    90 tablet    Take 1 tablet (320 mg) by mouth daily    Essential hypertension with goal blood pressure less than 130/80

## 2017-08-17 NOTE — LETTER
8/17/2017      RE: Jose Lira  [address]   [place] [address]       Dear Colleague,    Thank you for the opportunity to participate in the care of your patient, Jose Lira, at the [facility] acute hospital. Please see a copy of my visit note below.    ASKED BY REFERRING PHYSICIAN: Jose Willett MD to see patient in consultation of surgical treatment of CAD     CHIEF COMPLAINT: CAD, Consult for Davinci CABG        HPI: Jose Lira is a 72 year old male who is here to discuss coronary artery bypass surgery.  The patient was scheduled for a Laparoscopic Assisted Low Anterior Resection Possible Loop Ileostomy on 8/9/17 with Sharan Raza MD for Malignant Neoplasm Of the Rectosigmoid Junction.  Mr Lira has a significant history of PAD s/p stenting of the left internal carotid and right common iliac artery in 2002.  He saw cardiology on 7/31/17 for preop clearance and it was recommended he have a dobutamine stress echo.  This was performed on 8/4/17 and showed some ST segment depression in the inferolateral leads. Patient then underwent coronary angiogram on 08/15/17 that showed 3 vessel coronary artery disease.  Patient has a history of a stroke in 1994 and he did quit smoking at that time.  He does have a 30 year smoking history of 3 ppds.  Patient also has CKD stage 3-4.             PAST MEDICAL HISTORY:  Past Medical History:   Diagnosis Date     Acute, but ill-defined, cerebrovascular disease 1994     CKD (chronic kidney disease)     Stage 3-4     History of CVA (cerebrovascular accident)      Hx of endarterectomy 2002    Left and right     Peripheral vascular disease, unspecified (H)      Pulmonary nodules      Pure hypercholesterolemia      Unspecified essential hypertension        PAST SURGICAL HISTORY:  Past Surgical History:   Procedure Laterality Date     COLONOSCOPY N/A 5/19/2017    Procedure: COMBINED COLONOSCOPY,  SINGLE OR MULTIPLE BIOPSY/POLYPECTOMY BY BIOPSY;  Rectal bleeding;  Surgeon: Maximus Dinero MD;  Location:  GI     SURGICAL HISTORY OF -   10/02    angioplasty and stenting of left and internal carotid artery at the bifurcation     SURGICAL HISTORY OF - 11/11    right common iliac artery stent      VASCULAR SURGERY  2001    stent placed in carotid        FAMILY HISTORY:   Family History   Problem Relation Age of Onset     C.A.D. Father      Hypertension Father      Prostate Cancer Father      C.A.D. Brother      MI     Hypertension Brother      Arthritis Sister      DIABETES No family hx of      CEREBROVASCULAR DISEASE No family hx of      Cancer - colorectal No family hx of        SOCIAL HISTORY:  Social History     Social History     Marital status: Single     Spouse name: N/A     Number of children: N/A     Years of education: N/A     Occupational History     Not on file.     Social History Main Topics     Smoking status: Former Smoker     Packs/day: 3.00     Years: 30.00     Quit date: 8/5/1994     Smokeless tobacco: Former User     Quit date: 8/1/1994      Comment: quit 15 years ago after his stroke     Alcohol use Yes      Comment: every evening 1-2 drinks     Drug use: No     Sexual activity: No     Other Topics Concern     Parent/Sibling W/ Cabg, Mi Or Angioplasty Before 65f 55m? Yes     brother and father     Social History Narrative        ALLERGIES:   Allergies   Allergen Reactions     No Known Drug Allergies        CURRENT MEDICATIONS:   Prescription Medications as of 8/17/2017             aspirin 81 MG EC tablet Take 1 tablet (81 mg) by mouth daily    atorvastatin (LIPITOR) 40 MG tablet Take 1 tablet (40 mg) by mouth daily    amLODIPine (NORVASC) 10 MG tablet Take 1 tablet (10 mg) by mouth daily    atenolol (TENORMIN) 100 MG tablet Take 1 tablet (100 mg) by mouth daily    valsartan (DIOVAN) 320 MG tablet Take 1 tablet (320 mg) by mouth daily    tiZANidine (ZANAFLEX) 2 MG tablet Take 1-2 tablets (2-4  "mg) by mouth nightly as needed    Saw Palmetto, Serenoa repens, (SAW PALMETTO PO) Take by mouth every morning     niacin 500 MG tablet Take 1 tablet (500 mg) by mouth At Bedtime    Multiple Vitamins-Minerals (MULTIVITAMIN & MINERAL PO) Take 1 tablet by mouth every morning     diphenhydrAMINE (BENADRYL) 25 MG capsule Take 25 mg by mouth nightly as needed. sleep    lactobacillus rhamnosus, GG, (CULTURELLE) 10 B CELL capsule Take 1 capsule by mouth every morning     polyethylene glycol (GOLYTELY/NULYTELY) 236 G suspension Take as directed in your surgery packet.          REVIEW OF SYSTEMS:   Gen: denies frequent headaches, double/blurry vision, insomnia, fatigue, unexplained weight loss/gain. No previous anesthesia reactions.  CV: denies chest pain, shortness of breath, palpitations, peripheral edema.    Pulm: denies shortness of breath, asthma or wheezing  GI/: denies liver or kidney problems, blood in stool or BRBPR, difficulty urinating  Endo: denies thyroid problems or Diabetes  Heme/Onc: denies bleeding or clotting disorders, no family problems with bleeding/clotting diorders  MS: no weakness, tremors or gait instability  Neuro: denies depression, memory problems, no dysesthesias, no previous strokes, no migraines, no dysphagia  Skin: No petechiae, purpura or rash.    PHYSICAL EXAMINATION:   /79 (BP Location: Left arm, Patient Position: Chair, Cuff Size: Adult Large)  Pulse 75  Ht 1.651 m (5' 5\")  Wt 95.2 kg (209 lb 12.8 oz)  SpO2 97%  BMI 34.91 kg/m2  General: alert and oriented x 3, pleasant, no acute distress  CV: S1 S2, no murmurs, rubs or gallops, regular rate and rhythm, no peripheral edema, no carotid or abdomenal bruits, pulses in upper and lower extremities palpable  Pulm: bilateral breath sounds, clear to auscultation, easy work of breathing  GI: (+) bowel sounds, soft non-tender and non-distended  : voiding without problems  MS: moves all extremities x 4,  5+/5+ equal strength " bilaterally  Neuro: pupils equal round and reactive to light, cranial nerves, II-XII grossly intact, no gross neurologic deficits noted    LABS:  BMP RESULTS:  Lab Results   Component Value Date     08/15/2017    POTASSIUM 4.4 08/15/2017    CHLORIDE 111 (H) 08/15/2017    CO2 21 08/15/2017    ANIONGAP 8 08/15/2017    GLC 97 08/15/2017    BUN 32 (H) 08/15/2017    CR 2.48 (H) 08/15/2017    GFRESTIMATED 26 (L) 08/15/2017    GFRESTBLACK 31 (L) 08/15/2017    ADRIAN 8.8 08/15/2017        CBC RESULTS:  Lab Results   Component Value Date    WBC 10.8 08/15/2017    RBC 4.13 (L) 08/15/2017    HGB 12.4 (L) 08/15/2017    HCT 37.1 (L) 08/15/2017    MCV 90 08/15/2017    MCH 30.0 08/15/2017    MCHC 33.4 08/15/2017    RDW 13.6 08/15/2017     08/15/2017       Lab Results   Component Value Date    INR 1.10 08/15/2017     Lab Results   Component Value Date    PTT 32 10/18/2011     No results found for: UA  Lab Results   Component Value Date    A1C 5.4 08/15/2017       PROCEDURES/IMAGING:    DOBUTAMINE STRESS ECHO: 07/14/17  Abnormal stress echo.  Mid septal hypokinesis at rest, with mid septal and apical lateral ischemia at peak stress. Suspect multi-vessel CAD.  Target heart rate was achieved. Heart rate and blood pressure response to dobutamine were normal. With stress, the left ventricular ejection fraction increased from 55-60% to greater than 65% and the left ventricular size decreased appropriately.  No subjective symptoms  ST segment depression in inferolateral leads during recovery period, but not at peak stress.  No significant valve disease on screening doppler evaluation. The aortic root and visualized ascending aorta are normal.     Stress  The drug infusion was stopped due to target heart rate achieved.  The patient did not exhibit any symptoms during drug infusion.  Normal blood pressure response to medication.  ST segment depression in inferolateral leads during recovery period, but not at peak  stress.     Baseline  Normal baseline electrocardiogram.     Stress Results                                       Maximum Predicted HR:   147 bpm             Target HR: 125 bpm        % Maximum Predicted HR: 92 %                             Stage  DurationHeart Rate  BP                                 (mm:ss)   (bpm)                         Baseline            54    135/61                           Peak    7:10     135    141/68                            Stress Duration:   7:10 mm:ss *                      Maximum Stress HR: 135 bpm *     Left Ventricle  Left ventricular systolic function is normal. The visual ejection fraction is estimated at 55-60%.     Right Ventricle  The right ventricle is normal in size and function.     Mitral Valve  The mitral valve is normal in structure and function.        Tricuspid Valve  The tricuspid valve is normal in structure and function.     Aortic Valve  The aortic valve is normal in structure and function.     Pericardium  There is no pericardial effusion.     Procedure  Dobutamine Echo Complete. Contrast Definity.     MMode/2D Measurements & Calculations  asc Aorta Diam: 3.5 cm        ANGIOGRAM: 08/15/17              CAROTID US: 08/15/17  Impression:      1. Right internal carotid: Occluded, unchanged. Flow is visualized within the vertebral artery, consistent with angiogram 11/9/2011 and carotid ultrasound 6/20/2008.    2. Left carotid stent: Less than 50 percent narrowing within common carotid and internal carotid artery stent by velocity criteria.  3. The left external carotid artery demonstrate elevated velocities at 513 cm/sec, previously 380 cm/sec. Findings likely represent hemodynamically significant stenosis.        ASSESSMENT/PLAN:     Jose is a 72 year old male who presents with severe 3 vessel CAD.  Angiogram showed complex LAD disease in addition to other disease. Due to patient's comorbidities he is at increased risk to undergo conventional CABG.  However he  would benefit more from robotic assisted CABG with LIMA to LAD, plus possible stenting.         Risks and benefits of surgery were discussed with patient.  Patient verbalized understanding and accepts these risks.      Approximately 50 minutes spent with this case including review of the clinical history and data; discussion with the patient and his family and coordination of the care     Thank you for including me in the care of this kind patient. Do not hesitate to contact me with any questions.     Dr. Eriberto Curry     Cardiothoracic Surgery  [facility]  Patient Care Team:  Rebeca Sandoval NP as PCP - General  Lance Eason MD as Resident (Student in organized health care education/training program)  Orion Motley MD as MD (Radiology)  Etsefania Akers RN as Registered Nurse  Cher Yadav MD as MD (Hematology & Oncology)  Cynthia Mondragon, RN as Nurse Coordinator (Oncology)  Felecia Bundy, RN as Nurse Coordinator (Cardiology)  Carline Quezada, GER[place] as Nurse Coordinator (Thoracic Surgery (Cardiothoracic Vascular Surgery))  RADHA MIDDLETON    Please do not hesitate to contact me if you have any questions/concerns.     Sincerely,     Eriberto Curry MD

## 2017-08-17 NOTE — NURSING NOTE
Patient seen today for consultation for bypass surgery.    Surgery procedure explained to patient and all questions and concerns were answered and addressed.     Patient will be having single bypass with the Davinci and the other two arteries that are blocked will be stented.      Risks and benefits of surgery were discussed with patient (and all present) including risk of death, stroke, bleeding, cardiac ischemia, wound infection, renal failure, arrhythmias and possible pacemaker implantation. Patient accepts these risks and is willing to proceed with surgery.     Patient will be seen in vascular surgery for occluded carotid arteries       Patient verbalized understanding of all instructions and will call with any questions or concerns.

## 2017-08-17 NOTE — NURSING NOTE
Chief Complaint   Patient presents with     New Patient     Consult for Davinci CABG     Vitals were taken and medications were reconciled.     Janet Guerra,ROLAND  11:05 AM

## 2017-08-17 NOTE — LETTER
8/17/2017      RE: Jose Lira  [address]   [place] [address]       Dear Colleague,    Thank you for the opportunity to participate in the care of your patient, Jose Lira, at the [facility] at [facility]. Please see a copy of my visit note below.    ASKED BY REFERRING PHYSICIAN: Jose Willett MD to see patient in consultation of surgical treatment of CAD     CHIEF COMPLAINT: CAD, Consult for Davinci CABG        HPI: Jose Lira is a 72 year old male who is here to discuss coronary artery bypass surgery.  The patient was scheduled for a Laparoscopic Assisted Low Anterior Resection Possible Loop Ileostomy on 8/9/17 with Sharan Raza MD for Malignant Neoplasm Of the Rectosigmoid Junction.  Mr Lira has a significant history of PAD s/p stenting of the left internal carotid and right common iliac artery in 2002.  He saw cardiology on 7/31/17 for preop clearance and it was recommended he have a dobutamine stress echo.  This was performed on 8/4/17 and showed some ST segment depression in the inferolateral leads. Patient then underwent coronary angiogram on 08/15/17 that showed 3 vessel coronary artery disease.  Patient has a history of a stroke in 1994 and he did quit smoking at that time.  He does have a 30 year smoking history of 3 ppds.  Patient also has CKD stage 3-4.             PAST MEDICAL HISTORY:  Past Medical History:   Diagnosis Date     Acute, but ill-defined, cerebrovascular disease 1994     CKD (chronic kidney disease)     Stage 3-4     History of CVA (cerebrovascular accident)      Hx of endarterectomy 2002    Left and right     Peripheral vascular disease, unspecified (H)      Pulmonary nodules      Pure hypercholesterolemia      Unspecified essential hypertension        PAST SURGICAL HISTORY:  Past Surgical History:   Procedure Laterality Date     COLONOSCOPY N/A 5/19/2017    Procedure: COMBINED COLONOSCOPY,  SINGLE OR MULTIPLE BIOPSY/POLYPECTOMY BY BIOPSY;  Rectal bleeding;  Surgeon: Maximus Dinero MD;  Location:  GI     SURGICAL HISTORY OF -   10/02    angioplasty and stenting of left and internal carotid artery at the bifurcation     SURGICAL HISTORY OF - 11/11    right common iliac artery stent      VASCULAR SURGERY  2001    stent placed in carotid        FAMILY HISTORY:   Family History   Problem Relation Age of Onset     C.A.D. Father      Hypertension Father      Prostate Cancer Father      C.A.D. Brother      MI     Hypertension Brother      Arthritis Sister      DIABETES No family hx of      CEREBROVASCULAR DISEASE No family hx of      Cancer - colorectal No family hx of        SOCIAL HISTORY:  Social History     Social History     Marital status: Single     Spouse name: N/A     Number of children: N/A     Years of education: N/A     Occupational History     Not on file.     Social History Main Topics     Smoking status: Former Smoker     Packs/day: 3.00     Years: 30.00     Quit date: 8/5/1994     Smokeless tobacco: Former User     Quit date: 8/1/1994      Comment: quit 15 years ago after his stroke     Alcohol use Yes      Comment: every evening 1-2 drinks     Drug use: No     Sexual activity: No     Other Topics Concern     Parent/Sibling W/ Cabg, Mi Or Angioplasty Before 65f 55m? Yes     brother and father     Social History Narrative        ALLERGIES:   Allergies   Allergen Reactions     No Known Drug Allergies        CURRENT MEDICATIONS:   Prescription Medications as of 8/17/2017             aspirin 81 MG EC tablet Take 1 tablet (81 mg) by mouth daily    atorvastatin (LIPITOR) 40 MG tablet Take 1 tablet (40 mg) by mouth daily    amLODIPine (NORVASC) 10 MG tablet Take 1 tablet (10 mg) by mouth daily    atenolol (TENORMIN) 100 MG tablet Take 1 tablet (100 mg) by mouth daily    valsartan (DIOVAN) 320 MG tablet Take 1 tablet (320 mg) by mouth daily    tiZANidine (ZANAFLEX) 2 MG tablet Take 1-2 tablets (2-4  "mg) by mouth nightly as needed    Saw Palmetto, Serenoa repens, (SAW PALMETTO PO) Take by mouth every morning     niacin 500 MG tablet Take 1 tablet (500 mg) by mouth At Bedtime    Multiple Vitamins-Minerals (MULTIVITAMIN & MINERAL PO) Take 1 tablet by mouth every morning     diphenhydrAMINE (BENADRYL) 25 MG capsule Take 25 mg by mouth nightly as needed. sleep    lactobacillus rhamnosus, GG, (CULTURELLE) 10 B CELL capsule Take 1 capsule by mouth every morning     polyethylene glycol (GOLYTELY/NULYTELY) 236 G suspension Take as directed in your surgery packet.          REVIEW OF SYSTEMS:   Gen: denies frequent headaches, double/blurry vision, insomnia, fatigue, unexplained weight loss/gain. No previous anesthesia reactions.  CV: denies chest pain, shortness of breath, palpitations, peripheral edema.    Pulm: denies shortness of breath, asthma or wheezing  GI/: denies liver or kidney problems, blood in stool or BRBPR, difficulty urinating  Endo: denies thyroid problems or Diabetes  Heme/Onc: denies bleeding or clotting disorders, no family problems with bleeding/clotting diorders  MS: no weakness, tremors or gait instability  Neuro: denies depression, memory problems, no dysesthesias, no previous strokes, no migraines, no dysphagia  Skin: No petechiae, purpura or rash.    PHYSICAL EXAMINATION:   /79 (BP Location: Left arm, Patient Position: Chair, Cuff Size: Adult Large)  Pulse 75  Ht 1.651 m (5' 5\")  Wt 95.2 kg (209 lb 12.8 oz)  SpO2 97%  BMI 34.91 kg/m2  General: alert and oriented x 3, pleasant, no acute distress  CV: S1 S2, no murmurs, rubs or gallops, regular rate and rhythm, no peripheral edema, no carotid or abdomenal bruits, pulses in upper and lower extremities palpable  Pulm: bilateral breath sounds, clear to auscultation, easy work of breathing  GI: (+) bowel sounds, soft non-tender and non-distended  : voiding without problems  MS: moves all extremities x 4,  5+/5+ equal strength " bilaterally  Neuro: pupils equal round and reactive to light, cranial nerves, II-XII grossly intact, no gross neurologic deficits noted    LABS:  BMP RESULTS:  Lab Results   Component Value Date     08/15/2017    POTASSIUM 4.4 08/15/2017    CHLORIDE 111 (H) 08/15/2017    CO2 21 08/15/2017    ANIONGAP 8 08/15/2017    GLC 97 08/15/2017    BUN 32 (H) 08/15/2017    CR 2.48 (H) 08/15/2017    GFRESTIMATED 26 (L) 08/15/2017    GFRESTBLACK 31 (L) 08/15/2017    ADRIAN 8.8 08/15/2017        CBC RESULTS:  Lab Results   Component Value Date    WBC 10.8 08/15/2017    RBC 4.13 (L) 08/15/2017    HGB 12.4 (L) 08/15/2017    HCT 37.1 (L) 08/15/2017    MCV 90 08/15/2017    MCH 30.0 08/15/2017    MCHC 33.4 08/15/2017    RDW 13.6 08/15/2017     08/15/2017       Lab Results   Component Value Date    INR 1.10 08/15/2017     Lab Results   Component Value Date    PTT 32 10/18/2011     No results found for: UA  Lab Results   Component Value Date    A1C 5.4 08/15/2017       PROCEDURES/IMAGING:    DOBUTAMINE STRESS ECHO: 07/14/17  Abnormal stress echo.  Mid septal hypokinesis at rest, with mid septal and apical lateral ischemia at peak stress. Suspect multi-vessel CAD.  Target heart rate was achieved. Heart rate and blood pressure response to dobutamine were normal. With stress, the left ventricular ejection fraction increased from 55-60% to greater than 65% and the left ventricular size decreased appropriately.  No subjective symptoms  ST segment depression in inferolateral leads during recovery period, but not at peak stress.  No significant valve disease on screening doppler evaluation. The aortic root and visualized ascending aorta are normal.     Stress  The drug infusion was stopped due to target heart rate achieved.  The patient did not exhibit any symptoms during drug infusion.  Normal blood pressure response to medication.  ST segment depression in inferolateral leads during recovery period, but not at peak  stress.     Baseline  Normal baseline electrocardiogram.     Stress Results                                       Maximum Predicted HR:   147 bpm             Target HR: 125 bpm        % Maximum Predicted HR: 92 %                             Stage  DurationHeart Rate  BP                                 (mm:ss)   (bpm)                         Baseline            54    135/61                           Peak    7:10     135    141/68                            Stress Duration:   7:10 mm:ss *                      Maximum Stress HR: 135 bpm *     Left Ventricle  Left ventricular systolic function is normal. The visual ejection fraction is estimated at 55-60%.     Right Ventricle  The right ventricle is normal in size and function.     Mitral Valve  The mitral valve is normal in structure and function.        Tricuspid Valve  The tricuspid valve is normal in structure and function.     Aortic Valve  The aortic valve is normal in structure and function.     Pericardium  There is no pericardial effusion.     Procedure  Dobutamine Echo Complete. Contrast Definity.     MMode/2D Measurements & Calculations  asc Aorta Diam: 3.5 cm        ANGIOGRAM: 08/15/17              CAROTID US: 08/15/17  Impression:      1. Right internal carotid: Occluded, unchanged. Flow is visualized within the vertebral artery, consistent with angiogram 11/9/2011 and carotid ultrasound 6/20/2008.    2. Left carotid stent: Less than 50 percent narrowing within common carotid and internal carotid artery stent by velocity criteria.  3. The left external carotid artery demonstrate elevated velocities at 513 cm/sec, previously 380 cm/sec. Findings likely represent hemodynamically significant stenosis.        ASSESSMENT/PLAN:     Jose is a 72 year old male who presents with severe 3 vessel CAD.  Angiogram showed complex LAD disease in addition to other disease. Due to patient's comorbidities he is at increased risk to undergo conventional CABG.  However he  would benefit more from robotic assisted CABG with LIMA to LAD, plus possible stenting.         Risks and benefits of surgery were discussed with patient.  Patient verbalized understanding and accepts these risks.      Approximately 50 minutes spent with this case including review of the clinical history and data; discussion with the patient and his family and coordination of the care     Thank you for including me in the care of this kind patient. Do not hesitate to contact me with any questions.     Dr. Eriberto Curry     Cardiothoracic Surgery  [facility]  Patient Care Team:  Rebeca Sandoval NP as PCP - General  Lance Eason MD as Resident (Student in organized health care education/training program)  Orion Motley MD as MD (Radiology)  Estefania Akers RN as Registered Nurse  Cher Yadav MD as MD (Hematology & Oncology)  Cynthia Mondragon, RN as Nurse Coordinator (Oncology)  Felecia Bundy, RN as Nurse Coordinator (Cardiology)  Carline Quezada, [place] as Nurse Coordinator (Thoracic Surgery (Cardiothoracic Vascular Surgery))  RADHA MIDDLETON

## 2017-08-17 NOTE — PROGRESS NOTES
ASKED BY REFERRING PHYSICIAN: Jose Willett MD to see patient in consultation of surgical treatment of CAD     CHIEF COMPLAINT: CAD, Consult for Davinci CABG        HPI: Jose Lira is a 72 year old male who is here to discuss coronary artery bypass surgery.  The patient was scheduled for a Laparoscopic Assisted Low Anterior Resection Possible Loop Ileostomy on 8/9/17 with Sharan Raza MD for Malignant Neoplasm Of the Rectosigmoid Junction.  Mr Lira has a significant history of PAD s/p stenting of the left internal carotid and right common iliac artery in 2002.  He saw cardiology on 7/31/17 for preop clearance and it was recommended he have a dobutamine stress echo.  This was performed on 8/4/17 and showed some ST segment depression in the inferolateral leads. Patient then underwent coronary angiogram on 08/15/17 that showed 3 vessel coronary artery disease.  Patient has a history of a stroke in 1994 and he did quit smoking at that time.  He does have a 30 year smoking history of 3 ppds.  Patient also has CKD stage 3-4.             PAST MEDICAL HISTORY:  Past Medical History:   Diagnosis Date     Acute, but ill-defined, cerebrovascular disease 1994     CKD (chronic kidney disease)     Stage 3-4     History of CVA (cerebrovascular accident)      Hx of endarterectomy 2002    Left and right     Peripheral vascular disease, unspecified (H)      Pulmonary nodules      Pure hypercholesterolemia      Unspecified essential hypertension        PAST SURGICAL HISTORY:  Past Surgical History:   Procedure Laterality Date     COLONOSCOPY N/A 5/19/2017    Procedure: COMBINED COLONOSCOPY, SINGLE OR MULTIPLE BIOPSY/POLYPECTOMY BY BIOPSY;  Rectal bleeding;  Surgeon: Maximus Dinero MD;  Location:  GI     SURGICAL HISTORY OF -   10/02    angioplasty and stenting of left and internal carotid artery at the bifurcation     SURGICAL HISTORY OF - 11/11    right common iliac artery stent      VASCULAR SURGERY  2001    stent placed  in carotid        FAMILY HISTORY:   Family History   Problem Relation Age of Onset     C.A.D. Father      Hypertension Father      Prostate Cancer Father      C.A.D. Brother      MI     Hypertension Brother      Arthritis Sister      DIABETES No family hx of      CEREBROVASCULAR DISEASE No family hx of      Cancer - colorectal No family hx of        SOCIAL HISTORY:  Social History     Social History     Marital status: Single     Spouse name: N/A     Number of children: N/A     Years of education: N/A     Occupational History     Not on file.     Social History Main Topics     Smoking status: Former Smoker     Packs/day: 3.00     Years: 30.00     Quit date: 8/5/1994     Smokeless tobacco: Former User     Quit date: 8/1/1994      Comment: quit 15 years ago after his stroke     Alcohol use Yes      Comment: every evening 1-2 drinks     Drug use: No     Sexual activity: No     Other Topics Concern     Parent/Sibling W/ Cabg, Mi Or Angioplasty Before 65f 55m? Yes     brother and father     Social History Narrative        ALLERGIES:   Allergies   Allergen Reactions     No Known Drug Allergies        CURRENT MEDICATIONS:   Prescription Medications as of 8/17/2017             aspirin 81 MG EC tablet Take 1 tablet (81 mg) by mouth daily    atorvastatin (LIPITOR) 40 MG tablet Take 1 tablet (40 mg) by mouth daily    amLODIPine (NORVASC) 10 MG tablet Take 1 tablet (10 mg) by mouth daily    atenolol (TENORMIN) 100 MG tablet Take 1 tablet (100 mg) by mouth daily    valsartan (DIOVAN) 320 MG tablet Take 1 tablet (320 mg) by mouth daily    tiZANidine (ZANAFLEX) 2 MG tablet Take 1-2 tablets (2-4 mg) by mouth nightly as needed    Saw Palmetto, Serenoa repens, (SAW PALMETTO PO) Take by mouth every morning     niacin 500 MG tablet Take 1 tablet (500 mg) by mouth At Bedtime    Multiple Vitamins-Minerals (MULTIVITAMIN & MINERAL PO) Take 1 tablet by mouth every morning     diphenhydrAMINE (BENADRYL) 25 MG capsule Take 25 mg by mouth  "nightly as needed. sleep    lactobacillus rhamnosus, GG, (CULTURELLE) 10 B CELL capsule Take 1 capsule by mouth every morning     polyethylene glycol (GOLYTELY/NULYTELY) 236 G suspension Take as directed in your surgery packet.          REVIEW OF SYSTEMS:   Gen: denies frequent headaches, double/blurry vision, insomnia, fatigue, unexplained weight loss/gain. No previous anesthesia reactions.  CV: denies chest pain, shortness of breath, palpitations, peripheral edema.    Pulm: denies shortness of breath, asthma or wheezing  GI/: denies liver or kidney problems, blood in stool or BRBPR, difficulty urinating  Endo: denies thyroid problems or Diabetes  Heme/Onc: denies bleeding or clotting disorders, no family problems with bleeding/clotting diorders  MS: no weakness, tremors or gait instability  Neuro: denies depression, memory problems, no dysesthesias, no previous strokes, no migraines, no dysphagia  Skin: No petechiae, purpura or rash.    PHYSICAL EXAMINATION:   /79 (BP Location: Left arm, Patient Position: Chair, Cuff Size: Adult Large)  Pulse 75  Ht 1.651 m (5' 5\")  Wt 95.2 kg (209 lb 12.8 oz)  SpO2 97%  BMI 34.91 kg/m2  General: alert and oriented x 3, pleasant, no acute distress  CV: S1 S2, no murmurs, rubs or gallops, regular rate and rhythm, no peripheral edema, no carotid or abdomenal bruits, pulses in upper and lower extremities palpable  Pulm: bilateral breath sounds, clear to auscultation, easy work of breathing  GI: (+) bowel sounds, soft non-tender and non-distended  : voiding without problems  MS: moves all extremities x 4,  5+/5+ equal strength bilaterally  Neuro: pupils equal round and reactive to light, cranial nerves, II-XII grossly intact, no gross neurologic deficits noted    LABS:  BMP RESULTS:  Lab Results   Component Value Date     08/15/2017    POTASSIUM 4.4 08/15/2017    CHLORIDE 111 (H) 08/15/2017    CO2 21 08/15/2017    ANIONGAP 8 08/15/2017    GLC 97 08/15/2017    BUN " 32 (H) 08/15/2017    CR 2.48 (H) 08/15/2017    GFRESTIMATED 26 (L) 08/15/2017    GFRESTBLACK 31 (L) 08/15/2017    ADRIAN 8.8 08/15/2017        CBC RESULTS:  Lab Results   Component Value Date    WBC 10.8 08/15/2017    RBC 4.13 (L) 08/15/2017    HGB 12.4 (L) 08/15/2017    HCT 37.1 (L) 08/15/2017    MCV 90 08/15/2017    MCH 30.0 08/15/2017    MCHC 33.4 08/15/2017    RDW 13.6 08/15/2017     08/15/2017       Lab Results   Component Value Date    INR 1.10 08/15/2017     Lab Results   Component Value Date    PTT 32 10/18/2011     No results found for: UA  Lab Results   Component Value Date    A1C 5.4 08/15/2017       PROCEDURES/IMAGING:    DOBUTAMINE STRESS ECHO: 07/14/17  Abnormal stress echo.  Mid septal hypokinesis at rest, with mid septal and apical lateral ischemia at peak stress. Suspect multi-vessel CAD.  Target heart rate was achieved. Heart rate and blood pressure response to dobutamine were normal. With stress, the left ventricular ejection fraction increased from 55-60% to greater than 65% and the left ventricular size decreased appropriately.  No subjective symptoms  ST segment depression in inferolateral leads during recovery period, but not at peak stress.  No significant valve disease on screening doppler evaluation. The aortic root and visualized ascending aorta are normal.     Stress  The drug infusion was stopped due to target heart rate achieved.  The patient did not exhibit any symptoms during drug infusion.  Normal blood pressure response to medication.  ST segment depression in inferolateral leads during recovery period, but not at peak stress.     Baseline  Normal baseline electrocardiogram.     Stress Results                                       Maximum Predicted HR:   147 bpm             Target HR: 125 bpm        % Maximum Predicted HR: 92 %                             Stage  DurationHeart Rate  BP                                 (mm:ss)   (bpm)                         Baseline            54     135/61                           Peak    7:10     135    141/68                            Stress Duration:   7:10 mm:ss *                      Maximum Stress HR: 135 bpm *     Left Ventricle  Left ventricular systolic function is normal. The visual ejection fraction is estimated at 55-60%.     Right Ventricle  The right ventricle is normal in size and function.     Mitral Valve  The mitral valve is normal in structure and function.        Tricuspid Valve  The tricuspid valve is normal in structure and function.     Aortic Valve  The aortic valve is normal in structure and function.     Pericardium  There is no pericardial effusion.     Procedure  Dobutamine Echo Complete. Contrast Definity.     MMode/2D Measurements & Calculations  asc Aorta Diam: 3.5 cm        ANGIOGRAM: 08/15/17              CAROTID US: 08/15/17  Impression:      1. Right internal carotid: Occluded, unchanged. Flow is visualized within the vertebral artery, consistent with angiogram 11/9/2011 and carotid ultrasound 6/20/2008.    2. Left carotid stent: Less than 50 percent narrowing within common carotid and internal carotid artery stent by velocity criteria.  3. The left external carotid artery demonstrate elevated velocities at 513 cm/sec, previously 380 cm/sec. Findings likely represent hemodynamically significant stenosis.        ASSESSMENT/PLAN:     Jose is a 72 year old male who presents with severe 3 vessel CAD.  Angiogram showed complex LAD disease in addition to other disease. Due to patient's comorbidities he is at increased risk to undergo conventional CABG.  However he would benefit more from robotic assisted CABG with LIMA to LAD, plus possible stenting.         Risks and benefits of surgery were discussed with patient.  Patient verbalized understanding and accepts these risks.      Approximately 50 minutes spent with this case including review of the clinical history and data; discussion with the patient and his family and  coordination of the care     Thank you for including me in the care of this kind patient. Do not hesitate to contact me with any questions.     Dr. Eriberto Curry     Cardiothoracic Surgery  [facility]  Patient Care Team:  Rebeca Sandoval NP as PCP - General  Lance Eason MD as Resident (Student in organized health care education/training program)  Orion Motley MD as MD (Radiology)  Estefania Akers RN as Registered Nurse  Cher Yadav MD as MD (Hematology & Oncology)  Cynthia Mondragon, RN as Nurse Coordinator (Oncology)  Felecia Bundy, RN as Nurse Coordinator (Cardiology)  Carline Quezada, [place] as Nurse Coordinator (Thoracic Surgery (Cardiothoracic Vascular Surgery))  RADHA MIDDLETON

## 2017-08-18 ENCOUNTER — TELEPHONE (OUTPATIENT)
Dept: VASCULAR SURGERY | Facility: CLINIC | Age: 72
End: 2017-08-18

## 2017-08-18 NOTE — TELEPHONE ENCOUNTER
Per task, Dr. Curry would like the pt to see Vascular before scheduling surgery. Was able to get pt scheduled for 8/21 starting at 12 for imaging and clinic to follow with Dr. Kaur. LM asking pt to call back to confirm if that date/time works. Will try again later

## 2017-08-21 ENCOUNTER — OFFICE VISIT (OUTPATIENT)
Dept: VASCULAR SURGERY | Facility: CLINIC | Age: 72
End: 2017-08-21

## 2017-08-21 VITALS
DIASTOLIC BLOOD PRESSURE: 75 MMHG | HEART RATE: 60 BPM | SYSTOLIC BLOOD PRESSURE: 155 MMHG | RESPIRATION RATE: 18 BRPM | OXYGEN SATURATION: 97 %

## 2017-08-21 DIAGNOSIS — I73.9 PAD (PERIPHERAL ARTERY DISEASE) (H): Primary | ICD-10-CM

## 2017-08-21 ASSESSMENT — PAIN SCALES - GENERAL: PAINLEVEL: NO PAIN (0)

## 2017-08-21 NOTE — MR AVS SNAPSHOT
After Visit Summary   2017    Jose Lira    MRN: [id number]           Patient Information     Date Of Birth          1945        Visit Information        Provider Department      2017 12:30 PM May Dowd MD [facility]        Today's Diagnoses     PAD (peripheral artery disease) (H)    -  1       Follow-ups after your visit        Follow-up notes from your care team     Return if symptoms worsen or fail to improve.      Who to contact     Please call your clinic at [phone] to:    Ask questions about your health    Make or cancel appointments    Discuss your medicines    Learn about your test results    Speak to your doctor   If you have compliments or concerns about an experience at your clinic, or if you wish to file a complaint, please contact [facility] Patient Relations at [phone] or email us at [email or web address]         Additional Information About Your Visit        MyChart Information     [employer] is an electronic gateway that provides easy, online access to your medical records. With [employer], you can request a clinic appointment, read your test results, renew a prescription or communicate with your care team.     To sign up for [employer] visit the website at [email or web address]   You will be asked to enter the access code listed below, as well as some personal information. Please follow the directions to create your username and password.     Your access code is: [email or web address]  Expires: 2017 11:36 AM     Your access code will  in 90 days. If you need help or a new code, please contact your [facility] or call [phone] for assistance.        Care EveryWhere ID     This is your Care EveryWhere ID. This could be used by other organizations to access your [facility] medical records  [id number]        Your Vitals Were     Pulse Respirations Pulse  Oximetry             60 18 97%          Blood Pressure from Last 3 Encounters:   08/21/17 155/75   08/17/17 135/79   08/15/17 (!) 163/91    Weight from Last 3 Encounters:   08/17/17 209 lb 12.8 oz   08/03/17 201 lb 12.8 oz   07/31/17 200 lb 12.8 oz              Today, you had the following     No orders found for display         Today's Medication Changes          These changes are accurate as of: 8/21/17 11:59 PM.  If you have any questions, ask your nurse or doctor.               These medicines have changed or have updated prescriptions.        Dose/Directions    amLODIPine 10 MG tablet   Commonly known as:  NORVASC   This may have changed:  when to take this   Used for:  Essential hypertension with goal blood pressure less than 130/80        Dose:  10 mg   Take 1 tablet (10 mg) by mouth daily   Quantity:  90 tablet   Refills:  PRN       aspirin 81 MG EC tablet   This may have changed:  when to take this   Used for:  Hypertension goal BP (blood pressure) < 130/80, Hyperlipidemia LDL goal <100        Dose:  81 mg   Take 1 tablet (81 mg) by mouth daily   Quantity:  30 tablet   Refills:  0       atenolol 100 MG tablet   Commonly known as:  TENORMIN   This may have changed:  when to take this   Used for:  Essential hypertension with goal blood pressure less than 130/80        Dose:  100 mg   Take 1 tablet (100 mg) by mouth daily   Quantity:  90 tablet   Refills:  PRN       atorvastatin 40 MG tablet   Commonly known as:  LIPITOR   This may have changed:  when to take this   Used for:  Hyperlipidemia LDL goal <100        Dose:  40 mg   Take 1 tablet (40 mg) by mouth daily   Quantity:  90 tablet   Refills:  PRN       valsartan 320 MG tablet   Commonly known as:  DIOVAN   This may have changed:  when to take this   Used for:  Essential hypertension with goal blood pressure less than 130/80        Dose:  320 mg   Take 1 tablet (320 mg) by mouth daily   Quantity:  90 tablet   Refills:  3                Primary Care  Provider Office Phone # Fax #    Rebeca SandovalJAYCEE [phone] [address] [facility] [address]        Equal Access to Services     RIGO STORY : Hadii aad ku hadmalloryo Soomaali, waaxda luqadaha, qaybta kaalmada adeegyada, haresh zimmerman laRonalneetu campo. So [facility] [phone].    ATENCIÓN: Si habla español, tiene a adorno disposición servicios gratuitos de asistencia lingüística. Llame al [phone].    We comply with applicable federal civil rights laws and [place] laws. We do not discriminate on the basis of race, color, national origin, age, disability sex, sexual orientation or gender identity.            Thank you!     Thank you for choosing [facility]  for your care. Our goal is always to provide you with excellent care. Hearing back from our patients is one way we can continue to improve our services. Please take a few minutes to complete the written survey that you may receive in the mail after your visit with us. Thank you!             Your Updated Medication List - Protect others around you: Learn how to safely use, store and throw away your medicines at www.disposemymeds.org.          This list is accurate as of: 8/21/17 11:59 PM.  Always use your most recent med list.                   Brand Name Dispense Instructions for use Diagnosis    amLODIPine 10 MG tablet    NORVASC    90 tablet    Take 1 tablet (10 mg) by mouth daily    Essential hypertension with goal blood pressure less than 130/80       aspirin 81 MG EC tablet     30 tablet    Take 1 tablet (81 mg) by mouth daily    Hypertension goal BP (blood pressure) < 130/80, Hyperlipidemia LDL goal <100       atenolol 100 MG tablet    TENORMIN    90 tablet    Take 1 tablet (100 mg) by mouth daily    Essential hypertension with goal blood pressure less than 130/80       atorvastatin 40 MG tablet    LIPITOR    90 tablet    Take 1 tablet (40 mg) by mouth daily    Hyperlipidemia LDL goal <100       BENADRYL  25 MG capsule   Generic drug:  diphenhydrAMINE      Take 25 mg by mouth nightly as needed. sleep        lactobacillus rhamnosus (GG) 10 B CELL capsule    CULTURELLE     Take 1 capsule by mouth every morning        MULTIVITAMIN & MINERAL PO      Take 1 tablet by mouth every morning        niacin 500 MG tablet     30 tablet    Take 1 tablet (500 mg) by mouth At Bedtime    Hyperlipidemia LDL goal <100       polyethylene glycol 236 G suspension    GoLYTELY/NuLYTELY    4000 mL    Take as directed in your surgery packet.    Colon cancer (H)       SAW PALMETTO PO      Take by mouth every morning        tiZANidine 2 MG tablet    ZANAFLEX    60 tablet    Take 1-2 tablets (2-4 mg) by mouth nightly as needed    Bilateral low back pain without sciatica, unspecified chronicity       valsartan 320 MG tablet    DIOVAN    90 tablet    Take 1 tablet (320 mg) by mouth daily    Essential hypertension with goal blood pressure less than 130/80

## 2017-08-21 NOTE — NURSING NOTE
Chief Complaint   Patient presents with     Consult     Consult right side carotid stenoisis        Vitals:    08/21/17 1322   BP: 155/75   BP Location: Left arm   Pulse: 60   Resp: 18   SpO2: 97%       There is no height or weight on file to calculate BMI.               Concetta Colmenares LPN

## 2017-08-21 NOTE — LETTER
8/21/2017       RE: Jose Lira  [address]   [place] [address]     Dear Colleague,    Thank you for referring your patient, Jose Lira, to the [facility] at [facility]. Please see a copy of my visit note below.    VASCULAR SURGERY CLINIC PATIENT CONSULTATION NOTE    HPI: Mr. Lira is a 72- year old male who presents today for consultation for concern of right carotid artery occlusion. Mr. Lira is currently undergoing surgical planning for coronary artery bypass grafting.     REFERRAL SOURCE: Dr. Curry    PAST MEDICAL HISTORY:  Past Medical History:   Diagnosis Date     Acute, but ill-defined, cerebrovascular disease 1994     CKD (chronic kidney disease)     Stage 3-4     History of CVA (cerebrovascular accident)      Hx of endarterectomy 2002    Left and right     Peripheral vascular disease, unspecified (H)      Pulmonary nodules      Pure hypercholesterolemia      Unspecified essential hypertension      PAST SURGICAL HISTORY:  Past Surgical History:   Procedure Laterality Date     COLONOSCOPY N/A 5/19/2017    Procedure: COMBINED COLONOSCOPY, SINGLE OR MULTIPLE BIOPSY/POLYPECTOMY BY BIOPSY;  Rectal bleeding;  Surgeon: Maximus Dinero MD;  Location:  GI     SURGICAL HISTORY OF -   10/02    angioplasty and stenting of left and internal carotid artery at the bifurcation     SURGICAL HISTORY OF - 11/11    right common iliac artery stent      VASCULAR SURGERY  2001    stent placed in carotid        FAMILY HISTORY:  Family History   Problem Relation Age of Onset     C.A.D. Father      Hypertension Father      Prostate Cancer Father      C.A.D. Brother      MI     Hypertension Brother      Arthritis Sister      DIABETES No family hx of      CEREBROVASCULAR DISEASE No family hx of      Cancer - colorectal No family hx of      SOCIAL HISTORY:   Social History   Substance Use Topics     Smoking status: Former Smoker      Packs/day: 3.00     Years: 30.00     Quit date: 8/5/1994     Smokeless tobacco: Former User     Quit date: 8/1/1994      Comment: quit 15 years ago after his stroke     Alcohol use Yes      Comment: every evening 1-2 drinks     TOBACCO USE:  Quit 1993    FUNCTIONAL CAPACITY: Lives alone in an apartment locally. Is a retired  at [employer]. Notes he also has colorectal cancer and will need to undergo surgery after he recovers from coronary bypass.     HOME MEDICATIONS:  Prior to Admission medications    Medication Sig Start Date End Date Taking? Authorizing Provider   aspirin 81 MG EC tablet Take 1 tablet (81 mg) by mouth daily  Patient taking differently: Take 81 mg by mouth every morning  7/31/17   Jose Willett MD   polyethylene glycol (GOLYTELY/NULYTELY) 236 G suspension Take as directed in your surgery packet.  Patient not taking: Reported on 7/31/2017 7/28/17   Sharan Raza MD   atorvastatin (LIPITOR) 40 MG tablet Take 1 tablet (40 mg) by mouth daily  Patient taking differently: Take 40 mg by mouth every morning  6/26/17   Rebeca Sandoval NP   amLODIPine (NORVASC) 10 MG tablet Take 1 tablet (10 mg) by mouth daily  Patient taking differently: Take 10 mg by mouth every morning  6/26/17   Rebeca Sandoval NP   atenolol (TENORMIN) 100 MG tablet Take 1 tablet (100 mg) by mouth daily  Patient taking differently: Take 100 mg by mouth every morning  6/26/17   Rebeca Sandoval NP   valsartan (DIOVAN) 320 MG tablet Take 1 tablet (320 mg) by mouth daily  Patient taking differently: Take 320 mg by mouth every morning  6/26/17   Rebeca Sandoval NP   tiZANidine (ZANAFLEX) 2 MG tablet Take 1-2 tablets (2-4 mg) by mouth nightly as needed 10/25/16   Bottem, Rebeca M, NP   Saw Palmetto, Serenoa repens, (SAW PALMETTO PO) Take by mouth every morning     Reported, Patient   niacin 500 MG tablet Take 1 tablet (500 mg) by mouth At Bedtime 8/25/14   Rebeca Sandoval NP   Multiple  Vitamins-Minerals (MULTIVITAMIN & MINERAL PO) Take 1 tablet by mouth every morning     Reported, Patient   diphenhydrAMINE (BENADRYL) 25 MG capsule Take 25 mg by mouth nightly as needed. sleep    Reported, Patient   lactobacillus rhamnosus, GG, (CULTURELLE) 10 B CELL capsule Take 1 capsule by mouth every morning     Unknown, Entered By History       VITAL SIGNS:      BP: 155/75 Pulse: 60   Resp: 18 SpO2: 97 %        PHYSICAL EXAM:  NEURO/PSYCH:  The patient is alert and oriented.  Appropriate.  Moves all extremities.   SKIN: Color is appropriate for race, warm, dry..    PULMONARY:  Does not require supplemental oxygen; no acute distress.    EXTREMITIES: Appears well perfused. Faintly palpable bilateral femoral pulse.     7/31/2017 CAROTID ULTRASOUND:  Findings:     Right side:      Plaque Morphology: Predominantly echogenic, occlusive within the  internal carotid artery and irregular within the distal common carotid        Proximal CCA: 119 / 11  cm/sec     Mid CCA: 75 / 5  cm/sec     Distal CCA: 102 / 6  cm/sec     Carotid bifurcation: 93 / 11 cm/sec     External CA: 200 / 16 cm/sec        Proximal ICA: Occluded     Mid ICA: Occluded     Distal ICA: Occluded        Vert: Not seen, also not seen on review of procedural imaging  11/9/2011 or carotid ultrasound 6/20/2008.      Innominate artery: 113 cm/sec     Subclavian artery: 142 cm/sec     ICA/CCA ratio: N/A      Left side:      Plaque Morphology: Predominantly echogenic, Irregular. Large echogenic  plaque in the common carotid artery.        CCA origin: 60 / 13 cm/sec     Proximal CCA: 66 / 20 cm/sec     Mid CCA: 76 / 25  cm/sec     Distal CCA (proximal to stent): 93 / 21 cm/sec     Distal CCA (proximal stent): 91 / 19 cm/sec     Carotid bifurcation (mid stent): 87 / 18 cm/sec     External CA: 513 / 85 cm/sec, previously 380/42        Proximal ICA (distal stent): 77 / 15 cm/sec     Proximal ICA (distal to stent): 89 / 23 cm/sec     Mid ICA: 119 / 33  cm/sec      Distal ICA: 104 / 25 cm/sec        Vertebral Artery: Antegrade, 32/13 cm/sec     Subclavian artery: 118/9 cm/sec     ICA/CCA ratio: 1.52     Impression:  1. Right internal carotid: Occluded, unchanged. Flow is visualized  within the vertebral artery, consistent with angiogram 11/9/2011 and  carotid ultrasound 6/20/2008.     2. Left carotid stent: Less than 50 percent narrowing within common  carotid and internal carotid artery stent by velocity criteria.     3. The left external carotid artery demonstrate elevated velocities at  513 cm/sec, previously 380 cm/sec. Findings likely represent  hemodynamically significant stenosis.    ASSESSMENT/PLAN:  #1 Occluded right carotid artery, chronic, likely secondary to atherosclerosis  #2 Personal history of cerebrovascular accident, 1993  #3 Coronary artery disease  #4 Colorectal cancer  #5 Chronic kidney disease, stage III  #6 History of smoking, quit in 1993  - need to consider increasing statin to 80 mg, if tolerated   - has extensive atherosclerosis of the aorta  - right carotid occlusion is chronic and nothing can be done; has been occluded for 9+ years   - left carotid stent is patent; left external carotid stenosis is not something we would fix  - he also has left subclavian artery stenosis, which could make LIMA graft difficult   - Dr. Dowd will review; we may recommend a CTA of the aorta with runoff for further diagnosis of severity of atherosclerosis  - Dr. Dowd will talk to Dr. Curry directly     ANTI-PLATELET: Aspirin  ANTI-COAGULANT: Not warranted  STATIN: Atorvastatin 40  DIABETES MEDICATIONS: Not diabetic  TOBACCO CESSATION: Quit 1993    Please contact Dr. Dowd's Vascular Surgery Service with questions or concerns.    Angeles Han, APRN, CNP  Division of Vascular Surgery  [facility]  Pager: [phone]        I personally examined the patient and agree with the findings above.  I discussed the patient with the resident / fellow and care  team, and agree with the assessment and plan of care as documented in the note.  Vascular surgery staff    Palpable left radial pulse, although heavily calcified proximal left subclavian on the non-contrasted CT of chest.  Normal velocities on carotid duplex of the left subclavian artery.  Will discuss with Dr. Curry if needs intervention prior to CABG.    Has patent left carotid stent, chronically occluded right ica.  No need for further intervention on the carotids at this point.  B OSMANI's to eval for PVD prior to vein harvest.  May Dowd MD

## 2017-08-21 NOTE — PROGRESS NOTES
VASCULAR SURGERY CLINIC PATIENT CONSULTATION NOTE    HPI: Mr. Lira is a 72- year old male who presents today for consultation for concern of right carotid artery occlusion. Mr. Lira is currently undergoing surgical planning for coronary artery bypass grafting.     REFERRAL SOURCE: Dr. Curry    PAST MEDICAL HISTORY:  Past Medical History:   Diagnosis Date     Acute, but ill-defined, cerebrovascular disease 1994     CKD (chronic kidney disease)     Stage 3-4     History of CVA (cerebrovascular accident)      Hx of endarterectomy 2002    Left and right     Peripheral vascular disease, unspecified (H)      Pulmonary nodules      Pure hypercholesterolemia      Unspecified essential hypertension        PAST SURGICAL HISTORY:  Past Surgical History:   Procedure Laterality Date     COLONOSCOPY N/A 5/19/2017    Procedure: COMBINED COLONOSCOPY, SINGLE OR MULTIPLE BIOPSY/POLYPECTOMY BY BIOPSY;  Rectal bleeding;  Surgeon: Maximus Dinero MD;  Location:  GI     SURGICAL HISTORY OF -   10/02    angioplasty and stenting of left and internal carotid artery at the bifurcation     SURGICAL HISTORY OF - 11/11    right common iliac artery stent      VASCULAR SURGERY  2001    stent placed in carotid        FAMILY HISTORY:  Family History   Problem Relation Age of Onset     C.A.D. Father      Hypertension Father      Prostate Cancer Father      C.A.D. Brother      MI     Hypertension Brother      Arthritis Sister      DIABETES No family hx of      CEREBROVASCULAR DISEASE No family hx of      Cancer - colorectal No family hx of        SOCIAL HISTORY:   Social History   Substance Use Topics     Smoking status: Former Smoker     Packs/day: 3.00     Years: 30.00     Quit date: 8/5/1994     Smokeless tobacco: Former User     Quit date: 8/1/1994      Comment: quit 15 years ago after his stroke     Alcohol use Yes      Comment: every evening 1-2 drinks       TOBACCO USE:  Quit 1993    FUNCTIONAL CAPACITY: Lives alone in an apartment  locally. Is a retired  at [employer]. Notes he also has colorectal cancer and will need to undergo surgery after he recovers from coronary bypass.     HOME MEDICATIONS:  Prior to Admission medications    Medication Sig Start Date End Date Taking? Authorizing Provider   aspirin 81 MG EC tablet Take 1 tablet (81 mg) by mouth daily  Patient taking differently: Take 81 mg by mouth every morning  7/31/17   Jose Willett MD   polyethylene glycol (GOLYTELY/NULYTELY) 236 G suspension Take as directed in your surgery packet.  Patient not taking: Reported on 7/31/2017 7/28/17   Sharan Raza MD   atorvastatin (LIPITOR) 40 MG tablet Take 1 tablet (40 mg) by mouth daily  Patient taking differently: Take 40 mg by mouth every morning  6/26/17   Rebeca Sandoval NP   amLODIPine (NORVASC) 10 MG tablet Take 1 tablet (10 mg) by mouth daily  Patient taking differently: Take 10 mg by mouth every morning  6/26/17   Rebeca Sandoval NP   atenolol (TENORMIN) 100 MG tablet Take 1 tablet (100 mg) by mouth daily  Patient taking differently: Take 100 mg by mouth every morning  6/26/17   Rebeca Sandoval NP   valsartan (DIOVAN) 320 MG tablet Take 1 tablet (320 mg) by mouth daily  Patient taking differently: Take 320 mg by mouth every morning  6/26/17   Rebeca Sandoval NP   tiZANidine (ZANAFLEX) 2 MG tablet Take 1-2 tablets (2-4 mg) by mouth nightly as needed 10/25/16   Rebeca Sandoval NP   Saw Palmetto, Serenoa repens, (SAW PALMETTO PO) Take by mouth every morning     Reported, Patient   niacin 500 MG tablet Take 1 tablet (500 mg) by mouth At Bedtime 8/25/14   Rebeca Sandoval NP   Multiple Vitamins-Minerals (MULTIVITAMIN & MINERAL PO) Take 1 tablet by mouth every morning     Reported, Patient   diphenhydrAMINE (BENADRYL) 25 MG capsule Take 25 mg by mouth nightly as needed. sleep    Reported, Patient   lactobacillus rhamnosus, GG, (CULTURELLE) 10 B CELL capsule Take 1 capsule by mouth every  morning     Unknown, Entered By History       VITAL SIGNS:      BP: 155/75 Pulse: 60   Resp: 18 SpO2: 97 %        PHYSICAL EXAM:  NEURO/PSYCH:  The patient is alert and oriented.  Appropriate.  Moves all extremities.   SKIN: Color is appropriate for race, warm, dry..    PULMONARY:  Does not require supplemental oxygen; no acute distress.    EXTREMITIES: Appears well perfused. Faintly palpable bilateral femoral pulse.     7/31/2017 CAROTID ULTRASOUND:  Findings:     Right side:      Plaque Morphology: Predominantly echogenic, occlusive within the  internal carotid artery and irregular within the distal common carotid        Proximal CCA: 119 / 11  cm/sec     Mid CCA: 75 / 5  cm/sec     Distal CCA: 102 / 6  cm/sec     Carotid bifurcation: 93 / 11 cm/sec     External CA: 200 / 16 cm/sec        Proximal ICA: Occluded     Mid ICA: Occluded     Distal ICA: Occluded        Vert: Not seen, also not seen on review of procedural imaging  11/9/2011 or carotid ultrasound 6/20/2008.      Innominate artery: 113 cm/sec     Subclavian artery: 142 cm/sec     ICA/CCA ratio: N/A      Left side:      Plaque Morphology: Predominantly echogenic, Irregular. Large echogenic  plaque in the common carotid artery.        CCA origin: 60 / 13 cm/sec     Proximal CCA: 66 / 20 cm/sec     Mid CCA: 76 / 25  cm/sec     Distal CCA (proximal to stent): 93 / 21 cm/sec     Distal CCA (proximal stent): 91 / 19 cm/sec     Carotid bifurcation (mid stent): 87 / 18 cm/sec     External CA: 513 / 85 cm/sec, previously 380/42        Proximal ICA (distal stent): 77 / 15 cm/sec     Proximal ICA (distal to stent): 89 / 23 cm/sec     Mid ICA: 119 / 33  cm/sec     Distal ICA: 104 / 25 cm/sec        Vertebral Artery: Antegrade, 32/13 cm/sec     Subclavian artery: 118/9 cm/sec     ICA/CCA ratio: 1.52         Impression:     1. Right internal carotid: Occluded, unchanged. Flow is visualized  within the vertebral artery, consistent with angiogram 11/9/2011 and  carotid  ultrasound 6/20/2008.     2. Left carotid stent: Less than 50 percent narrowing within common  carotid and internal carotid artery stent by velocity criteria.     3. The left external carotid artery demonstrate elevated velocities at  513 cm/sec, previously 380 cm/sec. Findings likely represent  hemodynamically significant stenosis.    ASSESSMENT/PLAN:  #1 Occluded right carotid artery, chronic, likely secondary to atherosclerosis  #2 Personal history of cerebrovascular accident, 1993  #3 Coronary artery disease  #4 Colorectal cancer  #5 Chronic kidney disease, stage III  #6 History of smoking, quit in 1993  - need to consider increasing statin to 80 mg, if tolerated   - has extensive atherosclerosis of the aorta  - right carotid occlusion is chronic and nothing can be done; has been occluded for 9+ years   - left carotid stent is patent; left external carotid stenosis is not something we would fix  - he also has left subclavian artery stenosis, which could make LIMA graft difficult   - Dr. Dowd will review; we may recommend a CTA of the aorta with runoff for further diagnosis of severity of atherosclerosis  - Dr. Dowd will talk to Dr. Curry directly     ANTI-PLATELET: Aspirin  ANTI-COAGULANT: Not warranted  STATIN: Atorvastatin 40  DIABETES MEDICATIONS: Not diabetic  TOBACCO CESSATION: Quit 1993    Please contact Dr. Dowd's Vascular Surgery Service with questions or concerns.    CUONG Hope, CNP  Division of Vascular Surgery  [facility]  Pager: [phone]        I personally examined the patient and agree with the findings above.  I discussed the patient with the resident / fellow and care team, and agree with the assessment and plan of care as documented in the note.  Vascular surgery staff    Palpable left radial pulse, although heavily calcified proximal left subclavian on the non-contrasted CT of chest.  Normal velocities on carotid duplex of the left subclavian artery.  Will  discuss with Dr. Curry if needs intervention prior to CABG.    Has patent left carotid stent, chronically occluded right ica.  No need for further intervention on the carotids at this point.  B OSMANI's to eval for PVD prior to vein harvest.  May Dowd MD

## 2017-08-28 ENCOUNTER — CARE COORDINATION (OUTPATIENT)
Dept: CARDIOLOGY | Facility: CLINIC | Age: 72
End: 2017-08-28

## 2017-08-28 DIAGNOSIS — I25.10 CAD (CORONARY ARTERY DISEASE): Primary | ICD-10-CM

## 2017-08-28 RX ORDER — SODIUM CHLORIDE 9 MG/ML
INJECTION, SOLUTION INTRAVENOUS CONTINUOUS
Status: CANCELLED | OUTPATIENT
Start: 2017-08-28

## 2017-08-28 RX ORDER — LIDOCAINE 40 MG/G
CREAM TOPICAL
Status: CANCELLED | OUTPATIENT
Start: 2017-08-28

## 2017-08-28 NOTE — PROGRESS NOTES
Per Dr Curry patient will need arteriogram to assess the LIMA and [place] prior to bypass surgery.  Per CT arteries show significant calcification.  Call patient and he agreed to plan, appointment scheduled for Thursday 08/31 at 9:30 am. Reviewed preparation with the patient and he verbalized understanding.      Carline Quezada RN

## 2017-08-31 ENCOUNTER — HOSPITAL ENCOUNTER (OUTPATIENT)
Facility: CLINIC | Age: 72
Discharge: HOME | End: 2017-08-31
Attending: THORACIC SURGERY (CARDIOTHORACIC VASCULAR SURGERY) | Admitting: INTERNAL MEDICINE
Payer: MEDICARE

## 2017-08-31 ENCOUNTER — APPOINTMENT (OUTPATIENT)
Dept: MEDSURG UNIT | Facility: CLINIC | Age: 72
End: 2017-08-31
Attending: THORACIC SURGERY (CARDIOTHORACIC VASCULAR SURGERY)
Payer: MEDICARE

## 2017-08-31 ENCOUNTER — APPOINTMENT (OUTPATIENT)
Dept: CARDIOLOGY | Facility: CLINIC | Age: 72
End: 2017-08-31
Attending: THORACIC SURGERY (CARDIOTHORACIC VASCULAR SURGERY)
Payer: MEDICARE

## 2017-08-31 VITALS
RESPIRATION RATE: 18 BRPM | OXYGEN SATURATION: 98 % | HEART RATE: 66 BPM | DIASTOLIC BLOOD PRESSURE: 94 MMHG | TEMPERATURE: 97.8 F | SYSTOLIC BLOOD PRESSURE: 164 MMHG

## 2017-08-31 DIAGNOSIS — Z98.890 STATUS POST CORONARY ANGIOGRAM: Primary | ICD-10-CM

## 2017-08-31 DIAGNOSIS — I25.10 CAD (CORONARY ARTERY DISEASE): ICD-10-CM

## 2017-08-31 LAB
ANION GAP SERPL CALCULATED.3IONS-SCNC: 8 MMOL/L (ref 3–14)
BUN SERPL-MCNC: 27 MG/DL (ref 7–30)
CALCIUM SERPL-MCNC: 8.3 MG/DL (ref 8.5–10.1)
CHLORIDE SERPL-SCNC: 106 MMOL/L (ref 94–109)
CO2 SERPL-SCNC: 23 MMOL/L (ref 20–32)
CREAT SERPL-MCNC: 2.36 MG/DL (ref 0.66–1.25)
ERYTHROCYTE [DISTWIDTH] IN BLOOD BY AUTOMATED COUNT: 13.7 % (ref 10–15)
GFR SERPL CREATININE-BSD FRML MDRD: 27 ML/MIN/1.7M2
GLUCOSE SERPL-MCNC: 91 MG/DL (ref 70–99)
HCT VFR BLD AUTO: 38.8 % (ref 40–53)
HGB BLD-MCNC: 13.3 G/DL (ref 13.3–17.7)
MCH RBC QN AUTO: 30.9 PG (ref 26.5–33)
MCHC RBC AUTO-ENTMCNC: 34.3 G/DL (ref 31.5–36.5)
MCV RBC AUTO: 90 FL (ref 78–100)
PLATELET # BLD AUTO: 185 10E9/L (ref 150–450)
POTASSIUM SERPL-SCNC: 4.3 MMOL/L (ref 3.4–5.3)
RBC # BLD AUTO: 4.31 10E12/L (ref 4.4–5.9)
SODIUM SERPL-SCNC: 137 MMOL/L (ref 133–144)
WBC # BLD AUTO: 9.8 10E9/L (ref 4–11)

## 2017-08-31 PROCEDURE — C1769 GUIDE WIRE: HCPCS

## 2017-08-31 PROCEDURE — 27210787 ZZH MANIFOLD CR2

## 2017-08-31 PROCEDURE — 25000128 H RX IP 250 OP 636: Performed by: INTERNAL MEDICINE

## 2017-08-31 PROCEDURE — 27210807 ZZH SHEATH CR6

## 2017-08-31 PROCEDURE — 40000065 ZZH STATISTIC EKG NON-CHARGEABLE

## 2017-08-31 PROCEDURE — 27210742 ZZH CATH CR1

## 2017-08-31 PROCEDURE — 27210998 ZZH ACCESS HEART CATH CR6

## 2017-08-31 PROCEDURE — 25000125 ZZHC RX 250: Performed by: INTERNAL MEDICINE

## 2017-08-31 PROCEDURE — 99153 MOD SED SAME PHYS/QHP EA: CPT

## 2017-08-31 PROCEDURE — 27211089 ZZH KIT ACIST INJECTOR CR3

## 2017-08-31 PROCEDURE — 36215 PLACE CATHETER IN ARTERY: CPT

## 2017-08-31 PROCEDURE — 92978 ENDOLUMINL IVUS OCT C 1ST: CPT

## 2017-08-31 PROCEDURE — 37252 INTRVASC US NONCORONARY 1ST: CPT | Mod: GC | Performed by: INTERNAL MEDICINE

## 2017-08-31 PROCEDURE — C1887 CATHETER, GUIDING: HCPCS

## 2017-08-31 PROCEDURE — 25000128 H RX IP 250 OP 636: Performed by: HOSPITALIST

## 2017-08-31 PROCEDURE — C1894 INTRO/SHEATH, NON-LASER: HCPCS

## 2017-08-31 PROCEDURE — 99152 MOD SED SAME PHYS/QHP 5/>YRS: CPT

## 2017-08-31 PROCEDURE — 36215 PLACE CATHETER IN ARTERY: CPT | Mod: GC | Performed by: INTERNAL MEDICINE

## 2017-08-31 PROCEDURE — 36225 PLACE CATH SUBCLAVIAN ART: CPT | Mod: GC | Performed by: INTERNAL MEDICINE

## 2017-08-31 PROCEDURE — 93571 IV DOP VEL&/PRESS C FLO 1ST: CPT

## 2017-08-31 PROCEDURE — C1753 CATH, INTRAVAS ULTRASOUND: HCPCS

## 2017-08-31 PROCEDURE — 40000172 ZZH STATISTIC PROCEDURE PREP ONLY

## 2017-08-31 PROCEDURE — 85027 COMPLETE CBC AUTOMATED: CPT | Performed by: THORACIC SURGERY (CARDIOTHORACIC VASCULAR SURGERY)

## 2017-08-31 PROCEDURE — 80048 BASIC METABOLIC PNL TOTAL CA: CPT | Performed by: THORACIC SURGERY (CARDIOTHORACIC VASCULAR SURGERY)

## 2017-08-31 PROCEDURE — 93567 NJX CAR CTH SPRVLV AORTGRPHY: CPT

## 2017-08-31 PROCEDURE — 27210946 ZZH KIT HC TOTES DISP CR8

## 2017-08-31 PROCEDURE — 93572 IV DOP VEL&/PRESS C FLO EA: CPT

## 2017-08-31 PROCEDURE — 37252 INTRVASC US NONCORONARY 1ST: CPT

## 2017-08-31 PROCEDURE — 36221 PLACE CATH THORACIC AORTA: CPT

## 2017-08-31 PROCEDURE — 93010 ELECTROCARDIOGRAM REPORT: CPT | Performed by: INTERNAL MEDICINE

## 2017-08-31 RX ORDER — HEPARIN SODIUM 1000 [USP'U]/ML
1000-10000 INJECTION, SOLUTION INTRAVENOUS; SUBCUTANEOUS EVERY 5 MIN PRN
Status: DISCONTINUED | OUTPATIENT
Start: 2017-08-31 | End: 2017-08-31 | Stop reason: HOSPADM

## 2017-08-31 RX ORDER — DIPHENHYDRAMINE HYDROCHLORIDE 50 MG/ML
25-50 INJECTION INTRAMUSCULAR; INTRAVENOUS
Status: DISCONTINUED | OUTPATIENT
Start: 2017-08-31 | End: 2017-08-31 | Stop reason: HOSPADM

## 2017-08-31 RX ORDER — POTASSIUM CHLORIDE 7.45 MG/ML
10 INJECTION INTRAVENOUS
Status: DISCONTINUED | OUTPATIENT
Start: 2017-08-31 | End: 2017-08-31 | Stop reason: HOSPADM

## 2017-08-31 RX ORDER — NICARDIPINE HYDROCHLORIDE 2.5 MG/ML
100 INJECTION INTRAVENOUS
Status: DISCONTINUED | OUTPATIENT
Start: 2017-08-31 | End: 2017-08-31 | Stop reason: HOSPADM

## 2017-08-31 RX ORDER — PROTAMINE SULFATE 10 MG/ML
1-5 INJECTION, SOLUTION INTRAVENOUS
Status: DISCONTINUED | OUTPATIENT
Start: 2017-08-31 | End: 2017-08-31 | Stop reason: HOSPADM

## 2017-08-31 RX ORDER — DEXTROSE MONOHYDRATE 25 G/50ML
12.5-5 INJECTION, SOLUTION INTRAVENOUS EVERY 30 MIN PRN
Status: DISCONTINUED | OUTPATIENT
Start: 2017-08-31 | End: 2017-08-31 | Stop reason: HOSPADM

## 2017-08-31 RX ORDER — ACETAMINOPHEN 325 MG/1
325-650 TABLET ORAL EVERY 4 HOURS PRN
Status: DISCONTINUED | OUTPATIENT
Start: 2017-08-31 | End: 2017-08-31 | Stop reason: HOSPADM

## 2017-08-31 RX ORDER — PROTAMINE SULFATE 10 MG/ML
25-100 INJECTION, SOLUTION INTRAVENOUS EVERY 5 MIN PRN
Status: DISCONTINUED | OUTPATIENT
Start: 2017-08-31 | End: 2017-08-31 | Stop reason: HOSPADM

## 2017-08-31 RX ORDER — NALOXONE HYDROCHLORIDE 0.4 MG/ML
.2-.4 INJECTION, SOLUTION INTRAMUSCULAR; INTRAVENOUS; SUBCUTANEOUS
Status: DISCONTINUED | OUTPATIENT
Start: 2017-08-31 | End: 2017-08-31 | Stop reason: HOSPADM

## 2017-08-31 RX ORDER — NITROGLYCERIN 5 MG/ML
100-500 VIAL (ML) INTRAVENOUS
Status: DISCONTINUED | OUTPATIENT
Start: 2017-08-31 | End: 2017-08-31 | Stop reason: HOSPADM

## 2017-08-31 RX ORDER — FLUMAZENIL 0.1 MG/ML
0.2 INJECTION, SOLUTION INTRAVENOUS
Status: DISCONTINUED | OUTPATIENT
Start: 2017-08-31 | End: 2017-08-31 | Stop reason: HOSPADM

## 2017-08-31 RX ORDER — ASPIRIN 325 MG
325 TABLET ORAL
Status: DISCONTINUED | OUTPATIENT
Start: 2017-08-31 | End: 2017-08-31 | Stop reason: HOSPADM

## 2017-08-31 RX ORDER — DOBUTAMINE HYDROCHLORIDE 200 MG/100ML
2-20 INJECTION INTRAVENOUS CONTINUOUS PRN
Status: DISCONTINUED | OUTPATIENT
Start: 2017-08-31 | End: 2017-08-31 | Stop reason: HOSPADM

## 2017-08-31 RX ORDER — ARGATROBAN 1 MG/ML
150 INJECTION, SOLUTION INTRAVENOUS
Status: DISCONTINUED | OUTPATIENT
Start: 2017-08-31 | End: 2017-08-31 | Stop reason: HOSPADM

## 2017-08-31 RX ORDER — NIFEDIPINE 10 MG/1
10 CAPSULE ORAL
Status: DISCONTINUED | OUTPATIENT
Start: 2017-08-31 | End: 2017-08-31 | Stop reason: HOSPADM

## 2017-08-31 RX ORDER — CLOPIDOGREL BISULFATE 75 MG/1
300-600 TABLET ORAL
Status: DISCONTINUED | OUTPATIENT
Start: 2017-08-31 | End: 2017-08-31 | Stop reason: HOSPADM

## 2017-08-31 RX ORDER — METOPROLOL TARTRATE 1 MG/ML
5 INJECTION, SOLUTION INTRAVENOUS EVERY 5 MIN PRN
Status: DISCONTINUED | OUTPATIENT
Start: 2017-08-31 | End: 2017-08-31 | Stop reason: HOSPADM

## 2017-08-31 RX ORDER — ARGATROBAN 1 MG/ML
350 INJECTION, SOLUTION INTRAVENOUS
Status: DISCONTINUED | OUTPATIENT
Start: 2017-08-31 | End: 2017-08-31 | Stop reason: HOSPADM

## 2017-08-31 RX ORDER — SODIUM CHLORIDE 9 MG/ML
INJECTION, SOLUTION INTRAVENOUS CONTINUOUS
Status: DISCONTINUED | OUTPATIENT
Start: 2017-08-31 | End: 2017-08-31 | Stop reason: HOSPADM

## 2017-08-31 RX ORDER — ENALAPRILAT 1.25 MG/ML
1.25-2.5 INJECTION INTRAVENOUS
Status: DISCONTINUED | OUTPATIENT
Start: 2017-08-31 | End: 2017-08-31 | Stop reason: HOSPADM

## 2017-08-31 RX ORDER — POTASSIUM CHLORIDE 29.8 MG/ML
20 INJECTION INTRAVENOUS
Status: DISCONTINUED | OUTPATIENT
Start: 2017-08-31 | End: 2017-08-31 | Stop reason: HOSPADM

## 2017-08-31 RX ORDER — PHENYLEPHRINE HCL IN 0.9% NACL 1 MG/10 ML
20-100 SYRINGE (ML) INTRAVENOUS
Status: DISCONTINUED | OUTPATIENT
Start: 2017-08-31 | End: 2017-08-31 | Stop reason: HOSPADM

## 2017-08-31 RX ORDER — EPTIFIBATIDE 2 MG/ML
2 INJECTION, SOLUTION INTRAVENOUS CONTINUOUS PRN
Status: DISCONTINUED | OUTPATIENT
Start: 2017-08-31 | End: 2017-08-31 | Stop reason: HOSPADM

## 2017-08-31 RX ORDER — FUROSEMIDE 10 MG/ML
20-100 INJECTION INTRAMUSCULAR; INTRAVENOUS
Status: DISCONTINUED | OUTPATIENT
Start: 2017-08-31 | End: 2017-08-31 | Stop reason: HOSPADM

## 2017-08-31 RX ORDER — CLOPIDOGREL BISULFATE 75 MG/1
75 TABLET ORAL
Status: DISCONTINUED | OUTPATIENT
Start: 2017-08-31 | End: 2017-08-31 | Stop reason: HOSPADM

## 2017-08-31 RX ORDER — ONDANSETRON 2 MG/ML
4 INJECTION INTRAMUSCULAR; INTRAVENOUS EVERY 4 HOURS PRN
Status: DISCONTINUED | OUTPATIENT
Start: 2017-08-31 | End: 2017-08-31 | Stop reason: HOSPADM

## 2017-08-31 RX ORDER — PROMETHAZINE HYDROCHLORIDE 25 MG/ML
6.25-25 INJECTION, SOLUTION INTRAMUSCULAR; INTRAVENOUS EVERY 4 HOURS PRN
Status: DISCONTINUED | OUTPATIENT
Start: 2017-08-31 | End: 2017-08-31 | Stop reason: HOSPADM

## 2017-08-31 RX ORDER — PRASUGREL 10 MG/1
10-60 TABLET, FILM COATED ORAL
Status: DISCONTINUED | OUTPATIENT
Start: 2017-08-31 | End: 2017-08-31 | Stop reason: HOSPADM

## 2017-08-31 RX ORDER — LORAZEPAM 2 MG/ML
.5-1 INJECTION INTRAMUSCULAR
Status: DISCONTINUED | OUTPATIENT
Start: 2017-08-31 | End: 2017-08-31 | Stop reason: HOSPADM

## 2017-08-31 RX ORDER — LIDOCAINE 40 MG/G
CREAM TOPICAL
Status: DISCONTINUED | OUTPATIENT
Start: 2017-08-31 | End: 2017-08-31 | Stop reason: HOSPADM

## 2017-08-31 RX ORDER — ATROPINE SULFATE 0.1 MG/ML
.5-1 INJECTION INTRAVENOUS
Status: DISCONTINUED | OUTPATIENT
Start: 2017-08-31 | End: 2017-08-31 | Stop reason: HOSPADM

## 2017-08-31 RX ORDER — NALOXONE HYDROCHLORIDE 0.4 MG/ML
0.4 INJECTION, SOLUTION INTRAMUSCULAR; INTRAVENOUS; SUBCUTANEOUS EVERY 5 MIN PRN
Status: DISCONTINUED | OUTPATIENT
Start: 2017-08-31 | End: 2017-08-31 | Stop reason: HOSPADM

## 2017-08-31 RX ORDER — HYDRALAZINE HYDROCHLORIDE 20 MG/ML
10-20 INJECTION INTRAMUSCULAR; INTRAVENOUS
Status: DISCONTINUED | OUTPATIENT
Start: 2017-08-31 | End: 2017-08-31 | Stop reason: HOSPADM

## 2017-08-31 RX ORDER — FENTANYL CITRATE 50 UG/ML
25-50 INJECTION, SOLUTION INTRAMUSCULAR; INTRAVENOUS
Status: DISCONTINUED | OUTPATIENT
Start: 2017-08-31 | End: 2017-08-31 | Stop reason: HOSPADM

## 2017-08-31 RX ORDER — METHYLPREDNISOLONE SODIUM SUCCINATE 125 MG/2ML
125 INJECTION, POWDER, LYOPHILIZED, FOR SOLUTION INTRAMUSCULAR; INTRAVENOUS
Status: DISCONTINUED | OUTPATIENT
Start: 2017-08-31 | End: 2017-08-31 | Stop reason: HOSPADM

## 2017-08-31 RX ORDER — VERAPAMIL HYDROCHLORIDE 2.5 MG/ML
1-5 INJECTION, SOLUTION INTRAVENOUS
Status: DISCONTINUED | OUTPATIENT
Start: 2017-08-31 | End: 2017-08-31 | Stop reason: HOSPADM

## 2017-08-31 RX ORDER — NITROGLYCERIN 5 MG/ML
100-200 VIAL (ML) INTRAVENOUS
Status: DISCONTINUED | OUTPATIENT
Start: 2017-08-31 | End: 2017-08-31 | Stop reason: HOSPADM

## 2017-08-31 RX ORDER — MAGNESIUM HYDROXIDE 1200 MG/15ML
1000 LIQUID ORAL CONTINUOUS PRN
Status: DISCONTINUED | OUTPATIENT
Start: 2017-08-31 | End: 2017-08-31 | Stop reason: HOSPADM

## 2017-08-31 RX ORDER — EPTIFIBATIDE 2 MG/ML
180 INJECTION, SOLUTION INTRAVENOUS EVERY 10 MIN PRN
Status: DISCONTINUED | OUTPATIENT
Start: 2017-08-31 | End: 2017-08-31 | Stop reason: HOSPADM

## 2017-08-31 RX ORDER — ATROPINE SULFATE 0.1 MG/ML
0.5 INJECTION INTRAVENOUS EVERY 5 MIN PRN
Status: DISCONTINUED | OUTPATIENT
Start: 2017-08-31 | End: 2017-08-31 | Stop reason: HOSPADM

## 2017-08-31 RX ORDER — NITROGLYCERIN 20 MG/100ML
.07-2 INJECTION INTRAVENOUS CONTINUOUS PRN
Status: DISCONTINUED | OUTPATIENT
Start: 2017-08-31 | End: 2017-08-31 | Stop reason: HOSPADM

## 2017-08-31 RX ORDER — ADENOSINE 3 MG/ML
12-12000 INJECTION, SOLUTION INTRAVENOUS
Status: DISCONTINUED | OUTPATIENT
Start: 2017-08-31 | End: 2017-08-31 | Stop reason: HOSPADM

## 2017-08-31 RX ORDER — IOPAMIDOL 755 MG/ML
70 INJECTION, SOLUTION INTRAVASCULAR ONCE
Status: COMPLETED | OUTPATIENT
Start: 2017-08-31 | End: 2017-08-31

## 2017-08-31 RX ORDER — NITROGLYCERIN 0.4 MG/1
0.4 TABLET SUBLINGUAL EVERY 5 MIN PRN
Status: DISCONTINUED | OUTPATIENT
Start: 2017-08-31 | End: 2017-08-31 | Stop reason: HOSPADM

## 2017-08-31 RX ORDER — LIDOCAINE HYDROCHLORIDE 10 MG/ML
30 INJECTION, SOLUTION EPIDURAL; INFILTRATION; INTRACAUDAL; PERINEURAL
Status: DISCONTINUED | OUTPATIENT
Start: 2017-08-31 | End: 2017-08-31 | Stop reason: HOSPADM

## 2017-08-31 RX ORDER — DOPAMINE HYDROCHLORIDE 160 MG/100ML
2-20 INJECTION, SOLUTION INTRAVENOUS CONTINUOUS PRN
Status: DISCONTINUED | OUTPATIENT
Start: 2017-08-31 | End: 2017-08-31 | Stop reason: HOSPADM

## 2017-08-31 RX ORDER — ASPIRIN 81 MG/1
81-324 TABLET, CHEWABLE ORAL
Status: DISCONTINUED | OUTPATIENT
Start: 2017-08-31 | End: 2017-08-31 | Stop reason: HOSPADM

## 2017-08-31 RX ORDER — NALOXONE HYDROCHLORIDE 0.4 MG/ML
.1-.4 INJECTION, SOLUTION INTRAMUSCULAR; INTRAVENOUS; SUBCUTANEOUS
Status: DISCONTINUED | OUTPATIENT
Start: 2017-08-31 | End: 2017-08-31 | Stop reason: HOSPADM

## 2017-08-31 RX ORDER — SODIUM NITROPRUSSIDE 25 MG/ML
100-200 INJECTION INTRAVENOUS
Status: DISCONTINUED | OUTPATIENT
Start: 2017-08-31 | End: 2017-08-31 | Stop reason: HOSPADM

## 2017-08-31 RX ADMIN — FENTANYL CITRATE 50 MCG: 50 INJECTION, SOLUTION INTRAMUSCULAR; INTRAVENOUS at 13:09

## 2017-08-31 RX ADMIN — MIDAZOLAM HYDROCHLORIDE 1 MG: 1 INJECTION, SOLUTION INTRAMUSCULAR; INTRAVENOUS at 12:25

## 2017-08-31 RX ADMIN — IOPAMIDOL 70 ML: 755 INJECTION, SOLUTION INTRAVASCULAR at 13:30

## 2017-08-31 RX ADMIN — LORAZEPAM 1 MG: 2 INJECTION INTRAMUSCULAR; INTRAVENOUS at 17:03

## 2017-08-31 RX ADMIN — SODIUM CHLORIDE, PRESERVATIVE FREE 1500 UNITS: 5 INJECTION INTRAVENOUS at 12:19

## 2017-08-31 RX ADMIN — SODIUM CHLORIDE: 9 INJECTION, SOLUTION INTRAVENOUS at 15:18

## 2017-08-31 RX ADMIN — MIDAZOLAM 0.5 MG: 1 INJECTION INTRAMUSCULAR; INTRAVENOUS at 15:22

## 2017-08-31 RX ADMIN — MIDAZOLAM HYDROCHLORIDE 1 MG: 1 INJECTION, SOLUTION INTRAMUSCULAR; INTRAVENOUS at 12:17

## 2017-08-31 RX ADMIN — FENTANYL CITRATE 50 MCG: 50 INJECTION, SOLUTION INTRAMUSCULAR; INTRAVENOUS at 12:35

## 2017-08-31 RX ADMIN — FENTANYL CITRATE 25 MCG: 50 INJECTION, SOLUTION INTRAMUSCULAR; INTRAVENOUS at 15:16

## 2017-08-31 RX ADMIN — FENTANYL CITRATE 50 MCG: 50 INJECTION, SOLUTION INTRAMUSCULAR; INTRAVENOUS at 12:17
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
## 2017-08-31 NOTE — IP AVS SNAPSHOT
MRN:[id number]                      After Visit Summary   8/31/2017    Jose Lira    MRN: [id number]           Thank you!     Thank you for choosing [facility] for your care. Our goal is always to provide you with excellent care. Hearing back from our patients is one way we can continue to improve our services. Please take a few minutes to complete the written survey that you may receive in the mail after you visit with us. Thank you!        Patient Information     Date Of Birth          1945        About your hospital stay     You were admitted on:  August 31, 2017 You last received care in the:  Unit 6D Observation [facility]    You were discharged on:  August 31, 2017       Who to Call     For medical emergencies, please call 911.  For non-urgent questions about your medical care, please call your primary care provider or clinic, [phone]          Attending Provider     Provider Eriberto Lees MD Thoracic Diseases    Jose Willett MD Cardiology       Primary Care Provider Office Phone # Fax #    Rebeca Sandoval [phone]      After Care Instructions     Discharge Instructions - IF on Metformin (Glucophage or Glucovance) or Metformin containing medications       IF on Metformin (Glucophage or Glucovance) or Metformin containing medications , schedule a Basic Metabolic Panel at [facility] or Primary Clinic in 48 - 72 hours post procedure and PRIOR TO resuming the Metformin or Metformin containing medications.  Hold Metformin (Glucophage or Glucovance) or Metformin containing medications until after the Basic Metabolic Panel on the 2nd or 3rd day following the procedure.  May resume after blood draw is complete.                  Further instructions from your care team       Going Home after Coronary Angioplasty or Stent Placement        Name: Jose Lira  Medical Record Number:  [id number]  Today's Date: August 31,  2017        For 24 hours:         Have an adult stay with you for 24 hours.         Relax and take it easy.         Drink plenty of fluids.         You may eat your normal diet, unless your doctor tells you otherwise.         Do NOT make any important or legal decisions.         Do NOT drive or operate machines at home or at work.         Do NOT drink alcohol.      Do NOT smoke.     Medicines:         If you have begun Plavix (clopidogrel), Effient (prasugrel), or Brilinta (ticagrelor), do not stop taking it until you talk to your heart doctor (cardiologist).         If you are on metformin (Glucophage), do not restart it until you have blood tests (within 2 to 3 days after discharge). When your doctor tells you it is safe, you may restart the metformin.         If you have stopped any other medicines, check with your nurse or provider about when to restart them.    Care of groin site:         Remove the Band-Aid after 24 hours. If there is minor oozing, apply another Band-aid and remove it after 12 hours.          Do NOT take a bath, or use a hot tub or pool for at least 3 days. You may shower.          It is normal to have a small bruise or lump at the site.         Do not scrub the site.         Do not use lotion or powder near the puncture site for 3 days.         For the first 2 days: Do not stoop or squat. When you cough, sneeze or move your bowels, hold your hand over the puncture site and press gently.         Do not lift more than 10 pounds for at least 3 to 5 days.         For 2 days, do NOT have sex or do any heavy exercise.     If you start bleeding from the site in your groin:  Lie down flat and press firmly on the site.  Call your physician immediately, or, come to the emergency room.      Call 911 right away if you have bleeding that is heavy or does not stop.     Call your doctor if:         You have a large or growing hard lump around the site.         The site is red, swollen, hot or tender.         " Blood or fluid is draining from the site.         You have chills or a fever greater than 101 F (38 C).         Your leg or arm turns bluish, feels numb or cool.         You have hives, a rash or unusual itching.     [facility] at [place]:   [phone] (7 days a week)      Cardiology Fellow on call (24 hours per day) at [facility], [place]:   [phone] (ask for Cardiology Fellow on call)           Pending Results     Date and Time Order Name Status Description    2017 0952 EKG 12-lead, tracing only Preliminary             Admission Information     Date & Time Provider Department Dept. Phone    2017 Jose Willett MD Unit 6D Observation [facility] [place] [phone]      Your Vitals Were     Blood Pressure Pulse Temperature Respirations Pulse Oximetry       152/90 66 97.8  F (36.6  C) (Oral) 18 98%       MyChart Information     [employer] lets you send messages to your doctor, view your test results, renew your prescriptions, schedule appointments and more. To sign up, go to [email or web address] . Click on \"Log in\" on the left side of the screen, which will take you to the Welcome page. Then click on \"Sign up Now\" on the right side of the page.     You will be asked to enter the access code listed below, as well as some personal information. Please follow the directions to create your username and password.     Your access code is: [email or web address]  Expires: 2017 11:36 AM     Your access code will  in 90 days. If you need help or a new code, please call your [place] clinic or [phone].        Care EveryWhere ID     This is your Care EveryWhere ID. This could be used by other organizations to access your [place] medical records  [id number]        Equal Access to Services     RIGO STORY : Jeremiah Olvera, waminida luqadaha, qaybta kaalmashira romero, haresh campo. So [facility] [phone].    ATENCIÓN: Si murray diehl, " tiene a adorno disposición servicios gratuitos de asistencia lingüística. Rashad ceja [phone].    We comply with applicable federal civil rights laws and [place] laws. We do not discriminate on the basis of race, color, national origin, age, disability sex, sexual orientation or gender identity.               Review of your medicines      CONTINUE these medicines which may have CHANGED, or have new prescriptions. If we are uncertain of the size of tablets/capsules you have at home, strength may be listed as something that might have changed.        Dose / Directions    amLODIPine 10 MG tablet   Commonly known as:  NORVASC   This may have changed:  when to take this   Used for:  Essential hypertension with goal blood pressure less than 130/80        Dose:  10 mg   Take 1 tablet (10 mg) by mouth daily   Quantity:  90 tablet   Refills:  PRN       aspirin 81 MG EC tablet   This may have changed:  when to take this   Used for:  Hypertension goal BP (blood pressure) < 130/80, Hyperlipidemia LDL goal <100        Dose:  81 mg   Take 1 tablet (81 mg) by mouth daily   Quantity:  30 tablet   Refills:  0       atenolol 100 MG tablet   Commonly known as:  TENORMIN   This may have changed:  when to take this   Used for:  Essential hypertension with goal blood pressure less than 130/80        Dose:  100 mg   Take 1 tablet (100 mg) by mouth daily   Quantity:  90 tablet   Refills:  PRN       atorvastatin 40 MG tablet   Commonly known as:  LIPITOR   This may have changed:  when to take this   Used for:  Hyperlipidemia LDL goal <100        Dose:  40 mg   Take 1 tablet (40 mg) by mouth daily   Quantity:  90 tablet   Refills:  PRN       valsartan 320 MG tablet   Commonly known as:  DIOVAN   This may have changed:  when to take this   Used for:  Essential hypertension with goal blood pressure less than 130/80        Dose:  320 mg   Take 1 tablet (320 mg) by mouth daily   Quantity:  90 tablet   Refills:  3         CONTINUE these medicines  which have NOT CHANGED        Dose / Directions    BENADRYL 25 MG capsule   Generic drug:  diphenhydrAMINE        Dose:  25 mg   Take 25 mg by mouth nightly as needed. sleep   Refills:  0       lactobacillus rhamnosus (GG) 10 B CELL capsule   Commonly known as:  CULTURELLE        Dose:  1 capsule   Take 1 capsule by mouth every morning   Refills:  0       MULTIVITAMIN & MINERAL PO        Dose:  1 tablet   Take 1 tablet by mouth every morning   Refills:  0       niacin 500 MG tablet   Used for:  Hyperlipidemia LDL goal <100        Dose:  500 mg   Take 1 tablet (500 mg) by mouth At Bedtime   Quantity:  30 tablet   Refills:  PRN       polyethylene glycol 236 G suspension   Commonly known as:  GoLYTELY/NuLYTELY   Used for:  Colon cancer (H)        Take as directed in your surgery packet.   Quantity:  4000 mL   Refills:  0       SAW PALMETTO PO        Take by mouth every morning   Refills:  0       tiZANidine 2 MG tablet   Commonly known as:  ZANAFLEX   Used for:  Bilateral low back pain without sciatica, unspecified chronicity        Dose:  2-4 mg   Take 1-2 tablets (2-4 mg) by mouth nightly as needed   Quantity:  60 tablet   Refills:  PRN                Protect others around you: Learn how to safely use, store and throw away your medicines at www.disposemymeds.org.             Medication List: This is a list of all your medications and when to take them. Check marks below indicate your daily home schedule. Keep this list as a reference.      Medications           Morning Afternoon Evening Bedtime As Needed    amLODIPine 10 MG tablet   Commonly known as:  NORVASC   Take 1 tablet (10 mg) by mouth daily                                aspirin 81 MG EC tablet   Take 1 tablet (81 mg) by mouth daily                                atenolol 100 MG tablet   Commonly known as:  TENORMIN   Take 1 tablet (100 mg) by mouth daily                                atorvastatin 40 MG tablet   Commonly known as:  LIPITOR   Take 1 tablet  (40 mg) by mouth daily                                BENADRYL 25 MG capsule   Take 25 mg by mouth nightly as needed. sleep   Generic drug:  diphenhydrAMINE                                lactobacillus rhamnosus (GG) 10 B CELL capsule   Commonly known as:  CULTURELLE   Take 1 capsule by mouth every morning                                MULTIVITAMIN & MINERAL PO   Take 1 tablet by mouth every morning                                niacin 500 MG tablet   Take 1 tablet (500 mg) by mouth At Bedtime                                polyethylene glycol 236 G suspension   Commonly known as:  GoLYTELY/NuLYTELY   Take as directed in your surgery packet.                                SAW PALMETTO PO   Take by mouth every morning                                tiZANidine 2 MG tablet   Commonly known as:  ZANAFLEX   Take 1-2 tablets (2-4 mg) by mouth nightly as needed                                valsartan 320 MG tablet   Commonly known as:  DIOVAN   Take 1 tablet (320 mg) by mouth daily

## 2017-08-31 NOTE — PROGRESS NOTES
CARDIAC CATH REPORT:     PROCEDURES PERFORMED:   Supravalvular Aortography, Subclavian and brachiocephalic Angiography  Intravascular Ultrasound (IVUS)  Fractional Flow Reserve    PHYSICIANS:  Attending Physician: Jose Willett MD PhD  Cardiology Fellow: Dudley Berkowitz MD    INDICATION:  Jose Lira is a 72 year old male who presents for angiography of his subclavian and brachiocephalic arteries to assess his internal mammary arteries for patency prior to bypass grafting.  Patient had positive stress test in preop clearance for colorectal cancer resection, found to have severe multivessel disease on previous angiogram.  Referred for bypass surgery, however, because severe peripheral vascular and carotid disease, it was recommended to assess his internal mammary arteries for patency prior to bypass grafting.    DESCRIPTION:  1. Consent obtained with discussion of risks.  All questions were answered.  2. Sterile prep and procedure.  3. Location with Sheaths:   Rt Femoral Arterial  6 Fr  25 cm [long]   4. Access: Local anesthetic with lidocaine.  A micropuncture 21 guage needle with ultrasound guidance was used to establish vascular access using a modified Seldinger technique.  5. Diagnostic Catheters:   4 Fr  JR4 and pigtail  0.014 [place] Verrata FFR Wire  [place] IVUS [place] Eye Catheter  6. Guiding Catheters:  .025 Guidewire  CM Bentson Straight  7. Estimated blood loss: < 25 ml    MEDICATIONS:  The procedure was performed under conscious sedation for 57 minutes from 12:17 to 13:14.  The patient was assessed immediately before the first sedation medication was administered.  Midazolam 2 mg and Fentanyl 150 mcg were administered.  Heart rate, BP, respiration, oxygen saturation and patient responses were monitored throughout the procedure with the assistance of the RN under my supervision.  >> Antiplatelet Therapy: ASA 81 mg     Procedures:  SUPRAVALVULAR AORTOGRAPHY and GREAT VESSEL ANGIOGRAM  Right  Side  Brachiocephalic artery ostium wide open with minimal calcification.    Moderate calcifications within body of brachiocephalic artery.  Moderate calcification at bifurcation of subclavian and right carotid artery.  Complete occlusion of right carotid artery.  Right internal mammary artery seen intact.  Heavy calcifications noted distally in right subclavian artery.       Left Side  Severe Left subclavian ostial stenosis.  Diffuse severe calcification noted.      Right illiac artery stent incidentally noted    FUNCTIONAL ASSESSMENT:  A Jerome Verrata wire was passed across the lesion at left subclavian ostium.  There was a 20mmHg pressure differential across the ostium of the L subclavian artery.    INTRA-VASCULAR ULTRASOUND:  A [place] Eye catheter was advanced across Verrata wire into the vessel.  Images were collected on pullback      The minimal lumen diameter of the left subclavian artery was 2.3 mm.  The minimal lumenal area was 5.8 mm2 compared to 43 mm2 in the normal segment, corresponding to an 80% stenosis approximately.    Sheath Removal:  The right femoral artery sheath will be removed after the patient has been transferred to the unit.    Contrast: Isovue, 70 ml     Fluoroscopy Time: 21.9 min    COMPLICATIONS:  1. None    SUMMARY:   Multivessel peripheral vascular disease.  Diffuse calcifications of brachiocephalic and right subclavian artery.  Patent [place].  Severe Left subclavian ostial stenosis and diffuse calcifications noted.  20 mmHg pressure differential.     PLAN:   >> ASA 81 mg qd  >> Bedrest per protocol.  >> Continued medical management and lifestyle modification for cardiovascular risk factor optimization.   >>. Discharge today per protocol     Case also discussed with interventional cardiologist Dr. Callahan who stated he would intervene on subclavian stenosis lesion if LIMA needed in future.    The attending interventional cardiologist was present and supervised all critical  aspects the procedure.    Findings discussed with Tamie.    See CVIS report for final draft.    Dudley Berkowitz   Cardiology Fellow      ATTENDING ATTESTATION: I was present and supervised the cardiology fellow throughout the procedure and reviewed the findings with the fellow at the completion of the procedure.  I agree with the documentation above.    Jose Willett MD, PhD  Interventional/Critical Care Cardiology  [phone]    September 3, 2017

## 2017-08-31 NOTE — IP AVS SNAPSHOT
Unit 6D Observation [address] [address]    Phone:  [phone]    Fax:  [phone]                                       After Visit Summary   8/31/2017    Jose Lira    MRN: [id number]           After Visit Summary Signature Page     I have received my discharge instructions, and my questions have been answered. I have discussed any challenges I see with this plan with the nurse or doctor.    ..........................................................................................................................................  Patient/Patient Representative Signature      ..........................................................................................................................................  Patient Representative Print Name and Relationship to Patient    ..................................................               ................................................  Date                                            Time    ..........................................................................................................................................  Reviewed by Signature/Title    ...................................................              ..............................................  Date                                                            Time

## 2017-08-31 NOTE — PROGRESS NOTES
1030 Pt on 2a prepped and ready for cardiac angiogram. PIv placed and labs sent. EKg done. H&P current. Pts sister present. Pt ready for consent.

## 2017-09-01 LAB — INTERPRETATION ECG - MUSE: NORMAL

## 2017-09-01 NOTE — PROGRESS NOTES
May discharge when: the following anesthesia criteria are met (list in comments)     Discharge patient after   1. Patient is tolerating liquids and foods: YES  2. Ambulating: YES  3. Urinating: YES  4. Puncture sites are stable ( no bleeding and no hematoma): YES  5. Patient has a : YES  6. IF Vital Signs are within the patient's normal limits or systolic blood pressure greater than 90 mmHg and less than 160 mmHg: YES  7. Patient is alert and oriented: YES  8. If diabetic, blood glucose is in patient's usual normal range: NA    Pt met discharge criteria, discharge instructions reviewed, all questions answered, PIV removed, pt discharged home.

## 2017-09-01 NOTE — DISCHARGE INSTRUCTIONS
Going Home after Coronary Angioplasty or Stent Placement        Name: Jose Lira  Medical Record Number:  [id number]  Today's Date: August 31, 2017        For 24 hours:         Have an adult stay with you for 24 hours.         Relax and take it easy.         Drink plenty of fluids.         You may eat your normal diet, unless your doctor tells you otherwise.         Do NOT make any important or legal decisions.         Do NOT drive or operate machines at home or at work.         Do NOT drink alcohol.      Do NOT smoke.     Medicines:         If you have begun Plavix (clopidogrel), Effient (prasugrel), or Brilinta (ticagrelor), do not stop taking it until you talk to your heart doctor (cardiologist).         If you are on metformin (Glucophage), do not restart it until you have blood tests (within 2 to 3 days after discharge). When your doctor tells you it is safe, you may restart the metformin.         If you have stopped any other medicines, check with your nurse or provider about when to restart them.    Care of groin site:         Remove the Band-Aid after 24 hours. If there is minor oozing, apply another Band-aid and remove it after 12 hours.          Do NOT take a bath, or use a hot tub or pool for at least 3 days. You may shower.          It is normal to have a small bruise or lump at the site.         Do not scrub the site.         Do not use lotion or powder near the puncture site for 3 days.         For the first 2 days: Do not stoop or squat. When you cough, sneeze or move your bowels, hold your hand over the puncture site and press gently.         Do not lift more than 10 pounds for at least 3 to 5 days.         For 2 days, do NOT have sex or do any heavy exercise.     If you start bleeding from the site in your groin:  Lie down flat and press firmly on the site.  Call your physician immediately, or, come to the emergency room.      Call 911 right away if you have bleeding that is heavy or does  not stop.     Call your doctor if:         You have a large or growing hard lump around the site.         The site is red, swollen, hot or tender.         Blood or fluid is draining from the site.         You have chills or a fever greater than 101 F (38 C).         Your leg or arm turns bluish, feels numb or cool.         You have hives, a rash or unusual itching.     [facility] at [place]:   [phone] (7 days a week)      Cardiology Fellow on call (24 hours per day) at [facility]:   [phone] (ask for Cardiology Fellow on call)

## 2017-09-05 DIAGNOSIS — Z72.0 TOBACCO ABUSE: ICD-10-CM

## 2017-09-05 DIAGNOSIS — Z01.810 PRE-OPERATIVE CARDIOVASCULAR EXAMINATION: Primary | ICD-10-CM

## 2017-09-22 ENCOUNTER — CARE COORDINATION (OUTPATIENT)
Dept: CARDIOLOGY | Facility: CLINIC | Age: 72
End: 2017-09-22

## 2017-09-26 NOTE — PROGRESS NOTES
Patient called to ask about his surgery and when he could expect it to be scheduled.      This writer spoke to Dr Curry and patient is scheduled for Davinci single vessel bypass on 10/03, this is tentative pending the outcome of his PFTs.  Patient has a 90 year pack year hx of smoking, but quit in 1994. Patient to have PFTs on 09/27, will review tests results with Dr Curry and call patient with recommendations.

## 2017-09-27 ENCOUNTER — OFFICE VISIT (OUTPATIENT)
Dept: SURGERY | Facility: CLINIC | Age: 72
End: 2017-09-27

## 2017-09-27 ENCOUNTER — ANESTHESIA EVENT (OUTPATIENT)
Dept: SURGERY | Facility: CLINIC | Age: 72
End: 2017-09-27

## 2017-09-27 ENCOUNTER — ALLIED HEALTH/NURSE VISIT (OUTPATIENT)
Dept: SURGERY | Facility: CLINIC | Age: 72
End: 2017-09-27

## 2017-09-27 VITALS
HEART RATE: 60 BPM | OXYGEN SATURATION: 96 % | HEIGHT: 64 IN | DIASTOLIC BLOOD PRESSURE: 74 MMHG | WEIGHT: 205.5 LBS | SYSTOLIC BLOOD PRESSURE: 125 MMHG | BODY MASS INDEX: 35.08 KG/M2 | RESPIRATION RATE: 16 BRPM | TEMPERATURE: 98 F

## 2017-09-27 DIAGNOSIS — Z01.810 PRE-OPERATIVE CARDIOVASCULAR EXAMINATION: ICD-10-CM

## 2017-09-27 DIAGNOSIS — Z01.818 PREOP EXAMINATION: Primary | ICD-10-CM

## 2017-09-27 DIAGNOSIS — R91.8 PULMONARY NODULES: Primary | ICD-10-CM

## 2017-09-27 LAB
ALBUMIN UR-MCNC: 100 MG/DL
ANION GAP SERPL CALCULATED.3IONS-SCNC: 10 MMOL/L (ref 3–14)
APPEARANCE UR: CLEAR
BASOPHILS # BLD AUTO: 0.1 10E9/L (ref 0–0.2)
BASOPHILS NFR BLD AUTO: 0.6 %
BILIRUB UR QL STRIP: NEGATIVE
BUN SERPL-MCNC: 27 MG/DL (ref 7–30)
CALCIUM SERPL-MCNC: 9.1 MG/DL (ref 8.5–10.1)
CHLORIDE SERPL-SCNC: 109 MMOL/L (ref 94–109)
CO2 SERPL-SCNC: 18 MMOL/L (ref 20–32)
COLOR UR AUTO: YELLOW
CREAT SERPL-MCNC: 2.36 MG/DL (ref 0.66–1.25)
DIFFERENTIAL METHOD BLD: NORMAL
EOSINOPHIL # BLD AUTO: 0.7 10E9/L (ref 0–0.7)
EOSINOPHIL NFR BLD AUTO: 6.6 %
ERYTHROCYTE [DISTWIDTH] IN BLOOD BY AUTOMATED COUNT: 13.5 % (ref 10–15)
GFR SERPL CREATININE-BSD FRML MDRD: 27 ML/MIN/1.7M2
GLUCOSE SERPL-MCNC: 103 MG/DL (ref 70–99)
GLUCOSE UR STRIP-MCNC: NEGATIVE MG/DL
HCT VFR BLD AUTO: 41.6 % (ref 40–53)
HGB BLD-MCNC: 14.2 G/DL (ref 13.3–17.7)
HGB UR QL STRIP: NEGATIVE
HYALINE CASTS #/AREA URNS LPF: 4 /LPF (ref 0–2)
IMM GRANULOCYTES # BLD: 0.1 10E9/L (ref 0–0.4)
IMM GRANULOCYTES NFR BLD: 0.6 %
KETONES UR STRIP-MCNC: NEGATIVE MG/DL
LEUKOCYTE ESTERASE UR QL STRIP: NEGATIVE
LYMPHOCYTES # BLD AUTO: 1.5 10E9/L (ref 0.8–5.3)
LYMPHOCYTES NFR BLD AUTO: 14.3 %
MCH RBC QN AUTO: 30.5 PG (ref 26.5–33)
MCHC RBC AUTO-ENTMCNC: 34.1 G/DL (ref 31.5–36.5)
MCV RBC AUTO: 90 FL (ref 78–100)
MONOCYTES # BLD AUTO: 0.6 10E9/L (ref 0–1.3)
MONOCYTES NFR BLD AUTO: 5.3 %
MRSA DNA SPEC QL NAA+PROBE: NEGATIVE
MUCOUS THREADS #/AREA URNS LPF: PRESENT /LPF
NEUTROPHILS # BLD AUTO: 7.8 10E9/L (ref 1.6–8.3)
NEUTROPHILS NFR BLD AUTO: 72.6 %
NITRATE UR QL: NEGATIVE
NRBC # BLD AUTO: 0 10*3/UL
NRBC BLD AUTO-RTO: 0 /100
PH UR STRIP: 5 PH (ref 5–7)
PLATELET # BLD AUTO: 236 10E9/L (ref 150–450)
POTASSIUM SERPL-SCNC: 4.4 MMOL/L (ref 3.4–5.3)
RBC # BLD AUTO: 4.65 10E12/L (ref 4.4–5.9)
RBC #/AREA URNS AUTO: <1 /HPF (ref 0–2)
SODIUM SERPL-SCNC: 137 MMOL/L (ref 133–144)
SOURCE: ABNORMAL
SP GR UR STRIP: 1.01 (ref 1–1.03)
SPECIMEN SOURCE: NORMAL
UROBILINOGEN UR STRIP-MCNC: 0 MG/DL (ref 0–2)
WBC # BLD AUTO: 10.8 10E9/L (ref 4–11)
WBC #/AREA URNS AUTO: <1 /HPF (ref 0–2)

## 2017-09-27 PROCEDURE — 86923 COMPATIBILITY TEST ELECTRIC: CPT | Performed by: THORACIC SURGERY (CARDIOTHORACIC VASCULAR SURGERY)

## 2017-09-27 PROCEDURE — 87641 MR-STAPH DNA AMP PROBE: CPT | Performed by: NURSE PRACTITIONER

## 2017-09-27 PROCEDURE — 87640 STAPH A DNA AMP PROBE: CPT | Performed by: NURSE PRACTITIONER

## 2017-09-27 RX ORDER — CHLORHEXIDINE GLUCONATE ORAL RINSE 1.2 MG/ML
15 SOLUTION DENTAL ONCE
Status: CANCELLED | OUTPATIENT
Start: 2017-09-27 | End: 2017-09-27

## 2017-09-27 ASSESSMENT — LIFESTYLE VARIABLES: TOBACCO_USE: 1

## 2017-09-27 NOTE — ANESTHESIA PREPROCEDURE EVALUATION
Anesthesia Evaluation     . Pt has had prior anesthetic. Type: General    No history of anesthetic complications          ROS/MED HX    ENT/Pulmonary:     (+)DAPHNE risk factors snores loudly, hypertension, obese, other ENT (wears corrective lenses. )- tobacco use (3 PPD for 30 years), Past use 3 packs/day  , . .    Neurologic: Comment: s/p stent to Left ICA post TIA, 2002.    (+)CVA date: 1993 without deficitsTIA date: 2002 features: sluring words and general disorientation.,     Cardiovascular: Comment: PVD, s/p stent right common iliac artery , 2010.  Reports pain in legs with ambulating.     (+) hypertension-Peripheral Vascular Disease-CAD, --. Taking blood thinners Pt has received instructions: . . . :. . Previous cardiac testing Echodate:results:date: results:ECG reviewed date: results:Cath date: results:          METS/Exercise Tolerance: Comment: METs<4.  Limited by PVD.    Hematologic:  - neg hematologic  ROS       Musculoskeletal:  - neg musculoskeletal ROS       GI/Hepatic:  - neg GI/hepatic ROS       Renal/Genitourinary:     (+) chronic renal disease, type: CRI, Pt does not require dialysis, Pt has no history of transplant,       Endo:     (+) Obesity (BMI 35), .      Psychiatric:  - neg psychiatric ROS       Infectious Disease:  - neg infectious disease ROS       Malignancy:   (+) Malignancy History of Other  Other CA colon cancer Active status post         Other:    (+) No chance of pregnancy no H/O Chronic Pain,no other significant disability                    Physical Exam      Airway   Mallampati: II  Comment: TM ~3FB with limited neck extension.     Dental   (+) missing  Comment: Dentition in poor repair with multiple missing teeth.     Cardiovascular   Rhythm and rate: regular and normal      Pulmonary    breath sounds clear to auscultation    Other findings: Labs  Lab Results      Component                Value               Date                      WBC                      10.8                 09/27/2017            Lab Results      Component                Value               Date                      RBC                      4.65                09/27/2017            Lab Results      Component                Value               Date                      HGB                      14.2                09/27/2017            Lab Results      Component                Value               Date                      HCT                      41.6                09/27/2017            Lab Results      Component                Value               Date                      MCV                      90                  09/27/2017            Lab Results      Component                Value               Date                      MCH                      30.5                09/27/2017            Lab Results      Component                Value               Date                      MCHC                     34.1                09/27/2017            Lab Results      Component                Value               Date                      RDW                      13.5                09/27/2017            Lab Results      Component                Value               Date                      PLT                      236                 09/27/2017              Last Basic Metabolic Panel:  Lab Results      Component                Value               Date                      NA                       137                 09/27/2017             Lab Results      Component                Value               Date                      POTASSIUM                4.4                 09/27/2017            Lab Results      Component                Value               Date                      CHLORIDE                 109                 09/27/2017            Lab Results      Component                Value               Date                      ADRIAN                      9.1                 09/27/2017            Lab Results      Component                 Value               Date                      CO2                      18                  09/27/2017            Lab Results      Component                Value               Date                      BUN                      27                  09/27/2017            Lab Results      Component                Value               Date                      CR                       2.36                09/27/2017            Lab Results      Component                Value               Date                      GLC                      103                 09/27/2017              Color Urine (no units)       Date                     Value                 09/27/2017               Yellow           ----------  Appearance Urine (no units)       Date                     Value                 09/27/2017               Clear            ----------  Glucose Urine (mg/dL)       Date                     Value                 09/27/2017               Negative         ----------  Bilirubin Urine (no units)       Date                     Value                 09/27/2017               Negative         ----------  Ketones Urine (mg/dL)       Date                     Value                 09/27/2017               Negative         ----------  Specific Gravity Urine (no units)       Date                     Value                 09/27/2017               1.014            ----------  pH Urine (pH)       Date                     Value                 09/27/2017               5.0              ----------  Protein Albumin Urine (mg/dL)       Date                     Value                 09/27/2017               100 (A)          ----------  Urobilinogen Urine (EU/dL)       Date                     Value                 08/08/2005               0.2              ----------  Nitrite Urine (no units)       Date                     Value                 09/27/2017               Negative         ----------  Leukocyte Esterase Urine (no units)        Date                     Value                 09/27/2017               Negative         ----------        Procedures  Selective left and right coronary angiography and Subclavian artery angiography 8/15/17  SUMMARY:   1. Three vessel coronary artery disease with disease in proximal/mid RCA that was significant by FFR, severely diseased LAD, and diseased proximal to mid circumflex. This patient should be evaluated for CABG prior to consideration for colonic resection.  2. Would consider grafts to the mid/distal LAD, D1, D2, OM2, and RPDA  3. Subclavian artery with severe calcification but no hemodynamically significant stenosis    DSE 8/4/17  Interpretation Summary  Abnormal stress echo.  Mid septal hypokinesis at rest, with mid septal and apical lateral ischemia at  peak stress. Suspect multi-vessel CAD.  Target heart rate was achieved. Heart rate and blood pressure response to  dobutamine were normal. With stress, the left ventricular ejection fraction  increased from 55-60% to greater than 65% and the left ventricular size  decreased appropriately.  No subjective symptoms  ST segment depression in inferolateral leads during recovery period, but not  at peak stress.  No significant valve disease on screening doppler evaluation. The aortic root  and visualized ascending aorta are normal.     Bilateral Carotid US 7/31/17  Impression:     1. Right internal carotid: Occluded, unchanged. Flow is visualized  within the vertebral artery, consistent with angiogram 11/9/2011 and  carotid ultrasound 6/20/2008.     2. Left carotid stent: Less than 50 percent narrowing within common  carotid and internal carotid artery stent by velocity criteria.     3. The left external carotid artery demonstrate elevated velocities at  513 cm/sec, previously 380 cm/sec. Findings likely represent  hemodynamically significant stenosis.    CXR 8/15/17  IMPRESSION: Clear lungs. As above, severely calcified atherosclerotic  disease and bilateral  subclavian arteries is indirectly visualized on  current radiograph.                    PAC Discussion and Assessment    ASA Classification: 3  Case is suitable for: [place]  Anesthetic techniques and relevant risks discussed: GA  Invasive monitoring and risk discussed: Yes  Types:   Possibility and Risk of blood transfusion discussed: Yes  NPO instructions given:   Additional anesthetic preparation and risks discussed:   Needs early admission to pre-op area:   Other:     PAC Resident/NP Anesthesia Assessment:  Jose Lira is a 72 year old male scheduled for DaVinci Assisted Coronary Artery Bypass Graft on 10/3/2017 with Dr. Curry at [facility] under general anesthesia.  Mr. Lira was scheduled for Laparoscopic Assisted Low Anterior Resection Possible Loop Ileostomy on 8/9/17 with Dr. Raza for Malignant Neoplasm Of the Rectosigmoid Junction. In his cardiac pre-op evaluation, he had an abnormal DSE.  Subsequently, he underwent coronary angiogram which showed three vessel coronary artery disease without left main disease.  Given his significant peripheral vascular disease, the above procedure was recommended with Sudheerinci.  PAC referral for risk assessment and optimization of anesthesia with comorbid conditions of:  CAD; HTN; HLD; PVD; CKD; 90 pack year smoking history and distal sigmoid/rectosigmoid adenocarcinoma.    Mr. Lira presents to PAC and denies any chest pain, dyspnea, orthopnea or dizziness.  He denies any recent fevers, cough, or sore throat.  He get leg pain with walking that resolves quickly with rest.  Denies nocturnal leg pains.  He would like to proceed with above surgical intervention.        He has the following specific operative considerations:     1.  Cardiology -          -CAD, 3 vessel disease without left main (pRCA & mRCA, LAD and pCirc). EF 55-60% without valvular disease on DSE.  Hold ASA day of surgery.       - PVD, s/p stent to left internal  carotid artery, 2002 and right common iliac artery, 2011.  Seen by Dr. Dowd on 8/21/2017>>>please see her note.       - HTN-take beta blocker and calcium channel blocker DOS.  Hold ARB DOS.       - HLD, take statin as prescribed DOS.   2.  Pulmonary - 90 pack year smoking history. Quit 1994       - DAPHNE # of risks 5/8 = high risk  3.  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  GI - Risk of PONV score = 2.  If > 2, anti-emetic intervention recommended.  5.  Renal -  CKD stage 3-4: Cr 2.36, GFR 27.  Avoid nephrotoxic medications.  Recommend close monitoring of fluid balance and electrolytes throughout the perioperative period.  Evidence of albumin in urine analysis, recommend follow-up with PCP.  6.  Endocrine - HgbA1C 5.4 (8/2017)  7.  Musculoskeletal - upper extremity resting tremor  8.  Neuro - CVA 1994 and TIA 2002.  Stent to left internal carotid artery at bifurcation , 2002.    - Anesthesia considerations:  Refer to PAC assessment in anesthesia records>>>>limited neck extension with TM distance ~3 FB.    - Post-op delirium risk: elevated given age  - D/C planning initiated at PAC with .    Arrival time, NPO, shower and medication instructions provided by nursing staff today.  Preparing for Your Surgery handout given.  Patient was discussed with Dr. Paz.  I spent 20 minutes face to face with patient, assessing, examining, and educating.      Reviewed and Signed by PAC Mid-Level Provider/Resident  Mid-Level Provider/Resident: Rosalind HUTCHINS CNP  Date: 9/27/17  Time: 10:36    Attending Anesthesiologist Anesthesia Assessment:  Pt seen qs answered    Reviewed and Signed by PAC Anesthesiologist  Anesthesiologist: [email or web address]  Date: 9/29/17  Time:   Pass/Fail:   Disposition:     PAC Pharmacist Assessment:        Pharmacist:   Date:   Time:                           .

## 2017-09-27 NOTE — MR AVS SNAPSHOT
After Visit Summary   2017    Jose Lira    MRN: [id number]           Patient Information     Date Of Birth          1945        Visit Information        Provider Department      2017 10:30 AM Rn, [facility]        Care Instructions    Preparing for Your Surgery      Name:  Jose Lira   MRN:  [id number]   :  1945   Today's Date:  2017     Arriving for surgery:  Surgery date:  10/3/2017  Surgery time:  830 am  Arrival time:    630 am    Please come to:       [facility] [address]  [address] [place]  [address]    -   parking is available in front of the hospital from 5:15 am to 8:00 pm    -  Stop at the Information Desk in the lobby    -   Inform the information person that you are here for surgery. An escort to 3c will be provided. If you would not like an escort, please proceed to 3C on the 3rd floor. [phone]     What can I eat or drink?  -  You may have solid food or milk products until 8 hours prior to your surgery; until 12 midnight  -  You may have water, apple juice or 7up/Sprite until 2 hours prior to your surgery; until 630 am    Which medicines can I take?      -  Continue aspirin until surgery but do not take it on the day of surgery  -  Stop all vitamins and supplements one week before surgery.  E.g.  Saw palmetto, niacin, multivitamins, lactobacillus (culturelle)    -  Please take only these medications the day of surgery:  Amlodipine, atenolol, and tylenol if needed or pain      How do I prepare myself?  -  Take two showers: one the night before surgery; and one the morning of surgery.         Use Scrubcare or Hibiclens to wash from neck down.  You may use your own shampoo and conditioner. No other hair products.   -  Do NOT use lotion, powder, deodorant, or antiperspirant the day of your surgery.  -  Do NOT wear any makeup, fingernail polish or  Vouchercloud.  -  Begin using Incentive Spirometer 1 week prior to surgery.  Use 4 times per day, up to 5-10 breaths each time.  Bring Incentive Spirometer to hospital.  -Do not bring your own medications to the hospital, except for inhalers and eye drops.  -  Bring your ID and insurance card.    Questions or Concerns:  If you have questions or concerns, please call the  Preoperative Assessment Center, Monday-Friday 7AM-7PM:  [phone]          AFTER YOUR SURGERY  Breathing exercises   Breathing exercises help you recover faster. Take deep breaths and let the air out slowly. This will:     Help you wake up after surgery.    Help prevent complications like pneumonia.  Preventing complications will help you go home sooner.   We may give you a breathing device (incentive spirometer) to encourage you to breathe deeply.   Nausea and vomiting   You may feel sick to your stomach after surgery; if so, let your nurse know.    Pain control:  After surgery, you may have pain. Our goal is to help you manage your pain. Pain medicine will help you feel comfortable enough to do activities that will help you heal.  These activities may include breathing exercises, walking and physical therapy.   To help your health care team treat your pain we will ask: 1) If you have pain  2) where it is located 3) describe your pain in your words  Methods of pain control include medications given by mouth, vein or by nerve block for some surgeries.  We may give you a pain control pump that will:  1) Deliver the medicine through a tube placed in your vein  2) Control the amount of medicine you receive  3) Allow you to push a button to deliver a dose of pain medicine  Sequential Compression Device (SCD) or Pneumo Boots:  You may need to wear SCD S on your legs or feet. These are wraps connected to a machine that pumps in air and releases it. The repeated pumping helps prevent blood clots from forming.       Using an Incentive Spirometer    An  incentive spirometer is a device that helps you do deep breathing exercises. These exercises expand your lungs, aid in circulation, and help prevent pneumonia. Deep breathing exercises also help you breathe better and improve the function of your lungs by:    Keeping your lungs clear    Strengthening your breathing muscles    Helping prevent respiratory complications or problems  The incentive spirometer gives you a way to take an active part in recover. A nurse or therapist will teach you breathing exercises. To do these exercises, you will breathe in through your mouth and not your nose. The incentive spirometer only works correctly if you breathe in through your mouth.  Steps to clear lungs  Step 1. Exhale normally. Then, inhale normally.    Relax and breathe out.  Step 2. Place your lips tightly around the mouthpiece.    Make sure the device is upright and not tilted.  Step 3. Inhale as much air as you can through the mouthpiece (don't breath through your nose).    Inhale slowly and deeply.    Hold your breath long enough to keep the balls or disk raised for at least 3 to 5 seconds, or as instructed by your healthcare provider.    Some spirometers have an indicator to let you know that you are breathing in too fast. If the indicator goes off, breathe in more slowly.  Step 4. Repeat the exercise regularly.    Do this exercise every hour while you're awake, or as instructed by your healthcare provider.    If you were taught deep breathing and coughing exercises, do them regularly as instructed by your healthcare provider.   Follow-up care  Make a follow up appointment, or as directed. Also, follow up with your healthcare provider as advised or if your symptoms do not improve or continue to get worse.  When to call your healthcare provider  Call your healthcare provider right away if you have any of the following:    Fever 100.4  (38 C) or higher, or as directed by your healthcare provider    Brownish, bloody, or  smelly sputum  Call 911  Call 911 if any of these occur:     Shortness of breath that doesn't get better after taking your medicine    Cool, moist, pale or blue skin    Trouble breathing or swallowing, wheezing    Fainting or loss of consciousness    Feeling of fizziness or weakness or a sudden drop in blood pressure    Feeling of doom or lightheaded    Chest pain or rapid heart rate  Date Last Reviewed: 1/1/2017 2000-2017 [employer]. [address] [place] [address]. All rights reserved. This information is not intended as a substitute for professional medical care. Always follow your healthcare professional's instructions.                Follow-ups after your visit        Your next 10 appointments already scheduled     Sep 27, 2017 10:30 AM CDT   (Arrive by 10:15 AM)   PAC RN ASSESSMENT with  Pac Rn   [facility] ([facility])    [address] [place] [id number]   [phone]            Sep 27, 2017 11:00 AM CDT   (Arrive by 10:45 AM)   PAC Anesthesia Consult with  Pac Anesthesiologist   [facility] Preoperative [facility] [place] ([facility])    [address] [place] [id number]   [phone]            Sep 27, 2017 11:15 AM CDT   LAB with [facility]   [facility])    [address] [place] [id number]   [phone]           Patient must bring picture ID. Patient should be prepared to give a urine specimen  Please do not eat 10-12 hours before your appointment if you are coming in fasting for labs on lipids, cholesterol, or glucose (sugar). Pregnant women should follow their Care Team instructions. Water with medications is okay. Do not drink coffee or other fluids. If you have concerns about taking  your medications, please ask at office or if scheduling via [employer], send a message by clicking on  Secure Messaging, Message Your Care Team.            Oct 03, 2017   Procedure with Eriberto Curry MD   [facility], [place], Same Day Surgery (--)    [address]  [place] [id number] [phone]              Who to contact     Please call your clinic at [phone] to:    Ask questions about your health    Make or cancel appointments    Discuss your medicines    Learn about your test results    Speak to your doctor   If you have compliments or concerns about an experience at your clinic, or if you wish to file a complaint, please contact [facility] Patient Relations at [phone] or email us at [email or web address]         Additional Information About Your Visit        [facility] Information     [facility] is an electronic gateway that provides easy, online access to your medical records. With [facility], you can request a clinic appointment, read your test results, renew a prescription or communicate with your care team.     To sign up for [employer] visit the website at [email or web address]   You will be asked to enter the access code listed below, as well as some personal information. Please follow the directions to create your username and password.     Your access code is: [email or web address]  Expires: 2017  6:30 AM     Your access code will  in 90 days. If you need help or a new code, please contact your [facility] Clinic or call [phone] for assistance.        Care EveryWhere ID     This is your Care EveryWhere ID. This could be used by other organizations to access your [place] medical records  [id number]         Blood Pressure from Last 3 Encounters:   17 125/74   17 (!) 164/94   17 155/75    Weight from Last 3 Encounters:   17 93.2 kg (205 lb 8 oz)   17 95.2 kg (209 lb 12.8 oz)   17 91.5 kg (201 lb 12.8 oz)              Today, you had the following     No orders found for display          Today's Medication Changes          These changes are accurate as of: 9/27/17  9:55 AM.  If you have any questions, ask your nurse or doctor.               These medicines have changed or have updated prescriptions.        Dose/Directions    amLODIPine 10 MG tablet   Commonly known as:  NORVASC   This may have changed:  when to take this   Used for:  Essential hypertension with goal blood pressure less than 130/80        Dose:  10 mg   Take 1 tablet (10 mg) by mouth daily   Quantity:  90 tablet   Refills:  PRN       aspirin 81 MG EC tablet   This may have changed:  when to take this   Used for:  Hypertension goal BP (blood pressure) < 130/80, Hyperlipidemia LDL goal <100        Dose:  81 mg   Take 1 tablet (81 mg) by mouth daily   Quantity:  30 tablet   Refills:  0       atenolol 100 MG tablet   Commonly known as:  TENORMIN   This may have changed:  when to take this   Used for:  Essential hypertension with goal blood pressure less than 130/80        Dose:  100 mg   Take 1 tablet (100 mg) by mouth daily   Quantity:  90 tablet   Refills:  PRN       atorvastatin 40 MG tablet   Commonly known as:  LIPITOR   This may have changed:  when to take this   Used for:  Hyperlipidemia LDL goal <100        Dose:  40 mg   Take 1 tablet (40 mg) by mouth daily   Quantity:  90 tablet   Refills:  PRN       valsartan 320 MG tablet   Commonly known as:  DIOVAN   This may have changed:  when to take this   Used for:  Essential hypertension with goal blood pressure less than 130/80        Dose:  320 mg   Take 1 tablet (320 mg) by mouth daily   Quantity:  90 tablet   Refills:  3                Primary Care Provider Office Phone # Fax #    Rebeca Sandoval [phone] [address] [facility] [address]        Equal Access to Services     [facility] CenterHOLLIS AH: Jeremiah Olvera, wabreanna cuevas, qaybkarla romero, haresh campo. [facility] [phone].    ATENCIÓN: Si  murray diehl, tiene a adorno disposición servicios gratuitos de asistencia lingüística. Rashad ceja [phone].    We comply with applicable federal civil rights laws and [place] laws. We do not discriminate on the basis of race, color, national origin, age, disability sex, sexual orientation or gender identity.            Thank you!     Thank you for choosing [facility]  for your care. Our goal is always to provide you with excellent care. Hearing back from our patients is one way we can continue to improve our services. Please take a few minutes to complete the written survey that you may receive in the mail after your visit with us. Thank you!             Your Updated Medication List - Protect others around you: Learn how to safely use, store and throw away your medicines at www.disposemymeds.org.          This list is accurate as of: 9/27/17  9:55 AM.  Always use your most recent med list.                   Brand Name Dispense Instructions for use Diagnosis    amLODIPine 10 MG tablet    NORVASC    90 tablet    Take 1 tablet (10 mg) by mouth daily    Essential hypertension with goal blood pressure less than 130/80       aspirin 81 MG EC tablet     30 tablet    Take 1 tablet (81 mg) by mouth daily    Hypertension goal BP (blood pressure) < 130/80, Hyperlipidemia LDL goal <100       atenolol 100 MG tablet    TENORMIN    90 tablet    Take 1 tablet (100 mg) by mouth daily    Essential hypertension with goal blood pressure less than 130/80       atorvastatin 40 MG tablet    LIPITOR    90 tablet    Take 1 tablet (40 mg) by mouth daily    Hyperlipidemia LDL goal <100       BENADRYL 25 MG capsule   Generic drug:  diphenhydrAMINE      Take 25 mg by mouth nightly as needed. sleep        lactobacillus rhamnosus (GG) 10 B CELL capsule    CULTURELLE     Take 1 capsule by mouth every morning        MULTIVITAMIN & MINERAL PO      Take 1 tablet by mouth every morning        niacin 500 MG tablet     30  tablet    Take 1 tablet (500 mg) by mouth At Bedtime    Hyperlipidemia LDL goal <100       SAW PALMETTO PO      Take 1 tablet by mouth every morning        tiZANidine 2 MG tablet    ZANAFLEX    60 tablet    Take 1-2 tablets (2-4 mg) by mouth nightly as needed    Bilateral low back pain without sciatica, unspecified chronicity       valsartan 320 MG tablet    DIOVAN    90 tablet    Take 1 tablet (320 mg) by mouth daily    Essential hypertension with goal blood pressure less than 130/80

## 2017-09-27 NOTE — PATIENT INSTRUCTIONS
Preparing for Your Surgery      Name:  Jose Lira   MRN:  [id number]   :  1945   Today's Date:  2017     Arriving for surgery:  Surgery date:  10/3/2017  Surgery time:  830 am  Arrival time:    630 am    Please come to:       [facility] [address]  [address] [place]  [address]    -   parking is available in front of the hospital from 5:15 am to 8:00 pm    -  Stop at the Information Desk in the lobby    -   Inform the information person that you are here for surgery. An escort to 3c will be provided. If you would not like an escort, please proceed to 3C on the 3rd floor. [phone]     What can I eat or drink?  -  You may have solid food or milk products until 8 hours prior to your surgery; until 12 midnight  -  You may have water, apple juice or 7up/Sprite until 2 hours prior to your surgery; until 630 am    Which medicines can I take?      -  Continue aspirin until surgery but do not take it on the day of surgery  -  Stop all vitamins and supplements one week before surgery.  E.g.  Saw palmetto, niacin, multivitamins, lactobacillus (culturelle)    -  Please take only these medications the day of surgery:  Amlodipine, atenolol, and tylenol if needed or pain      How do I prepare myself?  -  Take two showers: one the night before surgery; and one the morning of surgery.         Use Scrubcare or Hibiclens to wash from neck down.  You may use your own shampoo and conditioner. No other hair products.   -  Do NOT use lotion, powder, deodorant, or antiperspirant the day of your surgery.  -  Do NOT wear any makeup, fingernail polish or jewelry.  -  Begin using Incentive Spirometer 1 week prior to surgery.  Use 4 times per day, up to 5-10 breaths each time.  Bring Incentive Spirometer to hospital.  -Do not bring your own medications to the hospital, except for inhalers and eye drops.  -  Bring your ID and insurance card.    Questions or Concerns:  If  you have questions or concerns, please call the  Preoperative Assessment Center, Monday-Friday 7AM-7PM:  [phone]          AFTER YOUR SURGERY  Breathing exercises   Breathing exercises help you recover faster. Take deep breaths and let the air out slowly. This will:     Help you wake up after surgery.    Help prevent complications like pneumonia.  Preventing complications will help you go home sooner.   We may give you a breathing device (incentive spirometer) to encourage you to breathe deeply.   Nausea and vomiting   You may feel sick to your stomach after surgery; if so, let your nurse know.    Pain control:  After surgery, you may have pain. Our goal is to help you manage your pain. Pain medicine will help you feel comfortable enough to do activities that will help you heal.  These activities may include breathing exercises, walking and physical therapy.   To help your health care team treat your pain we will ask: 1) If you have pain  2) where it is located 3) describe your pain in your words  Methods of pain control include medications given by mouth, vein or by nerve block for some surgeries.  We may give you a pain control pump that will:  1) Deliver the medicine through a tube placed in your vein  2) Control the amount of medicine you receive  3) Allow you to push a button to deliver a dose of pain medicine  Sequential Compression Device (SCD) or Pneumo Boots:  You may need to wear SCD S on your legs or feet. These are wraps connected to a machine that pumps in air and releases it. The repeated pumping helps prevent blood clots from forming.       Using an Incentive Spirometer    An incentive spirometer is a device that helps you do deep breathing exercises. These exercises expand your lungs, aid in circulation, and help prevent pneumonia. Deep breathing exercises also help you breathe better and improve the function of your lungs by:    Keeping your lungs clear    Strengthening your breathing  muscles    Helping prevent respiratory complications or problems  The incentive spirometer gives you a way to take an active part in recover. A nurse or therapist will teach you breathing exercises. To do these exercises, you will breathe in through your mouth and not your nose. The incentive spirometer only works correctly if you breathe in through your mouth.  Steps to clear lungs  Step 1. Exhale normally. Then, inhale normally.    Relax and breathe out.  Step 2. Place your lips tightly around the mouthpiece.    Make sure the device is upright and not tilted.  Step 3. Inhale as much air as you can through the mouthpiece (don't breath through your nose).    Inhale slowly and deeply.    Hold your breath long enough to keep the balls or disk raised for at least 3 to 5 seconds, or as instructed by your healthcare provider.    Some spirometers have an indicator to let you know that you are breathing in too fast. If the indicator goes off, breathe in more slowly.  Step 4. Repeat the exercise regularly.    Do this exercise every hour while you're awake, or as instructed by your healthcare provider.    If you were taught deep breathing and coughing exercises, do them regularly as instructed by your healthcare provider.   Follow-up care  Make a follow up appointment, or as directed. Also, follow up with your healthcare provider as advised or if your symptoms do not improve or continue to get worse.  When to call your healthcare provider  Call your healthcare provider right away if you have any of the following:    Fever 100.4  (38 C) or higher, or as directed by your healthcare provider    Brownish, bloody, or smelly sputum  Call 911  Call 911 if any of these occur:     Shortness of breath that doesn't get better after taking your medicine    Cool, moist, pale or blue skin    Trouble breathing or swallowing, wheezing    Fainting or loss of consciousness    Feeling of fizziness or weakness or a sudden drop in blood  pressure    Feeling of doom or lightheaded    Chest pain or rapid heart rate  Date Last Reviewed: 1/1/2017 2000-2017 [employer]. [address] [place] [address], [place] [address]. All rights reserved. This information is not intended as a substitute for professional medical care. Always follow your healthcare professional's instructions.

## 2017-09-27 NOTE — PROGRESS NOTES
Social Work: Outpatient Pre-Operative Assessment with Discharge Plan    Patient Name: Jose Lira  : 1945  Age: 72 year old  MRN: [id number]  Completed assessment with: Buddy    Presenting Information   Reason for Referral: Pre-Operative discharge planning, MRAPT  Date of intake: 2017  Referral Source: PAC  Reason for Admission: Davinci single bypass surgery  Decision Maker: self  Alternate Decision Maker: his sister Kim but needs to put that in a health care directive  Health Care Directive: Patient considering completing  Living Situation: Buddy lives in a [facility] apt in [place].   Previous Functional Status: Independent  Patient and family understanding of hospitalization: minimally invasive surgery and a few days in the hospital  Cultural/Language/Spiritual Considerations: none identified. Uses meditation  Coping with Illness: tries to not think about it too much to keep from worrying    Mental Health/Chemical Dependency:   Diagnosis: none identified  Support/Services in Place: na  Services Needed/Recommended: na    Support System  Significant Relationship at Present time:  none  Family of Origin is available for support: sister Kim and spouse Bill help with transportation, has other siblings also  Other support available:  Friends in his neighborhood area  Current in home services: none  Gaps in Support System: no one he can stay with or who can stay with him that he could identify  Community services receiving at home: none but has a  Sarah in the [facility]     Provider Information   Primary Care Physician:Reebca Sandoval [phone]      Clinic: [address] [place] [id number]    /Care Coordinator:     Financial   Financial Concerns:  Stable. Medication cost is manageable.  Insurance:   Payor/Plan Subscriber Name Rel Member # Group #   MEDICARE - MEDICARE JOSE LIRA*  [id number]       ATTN CLAIMS, [address]   COMMERCIAL -   P* ROYAL MONK E*  [id number]       HAND DELIVER TO [employer]IO STAFF         Discharge Plan   Patient and family discharge goal: to return home after DC  Provided Education on discharge plan: YES    A list of Medicare Certified Facilities was provided to the patient and/or family to encourage patient choice. Patient's choices for facility are:#  #1. [facility] (he worked for them at the [employer] for several years) #2. [facility]  Patient's choices for Medicare Certified Skilled Home Care are:  [facility]  Will [facility] provide Skilled rehabilitation or complex medical:  YES    General information regarding anticipated insurance coverage and possible out of pocket cost was discussed. Patient and patient's family are aware patient may incur the cost of transportation to the facility, pending insurance payment: YES- but recommend discussing again re TCU. He doesn't feel he will need a TCU so didn't want to discuss. He has Sr [employer] which is not a medicare supplement and doesn't cover TCU/SNF  Barriers to discharge: Pt is pretty determined he will go home but doesn't have any supports in place    Discharge Recommendations   Disposition: Home, home with homecare, or SNF. To be determined by medical team after surgery.  Transportation Plan: sister/bro in law or a friend      Additional comments   Inpatient social work/care coordination team to follow upon admission.    Please involve Kim his sister in discharge planning.    REMIGIO Tavares, Health system  Clinics and Surgery Center  [facility]  [phone]

## 2017-09-28 NOTE — H&P
Pre-Operative H & P     CC:  Preoperative exam to assess for increased cardiopulmonary risk while undergoing surgery and anesthesia.    Date of Encounter: 9/27/2017  Primary Care Physician:  Rebeca Sandoval  Jose Lira is a 72 year old male who presents for pre-operative H & P in preparation for DaVinci Assisted Coronary Artery Bypass Graft on 10/3/2017 with Dr. Curry at [facility] under general anesthesia.  Mr. Lira was scheduled for Laparoscopic Assisted Low Anterior Resection Possible Loop Ileostomy on 8/9/17 with Dr. Raza for Malignant Neoplasm Of the Rectosigmoid Junction. In his cardiac pre-op evaluation, he had an abnormal DSE.  Subsequently, he underwent coronary angiogram which showed three vessel coronary artery disease without left main disease.  Given his significant peripheral vascular disease, the above procedure was recommended with Fabio.  PAC referral for risk assessment and optimization of anesthesia with comorbid conditions of:  CAD; HTN; HLD; PVD; CKD; 90 pack year smoking history and distal sigmoid/rectosigmoid adenocarcinoma.    Mr. Lira presents to PAC and denies any chest pain, dyspnea, orthopnea or dizziness.  He denies any recent fevers, cough, or sore throat.  He get leg pain with walking that resolves quickly with rest.  Denies nocturnal leg pains.  He would like to proceed with above surgical intervention.      History is obtained from the patient and electronic health record.     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     History of CVA (cerebrovascular accident)      Hx of endarterectomy 2002    Left and right     Peripheral vascular disease, unspecified (H)      Pulmonary nodules      Pure hypercholesterolemia      Unspecified essential hypertension         Past Surgical History  Past Surgical History:   Procedure Laterality Date     COLONOSCOPY N/A 5/19/2017    Procedure: COMBINED COLONOSCOPY, SINGLE OR MULTIPLE BIOPSY/POLYPECTOMY BY BIOPSY;  Rectal bleeding;  Surgeon: Maximus Dinero MD;  Location:  GI     SURGICAL HISTORY OF -   10/02    angioplasty and stenting of left and internal carotid artery at the bifurcation     SURGICAL HISTORY OF - 11/11    right common iliac artery stent      VASCULAR SURGERY  2001    stent placed in carotid        Hx of Blood transfusions/reactions: denies     Hx of abnormal bleeding or anti-platelet use: ASA    Menstrual history: No LMP for male patient.    Steroid use in the last year: denies    Personal or FH with difficulty with Anesthesia:  denies    Prior to Admission Medications  Current Outpatient Prescriptions   Medication Sig Dispense Refill     aspirin 81 MG EC tablet Take 1 tablet (81 mg) by mouth daily (Patient taking differently: Take 81 mg by mouth every morning ) 30 tablet      atorvastatin (LIPITOR) 40 MG tablet Take 1 tablet (40 mg) by mouth daily (Patient taking differently: Take 40 mg by mouth every morning ) 90 tablet PRN     amLODIPine (NORVASC) 10 MG tablet Take 1 tablet (10 mg) by mouth daily (Patient taking differently: Take 10 mg by mouth every morning ) 90 tablet PRN     atenolol (TENORMIN) 100 MG tablet Take 1 tablet (100 mg) by mouth daily (Patient taking differently: Take 100 mg by mouth every morning ) 90 tablet PRN     valsartan (DIOVAN) 320 MG tablet Take 1 tablet (320 mg) by mouth daily (Patient taking differently: Take 320 mg by mouth every morning ) 90 tablet 3     tiZANidine (ZANAFLEX) 2 MG tablet Take 1-2 tablets (2-4 mg) by mouth nightly as needed 60 tablet PRN     Saw Palmetto, Serenoa repens, (SAW PALMETTO PO) Take 1 tablet by mouth every morning        niacin 500 MG tablet Take 1 tablet (500 mg) by mouth At Bedtime 30 tablet PRN     Multiple Vitamins-Minerals (MULTIVITAMIN & MINERAL PO)  Take 1 tablet by mouth every morning        diphenhydrAMINE (BENADRYL) 25 MG capsule Take 25 mg by mouth nightly as needed. sleep       lactobacillus rhamnosus, GG, (CULTURELLE) 10 B CELL capsule Take 1 capsule by mouth every morning        [DISCONTINUED] olmesartan-hydrochlorothiazide (BENICAR HCT) 40-25 MG per tablet Take 1 tablet by mouth daily 90 tablet 3       Allergies  Allergies   Allergen Reactions     No Known Drug Allergies        Social History  Social History     Social History     Marital status: Single     Spouse name: N/A     Number of children: 0     Years of education: N/A     Occupational History     post       Social History Main Topics     Smoking status: Former Smoker     Packs/day: 3.00     Years: 30.00     Quit date: 8/5/1994     Smokeless tobacco: Former User     Quit date: 8/1/1994      Comment: quit 15 years ago after his stroke     Alcohol use Yes      Comment: every evening 1-2 drinks     Drug use: No     Sexual activity: No     Other Topics Concern     Parent/Sibling W/ Cabg, Mi Or Angioplasty Before 65f 55m? Yes     brother and father     Social History Narrative    [facility]  Was never .  Had no children.  Has a two brothers that have passed away.  He has two sister, one with Alzheimer and one alive and well.  Sister will be here for surgery.         Family History  Family History   Problem Relation Age of Onset     C.A.D. Father      Hypertension Father      Prostate Cancer Father      C.A.D. Brother      MI     Hypertension Brother      Arthritis Sister      DIABETES No family hx of      CEREBROVASCULAR DISEASE No family hx of      Cancer - colorectal No family hx of          ROS/MED HX    ENT/Pulmonary:     (+)DAPHNE risk factors snores loudly, hypertension, obese, other ENT (wears corrective lenses. )- tobacco use (3 PPD for 30 years), Past use 3 packs/day  , . .    Neurologic: Comment: s/p stent to Left ICA post TIA, 2002.    (+)CVA date: 1993  "without deficitsTIA date: 2002 features: sluring words and general disorientation.,     Cardiovascular: Comment: PVD, s/p stent right common iliac artery , 2010.  Reports pain in legs with ambulating.     (+) hypertension-Peripheral Vascular Disease-CAD, --. Taking blood thinners Pt has received instructions: . . . :. . Previous cardiac testing Echodate:results:date: results:ECG reviewed date: results:Cath date: results:          METS/Exercise Tolerance: Comment: METs<4.  Limited by PVD.    Hematologic:  - neg hematologic  ROS       Musculoskeletal:  - neg musculoskeletal ROS       GI/Hepatic:  - neg GI/hepatic ROS       Renal/Genitourinary:     (+) chronic renal disease, type: CRI, Pt does not require dialysis, Pt has no history of transplant,       Endo:     (+) Obesity (BMI 35), .      Psychiatric:  - neg psychiatric ROS       Infectious Disease:  - neg infectious disease ROS       Malignancy:   (+) Malignancy History of Other  Other CA colon cancer Active status post         Other:    (+) No chance of pregnancy no H/O Chronic Pain,no other significant disability          Temp: 98  F (36.7  C) Temp src: Oral BP: 125/74 Pulse: 60   Resp: 16 SpO2: 96 %         205 lbs 8 oz  5' 4.25\"   Body mass index is 35 kg/(m^2).       Physical Exam  Constitutional: Awake, alert, cooperative, no apparent distress, and appears stated age and somewhat disshelved.  Eyes: Pupils equal, round and reactive to light, extra ocular muscles intact, sclera clear, conjunctiva normal.  HENT: Normocephalic, oral pharynx with moist mucus membranes,Dentition in poor repair with multiple missing teeth. No goiter appreciated.   Respiratory: Clear to auscultation bilaterally, no crackles or wheezing.  Cardiovascular: Regular rate and rhythm, normal S1 and S2, and no murmur noted.  Carotids +2, no bruits. No edema. Palpable pulses to radial  DP and PT arteries.   GI: Normal bowel sounds, soft, non-distended, non-tender, no masses palpated, no " hepatosplenomegaly. Difficult exam given obese abdomen.  Lymph/Hematologic: No cervical lymphadenopathy and no supraclavicular lymphadenopathy.  Genitourinary:  na  Skin: Warm and dry.  No rashes at anticipated surgical site.   Musculoskeletal: Limited ROM of neck. There is no redness, warmth, or swelling of visible joints. Gross motor strength is normal.    Neurologic: Awake, alert, oriented to name, place and time. Cranial nerves II-XII are grossly intact. Altered gait.  Upper extremities with resting tremors.   Neuropsychiatric: Calm, cooperative. Normal affect.      Labs  Lab Results      Component                Value               Date                      WBC                      10.8                09/27/2017            Lab Results      Component                Value               Date                      RBC                      4.65                09/27/2017            Lab Results      Component                Value               Date                      HGB                      14.2                09/27/2017            Lab Results      Component                Value               Date                      HCT                      41.6                09/27/2017            Lab Results      Component                Value               Date                      MCV                      90                  09/27/2017            Lab Results      Component                Value               Date                      MCH                      30.5                09/27/2017            Lab Results      Component                Value               Date                      MCHC                     34.1                09/27/2017            Lab Results      Component                Value               Date                      RDW                      13.5                09/27/2017            Lab Results      Component                Value               Date                      PLT                      236                  09/27/2017              Last Basic Metabolic Panel:  Lab Results      Component                Value               Date                      NA                       137                 09/27/2017             Lab Results      Component                Value               Date                      POTASSIUM                4.4                 09/27/2017            Lab Results      Component                Value               Date                      CHLORIDE                 109                 09/27/2017            Lab Results      Component                Value               Date                      ADRIAN                      9.1                 09/27/2017            Lab Results      Component                Value               Date                      CO2                      18                  09/27/2017            Lab Results      Component                Value               Date                      BUN                      27                  09/27/2017            Lab Results      Component                Value               Date                      CR                       2.36                09/27/2017            Lab Results      Component                Value               Date                      GLC                      103                 09/27/2017              Color Urine (no units)       Date                     Value                 09/27/2017               Yellow           ----------  Appearance Urine (no units)       Date                     Value                 09/27/2017               Clear            ----------  Glucose Urine (mg/dL)       Date                     Value                 09/27/2017               Negative         ----------  Bilirubin Urine (no units)       Date                     Value                 09/27/2017               Negative         ----------  Ketones Urine (mg/dL)       Date                     Value                 09/27/2017               Negative          ----------  Specific Gravity Urine (no units)       Date                     Value                 09/27/2017               1.014            ----------  pH Urine (pH)       Date                     Value                 09/27/2017               5.0              ----------  Protein Albumin Urine (mg/dL)       Date                     Value                 09/27/2017               100 (A)          ----------  Urobilinogen Urine (EU/dL)       Date                     Value                 08/08/2005               0.2              ----------  Nitrite Urine (no units)       Date                     Value                 09/27/2017               Negative         ----------  Leukocyte Esterase Urine (no units)       Date                     Value                 09/27/2017               Negative         ----------      Procedures  ECG 8/31/17:  SR 62 BPM    Selective left and right coronary angiography and Subclavian artery angiography 8/15/17  SUMMARY:   1. Three vessel coronary artery disease with disease in proximal/mid RCA that was significant by FFR, severely diseased LAD, and diseased proximal to mid circumflex. This patient should be evaluated for CABG prior to consideration for colonic resection.  2. Would consider grafts to the mid/distal LAD, D1, D2, OM2, and RPDA  3. Subclavian artery with severe calcification but no hemodynamically significant stenosis    DSE 8/4/17  Interpretation Summary  Abnormal stress echo.  Mid septal hypokinesis at rest, with mid septal and apical lateral ischemia at  peak stress. Suspect multi-vessel CAD.  Target heart rate was achieved. Heart rate and blood pressure response to  dobutamine were normal. With stress, the left ventricular ejection fraction  increased from 55-60% to greater than 65% and the left ventricular size  decreased appropriately.  No subjective symptoms  ST segment depression in inferolateral leads during recovery period, but not  at peak stress.  No significant  valve disease on screening doppler evaluation. The aortic root  and visualized ascending aorta are normal.     Bilateral Carotid US 7/31/17  Impression:     1. Right internal carotid: Occluded, unchanged. Flow is visualized  within the vertebral artery, consistent with angiogram 11/9/2011 and  carotid ultrasound 6/20/2008.     2. Left carotid stent: Less than 50 percent narrowing within common  carotid and internal carotid artery stent by velocity criteria.     3. The left external carotid artery demonstrate elevated velocities at  513 cm/sec, previously 380 cm/sec. Findings likely represent  hemodynamically significant stenosis.    CXR 8/15/17  IMPRESSION: Clear lungs. As above, severely calcified atherosclerotic  disease and bilateral subclavian arteries is indirectly visualized on  current radiograph.     ASSESSMENT and PLAN  Jose Lira is a 72 year old male scheduled to undergo DaVinci Assisted Coronary Artery Bypass Graft on 10/3/2017 with Dr. Curry at [facility] under general anesthesia.      He has the following specific operative considerations:     1.  Cardiology -          -CAD, 3 vessel disease without left main (pRCA & mRCA, LAD and pCirc). EF 55-60% without valvular disease on DSE.  Hold ASA day of surgery.       - PVD, s/p stent to left internal carotid artery, 2002 and right common iliac artery, 2011.  Seen by Dr. Dowd on 8/21/2017>>>please see her note.       - HTN-take beta blocker and calcium channel blocker DOS.  Hold ARB DOS.       - HLD, take statin as prescribed DOS.   2.  Pulmonary - 90 pack year smoking history. Quit 1994       - DAPHNE # of risks 5/8 = high risk  3.  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  GI - Risk of PONV score = 2.  If > 2, anti-emetic intervention recommended.  5.  Renal -  CKD stage 3-4: Cr 2.36, GFR 27.  Avoid nephrotoxic medications.   Recommend close monitoring of fluid balance and electrolytes throughout the perioperative period.  Evidence of albumin in urine analysis, recommend follow-up with PCP.  6.  Endocrine - HgbA1C 5.4 (8/2017)  7.  Musculoskeletal - upper extremity resting tremor  8.  Neuro - CVA 1994 and TIA 2002.  Stent to left internal carotid artery at bifurcation , 2002.    - Anesthesia considerations:  Refer to PAC assessment in anesthesia records>>>>limited neck extension with TM distance ~3 FB.    - Post-op delirium risk: elevated given age  - D/C planning initiated at PAC with .    Arrival time, NPO, shower and medication instructions provided by nursing staff today.  Preparing for Your Surgery handout given.  Patient was discussed with Dr. Paz.  I spent 20 minutes face to face with patient, assessing, examining, and educating.    CUONG Staley CNP  Preoperative Assessment Center  [facility]  Clinic and [facility] Center  Phone: [phone]  Fax: [phone]

## 2017-09-29 DIAGNOSIS — I25.10 CAD (CORONARY ARTERY DISEASE): Primary | ICD-10-CM

## 2017-10-10 LAB
DLCOUNC-%PRED-PRE: 62 %
DLCOUNC-PRE: 14.58 ML/MIN/MMHG
DLCOUNC-PRED: 23.22 ML/MIN/MMHG
ERV-%PRED-PRE: 204 %
ERV-PRE: 0.7 L
ERV-PRED: 0.34 L
EXPTIME-PRE: 9.42 SEC
FEF2575-%PRED-PRE: 102 %
FEF2575-PRE: 2.08 L/SEC
FEF2575-PRED: 2.02 L/SEC
FEFMAX-%PRED-PRE: 101 %
FEFMAX-PRE: 6.98 L/SEC
FEFMAX-PRED: 6.89 L/SEC
FEV1-%PRED-PRE: 108 %
FEV1-PRE: 2.85 L
FEV1FEV6-PRE: 75 %
FEV1FEV6-PRED: 77 %
FEV1FVC-PRE: 74 %
FEV1FVC-PRED: 76 %
FEV1SVC-PRE: 72 %
FEV1SVC-PRED: 69 %
FIFMAX-PRE: 4.89 L/SEC
FRCPLETH-%PRED-PRE: 98 %
FRCPLETH-PRE: 3.39 L
FRCPLETH-PRED: 3.44 L
FVC-%PRED-PRE: 112 %
FVC-PRE: 3.87 L
FVC-PRED: 3.44 L
IC-%PRED-PRE: 94 %
IC-PRE: 3.28 L
IC-PRED: 3.46 L
RVPLETH-%PRED-PRE: 105 %
RVPLETH-PRE: 2.7 L
RVPLETH-PRED: 2.55 L
TLCPLETH-%PRED-PRE: 109 %
TLCPLETH-PRE: 6.67 L
TLCPLETH-PRED: 6.11 L
VA-%PRED-PRE: 91 %
VA-PRE: 5.36 L
VC-%PRED-PRE: 104 %
VC-PRE: 3.98 L
VC-PRED: 3.8 L

## 2017-10-12 ENCOUNTER — OFFICE VISIT (OUTPATIENT)
Dept: FAMILY MEDICINE | Facility: CLINIC | Age: 72
End: 2017-10-12
Payer: MEDICARE

## 2017-10-12 VITALS
RESPIRATION RATE: 20 BRPM | SYSTOLIC BLOOD PRESSURE: 148 MMHG | TEMPERATURE: 97.6 F | DIASTOLIC BLOOD PRESSURE: 73 MMHG | HEART RATE: 70 BPM | WEIGHT: 207 LBS | OXYGEN SATURATION: 96 % | BODY MASS INDEX: 35.26 KG/M2

## 2017-10-12 DIAGNOSIS — L02.414 CUTANEOUS ABSCESS OF LEFT UPPER EXTREMITY: Primary | ICD-10-CM

## 2017-10-12 PROCEDURE — 99213 OFFICE O/P EST LOW 20 MIN: CPT | Performed by: NURSE PRACTITIONER

## 2017-10-12 RX ORDER — SULFAMETHOXAZOLE/TRIMETHOPRIM 800-160 MG
1 TABLET ORAL 2 TIMES DAILY
Qty: 20 TABLET | Refills: 0 | Status: SHIPPED | OUTPATIENT
Start: 2017-10-12 | End: 2017-10-23

## 2017-10-12 NOTE — PROGRESS NOTES
"  SUBJECTIVE:   Jose Lira is a 72 year old male who presents to clinic today for the following health issues:      Arm problem      Duration: Swollen on Monday   Had IV placed in arm, \"muslce feels different, aches\".     Description  Location: Left arm abrasion. Concerns regarding infection     Intensity:  moderate    Therapies tried and outcome: cortisone 10 and Aloe     On 9/27, he had a MRAPT for pre-operative planning for bypass surgery.    He has had a bump on his upper left arm for 3 days, it has started draining and is now getting smaller.  It is tender.  He denies any fevers.          Problem list and histories reviewed & adjusted, as indicated.  Additional history: as documented        Reviewed and updated as needed this visit by clinical staff     Reviewed and updated as needed this visit by Provider         ROS:  C: NEGATIVE for fever, chills, change in weight  INTEGUMENTARY/SKIN: see HPI  E/M: NEGATIVE for ear, mouth and throat problems  R: NEGATIVE for significant cough or SOB  CV: NEGATIVE for chest pain, palpitations or peripheral edema  MUSCULOSKELETAL: NEGATIVE for significant arthralgias or myalgia    OBJECTIVE:     /73  Pulse 70  Temp 97.6  F (36.4  C) (Oral)  Resp 20  Wt 207 lb (93.9 kg)  SpO2 96%  BMI 35.26 kg/m2  Body mass index is 35.26 kg/(m^2).  GENERAL: healthy, alert and no distress  SKIN: there is an erythematous fluctuant nodule approx 2 cm in diameter left upper arm, spontaneously draining        ASSESSMENT/PLAN:             1. Cutaneous abscess of left upper extremity  An I & D is not needed at this time, since it has opened and is spontaneously draining and getting smaller by his account.  Encouraged warm packs, soaking.  Will start Bactrim.  He will follow up in 2 days if no improvement or sooner if symptoms are worsening.   - sulfamethoxazole-trimethoprim (BACTRIM DS/SEPTRA DS) 800-160 MG per tablet; Take 1 tablet by mouth 2 times daily for 10 days  Dispense: " 20 tablet; Refill: 0        Rebeca Sandoval NP  [facility]

## 2017-10-12 NOTE — MR AVS SNAPSHOT
"              After Visit Summary   10/12/2017    Jose Lira    MRN: [id number]           Patient Information     Date Of Birth          1945        Visit Information        Provider Department      10/12/2017 8:00 AM Rebeca Sandoval NP [facility]        Today's Diagnoses     Cutaneous abscess of left upper extremity    -  1       Follow-ups after your visit        Your next 10 appointments already scheduled     Oct 20, 2017   Procedure with Eriberto Curry MD   [facility], [place], Same Day Surgery (--)    [address]  [place] [address] [phone]              Who to contact     If you have questions or need follow up information about today's clinic visit or your schedule please contact [facility] directly at [phone].  Normal or non-critical lab and imaging results will be communicated to you by OneFoldhart, letter or phone within 4 business days after the clinic has received the results. If you do not hear from us within 7 days, please contact the clinic through MyChart or phone. If you have a critical or abnormal lab result, we will notify you by phone as soon as possible.  Submit refill requests through [employer] or call your pharmacy and they will forward the refill request to us. Please allow 3 business days for your refill to be completed.          Additional Information About Your Visit        MyChart Information     [employer] lets you send messages to your doctor, view your test results, renew your prescriptions, schedule appointments and more. To sign up, go to [email or web address] . Click on \"Log in\" on the left side of the screen, which will take you to the Welcome page. Then click on \"Sign up Now\" on the right side of the page.     You will be asked to enter the access code listed below, as well as some personal information. Please follow the directions to create your username and password.     Your access code is: " [email or web address]  Expires: 2017  6:30 AM     Your access code will  in 90 days. If you need help or a new code, please call your [place] clinic or [phone].        Care EveryWhere ID     This is your Care EveryWhere ID. This could be used by other organizations to access your [facility] medical records  [id number]        Your Vitals Were     Pulse Temperature Respirations Pulse Oximetry BMI (Body Mass Index)       70 97.6  F (36.4  C) (Oral) 20 96% 35.26 kg/m2        Blood Pressure from Last 3 Encounters:   10/12/17 148/73   17 125/74   17 (!) 164/94    Weight from Last 3 Encounters:   10/12/17 207 lb (93.9 kg)   17 205 lb 8 oz (93.2 kg)   17 209 lb 12.8 oz (95.2 kg)              Today, you had the following     No orders found for display         Today's Medication Changes          These changes are accurate as of: 10/12/17  8:44 AM.  If you have any questions, ask your nurse or doctor.               Start taking these medicines.        Dose/Directions    sulfamethoxazole-trimethoprim 800-160 MG per tablet   Commonly known as:  BACTRIM DS/SEPTRA DS   Used for:  Cutaneous abscess of left upper extremity   Started by:  Rebeca Sandoval NP        Dose:  1 tablet   Take 1 tablet by mouth 2 times daily for 10 days   Quantity:  20 tablet   Refills:  0         These medicines have changed or have updated prescriptions.        Dose/Directions    amLODIPine 10 MG tablet   Commonly known as:  NORVASC   This may have changed:  when to take this   Used for:  Essential hypertension with goal blood pressure less than 130/80        Dose:  10 mg   Take 1 tablet (10 mg) by mouth daily   Quantity:  90 tablet   Refills:  PRN       aspirin 81 MG EC tablet   This may have changed:  when to take this   Used for:  Hypertension goal BP (blood pressure) < 130/80, Hyperlipidemia LDL goal <100        Dose:  81 mg   Take 1 tablet (81 mg) by mouth daily   Quantity:  30 tablet   Refills:  0       atenolol  100 MG tablet   Commonly known as:  TENORMIN   This may have changed:  when to take this   Used for:  Essential hypertension with goal blood pressure less than 130/80        Dose:  100 mg   Take 1 tablet (100 mg) by mouth daily   Quantity:  90 tablet   Refills:  PRN       atorvastatin 40 MG tablet   Commonly known as:  LIPITOR   This may have changed:  when to take this   Used for:  Hyperlipidemia LDL goal <100        Dose:  40 mg   Take 1 tablet (40 mg) by mouth daily   Quantity:  90 tablet   Refills:  PRN       valsartan 320 MG tablet   Commonly known as:  DIOVAN   This may have changed:  when to take this   Used for:  Essential hypertension with goal blood pressure less than 130/80        Dose:  320 mg   Take 1 tablet (320 mg) by mouth daily   Quantity:  90 tablet   Refills:  3            Where to get your medicines      These medications were sent to [facility] - [place], [place] - [address]  [address], [place] [address]     Phone:  [phone]     sulfamethoxazole-trimethoprim 800-160 MG per tablet                Primary Care Provider Office Phone # Fax #    Rebeca Sandoval [phone]       [address] [facility]  [facility] [address]        Equal Access to Services     RIGO STORY AH: Hadii yklee jon hadasho Soomaali, waaxda luqadaha, qaybta kaalmada adeegyada, haresh campo. So [facility] [phone].    ATENCIÓN: Si habla español, tiene a adorno disposición servicios gratuitos de asistencia lingüística. [facility] [phone].    We comply with applicable federal civil rights laws and [place] laws. We do not discriminate on the basis of race, color, national origin, age, disability, sex, sexual orientation, or gender identity.            Thank you!     Thank you for choosing [facility]  for your care. Our goal is always to provide you with excellent care. Hearing back from our patients is one way we can continue to improve our  services. Please take a few minutes to complete the written survey that you may receive in the mail after your visit with us. Thank you!             Your Updated Medication List - Protect others around you: Learn how to safely use, store and throw away your medicines at www.disposemymeds.org.          This list is accurate as of: 10/12/17  8:44 AM.  Always use your most recent med list.                   Brand Name Dispense Instructions for use Diagnosis    amLODIPine 10 MG tablet    NORVASC    90 tablet    Take 1 tablet (10 mg) by mouth daily    Essential hypertension with goal blood pressure less than 130/80       aspirin 81 MG EC tablet     30 tablet    Take 1 tablet (81 mg) by mouth daily    Hypertension goal BP (blood pressure) < 130/80, Hyperlipidemia LDL goal <100       atenolol 100 MG tablet    TENORMIN    90 tablet    Take 1 tablet (100 mg) by mouth daily    Essential hypertension with goal blood pressure less than 130/80       atorvastatin 40 MG tablet    LIPITOR    90 tablet    Take 1 tablet (40 mg) by mouth daily    Hyperlipidemia LDL goal <100       BENADRYL 25 MG capsule   Generic drug:  diphenhydrAMINE      Take 25 mg by mouth nightly as needed. sleep        lactobacillus rhamnosus (GG) 10 B CELL capsule    CULTURELLE     Take 1 capsule by mouth every morning        MULTIVITAMIN & MINERAL PO      Take 1 tablet by mouth every morning        niacin 500 MG tablet     30 tablet    Take 1 tablet (500 mg) by mouth At Bedtime    Hyperlipidemia LDL goal <100       SAW PALMETTO PO      Take 1 tablet by mouth every morning        sulfamethoxazole-trimethoprim 800-160 MG per tablet    BACTRIM DS/SEPTRA DS    20 tablet    Take 1 tablet by mouth 2 times daily for 10 days    Cutaneous abscess of left upper extremity       tiZANidine 2 MG tablet    ZANAFLEX    60 tablet    Take 1-2 tablets (2-4 mg) by mouth nightly as needed    Bilateral low back pain without sciatica, unspecified chronicity       valsartan 320 MG  tablet    DIOVAN    90 tablet    Take 1 tablet (320 mg) by mouth daily    Essential hypertension with goal blood pressure less than 130/80

## 2017-10-12 NOTE — NURSING NOTE
"Chief Complaint   Patient presents with     Musculoskeletal Problem       Initial /73  Pulse 70  Temp 97.6  F (36.4  C) (Oral)  Resp 20  Wt 207 lb (93.9 kg)  SpO2 96%  BMI 35.26 kg/m2 Estimated body mass index is 35.26 kg/(m^2) as calculated from the following:    Height as of 9/27/17: 5' 4.25\" (1.632 m).    Weight as of this encounter: 207 lb (93.9 kg).  Medication Reconciliation: complete     Anu Lopez MA      "

## 2017-10-16 ENCOUNTER — ANESTHESIA EVENT (OUTPATIENT)
Dept: SURGERY | Facility: CLINIC | Age: 72
End: 2017-10-16

## 2017-10-19 ASSESSMENT — LIFESTYLE VARIABLES: TOBACCO_USE: 1

## 2017-10-19 NOTE — ANESTHESIA PREPROCEDURE EVALUATION
Anesthesia Evaluation     . Pt has had prior anesthetic. Type: General    No history of anesthetic complications          ROS/MED HX    ENT/Pulmonary:     (+)DAPHNE risk factors snores loudly, hypertension, obese, other ENT (wears corrective lenses. )- tobacco use (3 PPD for 30 years), Past use 3 packs/day  , . .    Neurologic: Comment: s/p stent to Left ICA post TIA, 2002.    (+)CVA date: 1993 without deficitsTIA date: 2002 features: sluring words and general disorientation.,     Cardiovascular: Comment: PVD, s/p stent right common iliac artery , 2010.  Reports pain in legs with ambulating.     (+) hypertension-Peripheral Vascular Disease-CAD, --. Taking blood thinners Pt has received instructions: . . . :. . Previous cardiac testing Echodate:results:date: results:ECG reviewed date: results:Cath date: results:          METS/Exercise Tolerance: Comment: METs<4.  Limited by PVD.    Hematologic:  - neg hematologic  ROS       Musculoskeletal:  - neg musculoskeletal ROS       GI/Hepatic:  - neg GI/hepatic ROS       Renal/Genitourinary:     (+) chronic renal disease, type: CRI, Pt does not require dialysis, Pt has no history of transplant,       Endo:     (+) Obesity (BMI 35), .      Psychiatric:  - neg psychiatric ROS       Infectious Disease:  - neg infectious disease ROS       Malignancy:   (+) Malignancy History of Other  Other CA colon cancer Active status post         Other:    (+) No chance of pregnancy no H/O Chronic Pain,no other significant disability                    Physical Exam      Airway   Mallampati: II  Comment: TM ~3FB with limited neck extension.     Dental   (+) missing  Comment: Dentition in poor repair with multiple missing teeth.     Cardiovascular   Rhythm and rate: regular and normal      Pulmonary    breath sounds clear to auscultation    Other findings: Labs  Lab Results      Component                Value               Date                      WBC                      10.8                 09/27/2017            Lab Results      Component                Value               Date                      RBC                      4.65                09/27/2017            Lab Results      Component                Value               Date                      HGB                      14.2                09/27/2017            Lab Results      Component                Value               Date                      HCT                      41.6                09/27/2017            Lab Results      Component                Value               Date                      MCV                      90                  09/27/2017            Lab Results      Component                Value               Date                      MCH                      30.5                09/27/2017            Lab Results      Component                Value               Date                      MCHC                     34.1                09/27/2017            Lab Results      Component                Value               Date                      RDW                      13.5                09/27/2017            Lab Results      Component                Value               Date                      PLT                      236                 09/27/2017              Last Basic Metabolic Panel:  Lab Results      Component                Value               Date                      NA                       137                 09/27/2017             Lab Results      Component                Value               Date                      POTASSIUM                4.4                 09/27/2017            Lab Results      Component                Value               Date                      CHLORIDE                 109                 09/27/2017            Lab Results      Component                Value               Date                      ADRIAN                      9.1                 09/27/2017            Lab Results      Component                 Value               Date                      CO2                      18                  09/27/2017            Lab Results      Component                Value               Date                      BUN                      27                  09/27/2017            Lab Results      Component                Value               Date                      CR                       2.36                09/27/2017            Lab Results      Component                Value               Date                      GLC                      103                 09/27/2017              Color Urine (no units)       Date                     Value                 09/27/2017               Yellow           ----------  Appearance Urine (no units)       Date                     Value                 09/27/2017               Clear            ----------  Glucose Urine (mg/dL)       Date                     Value                 09/27/2017               Negative         ----------  Bilirubin Urine (no units)       Date                     Value                 09/27/2017               Negative         ----------  Ketones Urine (mg/dL)       Date                     Value                 09/27/2017               Negative         ----------  Specific Gravity Urine (no units)       Date                     Value                 09/27/2017               1.014            ----------  pH Urine (pH)       Date                     Value                 09/27/2017               5.0              ----------  Protein Albumin Urine (mg/dL)       Date                     Value                 09/27/2017               100 (A)          ----------  Urobilinogen Urine (EU/dL)       Date                     Value                 08/08/2005               0.2              ----------  Nitrite Urine (no units)       Date                     Value                 09/27/2017               Negative         ----------  Leukocyte Esterase Urine (no units)        Date                     Value                 09/27/2017               Negative         ----------        Procedures  Selective left and right coronary angiography and Subclavian artery angiography 8/15/17  SUMMARY:   1. Three vessel coronary artery disease with disease in proximal/mid RCA that was significant by FFR, severely diseased LAD, and diseased proximal to mid circumflex. This patient should be evaluated for CABG prior to consideration for colonic resection.  2. Would consider grafts to the mid/distal LAD, D1, D2, OM2, and RPDA  3. Subclavian artery with severe calcification but no hemodynamically significant stenosis    DSE 8/4/17  Interpretation Summary  Abnormal stress echo.  Mid septal hypokinesis at rest, with mid septal and apical lateral ischemia at  peak stress. Suspect multi-vessel CAD.  Target heart rate was achieved. Heart rate and blood pressure response to  dobutamine were normal. With stress, the left ventricular ejection fraction  increased from 55-60% to greater than 65% and the left ventricular size  decreased appropriately.  No subjective symptoms  ST segment depression in inferolateral leads during recovery period, but not  at peak stress.  No significant valve disease on screening doppler evaluation. The aortic root  and visualized ascending aorta are normal.     Bilateral Carotid US 7/31/17  Impression:     1. Right internal carotid: Occluded, unchanged. Flow is visualized  within the vertebral artery, consistent with angiogram 11/9/2011 and  carotid ultrasound 6/20/2008.     2. Left carotid stent: Less than 50 percent narrowing within common  carotid and internal carotid artery stent by velocity criteria.     3. The left external carotid artery demonstrate elevated velocities at  513 cm/sec, previously 380 cm/sec. Findings likely represent  hemodynamically significant stenosis.    CXR 8/15/17  IMPRESSION: Clear lungs. As above, severely calcified atherosclerotic  disease and bilateral  subclavian arteries is indirectly visualized on  current radiograph.                      PAC Discussion and Assessment    ASA Classification: 3  Case is suitable for: [place]  Anesthetic techniques and relevant risks discussed: GA  Invasive monitoring and risk discussed: Yes  Types:   Possibility and Risk of blood transfusion discussed: Yes  NPO instructions given:   Additional anesthetic preparation and risks discussed:   Needs early admission to pre-op area:   Other:     PAC Resident/NP Anesthesia Assessment:  Jose Lira is a 72 year old male scheduled for DaVinci Assisted Coronary Artery Bypass Graft on 10/3/2017 with Dr. Curry at [facility] under general anesthesia.  Mr. Lira was scheduled for Laparoscopic Assisted Low Anterior Resection Possible Loop Ileostomy on 8/9/17 with Dr. Raaz for Malignant Neoplasm Of the Rectosigmoid Junction. In his cardiac pre-op evaluation, he had an abnormal DSE.  Subsequently, he underwent coronary angiogram which showed three vessel coronary artery disease without left main disease.  Given his significant peripheral vascular disease, the above procedure was recommended with Sudheerinci.  PAC referral for risk assessment and optimization of anesthesia with comorbid conditions of:  CAD; HTN; HLD; PVD; CKD; 90 pack year smoking history and distal sigmoid/rectosigmoid adenocarcinoma.    Mr. Lira presents to PAC and denies any chest pain, dyspnea, orthopnea or dizziness.  He denies any recent fevers, cough, or sore throat.  He get leg pain with walking that resolves quickly with rest.  Denies nocturnal leg pains.  He would like to proceed with above surgical intervention.        He has the following specific operative considerations:     1.  Cardiology -          -CAD, 3 vessel disease without left main (pRCA & mRCA, LAD and pCirc). EF 55-60% without valvular disease on DSE.  Hold ASA day of surgery.       - PVD, s/p stent to left  internal carotid artery, 2002 and right common iliac artery, 2011.  Seen by Dr. Dowd on 8/21/2017>>>please see her note.       - HTN-take beta blocker and calcium channel blocker DOS.  Hold ARB DOS.       - HLD, take statin as prescribed DOS.   2.  Pulmonary - 90 pack year smoking history. Quit 1994       - DAPHNE # of risks 5/8 = high risk  3.  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  GI - Risk of PONV score = 2.  If > 2, anti-emetic intervention recommended.  5.  Renal -  CKD stage 3-4: Cr 2.36, GFR 27.  Avoid nephrotoxic medications.  Recommend close monitoring of fluid balance and electrolytes throughout the perioperative period.  Evidence of albumin in urine analysis, recommend follow-up with PCP.  6.  Endocrine - HgbA1C 5.4 (8/2017)  7.  Musculoskeletal - upper extremity resting tremor  8.  Neuro - CVA 1994 and TIA 2002.  Stent to left internal carotid artery at bifurcation , 2002.    - Anesthesia considerations:  Refer to PAC assessment in anesthesia records>>>>limited neck extension with TM distance ~3 FB.    - Post-op delirium risk: elevated given age  - D/C planning initiated at PAC with .    Arrival time, NPO, shower and medication instructions provided by nursing staff today.  Preparing for Your Surgery handout given.  Patient was discussed with Dr. Paz.  I spent 20 minutes face to face with patient, assessing, examining, and educating.      Reviewed and Signed by PAC Mid-Level Provider/Resident  Mid-Level Provider/Resident: Rosalind HUTCHINS CNP  Date: 9/27/17  Time: 10:36    Attending Anesthesiologist Anesthesia Assessment:  Pt seen qs answered    Reviewed and Signed by PAC Anesthesiologist  Anesthesiologist: brennen  Date: 9/29/17  Time:   Pass/Fail:   Disposition:     PAC Pharmacist Assessment:        Pharmacist:   Date:   Time:      Anesthesia Plan      History & Physical Review  History and physical reviewed  and following examination; no interval change.    ASA Status:  4 .    NPO Status:  > 8 hours    Plan for General and ETT with Intravenous and Propofol induction. Maintenance will be Balanced.    PONV prophylaxis:  Ondansetron (or other 5HT-3) and Dexamethasone or Solumedrol  Additional equipment: Double Lumen ETT, AUGUST, CVP, Videolaryngoscope, 2nd IV, Arterial Line and Central Line      Postoperative Care  Postoperative pain management:  IV analgesics and Multi-modal analgesia.      Consents  Anesthetic plan, risks, benefits and alternatives discussed with:  Patient..                          .

## 2017-10-20 ENCOUNTER — HOSPITAL ENCOUNTER (OUTPATIENT)
Facility: CLINIC | Age: 72
Discharge: HOME | End: 2017-10-20
Attending: THORACIC SURGERY (CARDIOTHORACIC VASCULAR SURGERY) | Admitting: THORACIC SURGERY (CARDIOTHORACIC VASCULAR SURGERY)
Payer: MEDICARE

## 2017-10-20 ENCOUNTER — TELEPHONE (OUTPATIENT)
Dept: FAMILY MEDICINE | Facility: CLINIC | Age: 72
End: 2017-10-20

## 2017-10-20 ENCOUNTER — SURGERY (OUTPATIENT)
Age: 72
End: 2017-10-20

## 2017-10-20 ENCOUNTER — ANESTHESIA (OUTPATIENT)
Dept: SURGERY | Facility: CLINIC | Age: 72
End: 2017-10-20

## 2017-10-20 VITALS
WEIGHT: 203.71 LBS | DIASTOLIC BLOOD PRESSURE: 73 MMHG | HEIGHT: 65 IN | SYSTOLIC BLOOD PRESSURE: 132 MMHG | TEMPERATURE: 98.2 F | RESPIRATION RATE: 20 BRPM | OXYGEN SATURATION: 98 % | BODY MASS INDEX: 33.94 KG/M2

## 2017-10-20 DIAGNOSIS — I25.10 CAD (CORONARY ARTERY DISEASE): ICD-10-CM

## 2017-10-20 LAB
ABO + RH BLD: NORMAL
ABO + RH BLD: NORMAL
BLD GP AB SCN SERPL QL: NORMAL
BLD PROD TYP BPU: NORMAL
BLOOD BANK CMNT PATIENT-IMP: NORMAL
BLOOD BANK CMNT PATIENT-IMP: NORMAL
CREAT SERPL-MCNC: 3.22 MG/DL (ref 0.66–1.25)
GFR SERPL CREATININE-BSD FRML MDRD: 19 ML/MIN/1.7M2
GLUCOSE BLDC GLUCOMTR-MCNC: 94 MG/DL (ref 70–99)
NUM BPU REQUESTED: 2
POTASSIUM SERPL-SCNC: 4.8 MMOL/L (ref 3.4–5.3)
SPECIMEN EXP DATE BLD: NORMAL

## 2017-10-20 PROCEDURE — 40000196 ZZH STATISTIC RAPCV CVP MONITORING

## 2017-10-20 PROCEDURE — 93010 ELECTROCARDIOGRAM REPORT: CPT | Performed by: INTERNAL MEDICINE

## 2017-10-20 PROCEDURE — 36415 COLL VENOUS BLD VENIPUNCTURE: CPT | Performed by: ANESTHESIOLOGY

## 2017-10-20 PROCEDURE — 40000883 ZZH CANCELLED SURGERY UP TO 61-90 MINS: Performed by: THORACIC SURGERY (CARDIOTHORACIC VASCULAR SURGERY)

## 2017-10-20 PROCEDURE — 82962 GLUCOSE BLOOD TEST: CPT

## 2017-10-20 PROCEDURE — 82565 ASSAY OF CREATININE: CPT | Performed by: ANESTHESIOLOGY

## 2017-10-20 PROCEDURE — 84132 ASSAY OF SERUM POTASSIUM: CPT | Performed by: ANESTHESIOLOGY

## 2017-10-20 PROCEDURE — 40000014 ZZH STATISTIC ARTERIAL MONITORING DAILY

## 2017-10-20 PROCEDURE — 40000275 ZZH STATISTIC RCP TIME EA 10 MIN

## 2017-10-20 PROCEDURE — 40000065 ZZH STATISTIC EKG NON-CHARGEABLE

## 2017-10-20 RX ORDER — CEFAZOLIN SODIUM 1 G/3ML
1 INJECTION, POWDER, FOR SOLUTION INTRAMUSCULAR; INTRAVENOUS SEE ADMIN INSTRUCTIONS
Status: DISCONTINUED | OUTPATIENT
Start: 2017-10-20 | End: 2017-10-20 | Stop reason: HOSPADM

## 2017-10-20 RX ORDER — CHLORHEXIDINE GLUCONATE ORAL RINSE 1.2 MG/ML
15 SOLUTION DENTAL ONCE
Status: DISCONTINUED | OUTPATIENT
Start: 2017-10-20 | End: 2017-10-20 | Stop reason: HOSPADM

## 2017-10-20 RX ORDER — NICOTINE POLACRILEX 4 MG
15-30 LOZENGE BUCCAL
Status: DISCONTINUED | OUTPATIENT
Start: 2017-10-20 | End: 2017-10-20 | Stop reason: HOSPADM

## 2017-10-20 RX ORDER — FLUMAZENIL 0.1 MG/ML
0.2 INJECTION, SOLUTION INTRAVENOUS
Status: DISCONTINUED | OUTPATIENT
Start: 2017-10-20 | End: 2017-10-20 | Stop reason: HOSPADM

## 2017-10-20 RX ORDER — FENTANYL CITRATE 50 UG/ML
25-50 INJECTION, SOLUTION INTRAMUSCULAR; INTRAVENOUS
Status: DISCONTINUED | OUTPATIENT
Start: 2017-10-20 | End: 2017-10-20 | Stop reason: HOSPADM

## 2017-10-20 RX ORDER — CEFAZOLIN SODIUM 2 G/100ML
2 INJECTION, SOLUTION INTRAVENOUS
Status: DISCONTINUED | OUTPATIENT
Start: 2017-10-20 | End: 2017-10-20 | Stop reason: HOSPADM

## 2017-10-20 RX ORDER — DEXTROSE MONOHYDRATE 25 G/50ML
25-50 INJECTION, SOLUTION INTRAVENOUS
Status: DISCONTINUED | OUTPATIENT
Start: 2017-10-20 | End: 2017-10-20 | Stop reason: HOSPADM

## 2017-10-20 RX ORDER — NALOXONE HYDROCHLORIDE 0.4 MG/ML
.1-.4 INJECTION, SOLUTION INTRAMUSCULAR; INTRAVENOUS; SUBCUTANEOUS
Status: DISCONTINUED | OUTPATIENT
Start: 2017-10-20 | End: 2017-10-20 | Stop reason: HOSPADM

## 2017-10-20 RX ORDER — LIDOCAINE 40 MG/G
CREAM TOPICAL
Status: DISCONTINUED | OUTPATIENT
Start: 2017-10-20 | End: 2017-10-20 | Stop reason: HOSPADM

## 2017-10-20 RX ORDER — SODIUM CHLORIDE, SODIUM LACTATE, POTASSIUM CHLORIDE, CALCIUM CHLORIDE 600; 310; 30; 20 MG/100ML; MG/100ML; MG/100ML; MG/100ML
INJECTION, SOLUTION INTRAVENOUS CONTINUOUS
Status: DISCONTINUED | OUTPATIENT
Start: 2017-10-20 | End: 2017-10-20 | Stop reason: HOSPADM

## 2017-10-20 NOTE — OR NURSING
Dr. Andrade notified of pt requesting to take antibiotic for arm abscess prior to procedure.  MDA okay to give with small sip of water.

## 2017-10-20 NOTE — TELEPHONE ENCOUNTER
Reason for Call:  Other call back    Detailed comments: Kim, Pt's sister, called and wanted to inform Lauren that the pt cancelled his heart surgery today because they found an infection in his shoulder. Please follow up with Kim thanks!    Phone Number Patient can be reached at: other [phone]    Best Time: anytime    Can we leave a detailed message on this number? YES    Call taken on 10/20/2017 at 3:37 PM by Marlin Rice

## 2017-10-20 NOTE — TELEPHONE ENCOUNTER
DAX review:  The sister Calls and states that heart surgery cancelled as surgeon said the shoulder infection needs an I & D.    Patient was told to f/u with you in 2 days after you saw him on 10/12 but he did not follow through.    Can we order a referral to Wound or Surgery for I  & D?   Told sister we need to get a Consent to communicate on file.  She thought she had one but nurse did not find yet. ( nurse listened while she gave info but did not offer info )   Also wondering if pt needs a PSA asks sister.    The heart surgery was to be the first surgery before pt would get his next colon cancer surgery so now things are delayed.  Carol Holguin RN

## 2017-10-20 NOTE — OR NURSING
1215: Dr. Curry at bedside to assess pt's wound on upper Left arm.  Dr. Gan called to bedside.  Discussed with pt and family procedure will be cancelled d/t infection.  Dr. Curry wants pt to follow up with primary care provider to get arm abscess treated appropriately.  IV removed.  Pt and family verbalize understanding.      1230: 90 minutes + spent face to face with pt.

## 2017-10-23 ENCOUNTER — OFFICE VISIT (OUTPATIENT)
Dept: FAMILY MEDICINE | Facility: CLINIC | Age: 72
End: 2017-10-23
Payer: MEDICARE

## 2017-10-23 ENCOUNTER — CARE COORDINATION (OUTPATIENT)
Dept: CARDIOLOGY | Facility: CLINIC | Age: 72
End: 2017-10-23

## 2017-10-23 VITALS
SYSTOLIC BLOOD PRESSURE: 162 MMHG | BODY MASS INDEX: 34.7 KG/M2 | RESPIRATION RATE: 20 BRPM | WEIGHT: 208.25 LBS | OXYGEN SATURATION: 97 % | DIASTOLIC BLOOD PRESSURE: 74 MMHG | HEART RATE: 70 BPM | TEMPERATURE: 97.6 F

## 2017-10-23 DIAGNOSIS — L02.414 CUTANEOUS ABSCESS OF LEFT UPPER EXTREMITY: Primary | ICD-10-CM

## 2017-10-23 LAB — INTERPRETATION ECG - MUSE: NORMAL

## 2017-10-23 PROCEDURE — 99213 OFFICE O/P EST LOW 20 MIN: CPT | Performed by: NURSE PRACTITIONER

## 2017-10-23 PROCEDURE — 87106 FUNGI IDENTIFICATION YEAST: CPT | Performed by: NURSE PRACTITIONER

## 2017-10-23 PROCEDURE — 87070 CULTURE OTHR SPECIMN AEROBIC: CPT | Performed by: NURSE PRACTITIONER

## 2017-10-23 RX ORDER — SULFAMETHOXAZOLE/TRIMETHOPRIM 800-160 MG
1 TABLET ORAL 2 TIMES DAILY
Qty: 20 TABLET | Refills: 0 | Status: SHIPPED | OUTPATIENT
Start: 2017-10-23 | End: 2017-01-01

## 2017-10-23 NOTE — PROGRESS NOTES
SUBJECTIVE:  Jose Lira, a 72 year old male scheduled an appointment to discuss the following issues:  Cutaneous abscess of left upper extremity   He had a boil that became an abscess.  It had spontaneously opened and started to drain when he was seen last week and had been decreasing in size.  He was started on Bactrim.  It has continued to decrease in size and is no longer draining.  He denies any fevers.    His CABG was cancelled due to this infection.      Medical, social, surgical, and family histories reviewed.    ROS:  C: NEGATIVE for fever, chills  INTEGUMENTARY/SKIN: see HPI  MUSCULOSKELETAL:no left arm pain  N: NEGATIVE for weakness, dizziness or paresthesias or headache    OBJECTIVE:  /74  Pulse 70  Temp 97.6  F (36.4  C) (Oral)  Resp 20  Wt 208 lb 4 oz (94.5 kg)  SpO2 97%  BMI 34.7 kg/m2  EXAM:  GENERAL APPEARANCE: healthy, alert and no distress  SKIN: there is a 1 cm in diameter area of new epithelial tissue with a tiny opening at the 5:00 position, surrounded by 2 cm of resolving erythema; no fluctuance and induration has resolved    ASSESSMENT/PLAN:  (L02.414) Cutaneous abscess of left upper extremity  (primary encounter diagnosis)  Comment:   Plan: Wound Culture Aerobic Bacterial,         sulfamethoxazole-trimethoprim (BACTRIM         DS/SEPTRA DS) 800-160 MG per tablet        Overall, significant improvement.  No abscess present to do an I & D.  A wound culture was obtained and will do a refill of Bactrim to ensure complete resolution.  Will follow up in one week or sooner if symptoms worsen.

## 2017-10-23 NOTE — MR AVS SNAPSHOT
"              After Visit Summary   10/23/2017    Jose Lira    MRN: [id number]           Patient Information     Date Of Birth          1945        Visit Information        Provider Department      10/23/2017 2:15 PM Rebeca Sandoval NP [facility]        Today's Diagnoses     Cutaneous abscess of left upper extremity    -  1       Follow-ups after your visit        Who to contact     If you have questions or need follow up information about today's clinic visit or your schedule please contact [facility] directly at [phone].  Normal or non-critical lab and imaging results will be communicated to you by [email or web address], letter or phone within 4 business days after the clinic has received the results. If you do not hear from us within 7 days, please contact the clinic through [email or web address] or phone. If you have a critical or abnormal lab result, we will notify you by phone as soon as possible.  Submit refill requests through [employer] or call your pharmacy and they will forward the refill request to us. Please allow 3 business days for your refill to be completed.          Additional Information About Your Visit        MyChart Information     [employer] lets you send messages to your doctor, view your test results, renew your prescriptions, schedule appointments and more. To sign up, go to [email or web address] . Click on \"Log in\" on the left side of the screen, which will take you to the Welcome page. Then click on \"Sign up Now\" on the right side of the page.     You will be asked to enter the access code listed below, as well as some personal information. Please follow the directions to create your username and password.     Your access code is: [email or web address]  Expires: 2017  6:30 AM     Your access code will  in 90 days. If you need help or a new code, please call your [facility] or [phone].        Care EveryWhere ID     This is your Care EveryWhere " ID. This could be used by other organizations to access your [place] medical records  [id number]        Your Vitals Were     Pulse Temperature Respirations Pulse Oximetry BMI (Body Mass Index)       70 97.6  F (36.4  C) (Oral) 20 97% 34.7 kg/m2        Blood Pressure from Last 3 Encounters:   10/23/17 162/74   10/20/17 132/73   10/12/17 148/73    Weight from Last 3 Encounters:   10/23/17 208 lb 4 oz (94.5 kg)   10/20/17 203 lb 11.3 oz (92.4 kg)   10/12/17 207 lb (93.9 kg)              We Performed the Following     Wound Culture Aerobic Bacterial          Today's Medication Changes          These changes are accurate as of: 10/23/17 11:59 PM.  If you have any questions, ask your nurse or doctor.               These medicines have changed or have updated prescriptions.        Dose/Directions    amLODIPine 10 MG tablet   Commonly known as:  NORVASC   This may have changed:  when to take this   Used for:  Essential hypertension with goal blood pressure less than 130/80        Dose:  10 mg   Take 1 tablet (10 mg) by mouth daily   Quantity:  90 tablet   Refills:  PRN       aspirin 81 MG EC tablet   This may have changed:  when to take this   Used for:  Hypertension goal BP (blood pressure) < 130/80, Hyperlipidemia LDL goal <100        Dose:  81 mg   Take 1 tablet (81 mg) by mouth daily   Quantity:  30 tablet   Refills:  0       atenolol 100 MG tablet   Commonly known as:  TENORMIN   This may have changed:  when to take this   Used for:  Essential hypertension with goal blood pressure less than 130/80        Dose:  100 mg   Take 1 tablet (100 mg) by mouth daily   Quantity:  90 tablet   Refills:  PRN       atorvastatin 40 MG tablet   Commonly known as:  LIPITOR   This may have changed:  when to take this   Used for:  Hyperlipidemia LDL goal <100        Dose:  40 mg   Take 1 tablet (40 mg) by mouth daily   Quantity:  90 tablet   Refills:  PRN       valsartan 320 MG tablet   Commonly known as:  DIOVAN   This may have  changed:  when to take this   Used for:  Essential hypertension with goal blood pressure less than 130/80        Dose:  320 mg   Take 1 tablet (320 mg) by mouth daily   Quantity:  90 tablet   Refills:  3            Where to get your medicines      These medications were [id number] - [place], [place] - [address]  [address], [facility] [address]     Phone:  [phone]     sulfamethoxazole-trimethoprim 800-160 MG per tablet                Primary Care Provider Office Phone # Fax #    Rebeca Sandoval [phone]       [address] [place]  [facility] [address]        Equal Access to Services     RIGO STORY : Hadii kylee samuel Soalejandra, waaxda luqadaha, qaybta kaalmada adebridgetyada, haresh campo. So [facility] [phone].    ATENCIÓN: Si habla español, tiene a adorno disposición servicios gratuitos de asistencia lingüística. Llame al [phone].    We comply with applicable federal civil rights laws and [place] laws. We do not discriminate on the basis of race, color, national origin, age, disability, sex, sexual orientation, or gender identity.            Thank you!     Thank you for choosing [facility]  for your care. Our goal is always to provide you with excellent care. Hearing back from our patients is one way we can continue to improve our services. Please take a few minutes to complete the written survey that you may receive in the mail after your visit with us. Thank you!             Your Updated Medication List - Protect others around you: Learn how to safely use, store and throw away your medicines at www.disposemymeds.org.          This list is accurate as of: 10/23/17 11:59 PM.  Always use your most recent med list.                   Brand Name Dispense Instructions for use Diagnosis    amLODIPine 10 MG tablet    NORVASC    90 tablet    Take 1 tablet (10 mg) by mouth daily    Essential hypertension with goal blood  pressure less than 130/80       aspirin 81 MG EC tablet     30 tablet    Take 1 tablet (81 mg) by mouth daily    Hypertension goal BP (blood pressure) < 130/80, Hyperlipidemia LDL goal <100       atenolol 100 MG tablet    TENORMIN    90 tablet    Take 1 tablet (100 mg) by mouth daily    Essential hypertension with goal blood pressure less than 130/80       atorvastatin 40 MG tablet    LIPITOR    90 tablet    Take 1 tablet (40 mg) by mouth daily    Hyperlipidemia LDL goal <100       BENADRYL 25 MG capsule   Generic drug:  diphenhydrAMINE      Take 25 mg by mouth nightly as needed. sleep        lactobacillus rhamnosus (GG) 10 B CELL capsule    CULTURELLE     Take 1 capsule by mouth every morning        MULTIVITAMIN & MINERAL PO      Take 1 tablet by mouth every morning        niacin 500 MG tablet     30 tablet    Take 1 tablet (500 mg) by mouth At Bedtime    Hyperlipidemia LDL goal <100       SAW PALMETTO PO      Take 1 tablet by mouth every morning        sulfamethoxazole-trimethoprim 800-160 MG per tablet    BACTRIM DS/SEPTRA DS    20 tablet    Take 1 tablet by mouth 2 times daily    Cutaneous abscess of left upper extremity       tiZANidine 2 MG tablet    ZANAFLEX    60 tablet    Take 1-2 tablets (2-4 mg) by mouth nightly as needed    Bilateral low back pain without sciatica, unspecified chronicity       valsartan 320 MG tablet    DIOVAN    90 tablet    Take 1 tablet (320 mg) by mouth daily    Essential hypertension with goal blood pressure less than 130/80

## 2017-10-23 NOTE — NURSING NOTE
"Chief Complaint   Patient presents with     Wound Check       Initial /74  Pulse 70  Temp 97.6  F (36.4  C) (Oral)  Resp 20  Wt 208 lb 4 oz (94.5 kg)  SpO2 97%  BMI 34.7 kg/m2 Estimated body mass index is 34.7 kg/(m^2) as calculated from the following:    Height as of 10/20/17: 5' 4.96\" (1.65 m).    Weight as of this encounter: 208 lb 4 oz (94.5 kg).  Medication Reconciliation: complete       Anu Lopez MA      "

## 2017-10-23 NOTE — TELEPHONE ENCOUNTER
Patient contacted, he is scheduled for today at 2:15.  No further action required.    Beatrice Maradiaga Willmar  October 23, 2017 7:50 AM

## 2017-10-23 NOTE — TELEPHONE ENCOUNTER
Writer notes response    Closing encounter - no further actions needed at this time    Sherif Loya RN

## 2017-10-24 LAB
BLD PROD TYP BPU: NORMAL
BLD PROD TYP BPU: NORMAL
BLD UNIT ID BPU: 0
BLD UNIT ID BPU: 0
BLOOD PRODUCT CODE: NORMAL
BLOOD PRODUCT CODE: NORMAL
BPU ID: NORMAL
BPU ID: NORMAL
TRANSFUSION STATUS PATIENT QL: NORMAL

## 2017-10-24 NOTE — PROGRESS NOTES
Patient returned call and states he saw his PCP yesterday and she refilled his antibiotic and did a wound culture.  Patient states both he and the provider agreed it was looking better.   Patient questioned about the course of antibiotics and wound and he states he was given 10 days of antibiotic and the boil is closed, no drainage and improved since last Friday.  Patient states he would like to get the bypass done asap as he is holding off on colon cancer surgery until he has bypass.  Patient is not undergoing any chemo or radiation as surgery is the treatment for his tumor.  Will re-schedule patient for Davinci bypass surgery at soonest available.

## 2017-10-25 ENCOUNTER — PRE VISIT (OUTPATIENT)
Dept: CARDIOLOGY | Facility: CLINIC | Age: 72
End: 2017-10-25

## 2017-10-25 LAB
BACTERIA SPEC CULT: ABNORMAL
SPECIMEN SOURCE: ABNORMAL

## 2017-10-25 NOTE — TELEPHONE ENCOUNTER
HPI: Jose Lira is a 72 year old male who is here to discuss coronary artery bypass surgery.  The patient was scheduled for a Laparoscopic Assisted Low Anterior Resection Possible Loop Ileostomy on 8/9/17 with Sharan Raza MD for Malignant Neoplasm Of the Rectosigmoid Junction.  Mr Lira has a significant history of PAD s/p stenting of the left internal carotid and right common iliac artery in 2002.  He saw cardiology on 7/31/17 for preop clearance and it was recommended he have a dobutamine stress echo.  This was performed on 8/4/17 and showed some ST segment depression in the inferolateral leads. Patient then underwent coronary angiogram on 08/15/17 that showed 3 vessel coronary artery disease.  Angiogram showed complex LAD disease in addition to other disease. Due to patient's comorbidities he is at increased risk to undergo conventional CABG.  However he would benefit more from robotic assisted CABG with LIMA to LAD, plus possible stenting.   Patient has a history of a stroke in 1994 and he did quit smoking at that time.  He does have a 30 year smoking history of 3 ppds.  Patient also has CKD stage 3-4.        Procedures:     CARDIAC CATH: 08/31/17        ASSESSMENT AND PLAN:  Jose Lira is a 72 year old male who        Approximately 60 minutes spent with this case including review of the clinical history and data; discussion with the patient and his family and coordination of the care    Thank you for including me in the care of this kind patient. Do not hesitate to contact me with any questions.    Dr. Eriberto Curry     Cardiothoracic Surgery  [facility]  Patient Care Team:  Rebeca Sandoval NP as PCP - General  Orion Motley MD as MD (Radiology)  Estefania Akers RN as Registered Nurse  Cher Yadav MD as MD (Hematology & Oncology)  Cynthia Mondragon, GERMANIA as Nurse Coordinator (Oncology)  Felecia Bundy, RN as Nurse Coordinator (Cardiology)  Carline Quezada, GERMANIA  as Nurse Coordinator (Thoracic Surgery (Cardiothoracic Vascular Surgery))

## 2017-10-26 ENCOUNTER — TELEPHONE (OUTPATIENT)
Dept: CARDIOLOGY | Facility: CLINIC | Age: 72
End: 2017-10-26

## 2017-10-26 NOTE — TELEPHONE ENCOUNTER
Patient called to state he was nauseated this am when he got up and asked to cancel his clinic appointment today, 10/26.  Rescheduled for 11/09.

## 2017-10-27 ENCOUNTER — TRANSFERRED RECORDS (OUTPATIENT)
Dept: HEALTH INFORMATION MANAGEMENT | Facility: CLINIC | Age: 72
End: 2017-10-27

## 2017-10-27 ENCOUNTER — TELEPHONE (OUTPATIENT)
Dept: FAMILY MEDICINE | Facility: CLINIC | Age: 72
End: 2017-10-27

## 2017-10-27 ENCOUNTER — CARE COORDINATION (OUTPATIENT)
Dept: CARDIOLOGY | Facility: CLINIC | Age: 72
End: 2017-10-27

## 2017-10-27 DIAGNOSIS — L02.419 ABSCESS OF ARM: Primary | ICD-10-CM

## 2017-10-27 DIAGNOSIS — Z01.810 PRE-OPERATIVE CARDIOVASCULAR EXAMINATION: Primary | ICD-10-CM

## 2017-10-27 NOTE — TELEPHONE ENCOUNTER
Notes Recorded by Rebeca Sandoval NP on 10/26/2017 at 7:09 PM  He had an abscess on his arm and surgery was cancelled (he needs a CABG and then will need surgery after that for his rectosigmoid cancer).  The abscess is much improved on Bactrim, but the wound culture shows heavy growth candida.  I have never seen candida cause a subcutaneous abscess, but it is important that this get treated appropriately and as quickly as possible so he can proceed with surgery.  Can you get him into a dermatologist ASAP so they can advise if/how to treat and they can also look at the wound?    [facility] today- lab result faxed-  Pt made aware. Location of office given to pt- as well as [place] [address]  He verbalized his understanding.Génesis Johnson RN

## 2017-10-27 NOTE — PROGRESS NOTES
Patient's surgery scheduled for 11/13/17 and will return to clinic to see Dr Curry on 11/09.  Patient will need to repeat his PAC, labs and chest x-ray prior to surgery.  Will schedule for same day as clinic visit with Dr Curry.

## 2017-11-01 NOTE — PROGRESS NOTES
Patient called to report he is having the cyst removed from his upper arm due to infection.  He is scheduled for [facility] with Dr Phu Bull on 11/03 to have the cyst removed.  Per Dermatology clinic nurse patient will have in incision that will be closed with sutures but they are unable to say if patient will be healed enough to know an infection will no longer be a problem.  Dr Curry is out of the office until 11/03.  This writer will speak to the patient regarding his thoughts on delaying the cyst removal surgery until after the bypass.      Called and spoke to patient and and he states the area is about the size of a nickel and is red but not painful or draining.  Patient states he still have a few days of antibiotics to finish.  Patient will call the dermatology clinic and ask about canceling the cyst removal after the bypass surgery and if he needs to continue on antibiotics until the redness is gone.  Patient will call this writer back with his decision.

## 2017-11-03 NOTE — PROGRESS NOTES
Called patient and asked if he had decided whether or not to have the cyst on his arm removed.  Patient states he talked to the dermatology clinic and has delayed having this done until after his bypass surgery.  Patient is scheduled to see Dr Curry on the 9th, Dr Curry will check cyst site at that time. Patient verbalized understanding.

## 2017-11-09 NOTE — MR AVS SNAPSHOT
"              After Visit Summary   11/9/2017    Jose Lira    MRN: [id number]           Patient Information     Date Of Birth          1945        Visit Information        Provider Department      11/9/2017 9:30 AM Eriberto Curry MD [facility]        Today's Diagnoses     Coronary artery disease of native artery of native heart with stable angina pectoris (H)    -  1       Follow-ups after your visit        Your next 10 appointments already scheduled     Jan 08, 2018 12:00 PM CST   (Arrive by 11:45 AM)   Return Visit with Jose Willett MD   [facility] ([facility])    [address]  3rd Floor  [facility] [address] [phone]              Who to contact     If you have questions or need follow up information about today's clinic visit or your schedule please contact [facility] directly at [phone].  Normal or non-critical lab and imaging results will be communicated to you by MyChart, letter or phone within 4 business days after the clinic has received the results. If you do not hear from us within 7 days, please contact the clinic through [employer] or phone. If you have a critical or abnormal lab result, we will notify you by phone as soon as possible.  Submit refill requests through [employer] or call your pharmacy and they will forward the refill request to us. Please allow 3 business days for your refill to be completed.          Additional Information About Your Visit        MyChart Information     [employer] lets you send messages to your doctor, view your test results, renew your prescriptions, schedule appointments and more. To sign up, go to [email or web address] . Click on \"Log in\" on the left side of the screen, which will take you to the Welcome page. Then click on \"Sign up Now\" on the right side of the page.     You will be asked to enter the access code listed below, as well as some personal information. Please " "follow the directions to create your username and password.     Your access code is: [email or web address]  Expires: 3/25/2018  6:30 AM     Your access code will  in 90 days. If you need help or a new code, please call your [place] clinic or [phone].        Care EveryWhere ID     This is your Care EveryWhere ID. This could be used by other organizations to access your [place] medical records  [id number]        Your Vitals Were     Pulse Temperature Height Pulse Oximetry BMI (Body Mass Index)       71 98.3  F (36.8  C) (Oral) 1.664 m (5' 5.5\") 97% 33.27 kg/m2        Blood Pressure from Last 3 Encounters:   17 133/73   17 106/65   17 139/71    Weight from Last 3 Encounters:   17 90.7 kg (200 lb)   17 89.4 kg (197 lb)   17 93 kg (205 lb)              Today, you had the following     No orders found for display         Today's Medication Changes          These changes are accurate as of: 17 11:59 PM.  If you have any questions, ask your nurse or doctor.               These medicines have changed or have updated prescriptions.        Dose/Directions    amLODIPine 10 MG tablet   Commonly known as:  NORVASC   This may have changed:  when to take this   Used for:  Essential hypertension with goal blood pressure less than 130/80        Dose:  10 mg   Take 1 tablet (10 mg) by mouth daily   Quantity:  90 tablet   Refills:  PRN       aspirin 81 MG EC tablet   This may have changed:  when to take this   Used for:  Hypertension goal BP (blood pressure) < 130/80, Hyperlipidemia LDL goal <100        Dose:  81 mg   Take 1 tablet (81 mg) by mouth daily   Quantity:  30 tablet   Refills:  0       atenolol 100 MG tablet   Commonly known as:  TENORMIN   This may have changed:  when to take this   Used for:  Essential hypertension with goal blood pressure less than 130/80        Dose:  100 mg   Take 1 tablet (100 mg) by mouth daily   Quantity:  90 tablet   Refills:  PRN       atorvastatin 40 " MG tablet   Commonly known as:  LIPITOR   This may have changed:  when to take this   Used for:  Hyperlipidemia LDL goal <100        Dose:  40 mg   Take 1 tablet (40 mg) by mouth daily   Quantity:  90 tablet   Refills:  PRN       valsartan 320 MG tablet   Commonly known as:  DIOVAN   This may have changed:  when to take this   Used for:  Essential hypertension with goal blood pressure less than 130/80        Dose:  320 mg   Take 1 tablet (320 mg) by mouth daily   Quantity:  90 tablet   Refills:  3                Primary Care Provider Office Phone # Fax #    Rebeca IDALIA Sandoval [phone] [address] [facility] [address]        Equal Access to Services     NAE STORY : Hadii kylee Olvera, waaxda lublanca, qaybta kaalmada heather, haresh love . So [facility] [phone].    ATENCIÓN: Si habla español, tiene a adorno disposición servicios gratuitos de asistencia lingüística. Llame al [phone].    We comply with applicable federal civil rights laws and [place] laws. We do not discriminate on the basis of race, color, national origin, age, disability, sex, sexual orientation, or gender identity.            Thank you!     Thank you for choosing [facility]  for your care. Our goal is always to provide you with excellent care. Hearing back from our patients is one way we can continue to improve our services. Please take a few minutes to complete the written survey that you may receive in the mail after your visit with us. Thank you!             Your Updated Medication List - Protect others around you: Learn how to safely use, store and throw away your medicines at www.disposemymeds.org.          This list is accurate as of: 11/9/17 11:59 PM.  Always use your most recent med list.                   Brand Name Dispense Instructions for use Diagnosis    amLODIPine 10 MG tablet    NORVASC    90 tablet    Take 1 tablet (10 mg) by mouth daily     Essential hypertension with goal blood pressure less than 130/80       aspirin 81 MG EC tablet     30 tablet    Take 1 tablet (81 mg) by mouth daily    Hypertension goal BP (blood pressure) < 130/80, Hyperlipidemia LDL goal <100       atenolol 100 MG tablet    TENORMIN    90 tablet    Take 1 tablet (100 mg) by mouth daily    Essential hypertension with goal blood pressure less than 130/80       atorvastatin 40 MG tablet    LIPITOR    90 tablet    Take 1 tablet (40 mg) by mouth daily    Hyperlipidemia LDL goal <100       BENADRYL 25 MG capsule   Generic drug:  diphenhydrAMINE      Take 50 mg by mouth nightly as needed sleep        lactobacillus rhamnosus (GG) 10 B CELL capsule    CULTURELLE     Take 1 capsule by mouth every morning        MULTIVITAMIN & MINERAL PO      Take 1 tablet by mouth every morning        niacin 500 MG tablet     30 tablet    Take 1 tablet (500 mg) by mouth At Bedtime    Hyperlipidemia LDL goal <100       SAW PALMETTO PO      Take 1 tablet by mouth every morning        valsartan 320 MG tablet    DIOVAN    90 tablet    Take 1 tablet (320 mg) by mouth daily    Essential hypertension with goal blood pressure less than 130/80

## 2017-11-09 NOTE — NURSING NOTE
"Chief Complaint   Patient presents with     RECHECK     Follow up prior to bypass       Initial /76  Pulse 71  Temp 98.3  F (36.8  C) (Oral)  Ht 1.664 m (5' 5.5\")  Wt 92.1 kg (203 lb)  SpO2 97%  BMI 33.27 kg/m2 Estimated body mass index is 33.27 kg/(m^2) as calculated from the following:    Height as of this encounter: 1.664 m (5' 5.5\").    Weight as of this encounter: 92.1 kg (203 lb).  Medication Reconciliation: complete   STEWART WADSWORTH CMA      "

## 2017-11-09 NOTE — LETTER
11/9/2017      RE: Jose Lira  [address]   [place] [address]       Dear Colleague,    Thank you for the opportunity to participate in the care of your patient, Jose Lira, at the [facility] at [facility]. Please see a copy of my visit note below.    HPI:     Jose Lira is a 72 year old male who is here to discuss coronary artery bypass surgery.  The patient was scheduled for a Laparoscopic Assisted Low Anterior Resection Possible Loop Ileostomy on 8/9/17 with Sharan Raza MD for Malignant Neoplasm Of the Rectosigmoid Junction.  Mr Lira has a significant history of PAD s/p stenting of the left internal carotid and right common iliac artery in 2002.      He saw cardiology on 7/31/17 for preop clearance and it was recommended he have a dobutamine stress echo.  This was performed on 8/4/17 and showed some ST segment depression in the inferolateral leads. Patient then underwent coronary angiogram on 08/15/17 that showed 3 vessel coronary artery disease.  Angiogram showed complex LAD disease in addition to other disease. Due to patient's comorbidities he is at increased risk to undergo conventional CABG.  He would benefit more from robotic assisted CABG with LIMA to LAD, plus possible stenting. However his left subclavian artery is stenotic which made robotic LIMA to LAD bypass not feasible.    Patient has a history of a stroke in 1994 and he did quit smoking at that time.  He does have a 30 year smoking history of 3 ppds.  Patient also has CKD stage 3-4.       PAST MEDICAL HISTORY:  Past Medical History:   Diagnosis Date     Acute, but ill-defined, cerebrovascular disease 1994     CAD (coronary artery disease)      CKD (chronic kidney disease) stage 4, GFR 15-29 ml/min (H)      History of CVA (cerebrovascular accident)      Hx of endarterectomy 2002    Left and right     Peripheral vascular disease, unspecified       Pulmonary nodules      Pure hypercholesterolemia      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     Subclavian arterial stenosis (H)     Left     Unspecified essential hypertension        PAST SURGICAL HISTORY:  Past Surgical History:   Procedure Laterality Date     COLONOSCOPY N/A 5/19/2017    Procedure: COMBINED COLONOSCOPY, SINGLE OR MULTIPLE BIOPSY/POLYPECTOMY BY BIOPSY;  Rectal bleeding;  Surgeon: Maximus Dinero MD;  Location:  GI     SURGICAL HISTORY OF -   10/02    angioplasty and stenting of left and internal carotid artery at the bifurcation     SURGICAL HISTORY OF - 11/11    right common iliac artery stent      VASCULAR SURGERY  2001    stent placed in carotid        FAMILY HISTORY:   Family History   Problem Relation Age of Onset     C.A.D. Father      Hypertension Father      Prostate Cancer Father      C.A.D. Brother      MI     Hypertension Brother      Arthritis Sister      DIABETES No family hx of      CEREBROVASCULAR DISEASE No family hx of      Cancer - colorectal No family hx of        SOCIAL HISTORY:  Social History     Social History     Marital status: Single     Spouse name: N/A     Number of children: 0     Years of education: N/A     Occupational History     post       Social History Main Topics     Smoking status: Former Smoker     Packs/day: 3.00     Years: 30.00     Quit date: 8/5/1994     Smokeless tobacco: Former User     Quit date: 8/1/1994      Comment: quit 15 years ago after his stroke     Alcohol use Yes      Comment: Varies: 1 per day     Drug use: No     Sexual activity: No     Other Topics Concern     Parent/Sibling W/ Cabg, Mi Or Angioplasty Before 65f 55m? Yes     brother and father     Social History Narrative    [facility]  Was never .  Had no children.  Has a two brothers that have passed away.  He has two sister, one with Alzheimer and one alive and well.  Sister will be here for surgery.          ALLERGIES:   Allergies   Allergen Reactions  "    No Known Drug Allergies        CURRENT MEDICATIONS:   Prescription Medications as of 11/10/2017             sulfamethoxazole-trimethoprim (BACTRIM DS/SEPTRA DS) 800-160 MG per tablet Take 1 tablet by mouth 2 times daily    aspirin 81 MG EC tablet Take 1 tablet (81 mg) by mouth daily    atorvastatin (LIPITOR) 40 MG tablet Take 1 tablet (40 mg) by mouth daily    amLODIPine (NORVASC) 10 MG tablet Take 1 tablet (10 mg) by mouth daily    atenolol (TENORMIN) 100 MG tablet Take 1 tablet (100 mg) by mouth daily    valsartan (DIOVAN) 320 MG tablet Take 1 tablet (320 mg) by mouth daily    tiZANidine (ZANAFLEX) 2 MG tablet Take 1-2 tablets (2-4 mg) by mouth nightly as needed    Saw Palmetto, Serenoa repens, (SAW PALMETTO PO) Take 1 tablet by mouth every morning     niacin 500 MG tablet Take 1 tablet (500 mg) by mouth At Bedtime    Multiple Vitamins-Minerals (MULTIVITAMIN & MINERAL PO) Take 1 tablet by mouth every morning     diphenhydrAMINE (BENADRYL) 25 MG capsule Take 25 mg by mouth nightly as needed. sleep    lactobacillus rhamnosus, GG, (CULTURELLE) 10 B CELL capsule Take 1 capsule by mouth every morning           REVIEW OF SYSTEMS:   Gen: denies frequent headaches, double/blurry vision, insomnia, fatigue, unexplained weight loss/gain. No previous anesthesia reactions.  CV: denies chest pain, shortness of breath, palpitations, peripheral edema.  Pulm: denies shortness of breath, asthma or wheezing  GI/: denies liver or kidney problems, blood in stool or BRBPR, difficulty urinating  Endo: denies thyroid problems or Diabetes  Heme/Onc: denies bleeding or clotting disorders, no family problems with bleeding/clotting diorders  MS: no weakness, tremors or gait instability  Neuro: denies depression, memory problems, no dysesthesias, no previous strokes, no migraines, no dysphagia  Skin: No petechiae, purpura or rash.    PHYSICAL EXAMINATION:   /76  Pulse 71  Temp 98.3  F (36.8  C) (Oral)  Ht 1.664 m (5' 5.5\")  Wt " 92.1 kg (203 lb)  SpO2 97%  BMI 33.27 kg/m2  General: alert and oriented x 3, pleasant, no acute distress  CV: S1 S2, no murmurs, rubs or gallops, regular rate and rhythm, no peripheral edema, no carotid or abdomenal bruits, pulses in upper and lower extremities palpable  Pulm: bilateral breath sounds, clear to auscultation, easy work of breathing  GI: (+) bowel sounds, soft non-tender and non-distended  : voiding without problems  MS: moves all extremities x 4,  5+/5+ equal strength bilaterally  Neuro: pupils equal round and reactive to light, cranial nerves, II-XII grossly intact, no gross neurologic deficits noted    LABS:  BMP RESULTS:  Lab Results   Component Value Date     09/27/2017    POTASSIUM 4.8 10/20/2017    CHLORIDE 109 09/27/2017    CO2 18 (L) 09/27/2017    ANIONGAP 10 09/27/2017     (H) 09/27/2017    BUN 27 09/27/2017    CR 3.22 (H) 10/20/2017    GFRESTIMATED 19 (L) 10/20/2017    GFRESTBLACK 23 (L) 10/20/2017    ADRIAN 9.1 09/27/2017        CBC RESULTS:  Lab Results   Component Value Date    WBC 10.8 09/27/2017    RBC 4.65 09/27/2017    HGB 14.2 09/27/2017    HCT 41.6 09/27/2017    MCV 90 09/27/2017    MCH 30.5 09/27/2017    MCHC 34.1 09/27/2017    RDW 13.5 09/27/2017     09/27/2017       Lab Results   Component Value Date    INR 1.10 08/15/2017     Lab Results   Component Value Date    PTT 32 10/18/2011     No results found for: UA  Lab Results   Component Value Date    A1C 5.4 08/15/2017       PROCEDURES/IMAGING:  CARDIAC CATH: 08/31/17          ASSESSMENT AND PLAN:    Jose Lira is a 72 year old male who has multiple comorbidities presents with severe CAD.  He is at increased risk to undergo conventional CABG.  He would benefit more from robotic assisted CABG with LIMA to LAD, plus possible stenting. However his left subclavian artery is stenotic which made robotic LIMA to LAD bypass not feasible. Patient has a history of a stroke in 1994 and he did quit smoking at that  time.  He does have a 30 year smoking history of 3 ppds.  Patient also has CKD stage 3-4.  Based on these findings he is not a surgical candidate for CABG either conventional or robotic assisted.  I spoke to interventional cardiologist Dr. Willett about stenting the coronary arteries and he agrees and thinks it's doable.  I also discussed with Dr Raza who understands the risk of operating on the patient when patient is taking plavix for coronary stents.      I also communicated with the patient and his sister who understand the treatment plan.    Approximately 60 minutes spent with this case including review of the clinical history and data; discussion with the patient and his family and coordination of the care     Thank you for including me in the care of this kind patient. Do not hesitate to contact me with any questions.     Dr. Eriberto Curry     Cardiothoracic Surgery  [facility]  Patient Care Team:  Rebeca Sandoval NP as PCP - General  Orion Motley MD as MD (Radiology)  Estefania Akers RN as Registered Nurse  Cher Yadav MD as MD (Hematology & Oncology)  Cynthia Mondragon, RN as Nurse Coordinator (Oncology)  Felecia Bundy, RN as Nurse Coordinator (Cardiology)  Carline Quezada, GER[place] as Nurse Coordinator (Thoracic Surgery (Cardiothoracic Vascular Surgery))

## 2017-11-09 NOTE — LETTER
11/9/2017      RE: Jose Lira  [address]   [place] [address]       Dear Colleague,    Thank you for the opportunity to participate in the care of your patient, Jose Lira, at the [facility] at [facility]. Please see a copy of my visit note below.    HPI:     Jose Lira is a 72 year old male who is here to discuss coronary artery bypass surgery.  The patient was scheduled for a Laparoscopic Assisted Low Anterior Resection Possible Loop Ileostomy on 8/9/17 with Sharan Raza MD for Malignant Neoplasm Of the Rectosigmoid Junction.  Mr Lira has a significant history of PAD s/p stenting of the left internal carotid and right common iliac artery in 2002.      He saw cardiology on 7/31/17 for preop clearance and it was recommended he have a dobutamine stress echo.  This was performed on 8/4/17 and showed some ST segment depression in the inferolateral leads. Patient then underwent coronary angiogram on 08/15/17 that showed 3 vessel coronary artery disease.  Angiogram showed complex LAD disease in addition to other disease. Due to patient's comorbidities he is at increased risk to undergo conventional CABG.  He would benefit more from robotic assisted CABG with LIMA to LAD, plus possible stenting. However his left subclavian artery is stenotic which made robotic LIMA to LAD bypass not feasible.    Patient has a history of a stroke in 1994 and he did quit smoking at that time.  He does have a 30 year smoking history of 3 ppds.  Patient also has CKD stage 3-4.       PAST MEDICAL HISTORY:  Past Medical History:   Diagnosis Date     Acute, but ill-defined, cerebrovascular disease 1994     CAD (coronary artery disease)      CKD (chronic kidney disease) stage 4, GFR 15-29 ml/min (H)      History of CVA (cerebrovascular accident)      Hx of endarterectomy 2002    Left and right     Peripheral vascular disease, unspecified       Pulmonary nodules      Pure hypercholesterolemia      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     Subclavian arterial stenosis (H)     Left     Unspecified essential hypertension        PAST SURGICAL HISTORY:  Past Surgical History:   Procedure Laterality Date     COLONOSCOPY N/A 5/19/2017    Procedure: COMBINED COLONOSCOPY, SINGLE OR MULTIPLE BIOPSY/POLYPECTOMY BY BIOPSY;  Rectal bleeding;  Surgeon: Maximus Dinero MD;  Location:  GI     SURGICAL HISTORY OF -   10/02    angioplasty and stenting of left and internal carotid artery at the bifurcation     SURGICAL HISTORY OF - 11/11    right common iliac artery stent      VASCULAR SURGERY  2001    stent placed in carotid        FAMILY HISTORY:   Family History   Problem Relation Age of Onset     C.A.D. Father      Hypertension Father      Prostate Cancer Father      C.A.D. Brother      MI     Hypertension Brother      Arthritis Sister      DIABETES No family hx of      CEREBROVASCULAR DISEASE No family hx of      Cancer - colorectal No family hx of        SOCIAL HISTORY:  Social History     Social History     Marital status: Single     Spouse name: N/A     Number of children: 0     Years of education: N/A     Occupational History     post       Social History Main Topics     Smoking status: Former Smoker     Packs/day: 3.00     Years: 30.00     Quit date: 8/5/1994     Smokeless tobacco: Former User     Quit date: 8/1/1994      Comment: quit 15 years ago after his stroke     Alcohol use Yes      Comment: Varies: 1 per day     Drug use: No     Sexual activity: No     Other Topics Concern     Parent/Sibling W/ Cabg, Mi Or Angioplasty Before 65f 55m? Yes     brother and father     Social History Narrative    [facility]  Was never .  Had no children.  Has a two brothers that have passed away.  He has two sister, one with Alzheimer and one alive and well.  Sister will be here for surgery.          ALLERGIES:   Allergies   Allergen Reactions  "    No Known Drug Allergies        CURRENT MEDICATIONS:   Prescription Medications as of 11/10/2017             sulfamethoxazole-trimethoprim (BACTRIM DS/SEPTRA DS) 800-160 MG per tablet Take 1 tablet by mouth 2 times daily    aspirin 81 MG EC tablet Take 1 tablet (81 mg) by mouth daily    atorvastatin (LIPITOR) 40 MG tablet Take 1 tablet (40 mg) by mouth daily    amLODIPine (NORVASC) 10 MG tablet Take 1 tablet (10 mg) by mouth daily    atenolol (TENORMIN) 100 MG tablet Take 1 tablet (100 mg) by mouth daily    valsartan (DIOVAN) 320 MG tablet Take 1 tablet (320 mg) by mouth daily    tiZANidine (ZANAFLEX) 2 MG tablet Take 1-2 tablets (2-4 mg) by mouth nightly as needed    Saw Palmetto, Serenoa repens, (SAW PALMETTO PO) Take 1 tablet by mouth every morning     niacin 500 MG tablet Take 1 tablet (500 mg) by mouth At Bedtime    Multiple Vitamins-Minerals (MULTIVITAMIN & MINERAL PO) Take 1 tablet by mouth every morning     diphenhydrAMINE (BENADRYL) 25 MG capsule Take 25 mg by mouth nightly as needed. sleep    lactobacillus rhamnosus, GG, (CULTURELLE) 10 B CELL capsule Take 1 capsule by mouth every morning           REVIEW OF SYSTEMS:   Gen: denies frequent headaches, double/blurry vision, insomnia, fatigue, unexplained weight loss/gain. No previous anesthesia reactions.  CV: denies chest pain, shortness of breath, palpitations, peripheral edema.  Pulm: denies shortness of breath, asthma or wheezing  GI/: denies liver or kidney problems, blood in stool or BRBPR, difficulty urinating  Endo: denies thyroid problems or Diabetes  Heme/Onc: denies bleeding or clotting disorders, no family problems with bleeding/clotting diorders  MS: no weakness, tremors or gait instability  Neuro: denies depression, memory problems, no dysesthesias, no previous strokes, no migraines, no dysphagia  Skin: No petechiae, purpura or rash.    PHYSICAL EXAMINATION:   /76  Pulse 71  Temp 98.3  F (36.8  C) (Oral)  Ht 1.664 m (5' 5.5\")  Wt " 92.1 kg (203 lb)  SpO2 97%  BMI 33.27 kg/m2  General: alert and oriented x 3, pleasant, no acute distress  CV: S1 S2, no murmurs, rubs or gallops, regular rate and rhythm, no peripheral edema, no carotid or abdomenal bruits, pulses in upper and lower extremities palpable  Pulm: bilateral breath sounds, clear to auscultation, easy work of breathing  GI: (+) bowel sounds, soft non-tender and non-distended  : voiding without problems  MS: moves all extremities x 4,  5+/5+ equal strength bilaterally  Neuro: pupils equal round and reactive to light, cranial nerves, II-XII grossly intact, no gross neurologic deficits noted    LABS:  BMP RESULTS:  Lab Results   Component Value Date     09/27/2017    POTASSIUM 4.8 10/20/2017    CHLORIDE 109 09/27/2017    CO2 18 (L) 09/27/2017    ANIONGAP 10 09/27/2017     (H) 09/27/2017    BUN 27 09/27/2017    CR 3.22 (H) 10/20/2017    GFRESTIMATED 19 (L) 10/20/2017    GFRESTBLACK 23 (L) 10/20/2017    ADRIAN 9.1 09/27/2017        CBC RESULTS:  Lab Results   Component Value Date    WBC 10.8 09/27/2017    RBC 4.65 09/27/2017    HGB 14.2 09/27/2017    HCT 41.6 09/27/2017    MCV 90 09/27/2017    MCH 30.5 09/27/2017    MCHC 34.1 09/27/2017    RDW 13.5 09/27/2017     09/27/2017       Lab Results   Component Value Date    INR 1.10 08/15/2017     Lab Results   Component Value Date    PTT 32 10/18/2011     No results found for: UA  Lab Results   Component Value Date    A1C 5.4 08/15/2017       PROCEDURES/IMAGING:  CARDIAC CATH: 08/31/17          ASSESSMENT AND PLAN:    Jose Lira is a 72 year old male who has multiple comorbidities presents with severe CAD.  He is at increased risk to undergo conventional CABG.  He would benefit more from robotic assisted CABG with LIMA to LAD, plus possible stenting. However his left subclavian artery is stenotic which made robotic LIMA to LAD bypass not feasible. Patient has a history of a stroke in 1994 and he did quit smoking at that  time.  He does have a 30 year smoking history of 3 ppds.  Patient also has CKD stage 3-4.  Based on these findings he is not a surgical candidate for CABG either conventional or robotic assisted.  I spoke to interventional cardiologist Dr. Willett about stenting the coronary arteries and he agrees and thinks it's doable.  I also discussed with Dr Raza who understands the risk of operating on the patient when patient is taking plavix for coronary stents.      I also communicated with the patient and his sister who understand the treatment plan.    Approximately 60 minutes spent with this case including review of the clinical history and data; discussion with the patient and his family and coordination of the care     Thank you for including me in the care of this kind patient. Do not hesitate to contact me with any questions.     Dr. Eriberto Arshad     Cardiothoracic Surgery  [facility]  Patient Care Team:  Rebeca Sandoval NP as PCP - General  Orion Motley MD as MD (Radiology)  Estefania Akers RN as Registered Nurse  Cher Yadav MD as MD (Hematology & Oncology)  Cynthia Mondragon, RN as Nurse Coordinator (Oncology)  Felecia Bundy, RN as Nurse Coordinator (Cardiology)  Carline Quezada, [place] as Nurse Coordinator (Thoracic Surgery (Cardiothoracic Vascular Surgery))  ERIBERTO ARSHAD    Please do not hesitate to contact me if you have any questions/concerns.     Sincerely,     Eriberto Arshad MD

## 2017-11-10 NOTE — NURSING NOTE
Patient seen today for consultation for [facility] single vessel bypass    Surgery procedure explained to patient and when told he may have to have open chest surgery in the case of an emergency patient states he does not want open chest.      Risks and benefits of surgery were discussed with patient (and all present) including risk of death, stroke, bleeding, cardiac ischemia, wound infection, renal failure, arrhythmias and possible pacemaker implantation. Patient no longer wants to proceed.     Discussed with patient possibility of stenting his blocked arteries and he was in agreement with this but he did not seem to understand what was being discussed.    Dr Curry spoke to Dr Willett, with patient present and Dr Willett agreed to try to stent the arteries.      Patient seemed confused when he left but there were no family members present to discuss patient's options.

## 2017-11-10 NOTE — PROGRESS NOTES
Per Dr Curry, after conversation with Dr Sharan Raza the recommendation is to have the patient go forward with the angiogram and have his blockage stented and follow up with Dr Raza regarding the colon tumor and Dr Raza make the recommendations going forward.    Called and informed patient's sister Kim about plan and date and time of the angiogram.  Patient's sister verbalized understanding and she will be the person to drive the patient to and from the angiogram.    Called the patient and informed his of date and time of angiogram and will call him two days prior and instruct patient on preparation.  Patient agreed to plan and will wait for call next week with prep.     Patient also asked this nurse to contact his his PCP about refilling his antibiotic for the cyst in his arm as per patient is is draining off and on.  Patient states he finished his current course of antibiotics 3 days ago. Note sent via in basket message to Rebeca Sandoval NP.

## 2017-11-10 NOTE — PROGRESS NOTES
HPI:     Jose Lira is a 72 year old male who is here to discuss coronary artery bypass surgery.  The patient was scheduled for a Laparoscopic Assisted Low Anterior Resection Possible Loop Ileostomy on 8/9/17 with Sharan Raza MD for Malignant Neoplasm Of the Rectosigmoid Junction.  Mr Lira has a significant history of PAD s/p stenting of the left internal carotid and right common iliac artery in 2002.      He saw cardiology on 7/31/17 for preop clearance and it was recommended he have a dobutamine stress echo.  This was performed on 8/4/17 and showed some ST segment depression in the inferolateral leads. Patient then underwent coronary angiogram on 08/15/17 that showed 3 vessel coronary artery disease.  Angiogram showed complex LAD disease in addition to other disease. Due to patient's comorbidities he is at increased risk to undergo conventional CABG.  He would benefit more from robotic assisted CABG with LIMA to LAD, plus possible stenting. However his left subclavian artery is stenotic which made robotic LIMA to LAD bypass not feasible.    Patient has a history of a stroke in 1994 and he did quit smoking at that time.  He does have a 30 year smoking history of 3 ppds.  Patient also has CKD stage 3-4.       PAST MEDICAL HISTORY:  Past Medical History:   Diagnosis Date     Acute, but ill-defined, cerebrovascular disease 1994     CAD (coronary artery disease)      CKD (chronic kidney disease) stage 4, GFR 15-29 ml/min (H)      History of CVA (cerebrovascular accident)      Hx of endarterectomy 2002    Left and right     Peripheral vascular disease, unspecified      Pulmonary nodules      Pure hypercholesterolemia      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     Subclavian arterial stenosis (H)     Left     Unspecified essential hypertension        PAST SURGICAL HISTORY:  Past Surgical History:   Procedure Laterality Date     COLONOSCOPY N/A 5/19/2017    Procedure: COMBINED COLONOSCOPY, SINGLE OR MULTIPLE  BIOPSY/POLYPECTOMY BY BIOPSY;  Rectal bleeding;  Surgeon: Maximus Dinero MD;  Location:  GI     SURGICAL HISTORY OF -   10/02    angioplasty and stenting of left and internal carotid artery at the bifurcation     SURGICAL HISTORY OF - 11/11    right common iliac artery stent      VASCULAR SURGERY  2001    stent placed in carotid        FAMILY HISTORY:   Family History   Problem Relation Age of Onset     C.A.D. Father      Hypertension Father      Prostate Cancer Father      C.A.D. Brother      MI     Hypertension Brother      Arthritis Sister      DIABETES No family hx of      CEREBROVASCULAR DISEASE No family hx of      Cancer - colorectal No family hx of        SOCIAL HISTORY:  Social History     Social History     Marital status: Single     Spouse name: N/A     Number of children: 0     Years of education: N/A     Occupational History     post       Social History Main Topics     Smoking status: Former Smoker     Packs/day: 3.00     Years: 30.00     Quit date: 8/5/1994     Smokeless tobacco: Former User     Quit date: 8/1/1994      Comment: quit 15 years ago after his stroke     Alcohol use Yes      Comment: Varies: 1 per day     Drug use: No     Sexual activity: No     Other Topics Concern     Parent/Sibling W/ Cabg, Mi Or Angioplasty Before 65f 55m? Yes     brother and father     Social History Narrative    [facility]  Was never .  Had no children.  Has a two brothers that have passed away.  He has two sister, one with Alzheimer and one alive and well.  Sister will be here for surgery.          ALLERGIES:   Allergies   Allergen Reactions     No Known Drug Allergies        CURRENT MEDICATIONS:   Prescription Medications as of 11/10/2017             sulfamethoxazole-trimethoprim (BACTRIM DS/SEPTRA DS) 800-160 MG per tablet Take 1 tablet by mouth 2 times daily    aspirin 81 MG EC tablet Take 1 tablet (81 mg) by mouth daily    atorvastatin (LIPITOR) 40 MG tablet Take 1 tablet  "(40 mg) by mouth daily    amLODIPine (NORVASC) 10 MG tablet Take 1 tablet (10 mg) by mouth daily    atenolol (TENORMIN) 100 MG tablet Take 1 tablet (100 mg) by mouth daily    valsartan (DIOVAN) 320 MG tablet Take 1 tablet (320 mg) by mouth daily    tiZANidine (ZANAFLEX) 2 MG tablet Take 1-2 tablets (2-4 mg) by mouth nightly as needed    Saw Palmetto, Serenoa repens, (SAW PALMETTO PO) Take 1 tablet by mouth every morning     niacin 500 MG tablet Take 1 tablet (500 mg) by mouth At Bedtime    Multiple Vitamins-Minerals (MULTIVITAMIN & MINERAL PO) Take 1 tablet by mouth every morning     diphenhydrAMINE (BENADRYL) 25 MG capsule Take 25 mg by mouth nightly as needed. sleep    lactobacillus rhamnosus, GG, (CULTURELLE) 10 B CELL capsule Take 1 capsule by mouth every morning           REVIEW OF SYSTEMS:   Gen: denies frequent headaches, double/blurry vision, insomnia, fatigue, unexplained weight loss/gain. No previous anesthesia reactions.  CV: denies chest pain, shortness of breath, palpitations, peripheral edema.  Pulm: denies shortness of breath, asthma or wheezing  GI/: denies liver or kidney problems, blood in stool or BRBPR, difficulty urinating  Endo: denies thyroid problems or Diabetes  Heme/Onc: denies bleeding or clotting disorders, no family problems with bleeding/clotting diorders  MS: no weakness, tremors or gait instability  Neuro: denies depression, memory problems, no dysesthesias, no previous strokes, no migraines, no dysphagia  Skin: No petechiae, purpura or rash.    PHYSICAL EXAMINATION:   /76  Pulse 71  Temp 98.3  F (36.8  C) (Oral)  Ht 1.664 m (5' 5.5\")  Wt 92.1 kg (203 lb)  SpO2 97%  BMI 33.27 kg/m2  General: alert and oriented x 3, pleasant, no acute distress  CV: S1 S2, no murmurs, rubs or gallops, regular rate and rhythm, no peripheral edema, no carotid or abdomenal bruits, pulses in upper and lower extremities palpable  Pulm: bilateral breath sounds, clear to auscultation, easy work " of breathing  GI: (+) bowel sounds, soft non-tender and non-distended  : voiding without problems  MS: moves all extremities x 4,  5+/5+ equal strength bilaterally  Neuro: pupils equal round and reactive to light, cranial nerves, II-XII grossly intact, no gross neurologic deficits noted    LABS:  BMP RESULTS:  Lab Results   Component Value Date     09/27/2017    POTASSIUM 4.8 10/20/2017    CHLORIDE 109 09/27/2017    CO2 18 (L) 09/27/2017    ANIONGAP 10 09/27/2017     (H) 09/27/2017    BUN 27 09/27/2017    CR 3.22 (H) 10/20/2017    GFRESTIMATED 19 (L) 10/20/2017    GFRESTBLACK 23 (L) 10/20/2017    ADRIAN 9.1 09/27/2017        CBC RESULTS:  Lab Results   Component Value Date    WBC 10.8 09/27/2017    RBC 4.65 09/27/2017    HGB 14.2 09/27/2017    HCT 41.6 09/27/2017    MCV 90 09/27/2017    MCH 30.5 09/27/2017    MCHC 34.1 09/27/2017    RDW 13.5 09/27/2017     09/27/2017       Lab Results   Component Value Date    INR 1.10 08/15/2017     Lab Results   Component Value Date    PTT 32 10/18/2011     No results found for: UA  Lab Results   Component Value Date    A1C 5.4 08/15/2017       PROCEDURES/IMAGING:  CARDIAC CATH: 08/31/17          ASSESSMENT AND PLAN:    Jose Lira is a 72 year old male who has multiple comorbidities presents with severe CAD.  He is at increased risk to undergo conventional CABG.  He would benefit more from robotic assisted CABG with LIMA to LAD, plus possible stenting. However his left subclavian artery is stenotic which made robotic LIMA to LAD bypass not feasible. Patient has a history of a stroke in 1994 and he did quit smoking at that time.  He does have a 30 year smoking history of 3 ppds.  Patient also has CKD stage 3-4.  Based on these findings he is not a surgical candidate for CABG either conventional or robotic assisted.  I spoke to interventional cardiologist Dr. Willett about stenting the coronary arteries and he agrees and thinks it's doable.  I also discussed  with Dr Raza who understands the risk of operating on the patient when patient is taking plavix for coronary stents.      I also communicated with the patient and his sister who understand the treatment plan.    Approximately 60 minutes spent with this case including review of the clinical history and data; discussion with the patient and his family and coordination of the care     Thank you for including me in the care of this kind patient. Do not hesitate to contact me with any questions.     Dr. Eriberto Arshad     Cardiothoracic Surgery  [facility]  Patient Care Team:  Rebeca Sandoval NP as PCP - General  Orion Motley MD as MD (Radiology)  Estefania Akers RN as Registered Nurse  Cher Yadav MD as MD (Hematology & Oncology)  Cynthia Mondragon, RN as Nurse Coordinator (Oncology)  Felecia Bundy, RN as Nurse Coordinator (Cardiology)  Carline Quezada, GERMANIA as Nurse Coordinator (Thoracic Surgery (Cardiothoracic Vascular Surgery))  ERIBERTO ARSHADXIONG

## 2017-11-10 NOTE — NURSING NOTE
Addendum:    Patient's sister called and was concerned that her brother will be on blood thinners and will not be able to have the surgery to remove the cancerous tumor in his colon for several more months and the cancer will continue to grow.    Dr Curry spoke to patient's gastroenterologist the it was decided the patient would have the stents and the surgery for the patient's tumor would be discussed by the gastroenterologist and the surgeon for that surgery after patient recovers from angiogram with PCI.      Patient and sister called with plan and they are in agreement.

## 2017-11-13 NOTE — TELEPHONE ENCOUNTER
----- Message from Carline Quezada RN sent at 11/10/2017  9:41 AM CST -----  Regarding: Left arm cyst  Maikel Jose,    I am writing on behalf of this patient.  He was in to see Dr Curry the cardiothoracic surgeon yesterday and was instructed to follow up with you concerning the cyst on his upper left arm as the patient states it has been draining off and on.  Patient states he finished his last dose of antibiotics 3 days ago.  Buddy was scheduled to see a dermatologist to have the cyst removed but that was canceled due to his upcoming bypass surgery.  Currently, Buddy is not going to have bypass surgery, he has been scheduled for an angiogram to try and stent the blockages.  Even though Buddy was going to have single vessel bypass with the robot there was still a chance he would need a sternotomy in the case of an emergency.  Buddy did not want a sternotomy so trying again to place stents is what was recommended.  To make a long story short Buddy needs to follow up with you to manage his wound and asked me to contact you because his doesn't think he would get a call from you if he called himself.   Please call Buddy and let him know if he should see you or if he needs to have another course of antibiotics.  His number is [phone].    Thank you very much.    Carline Quezada RNCC  Cardiothoracic Surgery   O) [phone]

## 2017-11-13 NOTE — TELEPHONE ENCOUNTER
I reviewed catrachita's note from 10/27.  Since he has finished Bactrim, he should call their office and schedule a cyst removal.  Can you call Buddy and let him know this is his next step?

## 2017-11-14 NOTE — TELEPHONE ENCOUNTER
Patient called back and was informed of message per below from KIARRA Sandoval NP.    Patient verbalized understanding and stated:  1. He has surgery scheduled on Thursday  2. Saw surgeon last week who recommended patient continue taking Bactrim given pus was still draining from cyst  3. Spoke with nurse at surgeon's office and told her he did not think he would be able to get Bactrim refilled and nurse said that would be okay    Writer's understanding is patient is having an angiogram on 11/16/17, not a surgery.      Arlet-Please review.  Is plan to NOT continue Bactrim and follow up with derm after currently scheduled procedure on 11/16/17 to remove cyst?    Thank you!  NATALIE Hair, AZULN, RN

## 2017-11-14 NOTE — TELEPHONE ENCOUNTER
Patient informed and will  meds today and will schedule with derm after his angiogram.  Mary Brewer RN

## 2017-11-16 NOTE — IP AVS SNAPSHOT
[address] Observation [address] [address]    Phone:  [phone]    Fax:  [phone]                                       After Visit Summary   11/16/2017    Jose Lira    MRN: [id number]           After Visit Summary Signature Page     I have received my discharge instructions, and my questions have been answered. I have discussed any challenges I see with this plan with the nurse or doctor.    ..........................................................................................................................................  Patient/Patient Representative Signature      ..........................................................................................................................................  Patient Representative Print Name and Relationship to Patient    ..................................................               ................................................  Date                                            Time    ..........................................................................................................................................  Reviewed by Signature/Title    ...................................................              ..............................................  Date                                                            Time

## 2017-11-16 NOTE — IP AVS SNAPSHOT
MRN:[id number]                      After Visit Summary   11/16/2017    Jose Lira    MRN: [id number]           Thank you!     Thank you for choosing [facility] for your care. Our goal is always to provide you with excellent care. Hearing back from our patients is one way we can continue to improve our services. Please take a few minutes to complete the written survey that you may receive in the mail after you visit with us. Thank you!        Patient Information     Date Of Birth          1945        Designated Caregiver       Most Recent Value    Caregiver    Will someone help with your care after discharge? yes    Name of designated caregiver Mark    Phone number of caregiver [phone]      About your hospital stay     You were admitted on:  November 16, 2017 You last received care in the:  [facility]    You were discharged on:  November 18, 2017        Reason for your hospital stay       Admitted with rectal bleeding following cardiac catheretization with stent placement.  Bleeding has resolved, but this will need to be monitored closely as an outpatient.  You are at higher risk of bleeding due to being on both aspirin and ticgrelor for your stents.  Taking both aspirin and ticgrelor daily is important for your new stents.  You should hold your valsartan until you follow up with your primary care provider.                  Who to Call     For medical emergencies, please call 911.  For non-urgent questions about your medical care, please call your primary care provider or clinic, [phone]          Attending Provider     Provider Specialty    Jose Willett MD Cardiology    Arlene Samuel MD Internal Medicine       Primary Care Provider Office Phone # Fax #    Rebeca Sandoval [phone]      After Care Instructions     Activity       Your activity upon discharge: activity as tolerated            Diet       Follow  this diet upon discharge: cardiac diet            Discharge Instructions - IF on Metformin (Glucophage or Glucovance) or Metformin containing medications       IF on Metformin (Glucophage or Glucovance) or Metformin containing medications , schedule a Basic Metabolic Panel at [facility] Heart or Primary Clinic in 48 - 72 hours post procedure and PRIOR TO resuming the Metformin or Metformin containing medications.  Hold Metformin (Glucophage or Glucovance) or Metformin containing medications until after the Basic Metabolic Panel on the 2nd or 3rd day following the procedure.  May resume after blood draw is complete.                  Follow-up Appointments     Adult [facility] Follow-up and recommended labs and tests       Follow up with primary care provider, Rebeca Sandoval, within 7 days to evaluate medication change and for hospital follow- up.  The following labs/tests are recommended: CBC, BMP.      Appointments on [place] and/or [facility] (with [facility] or [facility] provider or service). Call [phone] if you haven't heard regarding these appointments within 7 days of discharge.                  Your next 10 appointments already scheduled     Dec 01, 2017 11:00 AM CST   Procedure 1.5 hr with U2A ROOM 15   Unit [address]A [facility] [place] ([facility])    [address] [address]               Dec 01, 2017 12:30 PM CST   Cath 90 Minute with [id number]   [facility])    [address] [address]   [phone]              Additional Services     CARDIAC REHAB REFERRAL       Please be aware that coverage of these services is subject to the terms and limitations of your health insurance plan.  Call member services at your health plan with any benefit or coverage questions.     Order is sent electronically to central rehab scheduling. Call [phone] if you haven't been contacted  regarding these appointments within 2 business days of discharge.                  Further instructions from your care team       Going Home after Coronary Angioplasty or Stent Placement       Name: Jose Lira  Medical Record Number:  [id number]  Today's Date: November 2017        For 24 hours:         Have an adult stay with you for 24 hours.         Relax and take it easy.         Drink plenty of fluids.         You may eat your normal diet, unless your doctor tells you otherwise.         Do NOT make any important or legal decisions.         Do NOT drive or operate machines at home or at work.         Do NOT drink alcohol.      Do NOT smoke.     Medicines:         If you have begun Plavix (clopidogrel), Effient (prasugrel), or Brilinta (ticagrelor), do not stop taking it until you talk to your heart doctor (cardiologist).         If you are on metformin (Glucophage), do not restart it until you have blood tests (within 2 to 3 days after discharge). When your doctor tells you it is safe, you may restart the metformin.         If you have stopped any other medicines, check with your nurse or provider about when to restart them.    Care of groin site:         Remove the Band-Aid after 24 hours. If there is minor oozing, apply another Band-aid and remove it after 12 hours.          Do NOT take a bath, or use a hot tub or pool for at least 3 days. You may shower.          It is normal to have a small bruise or lump at the site.         Do not scrub the site.         Do not use lotion or powder near the puncture site for 3 days.         For the first 2 days: Do not stoop or squat. When you cough, sneeze or move your bowels, hold your hand over the puncture site and press gently.         Do not lift more than 10 pounds for at least 3 to 5 days.         For 2 days, do NOT have sex or do any heavy exercise.     If you start bleeding from the site in your groin:  Lie down flat and press firmly on the site.  Call  your physician immediately, or, come to the emergency room.      Call 911 right away if you have bleeding that is heavy or does not stop.     Call your doctor if:         You have a large or growing hard lump around the site.         The site is red, swollen, hot or tender.         Blood or fluid is draining from the site.         You have chills or a fever greater than 101 F (38 C).         Your leg or arm turns bluish, feels numb or cool.         You have hives, a rash or unusual itching.     ADDITIONAL INSTRUCTIONS: You are scheduled for additional coronary artery stent placement on Friday, December 1st. Check in for this appointment is at the [facility] Waiting Room in the [facility] ([address] [place] [address]) at 11:00 am. Do not eat or drink anything after 6:30 am prior to the procedure although you may take your morning medications (including aspirin and ticagrelor) with a sip of water. Have someone available to give you a ride home and stay with you after the recovery period. If you have any questions, please contact the Cardiology Clinic at [phone].    [facility] at [place]:   [phone] (7 days a week)      Cardiology Fellow on call (24 hours per day) at [facility], [place]:   [phone] (ask for Cardiology Fellow on call)      Number where we can reach you:  ____________    Pending Results     No orders found from 11/14/2017 to 11/17/2017.            Statement of Approval     Ordered          11/18/17 1216  I have reviewed and agree with all the recommendations and orders detailed in this document.  EFFECTIVE NOW     Approved and electronically signed by:  Shyann Domingo MD             Admission Information     Date & Time Provider Department Dept. Phone    11/16/2017 Arlene Samuel MD Unit [facility] [place] [phone]      Your Vitals Were     Blood Pressure Pulse Temperature Respirations Height Weight    118/63 (BP Location: Left  "arm) 69 97.3  F (36.3  C) (Oral) 16 1.664 m (5' 5.5\") 99.8 kg (220 lb 0.3 oz)    Pulse Oximetry BMI (Body Mass Index)                99% 36.06 kg/m2          [employer] Information     [employer] lets you send messages to your doctor, view your test results, renew your prescriptions, schedule appointments and more. To sign up, go to [email or web address] . Click on \"Log in\" on the left side of the screen, which will take you to the Welcome page. Then click on \"Sign up Now\" on the right side of the page.     You will be asked to enter the access code listed below, as well as some personal information. Please follow the directions to create your username and password.     Your access code is: [email or web address]  Expires: 2017  5:30 AM     Your access code will  in 90 days. If you need help or a new code, please call your [facility] clinic or [phone].        Care EveryWhere ID     This is your Care EveryWhere ID. This could be used by other organizations to access your [facility] medical records  [id number]        Equal Access to Services     RIGO STORY : Jeremiah Olvera, abhinav cuevas, qagracia kaalbinta romero, haresh campo. So [facility] [phone].    ATENCIÓN: Si habla español, tiene a adorno disposición servicios gratuitos de asistencia lingüística. Llame al [phone].    We comply with applicable federal civil rights laws and [place] laws. We do not discriminate on the basis of race, color, national origin, age, disability, sex, sexual orientation, or gender identity.               Review of your medicines      START taking        Dose / Directions    ticagrelor 90 MG tablet   Commonly known as:  BRILINTA   Used for:  Coronary artery disease of native artery of native heart with stable angina pectoris (H)        Dose:  90 mg   Take 1 tablet (90 mg) by mouth 2 times daily   Quantity:  180 tablet   Refills:  3         CONTINUE these medicines which may have CHANGED, " or have new prescriptions. If we are uncertain of the size of tablets/capsules you have at home, strength may be listed as something that might have changed.        Dose / Directions    amLODIPine 10 MG tablet   Commonly known as:  NORVASC   This may have changed:  when to take this   Used for:  Essential hypertension with goal blood pressure less than 130/80        Dose:  10 mg   Take 1 tablet (10 mg) by mouth daily   Quantity:  90 tablet   Refills:  PRN       aspirin 81 MG EC tablet   This may have changed:  when to take this   Used for:  Hypertension goal BP (blood pressure) < 130/80, Hyperlipidemia LDL goal <100        Dose:  81 mg   Take 1 tablet (81 mg) by mouth daily   Quantity:  30 tablet   Refills:  0       atenolol 100 MG tablet   Commonly known as:  TENORMIN   This may have changed:  when to take this   Used for:  Essential hypertension with goal blood pressure less than 130/80        Dose:  100 mg   Take 1 tablet (100 mg) by mouth daily   Quantity:  90 tablet   Refills:  PRN       atorvastatin 40 MG tablet   Commonly known as:  LIPITOR   This may have changed:  when to take this   Used for:  Hyperlipidemia LDL goal <100        Dose:  40 mg   Take 1 tablet (40 mg) by mouth daily   Quantity:  90 tablet   Refills:  PRN         CONTINUE these medicines which have NOT CHANGED        Dose / Directions    BENADRYL 25 MG capsule   Generic drug:  diphenhydrAMINE        Dose:  25 mg   Take 25 mg by mouth nightly as needed. sleep   Refills:  0       lactobacillus rhamnosus (GG) 10 B CELL capsule   Commonly known as:  CULTURELLE        Dose:  1 capsule   Take 1 capsule by mouth every morning   Refills:  0       MULTIVITAMIN & MINERAL PO        Dose:  1 tablet   Take 1 tablet by mouth every morning   Refills:  0       niacin 500 MG tablet   Used for:  Hyperlipidemia LDL goal <100        Dose:  500 mg   Take 1 tablet (500 mg) by mouth At Bedtime   Quantity:  30 tablet   Refills:  PRN       SAW PALMETTO PO        Dose:   1 tablet   Take 1 tablet by mouth every morning   Refills:  0       sulfamethoxazole-trimethoprim 800-160 MG per tablet   Commonly known as:  BACTRIM DS/SEPTRA DS   Used for:  Infected sebaceous cyst of skin        Dose:  1 tablet   Take 1 tablet by mouth 2 times daily   Quantity:  20 tablet   Refills:  0       tiZANidine 2 MG tablet   Commonly known as:  ZANAFLEX   Used for:  Bilateral low back pain without sciatica, unspecified chronicity        Dose:  2-4 mg   Take 1-2 tablets (2-4 mg) by mouth nightly as needed   Quantity:  60 tablet   Refills:  PRN         STOP taking     valsartan 320 MG tablet   Commonly known as:  DIOVAN                Where to get your medicines      These medications were sent to [facility] [place], [place] - [address]  [address] [place] [address]     Phone:  [phone]     ticagrelor 90 MG tablet                Protect others around you: Learn how to safely use, store and throw away your medicines at www.disposemymeds.org.             Medication List: This is a list of all your medications and when to take them. Check marks below indicate your daily home schedule. Keep this list as a reference.      Medications           Morning Afternoon Evening Bedtime As Needed    amLODIPine 10 MG tablet   Commonly known as:  NORVASC   Take 1 tablet (10 mg) by mouth daily                                aspirin 81 MG EC tablet   Take 1 tablet (81 mg) by mouth daily   Last time this was given:  81 mg on 11/18/2017  8:19 AM                                atenolol 100 MG tablet   Commonly known as:  TENORMIN   Take 1 tablet (100 mg) by mouth daily                                atorvastatin 40 MG tablet   Commonly known as:  LIPITOR   Take 1 tablet (40 mg) by mouth daily   Last time this was given:  40 mg on 11/18/2017  8:19 AM                                BENADRYL 25 MG capsule   Take 25 mg by mouth nightly as needed. sleep   Generic drug:  diphenhydrAMINE                                 lactobacillus rhamnosus (GG) 10 B CELL capsule   Commonly known as:  CULTURELLE   Take 1 capsule by mouth every morning                                MULTIVITAMIN & MINERAL PO   Take 1 tablet by mouth every morning                                niacin 500 MG tablet   Take 1 tablet (500 mg) by mouth At Bedtime                                SAW PALMETTO PO   Take 1 tablet by mouth every morning                                sulfamethoxazole-trimethoprim 800-160 MG per tablet   Commonly known as:  BACTRIM DS/SEPTRA DS   Take 1 tablet by mouth 2 times daily                                ticagrelor 90 MG tablet   Commonly known as:  BRILINTA   Take 1 tablet (90 mg) by mouth 2 times daily   Last time this was given:  90 mg on 11/18/2017  8:19 AM                                tiZANidine 2 MG tablet   Commonly known as:  ZANAFLEX   Take 1-2 tablets (2-4 mg) by mouth nightly as needed

## 2017-11-17 PROBLEM — K62.5 RECTAL BLEEDING: Status: ACTIVE | Noted: 2017-01-01

## 2017-11-17 NOTE — H&P
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 He is admitted for staged PCI procedure after a pre-operative dobutamine stress echo 8/4/17 revealed septal hypokinesis at rest with mid-septal and apical lateral ischemia at peak stress suspicious for multi-vessel CAD.  Subsequent coronary angio 8/15/17 revealed severe 3 vessel disease (proximal/mid RCA, severely disease LAD, proximal to mid circ). On 11/16, patient underwent PCI with NICOLE x3 of LAD with plan to follow up for staged PCI of RCA in 2 weeks. Following PCI on 11/16, patient had multiple episodes of bright red blood per rectum with an associated ~ 1 g drop in hemoglobin.  Bleeding has been attributed to his diagnosed rectosigmoid adenocarcinoma.  He was admitted to observation for monitoring in the setting of dual anti-platelet therapy.      Patient currently denies any abdominal pain.  No further episodes of rectal bleeding since this AM.  Per patient he has episodes of bleeding per rectum at home with roughly the same amount of bleeding.  No dizziness or lightheadedness.      PMH/PSH:  Past Medical History:   Diagnosis Date     Acute, but ill-defined, cerebrovascular disease 1994     CAD (coronary artery disease)      CKD (chronic kidney disease) stage 4, GFR 15-29 ml/min (H)      History of CVA (cerebrovascular accident)      Hx of endarterectomy 2002    Left and right     Peripheral vascular disease, unspecified      Pulmonary nodules      Pure  hypercholesterolemia      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     Subclavian arterial stenosis (H)     Left     Unspecified essential hypertension      Past Surgical History:   Procedure Laterality Date     COLONOSCOPY N/A 5/19/2017    Procedure: COMBINED COLONOSCOPY, SINGLE OR MULTIPLE BIOPSY/POLYPECTOMY BY BIOPSY;  Rectal bleeding;  Surgeon: Maximus Dinero MD;  Location:  GI     SURGICAL HISTORY OF -   10/02    angioplasty and stenting of left and internal carotid artery at the bifurcation     SURGICAL HISTORY OF - 11/11    right common iliac artery stent      VASCULAR SURGERY  2001    stent placed in carotid    Reviewed and updated in EPIC.     SHx:  Social History     Social History     Marital status: Single     Spouse name: N/A     Number of children: 0     Years of education: N/A     Occupational History     post       Social History Main Topics     Smoking status: Former Smoker     Packs/day: 3.00     Years: 30.00     Quit date: 8/5/1994     Smokeless tobacco: Former User     Quit date: 8/1/1994      Comment: quit 15 years ago after his stroke     Alcohol use Yes      Comment: Varies: 1 per day     Drug use: No     Sexual activity: No     Other Topics Concern     Parent/Sibling W/ Cabg, Mi Or Angioplasty Before 65f 55m? Yes     brother and father     Social History Narrative    [facility]  Was never .  Had no children.  Has a two brothers that have passed away.  He has two sister, one with Alzheimer and one alive and well.  Sister will be here for surgery.     Reviewed and updated in EPIC.     FHx:  Family History   Problem Relation Age of Onset     C.A.D. Father      Hypertension Father      Prostate Cancer Father      C.A.D. Brother      MI     Hypertension Brother      Arthritis Sister      DIABETES No family hx of      CEREBROVASCULAR DISEASE No family hx of      Cancer - colorectal No family hx of    Reviewed and updated in EPIC.     Allergies:  Allergies   Allergen  Reactions     No Known Drug Allergies    Reviewed and updated in EPIC.     Medications:  Prescriptions Prior to Admission   Medication Sig Dispense Refill Last Dose     sulfamethoxazole-trimethoprim (BACTRIM DS/SEPTRA DS) 800-160 MG per tablet Take 1 tablet by mouth 2 times daily 20 tablet 0 11/16/2017 at 0600     aspirin 81 MG EC tablet Take 1 tablet (81 mg) by mouth daily (Patient taking differently: Take 81 mg by mouth every morning ) 30 tablet  11/16/2017 at Unknown time     atorvastatin (LIPITOR) 40 MG tablet Take 1 tablet (40 mg) by mouth daily (Patient taking differently: Take 40 mg by mouth every morning ) 90 tablet PRN 11/16/2017 at 0600     amLODIPine (NORVASC) 10 MG tablet Take 1 tablet (10 mg) by mouth daily (Patient taking differently: Take 10 mg by mouth every morning ) 90 tablet PRN 11/16/2017 at 0600     atenolol (TENORMIN) 100 MG tablet Take 1 tablet (100 mg) by mouth daily (Patient taking differently: Take 100 mg by mouth every morning ) 90 tablet PRN 11/16/2017 at 0600     valsartan (DIOVAN) 320 MG tablet Take 1 tablet (320 mg) by mouth daily (Patient taking differently: Take 320 mg by mouth every morning ) 90 tablet 3 11/16/2017 at 0600     tiZANidine (ZANAFLEX) 2 MG tablet Take 1-2 tablets (2-4 mg) by mouth nightly as needed 60 tablet PRN Past Month at Unknown time     Saw Palmetto, Serenoa repens, (SAW PALMETTO PO) Take 1 tablet by mouth every morning    11/16/2017 at 0600     niacin 500 MG tablet Take 1 tablet (500 mg) by mouth At Bedtime 30 tablet PRN 11/15/2017 at 2200     Multiple Vitamins-Minerals (MULTIVITAMIN & MINERAL PO) Take 1 tablet by mouth every morning    11/16/2017 at 0600     diphenhydrAMINE (BENADRYL) 25 MG capsule Take 25 mg by mouth nightly as needed. sleep   11/15/2017 at 2200     lactobacillus rhamnosus, GG, (CULTURELLE) 10 B CELL capsule Take 1 capsule by mouth every morning    11/16/2017 at 0600       No current facility-administered medications on file prior to  encounter.   Current Outpatient Prescriptions on File Prior to Encounter:  aspirin 81 MG EC tablet Take 1 tablet (81 mg) by mouth daily (Patient taking differently: Take 81 mg by mouth every morning )   atorvastatin (LIPITOR) 40 MG tablet Take 1 tablet (40 mg) by mouth daily (Patient taking differently: Take 40 mg by mouth every morning )   amLODIPine (NORVASC) 10 MG tablet Take 1 tablet (10 mg) by mouth daily (Patient taking differently: Take 10 mg by mouth every morning )   atenolol (TENORMIN) 100 MG tablet Take 1 tablet (100 mg) by mouth daily (Patient taking differently: Take 100 mg by mouth every morning )   valsartan (DIOVAN) 320 MG tablet Take 1 tablet (320 mg) by mouth daily (Patient taking differently: Take 320 mg by mouth every morning )   tiZANidine (ZANAFLEX) 2 MG tablet Take 1-2 tablets (2-4 mg) by mouth nightly as needed   Saw Palmetto, Serenoa repens, (SAW PALMETTO PO) Take 1 tablet by mouth every morning    niacin 500 MG tablet Take 1 tablet (500 mg) by mouth At Bedtime   Multiple Vitamins-Minerals (MULTIVITAMIN & MINERAL PO) Take 1 tablet by mouth every morning    diphenhydrAMINE (BENADRYL) 25 MG capsule Take 25 mg by mouth nightly as needed. sleep   lactobacillus rhamnosus, GG, (CULTURELLE) 10 B CELL capsule Take 1 capsule by mouth every morning    [DISCONTINUED] olmesartan-hydrochlorothiazide (BENICAR HCT) 40-25 MG per tablet Take 1 tablet by mouth daily       ROS:   CONSTITUTIONAL:  negative for  fevers, chills and weight loss  EYES:  negative for blurred vision and visual disturbance  HEENT:  negative for sore throat and hoarseness  RESPIRATORY:  negative for dyspnea, cough, increased sputum  CARDIOVASCULAR:  negative for chest pain, palpitations, syncope  GASTROINTESTINAL:  negative for nausea, vomiting, diarrhea, constipation, abdominal pain, hematemesis, positive for  hemtochezia  GENITOURINARY:  negative for frequency and dysuria  HEMATOLOGIC/LYMPHATIC:  positive for  "bleeding  MUSCULOSKELETAL:  negative for  muscle weakness  NEUROLOGICAL:  negative for headaches, dizziness, weakness and numbness  BEHAVIOR/PSYCH:  negative for depressed mood     PHYSICAL EXAM:   Vitals:  /68 (BP Location: Left arm)  Pulse 75  Temp 97.9  F (36.6  C) (Oral)  Resp 18  Ht 1.664 m (5' 5.5\")  Wt 99.8 kg (220 lb 0.3 oz)  SpO2 99%  BMI 36.06 kg/m2    Constitutional: lying in bed, NAD  Integumentary: no rash seen  HEENT: oral mucous moist  Cardiovascular: regular rhythm, normal rate, S1 and S2 heard, no murmurs, rubs or gallops  Respiratory: CTAB, non-labored breathing  Abdominal: no distension,  no tenderness, no guarding  Extremities: no lower extremity edema  Neurologic: moving all extremities equally     Labs and Imaging:  Reviewed.     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   On 11/16, patient underwent PCI with NICOLE x3 of LAD with plan to follow up for staged PCI of RCA in 2 weeks. Following PCI on 11/16, patient had multiple episodes of bright red blood per rectum with an associated ~ 1 g drop in hemoglobin.     # rectal bleeding  Patient reports bleeding prior to admission.  Episode of bleeding overnight on 11/16 following PCI with ~ 1 g drop in hemoglobin.  Attributed to known rectosigmoid adenocarcinoma that has not been intervened upon yet due to cardiac risk.   - colorectal surgery consulted  - trend hgb  - clear liquid diet    # CAD s/p 3 v PCI (11/16/17)  Planned for staged PCI of RCA in 2 weeks.    - cardiology consulted for input on operative risk   - ASA 81 mg daily and ticagrelor 90 mg BID (will not be able to stop dual anti-platelet for surgery)   - continue PTA atorvastatin 40 mg     # " HTN  - holding PTA altenolol and valsartan due to bleeding     # CKD  Creatinine 2.77.  Minimally elevated from recent baseline of ~ 2.5.    - monitor   - avoid nephrotoxins  - hold valsartan       FEN: clear liquid diet  DVT PPx: holding in the setting of bleeding  Code Status: full code  Dispo: pending stability of hemoglobin and rectal bleeding overnight    Patient seen and discussed with Dr. Samuel who agrees with this plan.     Shyann Domingo MD, MS  Internal Medicine, PGY-2  [phone]

## 2017-11-17 NOTE — PROCEDURES
CARDIAC CATH REPORT:   PROCEDURES PERFORMED:   Selective Left Coronary Angiography  Physiologic Assessment (FFR)  Percutaneous Coronary Intervention    PHYSICIANS:  Attending Physician: Jose Willett MD  Interventional Cardiology Fellow: None  Cardiology Fellow: Leonides Betts MD    INDICATION:  72M Hx former tobacco use, HTN, HL, CKD4, no DM, severe PAD s/p LICA and right common iliac artery stenting, recently diagnosed with rectal cancer, prior to surgical resection patient underwent preop dobutamine stress echo which was positive, subsequent coronary angiogram 8/15/17 revealed severe 2-3 v CAD, plan was for CABG but patient refused sternotomy but permitting percutaneous revascularization, presents today on elective outpatient basis for planned PCI of LAD for UA CCS III.    DESCRIPTION:  1. Consent obtained with discussion of risks.  All questions were answered.  2. Sterile prep and procedure.  3. Location with Sheaths:   Rt Femoral Arterial  6 Fr  25 cm [long]  4. Access: Local anesthetic with lidocaine.  A standard 18 guage needle with ultrasound guidance was used to establish vascular access using a modified Seldinger technique.  5. Diagnostic Catheters:   None  6. Guiding Catheters:  6 Fr  XB LF 3.5 GC  6. Estimated blood loss: < 5 ml    MEDICATIONS:  The procedure was performed under conscious sedation for 103 minutes from 1630 to 1813.  The patient was assessed immediately before the first sedation medication was administered.  Midazolam 2 mg and Fentanyl 100 mcg were administered.  Heart rate, BP, respiration, oxygen saturation and patient responses were monitored throughout the procedure with the assistance of the RN under my supervision.  - IV UFH  - Antiplatelet Therapy: Ticagrelor 180 mg     Procedures:    SELECTIVE LEFT CORONARY ANGIOGRAM:   1. The LM has mild (<25%) proximal disease.  2. LAD is a type III vessel (supplies entire apex). Prior to PCI it is a medium caliber vessel with heavy proximal  calcification, proximal 80% stenosis, mid focal 99% stenosis with LORRAINE 2 flow distally, and moderate 50-70% is the distal vessel  3. LCX has sequential ostial and proximal 60% stenoses.    PERCUTANEOUS CORONARY INTERVENTION of the mid- proximal LAD:  A 6 Fr  XB LF 3.5 GC was positioned at the ostium of the LMCA. IV UFH was administered to achieve adequate anticoagulation.    A Pilot50 wire was advanced across the LAD lesions and positioned in the dLAD. The lesions were pre-dilated with 1.2x08mm, 1.5x15mm, and 2.0x12mm balloons sequentially. A Synergy drug eluting stent 2.5x28mm was successfully deployed in the mid LAD. The proximal segment of the stent was post-dilated with 2.5x12 NC balloon. A second Synergy drug eluting stent 3.0x20 mm was placed overlapping the first from the mid to proximal LAD. This was post dilated with a 3.0 x12 NC balloon. A third Synergy drug eluting stent 3.5x16mm was placed overlapping into the proximal LAD. This was post dilated was a 3.5x12 NC balloon. The Pilot50 wire was then advanced into the D2 branch and struts were dilated with the 2.0x 12mm balloon. Final angiography showed no evidence of perforation or dissection with residual stenosis of 0% and LORRAINE 3 flow. No complications.    FUNCTIONAL ASSESSMENT of LCx:  The Aeris pressure wire was advanced into the distal LCx. Hyperemia was induced with IC Adenosine (80 and 100 mcg).  The FFR at peak hyperemia was non-significant, 0.83 and 0.84, respectively.    Sheath Removal:  The 6Fr RFA sheath will be removed after the patient has been transferred to the unit.    Contrast: Isovue, 70 ml     Fluoroscopy Time: 31.9 min    COMPLICATIONS:  1. None    SUMMARY:   - Successful PCI of proximal and mid LAD with 3 x drug eluting stents with improvement from LORRAINE 2 to LORRAINE 3 flow  - LCx non significant by FFR 0.83  - RCA (not studied today) with known +FFR and plans for staged PCI in 2 weeks due to approaching contrast limit.  - Difficulty  passing a wire past his prior RFA stent.  It was accomplished with a straight Bentson wire and JR4 catheter    PLAN:   - Asa 81 daily lifelong  - Begin brilinta 90 mg BID for at least 6 months or until discussion with cardiology  - Cont high intensity statin  - Sheath removal once ACT <180, bedrest and monitoring per protocol  - Discharge home later tonight or tomorrow  - Follow up for staged PCI of RCA in 2 weeks with Dr Willett    The attending interventional cardiologist, Dr Willett, was present and supervised all critical aspects the procedure.    See CVIS report for final draft.    Leonides Betst MD  Cardiology Fellow  [phone]      ATTENDING ATTESTATION: I was present and supervised the cardiology fellow throughout the procedure and reviewed the findings with the fellow at the completion of the procedure.  I agree with the documentation above.    Jose Willett MD, PhD  Interventional/Critical Care Cardiology  [phone]    November 16, 2017

## 2017-11-17 NOTE — CONSULTS
Cardiology Inpatient Consultation  November 17, 2017    Reason for Consult:  A cardiology consult was requested by Dr. 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 He is admitted for staged PCI procedure after a pre-operative dobutamine stress echo 8/4/17 revealed septal hypokinesis at rest with mid-septal and apical lateral ischemia at peak stress suspicious for multi-vessel CAD.    Subsequent coronary angio 8/15/17 revealed severe 3v disease (proximal/mid RCA, severely disease LAD, proximal to mid circ. On 11/16, patient underwent PCI with NICOLE x3 of LAD with plan to follow up for staged PCI of RCA in 2 weeks. Following PCI on 11/16, patient had multiple episodes of bright red blood per rectum, with a 1.4g drop in hgb. During episodes, patient remained hemodynamically stable. Given his recently diagnosed rectosigmoid adenocarcinoma, colorectal surgery was consulted in the  and is seeking input on intraoperative cardiac risk should emergent surgery be required.     At the time of interview, the patient is mildly confused, but denies chest pain, dyspnea at rest or with exertion, orthopnea, PND, palpitations, lightheadedness, or syncope.     Review of Systems:    Complete review of systems was performed and negative except per HPI.    PMH:  Past Medical History:   Diagnosis Date     Acute, but ill-defined, cerebrovascular disease 1994     CAD (coronary artery disease)      CKD (chronic kidney disease) stage 4, GFR 15-29 ml/min (H)      History of CVA (cerebrovascular accident)      Hx of endarterectomy 2002    Left and right      Peripheral vascular disease, unspecified      Pulmonary nodules      Pure hypercholesterolemia      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     Subclavian arterial stenosis (H)     Left     Unspecified essential hypertension      Active Problems:  Patient Active Problem List    Diagnosis Date Noted     Rectal bleeding 11/17/2017     Priority: Medium     Status post coronary angiogram 08/15/2017     Priority: Medium     Pulmonary nodules 06/27/2017     Priority: Medium     Advanced directives, counseling/discussion 08/08/2011     Priority: Medium     Advance Directive Problem List Overview:   Name Relationship Phone    Primary Health Care Agent            Alternative Health Care Agent          Discussed advance care planning with patient; however, patient declined at this time. 8/8/2011          Hypertension goal BP (blood pressure) < 130/80 03/25/2011     Priority: Medium     Per provider       CKD (chronic kidney disease) stage 3, GFR 30-59 ml/min 03/25/2011     Priority: Medium     Per provider       Hyperlipidemia LDL goal <100 10/31/2010     Priority: Medium     Per provider       CVA 11/11/2004     Priority: Medium     Peripheral vascular disease (H) 11/11/2004     Priority: Medium     Problem list name updated by automated process. Provider to review       History of CVA (cerebrovascular accident) 11/11/2004     Priority: Medium     stenting done 10/02      Normal thallium stress echo in 2002  Problem list name updated by automated process. Provider to review       Social History:  Social History   Substance Use Topics     Smoking status: Former Smoker     Packs/day: 3.00     Years: 30.00     Quit date: 8/5/1994     Smokeless tobacco: Former User     Quit date: 8/1/1994      Comment: quit 15 years ago after his stroke     Alcohol use Yes      Comment: Varies: 1 per day     Family History:  Family History   Problem Relation Age of Onset     C.AADAN Father      Hypertension Father      Prostate Cancer Father      CKennyAADAN  Brother      MI     Hypertension Brother      Arthritis Sister      DIABETES No family hx of      CEREBROVASCULAR DISEASE No family hx of      Cancer - colorectal No family hx of        Medications:    pantoprazole  40 mg Intravenous BID     atorvastatin  40 mg Oral Daily     ticagrelor  90 mg Oral BID     sodium chloride (PF)  3 mL Intracatheter Q8H     aspirin EC  81 mg Oral Daily         Percutaneous Coronary Intervention orders placed (this is information for BPA alerting)       - MEDICATION INSTRUCTIONS -       Reason ACE/ARB/ARNI order not selected       - MEDICATION INSTRUCTIONS -         Physical Exam:  Temp:  [97.4  F (36.3  C)-98.7  F (37.1  C)] 97.5  F (36.4  C)  Pulse:  [55] 55  Heart Rate:  [60-77] 77  Resp:  [16-20] 18  BP: ()/() 104/65  SpO2:  [96 %-100 %] 99 %    Intake/Output Summary (Last 24 hours) at 11/17/17 1259  Last data filed at 11/16/17 2200   Gross per 24 hour   Intake              250 ml   Output              575 ml   Net             -325 ml     GEN: pleasant, no acute distress  HEENT: no icterus, dry MM  CV: RRR, normal s1/s2, no murmurs/rubs/s3/s4, no heave.   CHEST: clear to ausculation bilaterally, no rales or wheezing  ABD: soft, non-tender, normal active bowel sounds  EXTR: pulses intact bilaterally. Trace BLE edema. No cyanosis.   NEURO: Tremulous, AAO to person, place and president (not date), speech fluent    Diagnostics:  All labs and imaging were reviewed, of note:    All laboratory data reviewed    No results found for: TROPI, TROPONIN, TROPR, TROPN      EKG (18:55 11/16/17)        Dobutamine Stress test (8/4/2017):  Interpretation Summary  Abnormal stress echo.  Mid septal hypokinesis at rest, with mid septal and apical lateral ischemia at  peak stress. Suspect multi-vessel CAD.  Target heart rate was achieved. Heart rate and blood pressure response to  dobutamine were normal. With stress, the left ventricular ejection fraction  increased from 55-60% to greater  than 65% and the left ventricular size  decreased appropriately.  No subjective symptoms  ST segment depression in inferolateral leads during recovery period, but not  at peak stress.  No significant valve disease on screening doppler evaluation. The aortic root  and visualized ascending aorta are normal.      Assessment and Recommendation:    # HTN  # Hyperlipidemia   # CAD, s/p recent PCI on 11/16  Patient denies any prior cardiac interventions. He was originally seen by cardiology on 7/31/2017 where he was evaluated for cardiac clearance for colorectal resection. Stress echo recommended at that time, given patient was unable to exercise to 4 METs which demonstrated hypokinesis at rest with mid-septal and apical alteral ischemia. Planned PCI scheduled for RCA in 2 weeks. Patient has RCRI score of 4, although of note this was prior to his PCI with stenting yesterday. Currently denies any chest pain, shortness of breath or palpitations.   -The patient was discussed with multiple cardiology staff members including Dr. Suazo and Dr. Willett, the interventional cardiologist the did mr. Lira's PCI   -- Patient should remain on ASA and Brillinta (including during any potential upcoming surgery) as there is a high risk of stent thrombosis if discontinued, and stent thrombosis is a potentially fatal complication. If there is a surgical resection of his adenocarcinoma, in the near future, our recommendation is that his dual antiplatelet therapy is continued intraoperatively.  -- DAPT should only be be discontinued if absolutely required for life preserving intervention or in the setting of massive potentially fatal bleeding event   --He does have disease in his RCA as discussed. Current RCA disease does not preclude patient from emergent surgical intervention if needed as there is only a small amount of tissue that is supplied by this vessel and, if infarcted, would not cause major morbidity versus pending a life-saving  surgery would.     # Severe PAD   Patient underwent left internal carotid artery stenting in 2002 and right common iliac artery stenting in 2011. Carotid ultraound on 7/31/2017 demonstrated complete occlusion of SYDNEE and <50% stenosis of LICA (stent patent). Does have history of smoking, but patient states he quit after his stroke in 1994.   - Continue ASA 81 mg, atorvastatin 40 mg     I have discussed the above with Dr. Suazo as well as Dr. Willett.     Thank you for consulting the cardiovascular services at the [facility]. Please do not hesitate to call us with any questions.     Fabio Dhillon, DO  Internal Medicine, PGY1  Pager [phone]    Patrick Grissom PA-C  [facility] Cardiology Consult Team  Pager [phone] or [phone]    I have seen and examined the patient today as a part of a shared visit with Warren Grissom PA-C.   My physical exam today confirms the findings documented above.   Today's labs, imaging studies, EKGs, and telemetry were reviewed.     Decisions made by me, which were discussed with the team to complete are:   -recommend to continue DAPT without interruption (including through the entire perioperative period) in the absence of acute, life-threatening bleeding  -RCA revascularization is planned in the future, but myocardium at risk is small and this should not present a barrier to timely surgery (can be deferred until after surgery)-- would not be surprising for a small amount of ischemia to occur perioperatively, however.  -counseled the patient at length regarding the balance of risks and benefits (perioperative bleeding on DAPT vs. Potential for in-stent thrombosis and large MI with its interruption.) He understands these risks and the rationale for the above recommendations.   -discussed the above with the interventional cardiologist who performed the patient's initial PCI, Dr. Willett, who agrees with all of the above recommendations.    Total visit time=90 minutes,  >50% of which was spent at the bedside in direct patient contact in counseling and coordination of care regarding the treatment plan described above, the rationale for it, and answering all questions.     Leonides Suazo MD  Staff Cardiologist

## 2017-11-17 NOTE — PLAN OF CARE
Problem: Patient Care Overview  Goal: Plan of Care/Patient Progress Review  Outpatient/Observation goals to be met before discharge home:      24 hrs of no hemodynamic instability: monitoring in process   stable blood pressures and hemoglobins: yes, will continue to monitor

## 2017-11-17 NOTE — PROGRESS NOTES
Patient alert, confused, at times restless, denies any pain. Femoral sheath removed at 12 AM, bleeding noted after 20 minutes of manual pressure, extended manual pressure application for another 20 mins. Pt was off bed rest at 4 am but pt had large amount of bloody bright red stool. No dizziness, no SOB, no chest pain. Vital signs monitoring done q 15 and were stable, stat Hgb was 10.9 from 12.3. Provider informed and saw pt, Hgb monitoring ordered q 4 hours, and GI consult was place. Right groin site no bleeding, no hematoma, dressing clean dry intact.

## 2017-11-17 NOTE — PROGRESS NOTES
"Cardiology Cross Cover Note    Called to see patient re: large volume bright red stool. Patient hemodynamically stable, /52, HR in 60's. Denies any abdominal pain, dizziness, chest pain, SOB. States \"I told them this was going to happen\". Has known rectosigmoid cancer. Stat hgb 10.9 (12.3 on admission).     Patient made NPO, 2 large bore IVs placed, q4h hgb checks & Type and Screen ordered, and GI consult placed. Spoke with GI --> supportive cares given inability to stop DAPT. Tumor most likely source of bleeding.     Will follow.     Eun Luevano MD  Internal Medicine PGY-2  Cardiology Service  P: [phone]    "

## 2017-11-17 NOTE — PROGRESS NOTES
Pt wondering why he is here. Pt reports he doesn't remember the team of doctors who came to see him this morning.  Reiterated to pt why he is here, what we are monitoring for, and what the plan is. Pt verbalizes understanding.     Pts 2nd PIV came out. Paged Dr Domingo, per MD ok for pt to have 1 IV at this time.

## 2017-11-17 NOTE — CONSULTS
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 Angio showed severe 2-3 vessel disease. 11/16 PCI with NICOLE x3 in LAD, plan to return for PCI in two weeks of RCA. On dual anti-platelet therapy, had BRBPR with two point hbg drop    RECOMMENDATIONS:   -clears today, will advance diet if no further bloody BMs tomorrow  -hemoglobin stable  -would appreciate cardiology comment of operative risk if a emergent procedure is indicated, and the duration of dual anti-platelet therapy/risk of stopping it  -will continue to delay LAR until cardiac status is optimised    Patient  findings and plan discussed with colorectal fellow Dr. Becerra.    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 He underwent a angiogram in 8/2017 which showed severe 2-3 vessel CAD. He was offered a CABG but preferred to have PCI.     Admitted for PCI, had NICOLE x3 placed in the LAD. Given his contrast limitations with his CKD IV his RCA was not addressed, plan to do another PCI in two weeks. On dual anti-platelet therapy. Overnight had a small BM with some blood, then another episode around 4 AM where the nurse found him \"standing in a pool of blood\". The patient seems confused about the story and has a 1:1 with him given confusion and unstable gait.    Patient reports " that he has 1-2 bloody BMs at home (blood in the toilet bowel).     Colonoscopy in 2013 with four tubular adenomas removed, high grade dysplasia in the sigmoid colon, in 5/2017 colonoscopy showed 10 tubular adenomas in sessile polpys throughout the colon, also showed a 3-4 cm non obstructing mass at the rectosigmoid junction - pathology returned as low grade adenocarcinoma.     Review of systems: 10 point ROS neg other than the symptoms noted above in the HPI.     PAST MEDICAL HISTORY:  Past Medical History:   Diagnosis Date     Acute, but ill-defined, cerebrovascular disease 1994     CAD (coronary artery disease)      CKD (chronic kidney disease) stage 4, GFR 15-29 ml/min (H)      History of CVA (cerebrovascular accident)      Hx of endarterectomy 2002    Left and right     Peripheral vascular disease, unspecified      Pulmonary nodules      Pure hypercholesterolemia      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     Subclavian arterial stenosis (H)     Left     Unspecified essential hypertension      PAST SURGICAL HISTORY:  Past Surgical History:   Procedure Laterality Date     COLONOSCOPY N/A 5/19/2017    Procedure: COMBINED COLONOSCOPY, SINGLE OR MULTIPLE BIOPSY/POLYPECTOMY BY BIOPSY;  Rectal bleeding;  Surgeon: Maximus Dinero MD;  Location:  GI     SURGICAL HISTORY OF -   10/02    angioplasty and stenting of left and internal carotid artery at the bifurcation     SURGICAL HISTORY OF - 11/11    right common iliac artery stent      VASCULAR SURGERY  2001    stent placed in carotid      FAMILY HISTORY:  H/o heart disease    SOCIAL HISTORY:  90 pack year history of smoking, quit in 94  1-2 drinks per day     ALLERGIES:  Allergies   Allergen Reactions     No Known Drug Allergies        MEDICATIONS:    No current facility-administered medications on file prior to encounter.   Current Outpatient Prescriptions on File Prior to Encounter:  aspirin 81 MG EC tablet Take 1 tablet (81 mg) by mouth daily (Patient taking  differently: Take 81 mg by mouth every morning )   atorvastatin (LIPITOR) 40 MG tablet Take 1 tablet (40 mg) by mouth daily (Patient taking differently: Take 40 mg by mouth every morning )   amLODIPine (NORVASC) 10 MG tablet Take 1 tablet (10 mg) by mouth daily (Patient taking differently: Take 10 mg by mouth every morning )   atenolol (TENORMIN) 100 MG tablet Take 1 tablet (100 mg) by mouth daily (Patient taking differently: Take 100 mg by mouth every morning )   valsartan (DIOVAN) 320 MG tablet Take 1 tablet (320 mg) by mouth daily (Patient taking differently: Take 320 mg by mouth every morning )   tiZANidine (ZANAFLEX) 2 MG tablet Take 1-2 tablets (2-4 mg) by mouth nightly as needed   Saw Palmetto, Serenoa repens, (SAW PALMETTO PO) Take 1 tablet by mouth every morning    niacin 500 MG tablet Take 1 tablet (500 mg) by mouth At Bedtime   Multiple Vitamins-Minerals (MULTIVITAMIN & MINERAL PO) Take 1 tablet by mouth every morning    diphenhydrAMINE (BENADRYL) 25 MG capsule Take 25 mg by mouth nightly as needed. sleep   lactobacillus rhamnosus, GG, (CULTURELLE) 10 B CELL capsule Take 1 capsule by mouth every morning    [DISCONTINUED] olmesartan-hydrochlorothiazide (BENICAR HCT) 40-25 MG per tablet Take 1 tablet by mouth daily     PHYSICAL EXAMINATION:  Temp:  [97.4  F (36.3  C)-98.7  F (37.1  C)] 97.5  F (36.4  C)  Pulse:  [55] 55  Heart Rate:  [60-77] 77  Resp:  [16-20] 18  BP: ()/() 104/65  SpO2:  [96 %-100 %] 99 %  General: no acute distress, seems a bit confused  Neuro: AAOx3  CV: RRR  Pulm: Non labored breathing on RA  Abd: soft, obese, non-tender, non-distended, no rebound or guadring  Ex: wwp, no pedal edema, 2+ peripheral pulses b/l    LABS:  LABS: Reviewed.   Arterial Blood Gases   No lab results found in last 7 days.  Complete Blood Count     Recent Labs  Lab 11/17/17  0432 11/16/17  1502   WBC 9.3 8.1   HGB 10.9* 12.3*    170     Basic Metabolic Panel    Recent Labs  Lab 11/16/17  150       POTASSIUM 4.3   CHLORIDE 114*   CO2 19*   BUN 34*   CR 2.89*   GLC 90     Liver Function Tests  No lab results found in last 7 days.  Pancreatic Enzymes  No lab results found in last 7 days.  Coagulation Profile  No lab results found in last 7 days.  Lactate  Invalid input(s): LACTATE    IMAGING:  No results found for this or any previous visit (from the past 24 hour(s)).        Staff Addendum:  Agree with the consultation H&P as documented by the housestaff. I was personally involved with the recommendations made by our service for this patient.  Mayra Gong MD  Colon and Rectal Surgery Staff  [facility]

## 2017-11-17 NOTE — PROGRESS NOTES
Patient had 1 episode of large amount of bloody stool, dark red in color, denies any abdominal pain, no SOB, no dizziness even with walking or going to the bathroom, denies chest pain. Provider informed and saw pt.

## 2017-11-17 NOTE — PROGRESS NOTES
Provider was with patient and is aware that he refused Cardiac monitoring as well as BP monitoring. Pt also refused Pulse oximeter placed.

## 2017-11-17 NOTE — PLAN OF CARE
Problem: Patient Care Overview  Goal: Plan of Care/Patient Progress Review  Outpatient/Observation goals to be met before discharge home:    24 hrs of no hemodynamic instability: monitoring in process   stable blood pressures and hemoglobins: yes, will continue to monitor    No further episode of blood stool thus far.  Bedside attendant remains at bedside. (Pt disoriented at times and is a possible flight risk)

## 2017-11-17 NOTE — DISCHARGE INSTRUCTIONS
Going Home after Coronary Angioplasty or Stent Placement       Name: Jose Lira  Medical Record Number:  [id number]  Today's Date: November 2017        For 24 hours:         Have an adult stay with you for 24 hours.         Relax and take it easy.         Drink plenty of fluids.         You may eat your normal diet, unless your doctor tells you otherwise.         Do NOT make any important or legal decisions.         Do NOT drive or operate machines at home or at work.         Do NOT drink alcohol.      Do NOT smoke.     Medicines:         If you have begun Plavix (clopidogrel), Effient (prasugrel), or Brilinta (ticagrelor), do not stop taking it until you talk to your heart doctor (cardiologist).         If you are on metformin (Glucophage), do not restart it until you have blood tests (within 2 to 3 days after discharge). When your doctor tells you it is safe, you may restart the metformin.         If you have stopped any other medicines, check with your nurse or provider about when to restart them.    Care of groin site:         Remove the Band-Aid after 24 hours. If there is minor oozing, apply another Band-aid and remove it after 12 hours.          Do NOT take a bath, or use a hot tub or pool for at least 3 days. You may shower.          It is normal to have a small bruise or lump at the site.         Do not scrub the site.         Do not use lotion or powder near the puncture site for 3 days.         For the first 2 days: Do not stoop or squat. When you cough, sneeze or move your bowels, hold your hand over the puncture site and press gently.         Do not lift more than 10 pounds for at least 3 to 5 days.         For 2 days, do NOT have sex or do any heavy exercise.     If you start bleeding from the site in your groin:  Lie down flat and press firmly on the site.  Call your physician immediately, or, come to the emergency room.      Call 911 right away if you have bleeding that is heavy or does  not stop.     Call your doctor if:         You have a large or growing hard lump around the site.         The site is red, swollen, hot or tender.         Blood or fluid is draining from the site.         You have chills or a fever greater than 101 F (38 C).         Your leg or arm turns bluish, feels numb or cool.         You have hives, a rash or unusual itching.     ADDITIONAL INSTRUCTIONS: You are scheduled for additional coronary artery stent placement on Friday, December 1st. Check in for this appointment is at the [facility] Waiting Room in the [facility] ([address] [place] [address]) at 11:00 am. Do not eat or drink anything after 6:30 am prior to the procedure although you may take your morning medications (including aspirin and ticagrelor) with a sip of water. Have someone available to give you a ride home and stay with you after the recovery period. If you have any questions, please contact the Cardiology Clinic at [phone].    [facility] [place]:   [phone] (7 days a week)      Cardiology Fellow on call (24 hours per day) at [facility], [place]:   [phone] (ask for Cardiology Fellow on call)      Number where we can reach you:  ____________

## 2017-11-17 NOTE — PROGRESS NOTES
Patient was standing at the side of the bed using the urinal to void. Patient mentioned he cannot urinate lying down. Pt was instructed that he is on bed rest when he came to the unit until after 4 hours after the sheath is pulled out. No bleeding noted at the right groin but a small lump was noted at the insertion site. Pt agreed to be straight cath. Provider informed ordered straight catheterization and on dose of Haldol IV.

## 2017-11-17 NOTE — PROGRESS NOTES
6 Frisian arterial sheath pulled by Bobby ANDERS RN. Manual pressure was held for 20 minutes and when pressure released, patient began bleeding again and pressure was held for another 20 minutes with frequent checks and still bleeding. Manual pressure was held for a total of 40 minutes before reaching hemostasis at midnight. Patient to remain on flat bedrest until 4 am. Site is flat and soft to palpation with no hematoma. Will continue to monitor.

## 2017-11-18 NOTE — PLAN OF CARE
"Problem: Patient Care Overview  Goal: Discharge Needs Assessment  Observation goals PER UNIT ROUTINE     Comments: 24 hrs of no hemodynamic instability, with stable blood pressures and hemoglobins: Yes, stable. Vitally stable.Blood pressure 128/75, pulse 69, temperature 97.4  F (36.3  C), temperature source Oral, resp. rate 16, height 1.664 m (5' 5.5\"), weight 99.8 kg (220 lb 0.3 oz), SpO2 97 %.    Continues with 1:1 sitter.            "

## 2017-11-18 NOTE — PROGRESS NOTES
DISCHARGE                         No discharge date for patient encounter.  ----------------------------------------------------------------------------  Discharged to: Home  Via: private transportation  Accompanied by: Family  Discharge Instructions:  Reg diet,  medications, follow up appointments, when to call the MD, aftercare instructions.  Prescriptions: To be filled by [facility] d/c   pharmacy; medication list reviewed & sent with pt  Follow Up Appointments: arranged; information given  Belongings: All sent with pt  IV: d/c'd    Discharge Paperwork: Signed, copied, and sent home with patient.

## 2017-11-18 NOTE — DISCHARGE SUMMARY
Medicine Discharge Summary  Jose Lira MRN: [id number]  1945  Primary care provider: Rebeca Sandoval  ___________________________________          Date of Admission:  11/16/2017  Date of Discharge:  11/18/2017   Admitting Physician:  Arlene Samuel MD  Discharge Physician:  Arlene Samuel MD  Discharging Service:  Internal Medicine, [place] [address]     Primary Provider: Rebeca Sandoval         Reason for Admission:   Jose Lira is a 72 year old male with a past medical history significant for HTN, HLD, CVA (2002), CKD stage IV, severe PAD (s/p left internal carotid artery and right common iliac stenting) that 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   On 11/16, patient underwent PCI with NICOLE x3 of LAD with plan to follow up for staged PCI of RCA in 2 weeks. Following PCI on 11/16, patient had multiple episodes of bright red blood per rectum with an associated 1 g drop in hemoglobin.           Discharge Diagnosis:   Lower gastrointestinal bleed  CAD s/p PCI (11/16/17)   HTN  CKD         Procedures & Significant Findings:   PCI w/ NICOLE x 3 of LAD on 11/16         Consultations:   Colorectal Surgery  Cardiology         Hospital Course by Problem:    # Lower gastrointestinal bleed  Episode of bleeding overnight on 11/16 following PCI with and approximate 1 g drop in hemoglobin.  Attributed to known rectosigmoid adenocarcinoma that has not been intervened upon yet due to cardiac risk. Hemoglobin remained stable and no further episodes of bleeding were observed.  Colorectal surgery evaluated during admission and will hold off on surgical intervention until cardiac status is optimized.      # CAD s/p PCI (11/16/17)  Underwent PCI with NICOLE x 3 to LAD on 11/16.  Planned for staged PCI of RCA in 2 weeks.  Patient was continued on ASA 81 mg daily and ticagrelor 90 mg  "BID.  Dual antiplatelet can not be stopped for surgery. Cardiology evaluated to determine surgical risk, see below.     -- Patient should remain on ASA and Brillinta (including during any potential upcoming surgery) as there is a high risk of stent thrombosis if discontinued, and stent thrombosis is a potentially fatal complication. If there is a surgical resection of his adenocarcinoma, in the near future, our recommendation is that his dual antiplatelet therapy is continued intraoperatively.  -- DAPT should only be be discontinued if absolutely required for life preserving intervention or in the setting of massive potentially fatal bleeding event   --He does have disease in his RCA as discussed. Current RCA disease does not preclude patient from emergent surgical intervention if needed as there is only a small amount of tissue that is supplied by this vessel and, if infarcted, would not cause major morbidity versus pending a life-saving surgery would.      # HTN  Resumed prior to admission medications with the exception of valsartan due to elevated creatinine.  Patient should hold valsartan until follow up with primary care provider.  He may require further adjustment of medications at that time.         # CKD  Creatinine 2.77.  Minimally elevated from recent baseline of ~ 2.5.  Will have patient hold valsartan at discharge until follow up with primary care provider for creatinine check.     Physical Exam on day of Discharge:  Blood pressure 118/63, pulse 69, temperature 97.3  F (36.3  C), temperature source Oral, resp. rate 16, height 1.664 m (5' 5.5\"), weight 99.8 kg (220 lb 0.3 oz), SpO2 99 %.  Constitutional: lying in bed, NAD  Integumentary: no rash seen  HEENT: oral mucous moist  Cardiovascular: regular rhythm, normal rate, S1 and S2 heard, no murmurs, rubs or gallops  Respiratory: CTAB, non-labored breathing  Abdominal: no distension,  no tenderness, no guarding  Extremities: no lower extremity " edema  Neurologic: moving all extremities equally          Pending Results:   none          Discharge Medications:     Current Discharge Medication List      START taking these medications    Details   ticagrelor (BRILINTA) 90 MG tablet Take 1 tablet (90 mg) by mouth 2 times daily  Qty: 180 tablet, Refills: 3    Associated Diagnoses: Coronary artery disease of native artery of native heart with stable angina pectoris (H)         CONTINUE these medications which have NOT CHANGED    Details   sulfamethoxazole-trimethoprim (BACTRIM DS/SEPTRA DS) 800-160 MG per tablet Take 1 tablet by mouth 2 times daily  Qty: 20 tablet, Refills: 0    Associated Diagnoses: Infected sebaceous cyst of skin      aspirin 81 MG EC tablet Take 1 tablet (81 mg) by mouth daily  Qty: 30 tablet    Associated Diagnoses: Hypertension goal BP (blood pressure) < 130/80; Hyperlipidemia LDL goal <100      atorvastatin (LIPITOR) 40 MG tablet Take 1 tablet (40 mg) by mouth daily  Qty: 90 tablet, Refills: PRN    Associated Diagnoses: Hyperlipidemia LDL goal <100      amLODIPine (NORVASC) 10 MG tablet Take 1 tablet (10 mg) by mouth daily  Qty: 90 tablet, Refills: PRN    Associated Diagnoses: Essential hypertension with goal blood pressure less than 130/80      atenolol (TENORMIN) 100 MG tablet Take 1 tablet (100 mg) by mouth daily  Qty: 90 tablet, Refills: PRN    Associated Diagnoses: Essential hypertension with goal blood pressure less than 130/80      tiZANidine (ZANAFLEX) 2 MG tablet Take 1-2 tablets (2-4 mg) by mouth nightly as needed  Qty: 60 tablet, Refills: PRN    Associated Diagnoses: Bilateral low back pain without sciatica, unspecified chronicity      Saw Palmetto, Serenoa repens, (SAW PALMETTO PO) Take 1 tablet by mouth every morning       niacin 500 MG tablet Take 1 tablet (500 mg) by mouth At Bedtime  Qty: 30 tablet, Refills: PRN    Associated Diagnoses: Hyperlipidemia LDL goal <100      Multiple Vitamins-Minerals (MULTIVITAMIN & MINERAL PO)  Take 1 tablet by mouth every morning       diphenhydrAMINE (BENADRYL) 25 MG capsule Take 25 mg by mouth nightly as needed. sleep      lactobacillus rhamnosus, GG, (CULTURELLE) 10 B CELL capsule Take 1 capsule by mouth every morning          STOP taking these medications       valsartan (DIOVAN) 320 MG tablet Comments:   Reason for Stopping:                    Discharge Instructions and Follow-Up:     Discharge Procedure Orders  CARDIAC REHAB REFERRAL   Standing Status: Future  Standing Exp. Date: 11/16/18   Referral Type: Rehab Therapy Cardiac Therapy     Discharge Instructions - IF on Metformin (Glucophage or Glucovance) or Metformin containing medications   Order Comments: IF on Metformin (Glucophage or Glucovance) or Metformin containing medications , schedule a Basic Metabolic Panel at [facility] or Primary Clinic in 48 - 72 hours post procedure and PRIOR TO resuming the Metformin or Metformin containing medications.  Hold Metformin (Glucophage or Glucovance) or Metformin containing medications until after the Basic Metabolic Panel on the 2nd or 3rd day following the procedure.  May resume after blood draw is complete.     Reason for your hospital stay   Order Comments: Admitted with rectal bleeding following cardiac catheretization with stent placement.  Bleeding has resolved, but this will need to be monitored closely as an outpatient.  You are at higher risk of bleeding due to being on both aspirin and ticgrelor for your stents.  Taking both aspirin and ticgrelor daily is important for your new stents.  You should hold your valsartan until you follow up with your primary care provider.     Adult [facility] Follow-up and recommended labs and tests   Order Comments: Follow up with primary care provider, Rebeca Sandoval, within 7 days to evaluate medication change and for hospital follow- up.  The following labs/tests are recommended: CBC, BMP.      Appointments on [place] and/or [place] campus (with [facility] or  [facility] provider or service). Call [phone] if you haven't heard regarding these appointments within 7 days of discharge.     Activity   Order Comments: Your activity upon discharge: activity as tolerated   Order Specific Question Answer Comments   Is discharge order? Yes      Full Code     Diet   Order Comments: Follow this diet upon discharge: cardiac diet   Order Specific Question Answer Comments   Is discharge order? Yes             Discharge Disposition:   home         Condition on Discharge:   Discharge condition: Stable   Code status on discharge: Full Code        Date of service: 11/18/2017    The patient was discussed with Dr. Samuel.    Shyann Domingo MD, MS  Internal Medicine, PGY-2    Physician Attestation   I, Arlene Samuel, saw and evaluated this patient prior to discharge.  I discussed the patient with the resident and agree with plan of care as documented in the resident note.      I personally reviewed vital signs, medications, labs and imaging.    I personally spent 15 minutes on discharge activities.    Arlene Samuel  Date of Service (when I saw the patient): 11/18/17

## 2017-11-18 NOTE — PLAN OF CARE
Problem: Patient Care Overview  Goal: Discharge Needs Assessment   24 hrs of no hemodynamic instability: monitoring in process, Yes is stable   stable blood pressures and hemoglobins: yes, will continue to monitor

## 2017-11-18 NOTE — PLAN OF CARE
"Problem: Patient Care Overview  Goal: Discharge Needs Assessment  Problem: Patient Care Overview  Goal: Discharge Needs Assessment  Problem: Patient Care Overview  Goal: Discharge Needs Assessment  Observation goals PER UNIT ROUTINE     Comments: 24 hrs of no hemodynamic instability, with stable blood pressures and hemoglobins: Yes, Vitally stable. Remains alert to person,and place. Independent in bed. Voided several times. Pt appears tremulous. No BM reported. Hgb prior to sleep was 10.0.Will check Hgb this AM. Remains on 1:1 sitter. Sleeping in between care. /80 (BP Location: Left arm)  Pulse 69  Temp 97.8  F (36.6  C) (Oral)  Resp 16  Ht 1.664 m (5' 5.5\")  Wt 99.8 kg (220 lb 0.3 oz)  SpO2 98%  BMI 36.06 kg/m2 Will monitor stool and Hgb and up date as needed.              "

## 2017-11-18 NOTE — PROGRESS NOTES
Colorectal Surgery Progress Note    S:no further BMs since yesterday morning's bloody BM. Tolerated clears without issue.     O:  Temp:  [97.3  F (36.3  C)-97.9  F (36.6  C)] 97.3  F (36.3  C)  Pulse:  [69-75] 69  Heart Rate:  [67-77] 67  Resp:  [16-18] 16  BP: (103-167)/(63-80) 118/63  SpO2:  [97 %-99 %] 99 %    I/O last 3 completed shifts:  In: 360 [P.O.:360]  Out: -     Gen: NAD  Resp: Breathing non-labored on RA  CV: RRR  Abd: Soft, Non-distended, Non-tender  Ext: warm and well perfused  Neuro: answers questions appropriately    BMP  Recent Labs  Lab 11/18/17  0737 11/17/17  1246 11/16/17  1502    144 141   POTASSIUM 4.0 4.1 4.3   CHLORIDE 114* 114* 114*   ADRIAN 8.9 8.7 9.1   CO2 17* 18* 19*   BUN 34* 34* 34*   CR 2.77* 2.77* 2.89*   GLC 87 96 90     CBC  Recent Labs  Lab 11/18/17  0737 11/17/17  2006 11/17/17  1246 11/17/17  0839 11/17/17  0432 11/16/17  1502   WBC 8.8  --   --  10.1 9.3 8.1   RBC 3.45*  --   --  3.62* 3.56* 3.92*   HGB 10.5* 10.0* 11.0* 11.2* 10.9* 12.3*   HCT 32.4*  --   --  33.3* 32.8* 36.5*   MCV 94  --   --  92 92 93   MCH 30.4  --   --  30.9 30.6 31.4   MCHC 32.4  --   --  33.6 33.2 33.7   RDW 13.9  --   --  13.8 13.6 13.9     --   --  196 176 170     INR  Recent Labs  Lab 11/17/17  1246   INR 1.24*      Imaging  none       A/P: Jose Lira is a 72 year old male with PMH significant for HTN, HLD, CVA in 2002, CKD stage IV, severe PAD s/p Left internal carotid artery and right common iliac stenting, who was 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 Angio showed severe 2-3 vessel disease. 11/16 PCI with NICOLE x3 in LAD, plan to return for PCI in two weeks of RCA. On dual anti-platelet therapy, had BRBPR with two point hbg drop  - No further blood per rectum  -stable hemoglobin  -can advance diet, if tolerated can discharge later today  -will continue to delay LAR until cardiac  status is optimised  Patient seen and discussed with Dr. Licea, colorectal fellow  Yamile Celis MD  General Surgery Resident PGY-3   [phone]

## 2017-11-18 NOTE — PLAN OF CARE
"Problem: Patient Care Overview  Goal: Discharge Needs Assessment  Problem: Patient Care Overview  Goal: Discharge Needs Assessment  Observation goals PER UNIT ROUTINE     Comments: 24 hrs of no hemodynamic instability, with stable blood pressures and hemoglobins: Yes, stable. Vitally stable.Blood pressure 128/75, pulse 69, temperature 97.4  F (36.3  C), temperature source Oral, resp. rate 16, height 1.664 m (5' 5.5\"), weight 99.8 kg (220 lb 0.3 oz), SpO2 97 %.     Continues with 1:1 sitter. Sleeping in between care.           "

## 2017-11-20 NOTE — TELEPHONE ENCOUNTER
"ED/Discharge Protocol    \"Hi, my name is Bonita Carbajal, a registered nurse, and I am calling on behalf of Rebeca Simon 's office at [place].  I am calling to follow up and see how things are going for you after your recent visit.\"    \"I see that you were in the (ER/UC/IP) on discharged yesterday.    How are you doing now that you are home?\" \"much better\"    Is patient experiencing symptoms that may require a hospital visit?  no    Discharge Instructions    \"Let's review your discharge instructions.  What is/are the follow-up recommendations?  Pt. Response: forgot he was to see PCP    \"Were you instructed to make a follow-up appointment?\"  Pt. Response: Yes.  Has appointment been made?   No.  \"Can I help you schedule that appointment?\" yes, this was done with PCP in one week          rMedications    \"How many new medications are you on since your hospitalization/ED visit?\" one - Brillanta and holding valsartan   0-1  \"How many of your current medicines changed (dose, timing, name, etc.) while you were in the hospital/ED visit?\"   0-1  \"Do you have questions about your medications?\"   No     Medication reconciliation completed? Yes    Was MTM referral placed (*Make sure to put transitions as reason for referral)?   No    Call Summary    \"Do you have any questions or concerns about your condition or care plan at the moment?\"    No  Triage nurse advice given: can back if issues or questions         \"If you have questions or things don't continue to improve, we encourage you contact us through the main clinic number,    Bonita Carbajal, RN, BSN       \"Thank you for your time and take care!\"    ]          "

## 2017-11-20 NOTE — TELEPHONE ENCOUNTER
Please call for In Patient follow up  Coronary Artery Disease Of Native Artery Of Native Heart With Stable Angina Pectoris (H)

## 2017-11-27 PROBLEM — I25.10 CORONARY ARTERY DISEASE INVOLVING NATIVE HEART WITHOUT ANGINA PECTORIS, UNSPECIFIED VESSEL OR LESION TYPE: Status: ACTIVE | Noted: 2017-01-01

## 2017-11-27 PROBLEM — Z98.61 POSTSURGICAL PERCUTANEOUS TRANSLUMINAL CORONARY ANGIOPLASTY STATUS: Status: ACTIVE | Noted: 2017-01-01

## 2017-11-27 NOTE — MR AVS SNAPSHOT
"              After Visit Summary   11/27/2017    Jose Lira    MRN: [id number]           Patient Information     Date Of Birth          1945        Visit Information        Provider Department      11/27/2017 11:00 AM Rebeca Sandoval NP [facility]        Today's Diagnoses     Postsurgical percutaneous transluminal coronary angioplasty status    -  1    Coronary artery disease involving native heart without angina pectoris, unspecified vessel or lesion type        Elevated serum creatinine        Bilateral low back pain without sciatica, unspecified chronicity           Follow-ups after your visit        Future tests that were ordered for you today     Open Future Orders        Priority Expected Expires Ordered    Basic metabolic panel Routine  11/29/2018 11/29/2017            Who to contact     If you have questions or need follow up information about today's clinic visit or your schedule please contact [facility] directly at [phone].  Normal or non-critical lab and imaging results will be communicated to you by MyChart, letter or phone within 4 business days after the clinic has received the results. If you do not hear from us within 7 days, please contact the clinic through [employer] or phone. If you have a critical or abnormal lab result, we will notify you by phone as soon as possible.  Submit refill requests through [employer] or call your pharmacy and they will forward the refill request to us. Please allow 3 business days for your refill to be completed.          Additional Information About Your Visit        MyChart Information     [employer] lets you send messages to your doctor, view your test results, renew your prescriptions, schedule appointments and more. To sign up, go to [email or web address] . Click on \"Log in\" on the left side of the screen, which will take you to the Welcome page. Then click on \"Sign up Now\" on the right side of the page. "     You will be asked to enter the access code listed below, as well as some personal information. Please follow the directions to create your username and password.     Your access code is: [email or web address]  Expires: 2017  5:30 AM     Your access code will  in 90 days. If you need help or a new code, please call your [facility] clinic or [phone].        Care EveryWhere ID     This is your Care EveryWhere ID. This could be used by other organizations to access your [facility] medical records  [id number]        Your Vitals Were     Pulse Temperature Respirations Pulse Oximetry BMI (Body Mass Index)       60 97.9  F (36.6  C) (Oral) 24 98% 33.59 kg/m2        Blood Pressure from Last 3 Encounters:   17 139/71   17 118/63   17 146/76    Weight from Last 3 Encounters:   17 205 lb (93 kg)   17 220 lb 0.3 oz (99.8 kg)   17 203 lb (92.1 kg)              We Performed the Following     Basic metabolic panel          Today's Medication Changes          These changes are accurate as of: 17 11:59 PM.  If you have any questions, ask your nurse or doctor.               Start taking these medicines.        Dose/Directions    tiZANidine 2 MG tablet   Commonly known as:  ZANAFLEX   Used for:  Bilateral low back pain without sciatica, unspecified chronicity   Started by:  Rebeca Sandoval NP        Dose:  2-4 mg   Take 1-2 tablets (2-4 mg) by mouth nightly as needed   Quantity:  60 tablet   Refills:  PRN         These medicines have changed or have updated prescriptions.        Dose/Directions    amLODIPine 10 MG tablet   Commonly known as:  NORVASC   This may have changed:  when to take this   Used for:  Essential hypertension with goal blood pressure less than 130/80        Dose:  10 mg   Take 1 tablet (10 mg) by mouth daily   Quantity:  90 tablet   Refills:  PRN       aspirin 81 MG EC tablet   This may have changed:  when to take this   Used for:  Hypertension goal BP (blood  pressure) < 130/80, Hyperlipidemia LDL goal <100        Dose:  81 mg   Take 1 tablet (81 mg) by mouth daily   Quantity:  30 tablet   Refills:  0       atenolol 100 MG tablet   Commonly known as:  TENORMIN   This may have changed:  when to take this   Used for:  Essential hypertension with goal blood pressure less than 130/80        Dose:  100 mg   Take 1 tablet (100 mg) by mouth daily   Quantity:  90 tablet   Refills:  PRN       atorvastatin 40 MG tablet   Commonly known as:  LIPITOR   This may have changed:  when to take this   Used for:  Hyperlipidemia LDL goal <100        Dose:  40 mg   Take 1 tablet (40 mg) by mouth daily   Quantity:  90 tablet   Refills:  PRN            Where to get your medicines      These medications were sent to [facility][id number] - [facility] [address]  [address] [facility] [address]     Phone:  [phone]     tiZANidine 2 MG tablet                Primary Care Provider Office Phone # Fax #    Rebeca Sandoval [phone]       [address] [place] [address]  [facility] [address]        Equal Access to Services     [facility] campus[facility]LLIS : Hadii aad ku hadasho Soomaali, waaxda luqadaha, qaybta kaalmada adebridgetyada, haresh love . So [facility] [phone].    ATENCIÓN: Si habla español, tiene a adorno disposición servicios gratuitos de asistencia lingüística. [facility] [phone].    We comply with applicable federal civil rights laws and [place] laws. We do not discriminate on the basis of race, color, national origin, age, disability, sex, sexual orientation, or gender identity.            Thank you!     Thank you for choosing [facility]  for your care. Our goal is always to provide you with excellent care. Hearing back from our patients is one way we can continue to improve our services. Please take a few minutes to complete the written survey that you may receive in the mail after your visit with us. Thank you!             Your  Updated Medication List - Protect others around you: Learn how to safely use, store and throw away your medicines at www.disposemymeds.org.          This list is accurate as of: 11/27/17 11:59 PM.  Always use your most recent med list.                   Brand Name Dispense Instructions for use Diagnosis    amLODIPine 10 MG tablet    NORVASC    90 tablet    Take 1 tablet (10 mg) by mouth daily    Essential hypertension with goal blood pressure less than 130/80       aspirin 81 MG EC tablet     30 tablet    Take 1 tablet (81 mg) by mouth daily    Hypertension goal BP (blood pressure) < 130/80, Hyperlipidemia LDL goal <100       atenolol 100 MG tablet    TENORMIN    90 tablet    Take 1 tablet (100 mg) by mouth daily    Essential hypertension with goal blood pressure less than 130/80       atorvastatin 40 MG tablet    LIPITOR    90 tablet    Take 1 tablet (40 mg) by mouth daily    Hyperlipidemia LDL goal <100       BENADRYL 25 MG capsule   Generic drug:  diphenhydrAMINE      Take 25 mg by mouth nightly as needed. sleep        lactobacillus rhamnosus (GG) 10 B CELL capsule    CULTURELLE     Take 1 capsule by mouth every morning        MULTIVITAMIN & MINERAL PO      Take 1 tablet by mouth every morning        niacin 500 MG tablet     30 tablet    Take 1 tablet (500 mg) by mouth At Bedtime    Hyperlipidemia LDL goal <100       SAW PALMETTO PO      Take 1 tablet by mouth every morning        ticagrelor 90 MG tablet    BRILINTA    180 tablet    Take 1 tablet (90 mg) by mouth 2 times daily    Coronary artery disease of native artery of native heart with stable angina pectoris (H)       tiZANidine 2 MG tablet    ZANAFLEX    60 tablet    Take 1-2 tablets (2-4 mg) by mouth nightly as needed    Bilateral low back pain without sciatica, unspecified chronicity

## 2017-11-27 NOTE — PROGRESS NOTES
SUBJECTIVE:   Jose Lira is a 72 year old male who presents to clinic today for the following health issues:  Pt finished taking Bactrim 800-160 mg yesterday       Hospital Follow-up Visit:    Hospital/Nursing Home/IP Rehab Facility: [facility]  Date of Admission: 11/16/2017  Date of Discharge: 11/18/2017  Reason(s) for Admission: Discharge Diagnosis: Lower gastrointestinal bleed, CAD, HTN, and CKD.  Procedure & Significant Findings: PCI w/ NICOLE x 3 of LAD on 11/16            Problems taking medications regularly:  None       Medication changes since discharge: stopped taking Losartan        Problems adhering to non-medication therapy: none     Summary of hospitalization:  [facility] discharge summary reviewed  Diagnostic Tests/Treatments reviewed.  Follow up needed: will be getting more stents on 12/1; needs repeat creat before restarting on Losartan  Other Healthcare Providers Involved in Patient s Care:         cardiology  Update since discharge: stable.     Post Discharge Medication Reconciliation: discharge medications reconciled, continue medications without change.  Plan of care communicated with patient     Coding guidelines for this visit:  Type of Medical   Decision Making Face-to-Face Visit       within 7 Days of discharge Face-to-Face Visit        within 14 days of discharge   Moderate Complexity [id number]   High Complexity [id number]                  Problem list and histories reviewed & adjusted, as indicated.  Additional history: as documented        Reviewed and updated as needed this visit by clinical staff       Reviewed and updated as needed this visit by Provider         ROS:  C: NEGATIVE for fever, chills, change in weight  INTEGUMENTARY/SKIN: NEGATIVE for worrisome rashes, moles or lesions  E/M: NEGATIVE for ear, mouth and throat problems  R: NEGATIVE for significant cough or SOB  CV: NEGATIVE for chest pain, palpitations or peripheral edema  GI:  NEGATIVE for nausea, abdominal pain, heartburn, or change in bowel habits; intermittent blood in stool  MUSCULOSKELETAL: NEGATIVE for significant arthralgias or myalgia  NEURO: NEGATIVE for weakness, dizziness or paresthesias    OBJECTIVE:     /71  Pulse 60  Temp 97.9  F (36.6  C) (Oral)  Resp 24  Wt 205 lb (93 kg)  SpO2 98%  BMI 33.59 kg/m2  Body mass index is 33.59 kg/(m^2).  GENERAL: healthy, alert and no distress  RESP: lungs clear to auscultation - no rales, rhonchi or wheezes  CV: regular rate and rhythm, normal S1 S2, no S3 or S4, no murmur, click or rub, no peripheral edema and peripheral pulses strong  ABDOMEN: soft, nontender, no hepatosplenomegaly, no masses and bowel sounds normal  SKIN: ecchymoses right groin        ASSESSMENT/PLAN:             1. Postsurgical percutaneous transluminal coronary angioplasty status  Doing well.  He will be getting another stent on 12/1.  He thinks it might be helpful to have Tizanidine if needed for back pain in the hospital (he will be less likely to be restless and want to get up due to back pain).     2. Coronary artery disease involving native heart without angina pectoris, unspecified vessel or lesion type      3. Elevated serum creatinine  Still elevated past baseline.  Will continue to hold Losartan since he is having another PCI in a week and will recheck after next hospitalization.   - Basic metabolic panel    4. Bilateral low back pain without sciatica, unspecified chronicity  He will be getting another stent on 12/1.  He thinks it might be helpful to have Tizanidine if needed for back pain in the hospital (he will be less likely to be restless and want to get up due to back pain).   - tiZANidine (ZANAFLEX) 2 MG tablet; Take 1-2 tablets (2-4 mg) by mouth nightly as needed  Dispense: 60 tablet; Refill: POLLY Sandoval NP  [facility]

## 2017-11-29 NOTE — TELEPHONE ENCOUNTER
Pt's creatinine during hospitalization on 11/18/17 was 2.77; recheck on 11/27/17 was 3.36. Per Dr. Willett, planned PCI scheduled for 12/1/17 needs to be postponed until kidney function improves. Writer spoke with pt; notified him of creatinine recheck and that procedure would be postponed. Instructed pt to have labs rechecked mid-week next week. Encouraged PO fluid intake and reminded pt to continue taking ASA and ticagrelor. Pt verbalized understanding.

## 2017-12-04 NOTE — PROGRESS NOTES
SUBJECTIVE:   Jose Lira is a 72 year old male who presents to clinic today for the following health issues:        Pt notes he needed to come in for lab work per Dr. Willett.     Yesterday, he noticed more rectal bleeding.       Typically he has been having blood in his stools, but yesterday had some blood when wiping without having a BM.  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  He is awaiting surgery, but first is undergoing PCI with NICOLE.  He was scheduled to have another PCI with NICOLE last week, which was cancelled due to his elevated creatinine.  His Losartan has been held since his last procedure.    He denies any dizziness, chest pain, shortness of breath.              Problem list and histories reviewed & adjusted, as indicated.  Additional history: as documented        Reviewed and updated as needed this visit by clinical staff  Allergies  Meds       Reviewed and updated as needed this visit by Provider         ROS:  C: NEGATIVE for fever, chills, change in weight  R: NEGATIVE for significant cough or SOB  CV: NEGATIVE for chest pain, palpitations or peripheral edema  GI: see HPI  MUSCULOSKELETAL: NEGATIVE for significant arthralgias or myalgia  NEURO: NEGATIVE for weakness, dizziness or paresthesias    OBJECTIVE:     /65  Pulse 64  Temp 97.3  F (36.3  C) (Oral)  Resp 20  Wt 197 lb (89.4 kg)  SpO2 97%  BMI 32.28 kg/m2  Body mass index is 32.28 kg/(m^2).  GENERAL: healthy, alert and no distress  RESP: lungs clear to auscultation - no rales, rhonchi or wheezes  CV: regular rate and rhythm, normal S1 S2, no S3 or S4, no murmur, click or rub, no peripheral edema and peripheral pulses strong  RECTAL (male): anus without visible lesions    Diagnostic Test Results:  Results for orders placed or performed in visit on 12/04/17 (from the past 24 hour(s))   Basic metabolic panel   Result Value Ref Range    Sodium 140 133 - 144 mmol/L    Potassium 4.7 3.4 - 5.3 mmol/L     Chloride 110 (H) 94 - 109 mmol/L    Carbon Dioxide 17 (L) 20 - 32 mmol/L    Anion Gap 13 3 - 14 mmol/L    Glucose 139 (H) 70 - 99 mg/dL    Urea Nitrogen 27 7 - 30 mg/dL    Creatinine 2.75 (H) 0.66 - 1.25 mg/dL    GFR Estimate 23 (L) >60 mL/min/1.7m2    GFR Estimate If Black 28 (L) >60 mL/min/1.7m2    Calcium 9.5 8.5 - 10.1 mg/dL   **CBC with platelets FUTURE anytime   Result Value Ref Range    WBC 8.6 4.0 - 11.0 10e9/L    RBC Count 3.08 (L) 4.4 - 5.9 10e12/L    Hemoglobin 9.8 (L) 13.3 - 17.7 g/dL    Hematocrit 29.6 (L) 40.0 - 53.0 %    MCV 96 78 - 100 fl    MCH 31.8 26.5 - 33.0 pg    MCHC 33.1 31.5 - 36.5 g/dL    RDW 13.7 10.0 - 15.0 %    Platelet Count 276 150 - 450 10e9/L   INR   Result Value Ref Range    INR 1.02 0.86 - 1.14       ASSESSMENT/PLAN:           innin  1. Rectal bleeding  He needs to have colorectal surgery, but this cannot be done until he has additional stents placed.     2. Elevated serum creatinine  Stay off of Losartan.  He will need to have his creatinine rechecked again before angiogram next week.           Rebeca Sandoval NP  [facility]

## 2017-12-04 NOTE — MR AVS SNAPSHOT
After Visit Summary   12/4/2017    Jose Lira    MRN: [id number]           Patient Information     Date Of Birth          1945        Visit Information        Provider Department      12/4/2017 2:15 PM Rebeca Sandoval NP [facility]        Today's Diagnoses     Rectal bleeding    -  1    Elevated serum creatinine           Follow-ups after your visit        Your next 10 appointments already scheduled     Dec 19, 2017 12:00 PM CST   [facility] [place]    [address] [address]              Please do not eat 10-12 hours before your appointment if you are coming in fasting for labs on lipids, cholesterol, or glucose (sugar). This does not apply to pregnant women. Water, hot tea and black coffee (with nothing added) are okay. Do not drink other fluids, diet soda or chew gum.            Dec 19, 2017 12:30 PM CST   Procedure 1.5 hr with U2A ROOM 17   Unit 2A [facility] ([facility])    [address] [address]               Dec 19, 2017  2:00 PM CST   Cath 90 Minute with [id number]   [facility]    [address] [address] [phone]              Who to contact     If you have questions or need follow up information about today's clinic visit or your schedule please contact [facility] directly at [phone].  Normal or non-critical lab and imaging results will be communicated to you by MyChart, letter or phone within 4 business days after the clinic has received the results. If you do not hear from us within 7 days, please contact the clinic through MyChart or phone. If you have a critical or abnormal lab result, we will notify you by phone as soon as possible.  Submit refill requests through  "Ton or call your pharmacy and they will forward the refill request to us. Please allow 3 business days for your refill to be completed.          Additional Information About Your Visit        MyChart Information     [employer] lets you send messages to your doctor, view your test results, renew your prescriptions, schedule appointments and more. To sign up, go to [email or web address] . Click on \"Log in\" on the left side of the screen, which will take you to the Welcome page. Then click on \"Sign up Now\" on the right side of the page.     You will be asked to enter the access code listed below, as well as some personal information. Please follow the directions to create your username and password.     Your access code is: [email or web address]  Expires: 2017  5:30 AM     Your access code will  in 90 days. If you need help or a new code, please call your [place] clinic or [phone].        Care EveryWhere ID     This is your Care EveryWhere ID. This could be used by other organizations to access your [place] medical records  [id number]        Your Vitals Were     Pulse Temperature Respirations Pulse Oximetry BMI (Body Mass Index)       64 97.3  F (36.3  C) (Oral) 20 97% 32.28 kg/m2        Blood Pressure from Last 3 Encounters:   17 106/65   17 139/71   17 118/63    Weight from Last 3 Encounters:   17 197 lb (89.4 kg)   17 205 lb (93 kg)   17 220 lb 0.3 oz (99.8 kg)              Today, you had the following     No orders found for display         Today's Medication Changes          These changes are accurate as of: 17 11:59 PM.  If you have any questions, ask your nurse or doctor.               These medicines have changed or have updated prescriptions.        Dose/Directions    amLODIPine 10 MG tablet   Commonly known as:  NORVASC   This may have changed:  when to take this   Used for:  Essential hypertension with goal blood pressure less than 130/80        Dose:  " 10 mg   Take 1 tablet (10 mg) by mouth daily   Quantity:  90 tablet   Refills:  PRN       aspirin 81 MG EC tablet   This may have changed:  when to take this   Used for:  Hypertension goal BP (blood pressure) < 130/80, Hyperlipidemia LDL goal <100        Dose:  81 mg   Take 1 tablet (81 mg) by mouth daily   Quantity:  30 tablet   Refills:  0       atenolol 100 MG tablet   Commonly known as:  TENORMIN   This may have changed:  when to take this   Used for:  Essential hypertension with goal blood pressure less than 130/80        Dose:  100 mg   Take 1 tablet (100 mg) by mouth daily   Quantity:  90 tablet   Refills:  PRN       atorvastatin 40 MG tablet   Commonly known as:  LIPITOR   This may have changed:  when to take this   Used for:  Hyperlipidemia LDL goal <100        Dose:  40 mg   Take 1 tablet (40 mg) by mouth daily   Quantity:  90 tablet   Refills:  PRN                Primary Care Provider Office Phone # Fax #    Rebeca IDALIA Sandoval [phone] [address] [address]        Equal Access to Services     NAE [facility] HospitalHOLLIS AH: Hadii kylee ku hadasho Soomaali, waaxda luqadaha, qaybta kaalmada adeegyada, waxay idiin hayromeon kody love . So [place] [phone].    ATENCIÓN: Si habla español, tiene a adorno disposición servicios gratuitos de asistencia lingüística. LlAshtabula General Hospital [phone].    We comply with applicable federal civil rights laws and [place] laws. We do not discriminate on the basis of race, color, national origin, age, disability, sex, sexual orientation, or gender identity.            Thank you!     Thank you for choosing [facility]  for your care. Our goal is always to provide you with excellent care. Hearing back from our patients is one way we can continue to improve our services. Please take a few minutes to complete the written survey that you may receive in the mail after your visit with us. Thank you!             Your Updated Medication List  - Protect others around you: Learn how to safely use, store and throw away your medicines at www.disposemymeds.org.          This list is accurate as of: 12/4/17 11:59 PM.  Always use your most recent med list.                   Brand Name Dispense Instructions for use Diagnosis    amLODIPine 10 MG tablet    NORVASC    90 tablet    Take 1 tablet (10 mg) by mouth daily    Essential hypertension with goal blood pressure less than 130/80       aspirin 81 MG EC tablet     30 tablet    Take 1 tablet (81 mg) by mouth daily    Hypertension goal BP (blood pressure) < 130/80, Hyperlipidemia LDL goal <100       atenolol 100 MG tablet    TENORMIN    90 tablet    Take 1 tablet (100 mg) by mouth daily    Essential hypertension with goal blood pressure less than 130/80       atorvastatin 40 MG tablet    LIPITOR    90 tablet    Take 1 tablet (40 mg) by mouth daily    Hyperlipidemia LDL goal <100       BENADRYL 25 MG capsule   Generic drug:  diphenhydrAMINE      Take 50 mg by mouth nightly as needed sleep        lactobacillus rhamnosus (GG) 10 B CELL capsule    CULTURELLE     Take 1 capsule by mouth every morning        MULTIVITAMIN & MINERAL PO      Take 1 tablet by mouth every morning        niacin 500 MG tablet     30 tablet    Take 1 tablet (500 mg) by mouth At Bedtime    Hyperlipidemia LDL goal <100       SAW PALMETTO PO      Take 1 tablet by mouth every morning        ticagrelor 90 MG tablet    BRILINTA    180 tablet    Take 1 tablet (90 mg) by mouth 2 times daily    Coronary artery disease of native artery of native heart with stable angina pectoris (H)       tiZANidine 2 MG tablet    ZANAFLEX    60 tablet    Take 1-2 tablets (2-4 mg) by mouth nightly as needed    Bilateral low back pain without sciatica, unspecified chronicity

## 2017-12-05 NOTE — TELEPHONE ENCOUNTER
"Patient's creatinine recheck on 12/4=2.75 (from 3.36). Relayed results to Dr. Willett; per MD, pt may now safely have next coronary artery stent placement. Spoke with pt to plan procedure; per pt, he has been experiencing \"more bleeding\" (referring to GI bleeding) and saw his primary care provider on 12/4 for that issue. Hgb at that time was 9.8 (from 10.5 on 11/18). Pt requested that Dr. Willett and PCP connect to discuss bleeding prior to scheduling coronary artery stent placement. Writer sent message to Dr. Willett to discuss.   "

## 2017-12-08 NOTE — TELEPHONE ENCOUNTER
Contacted pt to scheduled planned PCI. 12/19/17 is next available appointment with Dr. Willett. Pt would like to speak to his sister first as she provides transportation for his appointments. Callback number provided.

## 2017-12-18 NOTE — PROGRESS NOTES
Contacted pt for reminder regarding procedure scheduled for tomorrow. Provided pre-procedure instructions including check in to [facility] Waiting Room at noon, NPO x 6 hrs prior to procedure (meds including aspirin and ticagrelor okay w/ sips of water), and need for transportation post-procedure. Discussed possibility of same day discharge. Pt verbalized understanding.

## 2017-12-19 PROBLEM — Z98.61 CAD S/P PERCUTANEOUS CORONARY ANGIOPLASTY: Status: ACTIVE | Noted: 2017-01-01

## 2017-12-19 PROBLEM — I25.10 CAD S/P PERCUTANEOUS CORONARY ANGIOPLASTY: Status: ACTIVE | Noted: 2017-01-01

## 2017-12-19 NOTE — PROVIDER NOTIFICATION
CSI notified that while patient was getting an EKG that he complained of chest pain described as an internal pressure or a weight on his chest.     Per provider: Give patient a SL nitro and update on pain.

## 2017-12-19 NOTE — DISCHARGE INSTRUCTIONS
Going Home after Coronary Angioplasty or Stent Placement       Name: Jose Lira  Medical Record Number:  [id number]  Today's Date: December 19, 2017        For 24 hours:         Have an adult stay with you for 24 hours.         Relax and take it easy.         Drink plenty of fluids.         You may eat your normal diet, unless your doctor tells you otherwise.         Do NOT make any important or legal decisions.         Do NOT drive or operate machines at home or at work.         Do NOT drink alcohol.      Do NOT smoke.     Medicines:         If you have begun Brilinta (ticagrelor), do not stop taking it until you talk to your heart doctor (cardiologist).         If you are on metformin (Glucophage), do not restart it until you have blood tests (within 2 to 3 days after discharge). When your doctor tells you it is safe, you may restart the metformin.         If you have stopped any other medicines, check with your nurse or provider about when to restart them.    Care of groin site:         Remove the Band-Aid after 24 hours. If there is minor oozing, apply another Band-aid and remove it after 12 hours.          Do NOT take a bath, or use a hot tub or pool for at least 3 days. You may shower.          It is normal to have a small bruise or lump at the site.         Do not scrub the site.         Do not use lotion or powder near the puncture site for 3 days.         For the first 2 days: Do not stoop or squat. When you cough, sneeze or move your bowels, hold your hand over the puncture site and press gently.         Do not lift more than 10 pounds for at least 3 to 5 days.         For 2 days, do NOT have sex or do any heavy exercise.     If you start bleeding from the site in your groin:  Lie down flat and press firmly on the site.  Call your physician immediately, or, come to the emergency room.      Call 911 right away if you have bleeding that is heavy or does not stop.     Call your doctor if:          You have a large or growing hard lump around the site.         The site is red, swollen, hot or tender.         Blood or fluid is draining from the site.         You have chills or a fever greater than 101 F (38 C).         Your leg or arm turns bluish, feels numb or cool.         You have hives, a rash or unusual itching.     Cardiac Rehabilitation: You should receive a phone call from the Cardiac Rehabilitation Department within the next week.  If you do not receive the call, please contact [facility] at [phone].    ADDITIONAL INSTRUCTIONS:   1) Have your labs (creatinine/kidney function and hemoglobin) rechecked at your local clinic in 2-3 days (Thursday or Friday this week).   2) STOP taking atenolol. START taking carvedilol 12.5 mg twice per day.   3) Follow up with your primary care provider in one week.   4) Follow up with your Cardiologist, Dr. Willett, on Monday, January 8th at 12:00 pm at the [facility] ([address] [place] [address]). Check in is at 11:45 am.   5) If you have any questions, please contact the Cardiology Clinic at [phone].    [facility] [place]:   [phone] (7 days a week)      Cardiology Fellow on call (24 hours per day) at [facility], [place]:   [phone] (ask for Cardiology Fellow on call)      Number where we can reach you:  ____________

## 2017-12-19 NOTE — PROGRESS NOTES
Prep and teaching complete for planned PCI; Sister Kim and Bro-in-law Aryan, cell [phone]; will be off site, able to  after. IV bag sent with RN to procedure. Pt to procedure with RN.

## 2017-12-19 NOTE — PROCEDURES
CARDIAC CATH REPORT:   PROCEDURES PERFORMED:   Coronary Angiography  Percutaneous Coronary Intervention    PHYSICIANS:  Attending Physician: Jose Willett MD, PhD  Interventional Cardiology Fellow: ARIES Samaniego MD, PhD    INDICATION:  72M Hx former tobacco use, HTN, HL, CKD4, no DM, severe PAD s/p LICA and right common iliac artery stenting, recently diagnosed with rectal cancer, prior to surgical resection patient underwent preop dobutamine stress echo which was positive, subsequent coronary angiogram 8/15/17 revealed severe 2-3 v CAD, plan was for CABG but patient refused sternotomy but permitting percutaneous revascularization. He had planned PCI of the LAD on 11/16 and presents today for planned PCI of the RCA.    DESCRIPTION:  1. Consent obtained with discussion of risks.  All questions were answered.  2. Sterile prep and procedure.  3. Location with Sheaths:   RFA 6 Fr    4. Access: Local anesthetic with lidocaine.  A micropuncture 18 guage needle with ultrasound guidance was used to establish vascular access using a modified Seldinger technique.  5. Diagnostic Catheters:   6 Fr  JR 4  6. Estimated blood loss: < 25 ml    No sedation meds given at the request of the patient.  He was taking ticagrelor prior to the procedure.    CORONARY ANGIOGRAM:   LCA not engaged today due to contrast limitations  RCA (dominant) gives rise to PL branches and supplies PDA. The prox to mid RCA has is moderate to severely calcified, with two 80% stenoses. The distal vessel has mild diffuse disease.    PERCUTANEOUS CORONARY INTERVENTION:  Prox-Mid RCA  JR 4  IV UFH for anticoagulation    A runthrough wire was advanced across the mid RCA lesion and positioned in the distal RPDA.  A 3.0x12mm balloon was used to pre-dilate the lesion. We pre-dilated the entire lesion from the prox to mid segment of the vessel.  We had significant difficulty advancing the stent across the lesion. The vessel was again dilated with a 3.0x12mm balloon  and a 6Fr guideliner was inserted for additional support. A 3.0x38mm Synergy drug eluting stent was successfully deployed across the lesion with inflation to 14 abdullahi.  We placed a second stent, 3.5x16mm Synergy NICOLE in the prox RCA overlapping with the first stent. The entire stented segment was post-dilated with a 3.5x12mm non-compliant balloon.  Final angiography showed no evidence of perforation or dissection with residual stenosis of 0% and LORRAINE 3 flow.  No complications.    Sheath Removal:  The RFA sheath was manually removed in the cardiac catheterization laboratory.    Contrast: Isovue, 30 ml     Fluoroscopy Time: 31.9 min    COMPLICATIONS:  1. None    SUMMARY:   2 Vessel CAD  Staged PCI of the RCA    PLAN:   ASA 81mg daily forever  Ticagrelor 90mg BID  Continue BB, ACEi, statin  Bedrest per protocol.  Continued medical management and lifestyle modification for cardiovascular risk factor optimization.   Discharge today per protocol, pending hemostasis    The attending interventional cardiologist was present and supervised all critical aspects the procedure.    See CVIS report for final draft.    ARIES Samaniego MD, PhD   Cardiology Fellow      ATTENDING ATTESTATION: I was present and supervised the cardiology fellow throughout the procedure and reviewed the findings with the fellow at the completion of the procedure.  I agree with the documentation above.    Jose Willett MD, PhD  Interventional/Critical Care Cardiology  [phone]    December 19, 2017

## 2017-12-19 NOTE — IP AVS SNAPSHOT
MRN:[id number]                      After Visit Summary   12/19/2017    Jose Lira    MRN: [id number]           Thank you!     Thank you for choosing [facility] for your care. Our goal is always to provide you with excellent care. Hearing back from our patients is one way we can continue to improve our services. Please take a few minutes to complete the written survey that you may receive in the mail after you visit with us. Thank you!        Patient Information     Date Of Birth          1945        About your hospital stay     You were admitted on:  December 19, 2017 You last received care in the:  Unit 6D Observation [facility]    You were discharged on:  December 20, 2017       Who to Call     For medical emergencies, please call 911.  For non-urgent questions about your medical care, please call your primary care provider or clinic, [phone]          Attending Provider     Provider Specialty    Jose Willett MD Cardiology       Primary Care Provider Office Phone # Fax #    Rebeca IDALIA Sandoval [phone]      After Care Instructions     Discharge Instructions - IF on Metformin (Glucophage or Glucovance) or Metformin containing medications       IF on Metformin (Glucophage or Glucovance) or Metformin containing medications , schedule a Basic Metabolic Panel at [facility] or Primary Clinic in 48 - 72 hours post procedure and PRIOR TO resuming the Metformin or Metformin containing medications.  Hold Metformin (Glucophage or Glucovance) or Metformin containing medications until after the Basic Metabolic Panel on the 2nd or 3rd day following the procedure.  May resume after blood draw is complete.                  Your next 10 appointments already scheduled     Jan 08, 2018 12:00 PM CST   (Arrive by 11:45 AM)   Return Visit with Jose Willett MD   [facility]    [address] [facility]  [id number] [phone]              Additional Services     CARDIAC REHAB REFERRAL       Please be aware that coverage of these services is subject to the terms and limitations of your health insurance plan.  Call member services at your health plan with any benefit or coverage questions.     Order is sent electronically to central rehab scheduling. Call [phone] if you haven't been contacted regarding these appointments within 2 business days of discharge.                  Further instructions from your care team       Going Home after Coronary Angioplasty or Stent Placement       Name: Jose Lira  Medical Record Number:  [id number]  Today's Date: December 19, 2017        For 24 hours:         Have an adult stay with you for 24 hours.         Relax and take it easy.         Drink plenty of fluids.         You may eat your normal diet, unless your doctor tells you otherwise.         Do NOT make any important or legal decisions.         Do NOT drive or operate machines at home or at work.         Do NOT drink alcohol.      Do NOT smoke.     Medicines:         If you have begun Brilinta (ticagrelor), do not stop taking it until you talk to your heart doctor (cardiologist).         If you are on metformin (Glucophage), do not restart it until you have blood tests (within 2 to 3 days after discharge). When your doctor tells you it is safe, you may restart the metformin.         If you have stopped any other medicines, check with your nurse or provider about when to restart them.    Care of groin site:         Remove the Band-Aid after 24 hours. If there is minor oozing, apply another Band-aid and remove it after 12 hours.          Do NOT take a bath, or use a hot tub or pool for at least 3 days. You may shower.          It is normal to have a small bruise or lump at the site.         Do not scrub the site.         Do not use lotion or powder near the puncture site for 3 days.         For the first 2  days: Do not stoop or squat. When you cough, sneeze or move your bowels, hold your hand over the puncture site and press gently.         Do not lift more than 10 pounds for at least 3 to 5 days.         For 2 days, do NOT have sex or do any heavy exercise.     If you start bleeding from the site in your groin:  Lie down flat and press firmly on the site.  Call your physician immediately, or, come to the emergency room.      Call 911 right away if you have bleeding that is heavy or does not stop.     Call your doctor if:         You have a large or growing hard lump around the site.         The site is red, swollen, hot or tender.         Blood or fluid is draining from the site.         You have chills or a fever greater than 101 F (38 C).         Your leg or arm turns bluish, feels numb or cool.         You have hives, a rash or unusual itching.     Cardiac Rehabilitation: You should receive a phone call from the Cardiac Rehabilitation Department within the next week.  If you do not receive the call, please contact [facility] Sched[facility] at [phone].    ADDITIONAL INSTRUCTIONS:   1) Have your labs (creatinine/kidney function and hemoglobin) rechecked at your local clinic in 2-3 days (Thursday or Friday this week).   2) STOP taking atenolol. START taking carvedilol 12.5 mg twice per day.   3) Follow up with your primary care provider in one week.   4) Follow up with your Cardiologist, Dr. Willett, on Monday, January 8th at 12:00 pm at the [facility] ([address] [place] [address]). Check in is at 11:45 am.   5) If you have any questions, please contact the Cardiology Clinic at [phone].    [facility] [place]:   [phone] (7 days a week)      Cardiology Fellow on call (24 hours per day) at [facility] [place]:   [phone] (ask for Cardiology Fellow on call)      Number where we can reach you:  ____________    Pending Results     Date and  "Time Order Name Status Description    2017 1621 EKG 12-lead, tracing only  Preliminary     2017 1331 EKG 12-lead, tracing only Preliminary             Admission Information     Date & Time Provider Department Dept. Phone    2017 Jose Willett MD Unit 6D Observation [facility] [phone]      Your Vitals Were     Blood Pressure Temperature Respirations Height Weight Pulse Oximetry    133/73 98.3  F (36.8  C) (Oral) 16 1.651 m (5' 5\") 90.7 kg (200 lb) 99%    BMI (Body Mass Index)                   33.28 kg/m2           [facility] Information     [employer] lets you send messages to your doctor, view your test results, renew your prescriptions, schedule appointments and more. To sign up, go to [email or web address] . Click on \"Log in\" on the left side of the screen, which will take you to the Welcome page. Then click on \"Sign up Now\" on the right side of the page.     You will be asked to enter the access code listed below, as well as some personal information. Please follow the directions to create your username and password.     Your access code is: [email or web address]  Expires: 2017  5:30 AM     Your access code will  in 90 days. If you need help or a new code, please call your [facility] clinic or [phone].        Care EveryWhere ID     This is your Care EveryWhere ID. This could be used by other organizations to access your [facility] medical records  [id number]        Equal Access to Services     [facility] HospitalHOLLIS : Hadii kylee jon hadasho Sonoahali, waaxda luqadaha, qaybta kaalmada adeegyada, haresh love . So [facility] [phone].    ATENCIÓN: Si habla español, tiene a adorno disposición servicios gratuitos de asistencia lingüística. Llame al [phone].    We comply with applicable federal civil rights laws and [place] laws. We do not discriminate on the basis of race, color, national origin, age, disability, sex, sexual orientation, or gender identity.             "   Review of your medicines      START taking        Dose / Directions    carvedilol 6.25 MG tablet   Commonly known as:  COREG   Used for:  Coronary artery disease due to lipid rich plaque, Status post coronary angioplasty        Dose:  12.5 mg   Take 2 tablets (12.5 mg) by mouth 2 times daily Hold IF heart rate less than 55.   Quantity:  180 tablet   Refills:  3         CONTINUE these medicines which may have CHANGED, or have new prescriptions. If we are uncertain of the size of tablets/capsules you have at home, strength may be listed as something that might have changed.        Dose / Directions    amLODIPine 10 MG tablet   Commonly known as:  NORVASC   This may have changed:  when to take this   Used for:  Essential hypertension with goal blood pressure less than 130/80        Dose:  10 mg   Take 1 tablet (10 mg) by mouth daily   Quantity:  90 tablet   Refills:  PRN       aspirin 81 MG EC tablet   This may have changed:  when to take this   Used for:  Hypertension goal BP (blood pressure) < 130/80, Hyperlipidemia LDL goal <100        Dose:  81 mg   Take 1 tablet (81 mg) by mouth daily   Quantity:  30 tablet   Refills:  0       atorvastatin 40 MG tablet   Commonly known as:  LIPITOR   This may have changed:  when to take this   Used for:  Hyperlipidemia LDL goal <100        Dose:  40 mg   Take 1 tablet (40 mg) by mouth daily   Quantity:  90 tablet   Refills:  PRN         CONTINUE these medicines which have NOT CHANGED        Dose / Directions    BENADRYL 25 MG capsule   Generic drug:  diphenhydrAMINE        Dose:  50 mg   Take 50 mg by mouth nightly as needed sleep   Refills:  0       lactobacillus rhamnosus (GG) 10 B CELL capsule   Commonly known as:  CULTURELLE        Dose:  1 capsule   Take 1 capsule by mouth every morning   Refills:  0       MULTIVITAMIN & MINERAL PO        Dose:  1 tablet   Take 1 tablet by mouth every morning   Refills:  0       niacin 500 MG tablet   Used for:  Hyperlipidemia LDL goal <100         Dose:  500 mg   Take 1 tablet (500 mg) by mouth At Bedtime   Quantity:  30 tablet   Refills:  PRN       SAW PALMETTO PO        Dose:  1 tablet   Take 1 tablet by mouth every morning   Refills:  0       ticagrelor 90 MG tablet   Commonly known as:  BRILINTA   Used for:  Coronary artery disease of native artery of native heart with stable angina pectoris (H)        Dose:  90 mg   Take 1 tablet (90 mg) by mouth 2 times daily   Quantity:  180 tablet   Refills:  3       tiZANidine 2 MG tablet   Commonly known as:  ZANAFLEX   Used for:  Bilateral low back pain without sciatica, unspecified chronicity        Dose:  2-4 mg   Take 1-2 tablets (2-4 mg) by mouth nightly as needed   Quantity:  60 tablet   Refills:  PRN         STOP taking     atenolol 100 MG tablet   Commonly known as:  TENORMIN                Where to get your medicines      These medications were sent to [facility] [place], [place] - [address]  [address] [address]     Phone:  [phone]     carvedilol 6.25 MG tablet    ticagrelor 90 MG tablet                Protect others around you: Learn how to safely use, store and throw away your medicines at www.disposemymeds.org.             Medication List: This is a list of all your medications and when to take them. Check marks below indicate your daily home schedule. Keep this list as a reference.      Medications           Morning Afternoon Evening Bedtime As Needed    amLODIPine 10 MG tablet   Commonly known as:  NORVASC   Take 1 tablet (10 mg) by mouth daily                                aspirin 81 MG EC tablet   Take 1 tablet (81 mg) by mouth daily   Last time this was given:  325 mg on 12/19/2017  2:16 PM                                atorvastatin 40 MG tablet   Commonly known as:  LIPITOR   Take 1 tablet (40 mg) by mouth daily                                BENADRYL 25 MG capsule   Take 50 mg by mouth nightly as needed sleep   Generic drug:   diphenhydrAMINE                                carvedilol 6.25 MG tablet   Commonly known as:  COREG   Take 2 tablets (12.5 mg) by mouth 2 times daily Hold IF heart rate less than 55.                                lactobacillus rhamnosus (GG) 10 B CELL capsule   Commonly known as:  CULTURELLE   Take 1 capsule by mouth every morning                                MULTIVITAMIN & MINERAL PO   Take 1 tablet by mouth every morning                                niacin 500 MG tablet   Take 1 tablet (500 mg) by mouth At Bedtime                                SAW PALMETTO PO   Take 1 tablet by mouth every morning                                ticagrelor 90 MG tablet   Commonly known as:  BRILINTA   Take 1 tablet (90 mg) by mouth 2 times daily                                tiZANidine 2 MG tablet   Commonly known as:  ZANAFLEX   Take 1-2 tablets (2-4 mg) by mouth nightly as needed

## 2017-12-20 NOTE — PROVIDER NOTIFICATION
Provider updated regarding hematoma dissipating. MD will come see patient this evening. Will continue to monitor.

## 2017-12-20 NOTE — PLAN OF CARE
Problem: Patient Care Overview  Goal: Discharge Needs Assessment  Patient off bedrest at 12 midnight. Groin site intact. No bleeding or hematoma noted. PIV removed. Discharge instructions given and explained to patient. Patient verbalized understanding. Patient discharged with family with prescription meds.

## 2017-12-20 NOTE — PROGRESS NOTES
"Contacted pt for post-procedure follow-up after stent placement and same day discharge. Pt reported that the night \"went well\" and he is feeling \"okay\" today. Pt verbalized understanding that he should continue taking ticagrelor twice per day which has not changed. Writer reminded pt to stop taking atenolol and start taking carvedilol twice per day as prescribed. Explained rationale for changed atenolol to carvedilol. Pt verbalized understanding and denied further questions. Lab recheck in 2-3 days and follow-up with Dr. Willett on 1/8/18.  "

## 2017-12-20 NOTE — PLAN OF CARE
Problem: Patient Care Overview  Goal: Discharge Needs Assessment  Hematoma noted around groin area, MD updated and manual pressure applied for 20 more minutes. No bleeding noted, no hematoma noted there after. Pedal pulses present. Patient on bedrest for 4 hours.

## 2017-12-20 NOTE — PLAN OF CARE
Problem: Patient Care Overview  Goal: Plan of Care/Patient Progress Review  Right groin sheath pull at 1930. Patient tolerated well. Hemostasis Achieved after applying pressure for 40 mins. Pedal pulses palpable. On bedrest till 0000. Patient verbalized understanding not move right leg. Will continue to monitor.

## 2017-12-22 NOTE — PROGRESS NOTES
Called to follow up on patient after PCI.  No answer and no way to leave message.  Will call back later.

## 2017-12-26 NOTE — PROGRESS NOTES
Called and spoke to patient as follow up to PCI with stent to RCA as Dr Curry thought patient was too high risk for bypass.  Patient states he is doing ok.  He isn't feeling much better no does he have more energy but he is hoping this will come in time.  Patient has follow up on 01/08 with cardiology and then will make an appointment with his GI physician for his colon surgery to remove a cancerous tumor.  Patient will call with any other concerns.

## 2018-01-01 ENCOUNTER — TELEPHONE (OUTPATIENT)
Dept: CARDIOLOGY | Facility: CLINIC | Age: 73
End: 2018-01-01

## 2018-01-01 ENCOUNTER — OFFICE VISIT - HEALTHEAST (OUTPATIENT)
Dept: GERIATRICS | Facility: CLINIC | Age: 73
End: 2018-01-01

## 2018-01-01 ENCOUNTER — OFFICE VISIT (OUTPATIENT)
Dept: FAMILY MEDICINE | Facility: CLINIC | Age: 73
End: 2018-01-01
Payer: MEDICARE

## 2018-01-01 ENCOUNTER — APPOINTMENT (OUTPATIENT)
Dept: OCCUPATIONAL THERAPY | Facility: CLINIC | Age: 73
DRG: 332 | End: 2018-01-01
Attending: COLON & RECTAL SURGERY
Payer: MEDICARE

## 2018-01-01 ENCOUNTER — APPOINTMENT (OUTPATIENT)
Dept: GENERAL RADIOLOGY | Facility: CLINIC | Age: 73
DRG: 332 | End: 2018-01-01
Attending: COLON & RECTAL SURGERY
Payer: MEDICARE

## 2018-01-01 ENCOUNTER — CARE COORDINATION (OUTPATIENT)
Dept: SURGERY | Facility: CLINIC | Age: 73
End: 2018-01-01

## 2018-01-01 ENCOUNTER — TRANSFERRED RECORDS (OUTPATIENT)
Dept: HEALTH INFORMATION MANAGEMENT | Facility: CLINIC | Age: 73
End: 2018-01-01

## 2018-01-01 ENCOUNTER — OFFICE VISIT (OUTPATIENT)
Dept: CARDIOLOGY | Facility: CLINIC | Age: 73
End: 2018-01-01
Attending: INTERNAL MEDICINE
Payer: MEDICARE

## 2018-01-01 ENCOUNTER — TELEPHONE (OUTPATIENT)
Dept: SURGERY | Facility: CLINIC | Age: 73
End: 2018-01-01

## 2018-01-01 ENCOUNTER — CARE COORDINATION (OUTPATIENT)
Dept: CARDIOLOGY | Facility: CLINIC | Age: 73
End: 2018-01-01

## 2018-01-01 ENCOUNTER — APPOINTMENT (OUTPATIENT)
Dept: PHYSICAL THERAPY | Facility: CLINIC | Age: 73
DRG: 332 | End: 2018-01-01
Attending: COLON & RECTAL SURGERY
Payer: MEDICARE

## 2018-01-01 ENCOUNTER — ALLIED HEALTH/NURSE VISIT (OUTPATIENT)
Dept: SURGERY | Facility: CLINIC | Age: 73
End: 2018-01-01
Payer: MEDICARE

## 2018-01-01 ENCOUNTER — TELEPHONE (OUTPATIENT)
Dept: FAMILY MEDICINE | Facility: CLINIC | Age: 73
End: 2018-01-01

## 2018-01-01 ENCOUNTER — ONCOLOGY VISIT (OUTPATIENT)
Dept: ONCOLOGY | Facility: CLINIC | Age: 73
End: 2018-01-01
Attending: INTERNAL MEDICINE
Payer: MEDICARE

## 2018-01-01 ENCOUNTER — ANESTHESIA EVENT (OUTPATIENT)
Dept: SURGERY | Facility: CLINIC | Age: 73
DRG: 332 | End: 2018-01-01
Payer: MEDICARE

## 2018-01-01 ENCOUNTER — APPOINTMENT (OUTPATIENT)
Dept: LAB | Facility: CLINIC | Age: 73
End: 2018-01-01
Attending: INTERNAL MEDICINE
Payer: MEDICARE

## 2018-01-01 ENCOUNTER — APPOINTMENT (OUTPATIENT)
Dept: CARDIOLOGY | Facility: CLINIC | Age: 73
DRG: 332 | End: 2018-01-01
Attending: SURGERY
Payer: MEDICARE

## 2018-01-01 ENCOUNTER — CARE COORDINATION (OUTPATIENT)
Dept: ONCOLOGY | Facility: CLINIC | Age: 73
End: 2018-01-01

## 2018-01-01 ENCOUNTER — APPOINTMENT (OUTPATIENT)
Dept: SURGERY | Facility: CLINIC | Age: 73
End: 2018-01-01
Payer: MEDICARE

## 2018-01-01 ENCOUNTER — APPOINTMENT (OUTPATIENT)
Dept: PHYSICAL THERAPY | Facility: CLINIC | Age: 73
DRG: 332 | End: 2018-01-01
Attending: SURGERY
Payer: MEDICARE

## 2018-01-01 ENCOUNTER — HOSPITAL ENCOUNTER (OUTPATIENT)
Dept: PET IMAGING | Facility: CLINIC | Age: 73
Discharge: HOME | End: 2018-04-16
Attending: INTERNAL MEDICINE | Admitting: INTERNAL MEDICINE
Payer: MEDICARE

## 2018-01-01 ENCOUNTER — AMBULATORY - HEALTHEAST (OUTPATIENT)
Dept: ADMINISTRATIVE | Facility: CLINIC | Age: 73
End: 2018-01-01

## 2018-01-01 ENCOUNTER — OFFICE VISIT (OUTPATIENT)
Dept: WOUND CARE | Facility: CLINIC | Age: 73
End: 2018-01-01
Payer: MEDICARE

## 2018-01-01 ENCOUNTER — HOSPITAL ENCOUNTER (INPATIENT)
Facility: CLINIC | Age: 73
LOS: 12 days | Discharge: NURSING, REHAB OR LONG-TERM CARE | DRG: 332 | End: 2018-03-06
Attending: COLON & RECTAL SURGERY | Admitting: COLON & RECTAL SURGERY
Payer: MEDICARE

## 2018-01-01 ENCOUNTER — APPOINTMENT (OUTPATIENT)
Dept: ULTRASOUND IMAGING | Facility: CLINIC | Age: 73
DRG: 332 | End: 2018-01-01
Attending: COLON & RECTAL SURGERY
Payer: MEDICARE

## 2018-01-01 ENCOUNTER — ANESTHESIA (OUTPATIENT)
Dept: SURGERY | Facility: CLINIC | Age: 73
DRG: 332 | End: 2018-01-01
Payer: MEDICARE

## 2018-01-01 ENCOUNTER — OFFICE VISIT (OUTPATIENT)
Dept: SURGERY | Facility: CLINIC | Age: 73
End: 2018-01-01
Payer: MEDICARE

## 2018-01-01 ENCOUNTER — PRE VISIT (OUTPATIENT)
Dept: CARDIOLOGY | Facility: CLINIC | Age: 73
End: 2018-01-01

## 2018-01-01 ENCOUNTER — AMBULATORY - HEALTHEAST (OUTPATIENT)
Dept: GERIATRICS | Facility: CLINIC | Age: 73
End: 2018-01-01

## 2018-01-01 VITALS
TEMPERATURE: 97.2 F | HEART RATE: 68 BPM | WEIGHT: 174.5 LBS | HEIGHT: 65 IN | OXYGEN SATURATION: 95 % | SYSTOLIC BLOOD PRESSURE: 124 MMHG | DIASTOLIC BLOOD PRESSURE: 56 MMHG | BODY MASS INDEX: 29.07 KG/M2

## 2018-01-01 VITALS
TEMPERATURE: 98.2 F | RESPIRATION RATE: 18 BRPM | HEIGHT: 65 IN | DIASTOLIC BLOOD PRESSURE: 71 MMHG | HEART RATE: 77 BPM | WEIGHT: 181.3 LBS | SYSTOLIC BLOOD PRESSURE: 157 MMHG | BODY MASS INDEX: 30.21 KG/M2 | OXYGEN SATURATION: 96 %

## 2018-01-01 VITALS
RESPIRATION RATE: 16 BRPM | OXYGEN SATURATION: 97 % | TEMPERATURE: 97 F | WEIGHT: 175.38 LBS | BODY MASS INDEX: 29.18 KG/M2 | SYSTOLIC BLOOD PRESSURE: 148 MMHG | DIASTOLIC BLOOD PRESSURE: 68 MMHG | HEART RATE: 79 BPM

## 2018-01-01 VITALS
WEIGHT: 184.4 LBS | TEMPERATURE: 98.2 F | DIASTOLIC BLOOD PRESSURE: 60 MMHG | HEART RATE: 75 BPM | OXYGEN SATURATION: 98 % | BODY MASS INDEX: 30.72 KG/M2 | HEIGHT: 65 IN | SYSTOLIC BLOOD PRESSURE: 111 MMHG

## 2018-01-01 VITALS
DIASTOLIC BLOOD PRESSURE: 85 MMHG | BODY MASS INDEX: 32.61 KG/M2 | HEIGHT: 65 IN | HEART RATE: 76 BPM | SYSTOLIC BLOOD PRESSURE: 148 MMHG | WEIGHT: 195.7 LBS | OXYGEN SATURATION: 98 %

## 2018-01-01 VITALS
DIASTOLIC BLOOD PRESSURE: 68 MMHG | HEIGHT: 65 IN | WEIGHT: 172.7 LBS | SYSTOLIC BLOOD PRESSURE: 152 MMHG | HEART RATE: 63 BPM | OXYGEN SATURATION: 98 % | BODY MASS INDEX: 28.78 KG/M2

## 2018-01-01 VITALS
BODY MASS INDEX: 29.69 KG/M2 | OXYGEN SATURATION: 96 % | HEIGHT: 65 IN | RESPIRATION RATE: 18 BRPM | WEIGHT: 178.2 LBS | SYSTOLIC BLOOD PRESSURE: 112 MMHG | DIASTOLIC BLOOD PRESSURE: 66 MMHG | HEART RATE: 73 BPM | TEMPERATURE: 97.9 F

## 2018-01-01 VITALS
TEMPERATURE: 98.1 F | HEART RATE: 75 BPM | BODY MASS INDEX: 28.46 KG/M2 | RESPIRATION RATE: 18 BRPM | WEIGHT: 171 LBS | DIASTOLIC BLOOD PRESSURE: 60 MMHG | SYSTOLIC BLOOD PRESSURE: 138 MMHG | OXYGEN SATURATION: 100 %

## 2018-01-01 VITALS
RESPIRATION RATE: 16 BRPM | HEIGHT: 65 IN | BODY MASS INDEX: 32.79 KG/M2 | SYSTOLIC BLOOD PRESSURE: 163 MMHG | OXYGEN SATURATION: 98 % | HEART RATE: 80 BPM | DIASTOLIC BLOOD PRESSURE: 77 MMHG | WEIGHT: 196.8 LBS | TEMPERATURE: 97.7 F

## 2018-01-01 DIAGNOSIS — I25.118 CORONARY ARTERY DISEASE OF NATIVE ARTERY OF NATIVE HEART WITH STABLE ANGINA PECTORIS (H): ICD-10-CM

## 2018-01-01 DIAGNOSIS — I25.10 CORONARY ARTERY DISEASE INVOLVING NATIVE HEART WITHOUT ANGINA PECTORIS, UNSPECIFIED VESSEL OR LESION TYPE: ICD-10-CM

## 2018-01-01 DIAGNOSIS — Z98.61 STATUS POST CORONARY ANGIOPLASTY: ICD-10-CM

## 2018-01-01 DIAGNOSIS — Z09 FOLLOW-UP EXAMINATION FOLLOWING SURGERY: ICD-10-CM

## 2018-01-01 DIAGNOSIS — Z90.49 S/P COLON RESECTION: ICD-10-CM

## 2018-01-01 DIAGNOSIS — C20 RECTAL CANCER (H): ICD-10-CM
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
## 2018-01-01 DIAGNOSIS — I10 HYPERTENSION GOAL BP (BLOOD PRESSURE) < 130/80: ICD-10-CM

## 2018-01-01 DIAGNOSIS — C18.9 MALIGNANT NEOPLASM OF COLON, UNSPECIFIED PART OF COLON (H): ICD-10-CM

## 2018-01-01 DIAGNOSIS — N18.4 CKD (CHRONIC KIDNEY DISEASE) STAGE 4, GFR 15-29 ML/MIN (H): ICD-10-CM

## 2018-01-01 DIAGNOSIS — Z23 NEED FOR PROPHYLACTIC VACCINATION AGAINST STREPTOCOCCUS PNEUMONIAE (PNEUMOCOCCUS): ICD-10-CM

## 2018-01-01 DIAGNOSIS — I10 ESSENTIAL HYPERTENSION WITH GOAL BLOOD PRESSURE LESS THAN 130/80: ICD-10-CM

## 2018-01-01 DIAGNOSIS — I25.10 CORONARY ARTERY DISEASE INVOLVING NATIVE CORONARY ARTERY OF NATIVE HEART WITHOUT ANGINA PECTORIS: ICD-10-CM

## 2018-01-01 DIAGNOSIS — C18.9 COLON CANCER (H): ICD-10-CM

## 2018-01-01 DIAGNOSIS — C20 RECTAL CANCER (H): Primary | ICD-10-CM

## 2018-01-01 DIAGNOSIS — C18.9 COLON CANCER METASTASIZED TO MULTIPLE SITES (H): ICD-10-CM

## 2018-01-01 DIAGNOSIS — C19 RECTOSIGMOID CANCER (H): ICD-10-CM

## 2018-01-01 DIAGNOSIS — Z86.73 HISTORY OF STROKE: ICD-10-CM

## 2018-01-01 DIAGNOSIS — Z00.00 ENCOUNTER FOR ROUTINE ADULT HEALTH EXAMINATION WITHOUT ABNORMAL FINDINGS: Primary | ICD-10-CM

## 2018-01-01 DIAGNOSIS — E78.5 HYPERLIPIDEMIA LDL GOAL <100: ICD-10-CM

## 2018-01-01 DIAGNOSIS — I25.10 CORONARY ARTERY DISEASE DUE TO LIPID RICH PLAQUE: ICD-10-CM

## 2018-01-01 DIAGNOSIS — N18.30 CKD (CHRONIC KIDNEY DISEASE) STAGE 3, GFR 30-59 ML/MIN (H): ICD-10-CM

## 2018-01-01 DIAGNOSIS — Z01.818 PRE-OP EVALUATION: Primary | ICD-10-CM

## 2018-01-01 DIAGNOSIS — Z01.818 PRE-OP EVALUATION: ICD-10-CM

## 2018-01-01 DIAGNOSIS — J18.9 HAP (HOSPITAL-ACQUIRED PNEUMONIA): ICD-10-CM

## 2018-01-01 DIAGNOSIS — Z98.890 HISTORY OF BILATERAL CAROTID ENDARTERECTOMY: ICD-10-CM

## 2018-01-01 DIAGNOSIS — Z71.89 OSTOMY NURSE CONSULTATION: Primary | ICD-10-CM

## 2018-01-01 DIAGNOSIS — I73.9 PERIPHERAL VASCULAR DISEASE (H): ICD-10-CM

## 2018-01-01 DIAGNOSIS — E78.5 HYPERLIPIDEMIA LDL GOAL <70: ICD-10-CM

## 2018-01-01 DIAGNOSIS — I25.83 CORONARY ARTERY DISEASE DUE TO LIPID RICH PLAQUE: ICD-10-CM

## 2018-01-01 DIAGNOSIS — R53.81 PHYSICAL DECONDITIONING: ICD-10-CM

## 2018-01-01 DIAGNOSIS — Z98.61 STATUS POST PERCUTANEOUS TRANSLUMINAL CORONARY ANGIOPLASTY: ICD-10-CM

## 2018-01-01 DIAGNOSIS — K59.00 CONSTIPATION, UNSPECIFIED CONSTIPATION TYPE: Primary | ICD-10-CM

## 2018-01-01 DIAGNOSIS — Z79.899 MEDICATION MANAGEMENT: ICD-10-CM

## 2018-01-01 DIAGNOSIS — G47.09 OTHER INSOMNIA: ICD-10-CM

## 2018-01-01 DIAGNOSIS — E78.5 HYPERLIPIDEMIA LDL GOAL <100: Primary | ICD-10-CM

## 2018-01-01 DIAGNOSIS — E78.5 HYPERLIPIDEMIA LDL GOAL <70: Primary | ICD-10-CM

## 2018-01-01 DIAGNOSIS — L02.92 BOIL: Primary | ICD-10-CM

## 2018-01-01 LAB
ABO + RH BLD: NORMAL
ALBUMIN SERPL-MCNC: 3.4 G/DL (ref 3.4–5)
ALBUMIN UR-MCNC: 100 MG/DL
ALP SERPL-CCNC: 121 U/L (ref 40–150)
ALT SERPL W P-5'-P-CCNC: 14 U/L (ref 0–70)
ALT SERPL W P-5'-P-CCNC: 9 U/L (ref 0–70)
ANION GAP SERPL CALCULATED.3IONS-SCNC: 10 MMOL/L (ref 3–14)
ANION GAP SERPL CALCULATED.3IONS-SCNC: 11 MMOL/L (ref 3–14)
ANION GAP SERPL CALCULATED.3IONS-SCNC: 12 MMOL/L (ref 3–14)
ANION GAP SERPL CALCULATED.3IONS-SCNC: 14 MMOL/L (ref 3–14)
ANION GAP SERPL CALCULATED.3IONS-SCNC: 8 MMOL/L (ref 3–14)
ANION GAP SERPL CALCULATED.3IONS-SCNC: 9 MMOL/L (ref 3–14)
APPEARANCE UR: CLEAR
APTT PPP: 56 SEC (ref 22–37)
AST SERPL W P-5'-P-CCNC: 15 U/L (ref 0–45)
AST SERPL W P-5'-P-CCNC: 19 U/L (ref 0–45)
BASE EXCESS BLDA CALC-SCNC: 1.3 MMOL/L
BASE EXCESS BLDA CALC-SCNC: 1.4 MMOL/L
BASOPHILS # BLD AUTO: 0 10E9/L (ref 0–0.2)
BASOPHILS NFR BLD AUTO: 0.4 %
BILIRUB SERPL-MCNC: 0.6 MG/DL (ref 0.2–1.3)
BILIRUB UR QL STRIP: NEGATIVE
BLD GP AB SCN SERPL QL: NORMAL
BLD GP AB SCN SERPL QL: NORMAL
BLD PROD TYP BPU: NORMAL
BLD PROD TYP BPU: NORMAL
BLD UNIT ID BPU: 0
BLOOD BANK CMNT PATIENT-IMP: NORMAL
BLOOD PRODUCT CODE: NORMAL
BPU ID: NORMAL
BUN SERPL-MCNC: 16 MG/DL (ref 7–30)
BUN SERPL-MCNC: 17 MG/DL (ref 7–30)
BUN SERPL-MCNC: 21 MG/DL (ref 7–30)
BUN SERPL-MCNC: 22 MG/DL (ref 7–30)
BUN SERPL-MCNC: 22 MG/DL (ref 7–30)
BUN SERPL-MCNC: 28 MG/DL (ref 7–30)
BUN SERPL-MCNC: 32 MG/DL (ref 7–30)
BUN SERPL-MCNC: 32 MG/DL (ref 7–30)
BUN SERPL-MCNC: 33 MG/DL (ref 7–30)
BUN SERPL-MCNC: 36 MG/DL (ref 7–30)
BUN SERPL-MCNC: 37 MG/DL (ref 7–30)
BUN SERPL-MCNC: 38 MG/DL (ref 7–30)
BUN SERPL-MCNC: 39 MG/DL (ref 7–30)
BUN SERPL-MCNC: 39 MG/DL (ref 7–30)
BUN SERPL-MCNC: 40 MG/DL (ref 7–30)
CALCIUM SERPL-MCNC: 10 MG/DL (ref 8.5–10.1)
CALCIUM SERPL-MCNC: 7.8 MG/DL (ref 8.5–10.1)
CALCIUM SERPL-MCNC: 7.8 MG/DL (ref 8.5–10.1)
CALCIUM SERPL-MCNC: 8 MG/DL (ref 8.5–10.1)
CALCIUM SERPL-MCNC: 8.1 MG/DL (ref 8.5–10.1)
CALCIUM SERPL-MCNC: 8.1 MG/DL (ref 8.5–10.1)
CALCIUM SERPL-MCNC: 8.2 MG/DL (ref 8.5–10.1)
CALCIUM SERPL-MCNC: 8.4 MG/DL (ref 8.5–10.1)
CALCIUM SERPL-MCNC: 8.6 MG/DL (ref 8.5–10.1)
CALCIUM SERPL-MCNC: 8.8 MG/DL (ref 8.5–10.1)
CALCIUM SERPL-MCNC: 9.1 MG/DL (ref 8.5–10.1)
CALCIUM SERPL-MCNC: 9.1 MG/DL (ref 8.5–10.1)
CEA SERPL-MCNC: 2.3 UG/L (ref 0–2.5)
CHLORIDE SERPL-SCNC: 105 MMOL/L (ref 94–109)
CHLORIDE SERPL-SCNC: 106 MMOL/L (ref 94–109)
CHLORIDE SERPL-SCNC: 107 MMOL/L (ref 94–109)
CHLORIDE SERPL-SCNC: 108 MMOL/L (ref 94–109)
CHLORIDE SERPL-SCNC: 109 MMOL/L (ref 94–109)
CHLORIDE SERPL-SCNC: 110 MMOL/L (ref 94–109)
CHLORIDE SERPL-SCNC: 111 MMOL/L (ref 94–109)
CHLORIDE SERPL-SCNC: 112 MMOL/L (ref 94–109)
CHOLEST SERPL-MCNC: 137 MG/DL
CO2 SERPL-SCNC: 19 MMOL/L (ref 20–32)
CO2 SERPL-SCNC: 20 MMOL/L (ref 20–32)
CO2 SERPL-SCNC: 21 MMOL/L (ref 20–32)
CO2 SERPL-SCNC: 22 MMOL/L (ref 20–32)
CO2 SERPL-SCNC: 22 MMOL/L (ref 20–32)
CO2 SERPL-SCNC: 23 MMOL/L (ref 20–32)
CO2 SERPL-SCNC: 23 MMOL/L (ref 20–32)
CO2 SERPL-SCNC: 24 MMOL/L (ref 20–32)
CO2 SERPL-SCNC: 25 MMOL/L (ref 20–32)
COLOR UR AUTO: YELLOW
COPATH REPORT: NORMAL
CREAT SERPL-MCNC: 2.06 MG/DL (ref 0.66–1.25)
CREAT SERPL-MCNC: 2.12 MG/DL (ref 0.66–1.25)
CREAT SERPL-MCNC: 2.18 MG/DL (ref 0.66–1.25)
CREAT SERPL-MCNC: 2.28 MG/DL (ref 0.66–1.25)
CREAT SERPL-MCNC: 2.33 MG/DL (ref 0.66–1.25)
CREAT SERPL-MCNC: 2.36 MG/DL (ref 0.66–1.25)
CREAT SERPL-MCNC: 2.37 MG/DL (ref 0.66–1.25)
CREAT SERPL-MCNC: 2.4 MG/DL (ref 0.66–1.25)
CREAT SERPL-MCNC: 2.46 MG/DL (ref 0.66–1.25)
CREAT SERPL-MCNC: 2.5 MG/DL (ref 0.66–1.25)
CREAT SERPL-MCNC: 2.52 MG/DL (ref 0.66–1.25)
CREAT SERPL-MCNC: 2.55 MG/DL (ref 0.66–1.25)
CREAT SERPL-MCNC: 2.58 MG/DL (ref 0.66–1.25)
CREAT SERPL-MCNC: 2.59 MG/DL (ref 0.66–1.25)
CREAT SERPL-MCNC: 2.6 MG/DL (ref 0.66–1.25)
CREAT SERPL-MCNC: 2.63 MG/DL (ref 0.66–1.25)
CREAT SERPL-MCNC: 2.73 MG/DL (ref 0.66–1.25)
CREAT SERPL-MCNC: 2.74 MG/DL (ref 0.66–1.25)
CREAT SERPL-MCNC: 2.77 MG/DL (ref 0.66–1.25)
DIFFERENTIAL METHOD BLD: ABNORMAL
EOSINOPHIL # BLD AUTO: 0.6 10E9/L (ref 0–0.7)
EOSINOPHIL NFR BLD AUTO: 8.5 %
ERYTHROCYTE [DISTWIDTH] IN BLOOD BY AUTOMATED COUNT: 13.2 % (ref 10–15)
ERYTHROCYTE [DISTWIDTH] IN BLOOD BY AUTOMATED COUNT: 14 % (ref 10–15)
ERYTHROCYTE [DISTWIDTH] IN BLOOD BY AUTOMATED COUNT: 14.5 % (ref 10–15)
ERYTHROCYTE [DISTWIDTH] IN BLOOD BY AUTOMATED COUNT: 14.6 % (ref 10–15)
ERYTHROCYTE [DISTWIDTH] IN BLOOD BY AUTOMATED COUNT: 14.9 % (ref 10–15)
ERYTHROCYTE [DISTWIDTH] IN BLOOD BY AUTOMATED COUNT: 15.2 % (ref 10–15)
ERYTHROCYTE [DISTWIDTH] IN BLOOD BY AUTOMATED COUNT: 15.5 % (ref 10–15)
ERYTHROCYTE [DISTWIDTH] IN BLOOD BY AUTOMATED COUNT: 15.5 % (ref 10–15)
ERYTHROCYTE [DISTWIDTH] IN BLOOD BY AUTOMATED COUNT: 15.6 % (ref 10–15)
ERYTHROCYTE [DISTWIDTH] IN BLOOD BY AUTOMATED COUNT: 15.9 % (ref 10–15)
ERYTHROCYTE [DISTWIDTH] IN BLOOD BY AUTOMATED COUNT: 16.7 % (ref 10–15)
ERYTHROCYTE [DISTWIDTH] IN BLOOD BY AUTOMATED COUNT: NORMAL % (ref 10–15)
GFR SERPL CREATININE-BSD FRML MDRD: 23 ML/MIN/1.7M2
GFR SERPL CREATININE-BSD FRML MDRD: 24 ML/MIN/1.7M2
GFR SERPL CREATININE-BSD FRML MDRD: 25 ML/MIN/1.7M2
GFR SERPL CREATININE-BSD FRML MDRD: 26 ML/MIN/1.7M2
GFR SERPL CREATININE-BSD FRML MDRD: 27 ML/MIN/1.7M2
GFR SERPL CREATININE-BSD FRML MDRD: 28 ML/MIN/1.7M2
GFR SERPL CREATININE-BSD FRML MDRD: 28 ML/MIN/1.7M2
GFR SERPL CREATININE-BSD FRML MDRD: 30 ML/MIN/1.7M2
GFR SERPL CREATININE-BSD FRML MDRD: 31 ML/MIN/1.7M2
GFR SERPL CREATININE-BSD FRML MDRD: 32 ML/MIN/1.7M2
GLUCOSE BLDC GLUCOMTR-MCNC: 115 MG/DL (ref 70–99)
GLUCOSE BLDC GLUCOMTR-MCNC: 183 MG/DL (ref 70–99)
GLUCOSE BLDC GLUCOMTR-MCNC: 97 MG/DL (ref 70–99)
GLUCOSE SERPL-MCNC: 100 MG/DL (ref 70–99)
GLUCOSE SERPL-MCNC: 102 MG/DL (ref 70–99)
GLUCOSE SERPL-MCNC: 104 MG/DL (ref 70–99)
GLUCOSE SERPL-MCNC: 105 MG/DL (ref 70–99)
GLUCOSE SERPL-MCNC: 108 MG/DL (ref 70–99)
GLUCOSE SERPL-MCNC: 109 MG/DL (ref 70–99)
GLUCOSE SERPL-MCNC: 112 MG/DL (ref 70–99)
GLUCOSE SERPL-MCNC: 113 MG/DL (ref 70–99)
GLUCOSE SERPL-MCNC: 113 MG/DL (ref 70–99)
GLUCOSE SERPL-MCNC: 114 MG/DL (ref 70–99)
GLUCOSE SERPL-MCNC: 131 MG/DL (ref 70–99)
GLUCOSE SERPL-MCNC: 140 MG/DL (ref 70–99)
GLUCOSE SERPL-MCNC: 87 MG/DL (ref 70–99)
GLUCOSE SERPL-MCNC: 94 MG/DL (ref 70–99)
GLUCOSE SERPL-MCNC: 95 MG/DL (ref 70–99)
GLUCOSE SERPL-MCNC: 96 MG/DL (ref 70–99)
GLUCOSE UR STRIP-MCNC: NEGATIVE MG/DL
HCO3 BLD-SCNC: 24 MMOL/L (ref 21–28)
HCO3 BLD-SCNC: 25 MMOL/L (ref 21–28)
HCT VFR BLD AUTO: 23.9 % (ref 40–53)
HCT VFR BLD AUTO: 24 % (ref 40–53)
HCT VFR BLD AUTO: 24.2 % (ref 40–53)
HCT VFR BLD AUTO: 25.3 % (ref 40–53)
HCT VFR BLD AUTO: 26.2 % (ref 40–53)
HCT VFR BLD AUTO: 26.9 % (ref 40–53)
HCT VFR BLD AUTO: 27.4 % (ref 40–53)
HCT VFR BLD AUTO: 29.7 % (ref 40–53)
HCT VFR BLD AUTO: 30.9 % (ref 40–53)
HCT VFR BLD AUTO: 31.3 % (ref 40–53)
HCT VFR BLD AUTO: 31.8 % (ref 40–53)
HCT VFR BLD AUTO: NORMAL % (ref 40–53)
HDLC SERPL-MCNC: 70 MG/DL
HGB BLD-MCNC: 6.8 G/DL (ref 13.3–17.7)
HGB BLD-MCNC: 7 G/DL (ref 13.3–17.7)
HGB BLD-MCNC: 7.1 G/DL (ref 13.3–17.7)
HGB BLD-MCNC: 7.2 G/DL (ref 13.3–17.7)
HGB BLD-MCNC: 7.5 G/DL (ref 13.3–17.7)
HGB BLD-MCNC: 7.6 G/DL (ref 13.3–17.7)
HGB BLD-MCNC: 7.8 G/DL (ref 13.3–17.7)
HGB BLD-MCNC: 7.8 G/DL (ref 13.3–17.7)
HGB BLD-MCNC: 7.9 G/DL (ref 13.3–17.7)
HGB BLD-MCNC: 8.1 G/DL (ref 13.3–17.7)
HGB BLD-MCNC: 8.6 G/DL (ref 13.3–17.7)
HGB BLD-MCNC: 9.2 G/DL (ref 13.3–17.7)
HGB BLD-MCNC: 9.5 G/DL (ref 13.3–17.7)
HGB BLD-MCNC: 9.5 G/DL (ref 13.3–17.7)
HGB BLD-MCNC: 9.7 G/DL (ref 13.3–17.7)
HGB BLD-MCNC: NORMAL G/DL (ref 13.3–17.7)
HGB UR QL STRIP: ABNORMAL
HYALINE CASTS #/AREA URNS LPF: 4 /LPF (ref 0–2)
IMM GRANULOCYTES # BLD: 0.1 10E9/L (ref 0–0.4)
IMM GRANULOCYTES NFR BLD: 1.2 %
INR PPP: 1.22 (ref 0.86–1.14)
INTERPRETATION ECG - MUSE: NORMAL
KETONES UR STRIP-MCNC: NEGATIVE MG/DL
LACTATE BLD-SCNC: 0.9 MMOL/L (ref 0.4–1.9)
LACTATE BLD-SCNC: 1 MMOL/L (ref 0.4–1.9)
LACTATE BLD-SCNC: 1.1 MMOL/L (ref 0.7–2)
LACTATE BLD-SCNC: 1.1 MMOL/L (ref 0.7–2)
LDLC SERPL CALC-MCNC: 49 MG/DL
LEUKOCYTE ESTERASE UR QL STRIP: NEGATIVE
LYMPHOCYTES # BLD AUTO: 0.8 10E9/L (ref 0.8–5.3)
LYMPHOCYTES NFR BLD AUTO: 10.8 %
MAGNESIUM SERPL-MCNC: 2 MG/DL (ref 1.6–2.3)
MAGNESIUM SERPL-MCNC: 2.1 MG/DL (ref 1.6–2.3)
MAGNESIUM SERPL-MCNC: 2.3 MG/DL (ref 1.6–2.3)
MCH RBC QN AUTO: 23.9 PG (ref 26.5–33)
MCH RBC QN AUTO: 24.1 PG (ref 26.5–33)
MCH RBC QN AUTO: 24.4 PG (ref 26.5–33)
MCH RBC QN AUTO: 24.5 PG (ref 26.5–33)
MCH RBC QN AUTO: 24.6 PG (ref 26.5–33)
MCH RBC QN AUTO: 24.6 PG (ref 26.5–33)
MCH RBC QN AUTO: 24.7 PG (ref 26.5–33)
MCH RBC QN AUTO: 25 PG (ref 26.5–33)
MCH RBC QN AUTO: 25.1 PG (ref 26.5–33)
MCH RBC QN AUTO: 25.1 PG (ref 26.5–33)
MCH RBC QN AUTO: 26.6 PG (ref 26.5–33)
MCH RBC QN AUTO: NORMAL PG (ref 26.5–33)
MCHC RBC AUTO-ENTMCNC: 28.8 G/DL (ref 31.5–36.5)
MCHC RBC AUTO-ENTMCNC: 29 G/DL (ref 31.5–36.5)
MCHC RBC AUTO-ENTMCNC: 29.2 G/DL (ref 31.5–36.5)
MCHC RBC AUTO-ENTMCNC: 29.3 G/DL (ref 31.5–36.5)
MCHC RBC AUTO-ENTMCNC: 29.6 G/DL (ref 31.5–36.5)
MCHC RBC AUTO-ENTMCNC: 29.8 G/DL (ref 31.5–36.5)
MCHC RBC AUTO-ENTMCNC: 29.9 G/DL (ref 31.5–36.5)
MCHC RBC AUTO-ENTMCNC: 30.1 G/DL (ref 31.5–36.5)
MCHC RBC AUTO-ENTMCNC: 30.7 G/DL (ref 31.5–36.5)
MCHC RBC AUTO-ENTMCNC: 31 G/DL (ref 31.5–36.5)
MCHC RBC AUTO-ENTMCNC: 31 G/DL (ref 31.5–36.5)
MCHC RBC AUTO-ENTMCNC: NORMAL G/DL (ref 31.5–36.5)
MCV RBC AUTO: 81 FL (ref 78–100)
MCV RBC AUTO: 82 FL (ref 78–100)
MCV RBC AUTO: 83 FL (ref 78–100)
MCV RBC AUTO: 84 FL (ref 78–100)
MCV RBC AUTO: 84 FL (ref 78–100)
MCV RBC AUTO: 86 FL (ref 78–100)
MCV RBC AUTO: NORMAL FL (ref 78–100)
MONOCYTES # BLD AUTO: 0.7 10E9/L (ref 0–1.3)
MONOCYTES NFR BLD AUTO: 9.2 %
MUCOUS THREADS #/AREA URNS LPF: PRESENT /LPF
NEUTROPHILS # BLD AUTO: 5.1 10E9/L (ref 1.6–8.3)
NEUTROPHILS NFR BLD AUTO: 69.9 %
NITRATE UR QL: NEGATIVE
NONHDLC SERPL-MCNC: 67 MG/DL
NRBC # BLD AUTO: 0 10*3/UL
NRBC BLD AUTO-RTO: 0 /100
NT-PROBNP SERPL-MCNC: 3491 PG/ML (ref 0–900)
NUM BPU REQUESTED: 1
O2/TOTAL GAS SETTING VFR VENT: 21 %
O2/TOTAL GAS SETTING VFR VENT: ABNORMAL %
OXYHGB MFR BLD: 96 % (ref 92–100)
PCO2 BLD: 32 MM HG (ref 35–45)
PCO2 BLD: 36 MM HG (ref 35–45)
PH BLD: 7.46 PH (ref 7.35–7.45)
PH BLD: 7.5 PH (ref 7.35–7.45)
PH UR STRIP: 6 PH (ref 5–7)
PHOSPHATE SERPL-MCNC: 3.6 MG/DL (ref 2.5–4.5)
PHOSPHATE SERPL-MCNC: 3.9 MG/DL (ref 2.5–4.5)
PLATELET # BLD AUTO: 175 10E9/L (ref 150–450)
PLATELET # BLD AUTO: 194 10E9/L (ref 150–450)
PLATELET # BLD AUTO: 196 10E9/L (ref 150–450)
PLATELET # BLD AUTO: 198 10E9/L (ref 150–450)
PLATELET # BLD AUTO: 200 10E9/L (ref 150–450)
PLATELET # BLD AUTO: 219 10E9/L (ref 150–450)
PLATELET # BLD AUTO: 237 10E9/L (ref 150–450)
PLATELET # BLD AUTO: 258 10E9/L (ref 150–450)
PLATELET # BLD AUTO: 267 10E9/L (ref 150–450)
PLATELET # BLD AUTO: 286 10E9/L (ref 150–450)
PLATELET # BLD AUTO: 323 10E9/L (ref 150–450)
PLATELET # BLD AUTO: NORMAL 10E9/L (ref 150–450)
PO2 BLD: 68 MM HG (ref 80–105)
PO2 BLD: 87 MM HG (ref 80–105)
POTASSIUM SERPL-SCNC: 3.3 MMOL/L (ref 3.4–5.3)
POTASSIUM SERPL-SCNC: 3.4 MMOL/L (ref 3.4–5.3)
POTASSIUM SERPL-SCNC: 3.5 MMOL/L (ref 3.4–5.3)
POTASSIUM SERPL-SCNC: 3.5 MMOL/L (ref 3.4–5.3)
POTASSIUM SERPL-SCNC: 3.6 MMOL/L (ref 3.4–5.3)
POTASSIUM SERPL-SCNC: 3.6 MMOL/L (ref 3.4–5.3)
POTASSIUM SERPL-SCNC: 3.7 MMOL/L (ref 3.4–5.3)
POTASSIUM SERPL-SCNC: 3.7 MMOL/L (ref 3.4–5.3)
POTASSIUM SERPL-SCNC: 3.8 MMOL/L (ref 3.4–5.3)
POTASSIUM SERPL-SCNC: 3.9 MMOL/L (ref 3.4–5.3)
POTASSIUM SERPL-SCNC: 3.9 MMOL/L (ref 3.4–5.3)
POTASSIUM SERPL-SCNC: 4 MMOL/L (ref 3.4–5.3)
POTASSIUM SERPL-SCNC: 4 MMOL/L (ref 3.4–5.3)
PROCALCITONIN SERPL-MCNC: 0.84 NG/ML
PROCALCITONIN SERPL-MCNC: 0.84 NG/ML
PROCALCITONIN SERPL-MCNC: 1.03 NG/ML
PROCALCITONIN SERPL-MCNC: NORMAL NG/ML
PROT SERPL-MCNC: 7.7 G/DL (ref 6.8–8.8)
RBC # BLD AUTO: 2.85 10E12/L (ref 4.4–5.9)
RBC # BLD AUTO: 2.9 10E12/L (ref 4.4–5.9)
RBC # BLD AUTO: 2.92 10E12/L (ref 4.4–5.9)
RBC # BLD AUTO: 3.05 10E12/L (ref 4.4–5.9)
RBC # BLD AUTO: 3.2 10E12/L (ref 4.4–5.9)
RBC # BLD AUTO: 3.24 10E12/L (ref 4.4–5.9)
RBC # BLD AUTO: 3.3 10E12/L (ref 4.4–5.9)
RBC # BLD AUTO: 3.65 10E12/L (ref 4.4–5.9)
RBC # BLD AUTO: 3.68 10E12/L (ref 4.4–5.9)
RBC # BLD AUTO: 3.79 10E12/L (ref 4.4–5.9)
RBC # BLD AUTO: 3.79 10E12/L (ref 4.4–5.9)
RBC # BLD AUTO: NORMAL 10E12/L (ref 4.4–5.9)
RBC #/AREA URNS AUTO: 9 /HPF (ref 0–2)
SODIUM SERPL-SCNC: 137 MMOL/L (ref 133–144)
SODIUM SERPL-SCNC: 139 MMOL/L (ref 133–144)
SODIUM SERPL-SCNC: 140 MMOL/L (ref 133–144)
SODIUM SERPL-SCNC: 141 MMOL/L (ref 133–144)
SODIUM SERPL-SCNC: 142 MMOL/L (ref 133–144)
SODIUM SERPL-SCNC: 142 MMOL/L (ref 133–144)
SODIUM SERPL-SCNC: 143 MMOL/L (ref 133–144)
SODIUM SERPL-SCNC: 144 MMOL/L (ref 133–144)
SODIUM SERPL-SCNC: 146 MMOL/L (ref 133–144)
SOURCE: ABNORMAL
SP GR UR STRIP: 1.01 (ref 1–1.03)
SPECIMEN EXP DATE BLD: NORMAL
SPECIMEN EXP DATE BLD: NORMAL
TRANSFUSION STATUS PATIENT QL: NORMAL
TRANSFUSION STATUS PATIENT QL: NORMAL
TRIGL SERPL-MCNC: 90 MG/DL
TROPONIN I SERPL-MCNC: 0.02 UG/L (ref 0–0.04)
TROPONIN I SERPL-MCNC: 0.05 UG/L (ref 0–0.04)
TROPONIN I SERPL-MCNC: 0.1 UG/L (ref 0–0.04)
TROPONIN I SERPL-MCNC: 0.11 UG/L (ref 0–0.04)
TROPONIN I SERPL-MCNC: <0.015 UG/L (ref 0–0.04)
UROBILINOGEN UR STRIP-MCNC: NORMAL MG/DL (ref 0–2)
WBC # BLD AUTO: 10.2 10E9/L (ref 4–11)
WBC # BLD AUTO: 10.5 10E9/L (ref 4–11)
WBC # BLD AUTO: 10.7 10E9/L (ref 4–11)
WBC # BLD AUTO: 11.6 10E9/L (ref 4–11)
WBC # BLD AUTO: 11.7 10E9/L (ref 4–11)
WBC # BLD AUTO: 12.7 10E9/L (ref 4–11)
WBC # BLD AUTO: 13.7 10E9/L (ref 4–11)
WBC # BLD AUTO: 7.3 10E9/L (ref 4–11)
WBC # BLD AUTO: 7.3 10E9/L (ref 4–11)
WBC # BLD AUTO: 7.7 10E9/L (ref 4–11)
WBC # BLD AUTO: 9.1 10E9/L (ref 4–11)
WBC # BLD AUTO: NORMAL 10E9/L (ref 4–11)
WBC #/AREA URNS AUTO: 1 /HPF (ref 0–2)

## 2018-01-01 PROCEDURE — 85018 HEMOGLOBIN: CPT | Performed by: STUDENT IN AN ORGANIZED HEALTH CARE EDUCATION/TRAINING PROGRAM

## 2018-01-01 PROCEDURE — 25000132 ZZH RX MED GY IP 250 OP 250 PS 637: Mod: GY | Performed by: STUDENT IN AN ORGANIZED HEALTH CARE EDUCATION/TRAINING PROGRAM

## 2018-01-01 PROCEDURE — 40000193 ZZH STATISTIC PT WARD VISIT: Performed by: PHYSICAL THERAPIST

## 2018-01-01 PROCEDURE — 84132 ASSAY OF SERUM POTASSIUM: CPT | Performed by: SURGERY

## 2018-01-01 PROCEDURE — 36415 COLL VENOUS BLD VENIPUNCTURE: CPT | Performed by: SURGERY

## 2018-01-01 PROCEDURE — 71000017 ZZH RECOVERY PHASE 1 LEVEL 3 EA ADDTL HR: Performed by: COLON & RECTAL SURGERY

## 2018-01-01 PROCEDURE — 25000128 H RX IP 250 OP 636: Performed by: STUDENT IN AN ORGANIZED HEALTH CARE EDUCATION/TRAINING PROGRAM

## 2018-01-01 PROCEDURE — 12000006 ZZH R&B IMCU INTERMEDIATE UMMC

## 2018-01-01 PROCEDURE — 25000128 H RX IP 250 OP 636: Performed by: PHYSICIAN ASSISTANT

## 2018-01-01 PROCEDURE — C9290 INJ, BUPIVACAINE LIPOSOME: HCPCS | Performed by: STUDENT IN AN ORGANIZED HEALTH CARE EDUCATION/TRAINING PROGRAM

## 2018-01-01 PROCEDURE — 80048 BASIC METABOLIC PNL TOTAL CA: CPT | Performed by: STUDENT IN AN ORGANIZED HEALTH CARE EDUCATION/TRAINING PROGRAM

## 2018-01-01 PROCEDURE — 25000128 H RX IP 250 OP 636: Performed by: COLON & RECTAL SURGERY

## 2018-01-01 PROCEDURE — 83735 ASSAY OF MAGNESIUM: CPT | Performed by: SURGERY

## 2018-01-01 PROCEDURE — 40000141 ZZH STATISTIC PERIPHERAL IV START W/O US GUIDANCE

## 2018-01-01 PROCEDURE — 97535 SELF CARE MNGMENT TRAINING: CPT | Mod: GO

## 2018-01-01 PROCEDURE — 25000128 H RX IP 250 OP 636: Performed by: SURGERY

## 2018-01-01 PROCEDURE — 25000128 H RX IP 250 OP 636: Performed by: NURSE ANESTHETIST, CERTIFIED REGISTERED

## 2018-01-01 PROCEDURE — 36415 COLL VENOUS BLD VENIPUNCTURE: CPT | Performed by: STUDENT IN AN ORGANIZED HEALTH CARE EDUCATION/TRAINING PROGRAM

## 2018-01-01 PROCEDURE — A9270 NON-COVERED ITEM OR SERVICE: HCPCS | Mod: GY | Performed by: STUDENT IN AN ORGANIZED HEALTH CARE EDUCATION/TRAINING PROGRAM

## 2018-01-01 PROCEDURE — 85027 COMPLETE CBC AUTOMATED: CPT | Performed by: STUDENT IN AN ORGANIZED HEALTH CARE EDUCATION/TRAINING PROGRAM

## 2018-01-01 PROCEDURE — 25000125 ZZHC RX 250: Performed by: SURGERY

## 2018-01-01 PROCEDURE — 84484 ASSAY OF TROPONIN QUANT: CPT | Performed by: SURGERY

## 2018-01-01 PROCEDURE — 25500064 ZZH RX 255 OP 636: Performed by: COLON & RECTAL SURGERY

## 2018-01-01 PROCEDURE — 0DJD8ZZ INSPECTION OF LOWER INTESTINAL TRACT, VIA NATURAL OR ARTIFICIAL OPENING ENDOSCOPIC: ICD-10-PCS | Performed by: COLON & RECTAL SURGERY

## 2018-01-01 PROCEDURE — 93005 ELECTROCARDIOGRAM TRACING: CPT

## 2018-01-01 PROCEDURE — A9270 NON-COVERED ITEM OR SERVICE: HCPCS | Mod: GY | Performed by: COLON & RECTAL SURGERY

## 2018-01-01 PROCEDURE — 86900 BLOOD TYPING SEROLOGIC ABO: CPT | Performed by: STUDENT IN AN ORGANIZED HEALTH CARE EDUCATION/TRAINING PROGRAM

## 2018-01-01 PROCEDURE — 34300033 ZZH RX 343: Performed by: INTERNAL MEDICINE

## 2018-01-01 PROCEDURE — 86923 COMPATIBILITY TEST ELECTRIC: CPT | Performed by: STUDENT IN AN ORGANIZED HEALTH CARE EDUCATION/TRAINING PROGRAM

## 2018-01-01 PROCEDURE — 25000132 ZZH RX MED GY IP 250 OP 250 PS 637: Mod: GY | Performed by: COLON & RECTAL SURGERY

## 2018-01-01 PROCEDURE — 40000275 ZZH STATISTIC RCP TIME EA 10 MIN

## 2018-01-01 PROCEDURE — 71045 X-RAY EXAM CHEST 1 VIEW: CPT

## 2018-01-01 PROCEDURE — 97110 THERAPEUTIC EXERCISES: CPT | Mod: GO

## 2018-01-01 PROCEDURE — 83735 ASSAY OF MAGNESIUM: CPT | Performed by: STUDENT IN AN ORGANIZED HEALTH CARE EDUCATION/TRAINING PROGRAM

## 2018-01-01 PROCEDURE — 84145 PROCALCITONIN (PCT): CPT | Performed by: STUDENT IN AN ORGANIZED HEALTH CARE EDUCATION/TRAINING PROGRAM

## 2018-01-01 PROCEDURE — 80048 BASIC METABOLIC PNL TOTAL CA: CPT | Performed by: FAMILY MEDICINE

## 2018-01-01 PROCEDURE — A9270 NON-COVERED ITEM OR SERVICE: HCPCS | Mod: GY | Performed by: SURGERY

## 2018-01-01 PROCEDURE — 40000133 ZZH STATISTIC OT WARD VISIT

## 2018-01-01 PROCEDURE — 97116 GAIT TRAINING THERAPY: CPT | Mod: GP | Performed by: REHABILITATION PRACTITIONER

## 2018-01-01 PROCEDURE — 80048 BASIC METABOLIC PNL TOTAL CA: CPT | Performed by: SURGERY

## 2018-01-01 PROCEDURE — 25000132 ZZH RX MED GY IP 250 OP 250 PS 637: Mod: GY | Performed by: SURGERY

## 2018-01-01 PROCEDURE — 97116 GAIT TRAINING THERAPY: CPT | Mod: GP | Performed by: PHYSICAL THERAPIST

## 2018-01-01 PROCEDURE — 25000565 ZZH ISOFLURANE, EA 15 MIN: Performed by: COLON & RECTAL SURGERY

## 2018-01-01 PROCEDURE — 84132 ASSAY OF SERUM POTASSIUM: CPT | Performed by: ANESTHESIOLOGY

## 2018-01-01 PROCEDURE — 97530 THERAPEUTIC ACTIVITIES: CPT | Mod: GP | Performed by: PHYSICAL THERAPIST

## 2018-01-01 PROCEDURE — 25000128 H RX IP 250 OP 636: Performed by: ANESTHESIOLOGY

## 2018-01-01 PROCEDURE — 36415 COLL VENOUS BLD VENIPUNCTURE: CPT

## 2018-01-01 PROCEDURE — A9552 F18 FDG: HCPCS | Performed by: INTERNAL MEDICINE

## 2018-01-01 PROCEDURE — 00000146 ZZHCL STATISTIC GLUCOSE BY METER IP

## 2018-01-01 PROCEDURE — 25000132 ZZH RX MED GY IP 250 OP 250 PS 637: Mod: GY | Performed by: PHYSICIAN ASSISTANT

## 2018-01-01 PROCEDURE — 80061 LIPID PANEL: CPT | Performed by: FAMILY MEDICINE

## 2018-01-01 PROCEDURE — 82805 BLOOD GASES W/O2 SATURATION: CPT

## 2018-01-01 PROCEDURE — 93010 ELECTROCARDIOGRAM REPORT: CPT | Performed by: INTERNAL MEDICINE

## 2018-01-01 PROCEDURE — 27210995 ZZH RX 272: Performed by: COLON & RECTAL SURGERY

## 2018-01-01 PROCEDURE — 90732 PPSV23 VACC 2 YRS+ SUBQ/IM: CPT | Performed by: NURSE PRACTITIONER

## 2018-01-01 PROCEDURE — G0463 HOSPITAL OUTPT CLINIC VISIT: HCPCS | Mod: ZF

## 2018-01-01 PROCEDURE — 85027 COMPLETE CBC AUTOMATED: CPT | Performed by: FAMILY MEDICINE

## 2018-01-01 PROCEDURE — 85018 HEMOGLOBIN: CPT | Performed by: ANESTHESIOLOGY

## 2018-01-01 PROCEDURE — 40000193 ZZH STATISTIC PT WARD VISIT: Performed by: REHABILITATION PRACTITIONER

## 2018-01-01 PROCEDURE — 71000016 ZZH RECOVERY PHASE 1 LEVEL 3 FIRST HR: Performed by: COLON & RECTAL SURGERY

## 2018-01-01 PROCEDURE — 84484 ASSAY OF TROPONIN QUANT: CPT | Performed by: STUDENT IN AN ORGANIZED HEALTH CARE EDUCATION/TRAINING PROGRAM

## 2018-01-01 PROCEDURE — 36415 COLL VENOUS BLD VENIPUNCTURE: CPT | Performed by: ANESTHESIOLOGY

## 2018-01-01 PROCEDURE — 25000132 ZZH RX MED GY IP 250 OP 250 PS 637: Mod: GY

## 2018-01-01 PROCEDURE — 25000125 ZZHC RX 250: Performed by: NURSE ANESTHETIST, CERTIFIED REGISTERED

## 2018-01-01 PROCEDURE — 97110 THERAPEUTIC EXERCISES: CPT | Mod: GP

## 2018-01-01 PROCEDURE — 83605 ASSAY OF LACTIC ACID: CPT | Performed by: STUDENT IN AN ORGANIZED HEALTH CARE EDUCATION/TRAINING PROGRAM

## 2018-01-01 PROCEDURE — G0009 ADMIN PNEUMOCOCCAL VACCINE: HCPCS | Performed by: NURSE PRACTITIONER

## 2018-01-01 PROCEDURE — 93308 TTE F-UP OR LMTD: CPT | Mod: 26 | Performed by: INTERNAL MEDICINE

## 2018-01-01 PROCEDURE — 84100 ASSAY OF PHOSPHORUS: CPT | Performed by: SURGERY

## 2018-01-01 PROCEDURE — 97116 GAIT TRAINING THERAPY: CPT | Mod: GP

## 2018-01-01 PROCEDURE — 99214 OFFICE O/P EST MOD 30 MIN: CPT | Mod: ZP | Performed by: INTERNAL MEDICINE

## 2018-01-01 PROCEDURE — 40000193 ZZH STATISTIC PT WARD VISIT

## 2018-01-01 PROCEDURE — 37000009 ZZH ANESTHESIA TECHNICAL FEE, EACH ADDTL 15 MIN: Performed by: COLON & RECTAL SURGERY

## 2018-01-01 PROCEDURE — 94799 UNLISTED PULMONARY SVC/PX: CPT

## 2018-01-01 PROCEDURE — 36415 COLL VENOUS BLD VENIPUNCTURE: CPT | Performed by: COLON & RECTAL SURGERY

## 2018-01-01 PROCEDURE — 97530 THERAPEUTIC ACTIVITIES: CPT | Mod: GP | Performed by: REHABILITATION PRACTITIONER

## 2018-01-01 PROCEDURE — 83605 ASSAY OF LACTIC ACID: CPT | Performed by: COLON & RECTAL SURGERY

## 2018-01-01 PROCEDURE — 36415 COLL VENOUS BLD VENIPUNCTURE: CPT | Performed by: FAMILY MEDICINE

## 2018-01-01 PROCEDURE — P9016 RBC LEUKOCYTES REDUCED: HCPCS | Performed by: STUDENT IN AN ORGANIZED HEALTH CARE EDUCATION/TRAINING PROGRAM

## 2018-01-01 PROCEDURE — 94640 AIRWAY INHALATION TREATMENT: CPT

## 2018-01-01 PROCEDURE — 84450 TRANSFERASE (AST) (SGOT): CPT | Performed by: FAMILY MEDICINE

## 2018-01-01 PROCEDURE — 83880 ASSAY OF NATRIURETIC PEPTIDE: CPT | Performed by: STUDENT IN AN ORGANIZED HEALTH CARE EDUCATION/TRAINING PROGRAM

## 2018-01-01 PROCEDURE — 84145 PROCALCITONIN (PCT): CPT | Performed by: COLON & RECTAL SURGERY

## 2018-01-01 PROCEDURE — 93325 DOPPLER ECHO COLOR FLOW MAPG: CPT | Mod: 26 | Performed by: INTERNAL MEDICINE

## 2018-01-01 PROCEDURE — 82803 BLOOD GASES ANY COMBINATION: CPT | Performed by: COLON & RECTAL SURGERY

## 2018-01-01 PROCEDURE — 85025 COMPLETE CBC W/AUTO DIFF WBC: CPT | Performed by: INTERNAL MEDICINE

## 2018-01-01 PROCEDURE — 97162 PT EVAL MOD COMPLEX 30 MIN: CPT | Mod: GP | Performed by: PHYSICAL THERAPIST

## 2018-01-01 PROCEDURE — A9270 NON-COVERED ITEM OR SERVICE: HCPCS | Mod: GY

## 2018-01-01 PROCEDURE — 36000068 ZZH SURGERY LEVEL 5 1ST 30 MIN - UMMC: Performed by: COLON & RECTAL SURGERY

## 2018-01-01 PROCEDURE — 97165 OT EVAL LOW COMPLEX 30 MIN: CPT | Mod: GO

## 2018-01-01 PROCEDURE — 40000556 ZZH STATISTIC PERIPHERAL IV START W US GUIDANCE

## 2018-01-01 PROCEDURE — 99214 OFFICE O/P EST MOD 30 MIN: CPT | Performed by: NURSE PRACTITIONER

## 2018-01-01 PROCEDURE — 86850 RBC ANTIBODY SCREEN: CPT | Performed by: STUDENT IN AN ORGANIZED HEALTH CARE EDUCATION/TRAINING PROGRAM

## 2018-01-01 PROCEDURE — 88304 TISSUE EXAM BY PATHOLOGIST: CPT | Performed by: COLON & RECTAL SURGERY

## 2018-01-01 PROCEDURE — 93321 DOPPLER ECHO F-UP/LMTD STD: CPT | Mod: 26 | Performed by: INTERNAL MEDICINE

## 2018-01-01 PROCEDURE — 27210794 ZZH OR GENERAL SUPPLY STERILE: Performed by: COLON & RECTAL SURGERY

## 2018-01-01 PROCEDURE — 74176 CT ABD & PELVIS W/O CONTRAST: CPT | Mod: XS

## 2018-01-01 PROCEDURE — 82962 GLUCOSE BLOOD TEST: CPT

## 2018-01-01 PROCEDURE — 80048 BASIC METABOLIC PNL TOTAL CA: CPT | Performed by: COLON & RECTAL SURGERY

## 2018-01-01 PROCEDURE — 85610 PROTHROMBIN TIME: CPT | Performed by: COLON & RECTAL SURGERY

## 2018-01-01 PROCEDURE — 97530 THERAPEUTIC ACTIVITIES: CPT | Mod: GO

## 2018-01-01 PROCEDURE — 88309 TISSUE EXAM BY PATHOLOGIST: CPT | Performed by: COLON & RECTAL SURGERY

## 2018-01-01 PROCEDURE — 82378 CARCINOEMBRYONIC ANTIGEN: CPT | Performed by: INTERNAL MEDICINE

## 2018-01-01 PROCEDURE — 85027 COMPLETE CBC AUTOMATED: CPT | Performed by: SURGERY

## 2018-01-01 PROCEDURE — 40000170 ZZH STATISTIC PRE-PROCEDURE ASSESSMENT II: Performed by: COLON & RECTAL SURGERY

## 2018-01-01 PROCEDURE — A9270 NON-COVERED ITEM OR SERVICE: HCPCS | Mod: GY | Performed by: ANESTHESIOLOGY

## 2018-01-01 PROCEDURE — 84484 ASSAY OF TROPONIN QUANT: CPT | Performed by: INTERNAL MEDICINE

## 2018-01-01 PROCEDURE — 80053 COMPREHEN METABOLIC PANEL: CPT | Performed by: INTERNAL MEDICINE

## 2018-01-01 PROCEDURE — 99215 OFFICE O/P EST HI 40 MIN: CPT | Mod: ZP | Performed by: INTERNAL MEDICINE

## 2018-01-01 PROCEDURE — 99214 OFFICE O/P EST MOD 30 MIN: CPT | Mod: GC | Performed by: INTERNAL MEDICINE

## 2018-01-01 PROCEDURE — 36000070 ZZH SURGERY LEVEL 5 EA 15 ADDTL MIN - UMMC: Performed by: COLON & RECTAL SURGERY

## 2018-01-01 PROCEDURE — 94660 CPAP INITIATION&MGMT: CPT

## 2018-01-01 PROCEDURE — 40000014 ZZH STATISTIC ARTERIAL MONITORING DAILY

## 2018-01-01 PROCEDURE — 40000802 ZZH SITE CHECK

## 2018-01-01 PROCEDURE — 94640 AIRWAY INHALATION TREATMENT: CPT | Mod: 76

## 2018-01-01 PROCEDURE — 84100 ASSAY OF PHOSPHORUS: CPT | Performed by: STUDENT IN AN ORGANIZED HEALTH CARE EDUCATION/TRAINING PROGRAM

## 2018-01-01 PROCEDURE — 93308 TTE F-UP OR LMTD: CPT

## 2018-01-01 PROCEDURE — 85730 THROMBOPLASTIN TIME PARTIAL: CPT | Performed by: COLON & RECTAL SURGERY

## 2018-01-01 PROCEDURE — 37000008 ZZH ANESTHESIA TECHNICAL FEE, 1ST 30 MIN: Performed by: COLON & RECTAL SURGERY

## 2018-01-01 PROCEDURE — 84460 ALANINE AMINO (ALT) (SGPT): CPT | Performed by: FAMILY MEDICINE

## 2018-01-01 PROCEDURE — 80048 BASIC METABOLIC PNL TOTAL CA: CPT | Performed by: INTERNAL MEDICINE

## 2018-01-01 PROCEDURE — 93970 EXTREMITY STUDY: CPT

## 2018-01-01 PROCEDURE — 36600 WITHDRAWAL OF ARTERIAL BLOOD: CPT

## 2018-01-01 PROCEDURE — 25000132 ZZH RX MED GY IP 250 OP 250 PS 637: Mod: GY | Performed by: ANESTHESIOLOGY

## 2018-01-01 PROCEDURE — 36415 COLL VENOUS BLD VENIPUNCTURE: CPT | Performed by: INTERNAL MEDICINE

## 2018-01-01 PROCEDURE — 25000125 ZZHC RX 250: Performed by: STUDENT IN AN ORGANIZED HEALTH CARE EDUCATION/TRAINING PROGRAM

## 2018-01-01 PROCEDURE — 86901 BLOOD TYPING SEROLOGIC RH(D): CPT | Performed by: STUDENT IN AN ORGANIZED HEALTH CARE EDUCATION/TRAINING PROGRAM

## 2018-01-01 PROCEDURE — 82565 ASSAY OF CREATININE: CPT | Performed by: ANESTHESIOLOGY

## 2018-01-01 PROCEDURE — 97530 THERAPEUTIC ACTIVITIES: CPT | Mod: GP

## 2018-01-01 PROCEDURE — C9399 UNCLASSIFIED DRUGS OR BIOLOG: HCPCS | Performed by: NURSE ANESTHETIST, CERTIFIED REGISTERED

## 2018-01-01 PROCEDURE — 97110 THERAPEUTIC EXERCISES: CPT | Mod: GP | Performed by: REHABILITATION PRACTITIONER

## 2018-01-01 PROCEDURE — 81001 URINALYSIS AUTO W/SCOPE: CPT | Performed by: STUDENT IN AN ORGANIZED HEALTH CARE EDUCATION/TRAINING PROGRAM

## 2018-01-01 PROCEDURE — 85027 COMPLETE CBC AUTOMATED: CPT | Performed by: INTERNAL MEDICINE

## 2018-01-01 PROCEDURE — G0439 PPPS, SUBSEQ VISIT: HCPCS | Performed by: NURSE PRACTITIONER

## 2018-01-01 PROCEDURE — P9041 ALBUMIN (HUMAN),5%, 50ML: HCPCS | Performed by: NURSE ANESTHETIST, CERTIFIED REGISTERED

## 2018-01-01 PROCEDURE — 0DTP4ZZ RESECTION OF RECTUM, PERCUTANEOUS ENDOSCOPIC APPROACH: ICD-10-PCS | Performed by: COLON & RECTAL SURGERY

## 2018-01-01 RX ORDER — OLANZAPINE 2.5 MG/1
5 TABLET, FILM COATED ORAL
Status: COMPLETED | OUTPATIENT
Start: 2018-01-01 | End: 2018-01-01

## 2018-01-01 RX ORDER — METOPROLOL TARTRATE 1 MG/ML
5 INJECTION, SOLUTION INTRAVENOUS EVERY 6 HOURS
Status: DISCONTINUED | OUTPATIENT
Start: 2018-01-01 | End: 2018-01-01

## 2018-01-01 RX ORDER — MAGNESIUM SULFATE HEPTAHYDRATE 40 MG/ML
4 INJECTION, SOLUTION INTRAVENOUS EVERY 4 HOURS PRN
Status: DISCONTINUED | OUTPATIENT
Start: 2018-01-01 | End: 2018-01-01 | Stop reason: HOSPADM

## 2018-01-01 RX ORDER — HEPARIN SODIUM 5000 [USP'U]/.5ML
5000 INJECTION, SOLUTION INTRAVENOUS; SUBCUTANEOUS EVERY 12 HOURS
DISCHARGE
Start: 2018-01-01 | End: 2018-01-01

## 2018-01-01 RX ORDER — ATORVASTATIN CALCIUM 40 MG/1
TABLET, FILM COATED ORAL
Qty: 90 TABLET | Refills: 3 | Status: SHIPPED | OUTPATIENT
Start: 2018-01-01

## 2018-01-01 RX ORDER — FENTANYL CITRATE 50 UG/ML
25-50 INJECTION, SOLUTION INTRAMUSCULAR; INTRAVENOUS
Status: DISCONTINUED | OUTPATIENT
Start: 2018-01-01 | End: 2018-01-01 | Stop reason: HOSPADM

## 2018-01-01 RX ORDER — LEVOFLOXACIN 5 MG/ML
500 INJECTION, SOLUTION INTRAVENOUS
Status: DISCONTINUED | OUTPATIENT
Start: 2018-01-01 | End: 2018-01-01

## 2018-01-01 RX ORDER — FUROSEMIDE 10 MG/ML
10 INJECTION INTRAMUSCULAR; INTRAVENOUS ONCE
Status: COMPLETED | OUTPATIENT
Start: 2018-01-01 | End: 2018-01-01

## 2018-01-01 RX ORDER — CARVEDILOL 25 MG/1
25 TABLET ORAL 2 TIMES DAILY
Qty: 180 TABLET | Refills: 3 | Status: SHIPPED | OUTPATIENT
Start: 2018-01-01

## 2018-01-01 RX ORDER — METRONIDAZOLE 500 MG/1
500 TABLET ORAL EVERY 8 HOURS
Qty: 3 TABLET | Refills: 0 | Status: SHIPPED | OUTPATIENT
Start: 2018-01-01 | End: 2018-01-01

## 2018-01-01 RX ORDER — SODIUM CHLORIDE, SODIUM LACTATE, POTASSIUM CHLORIDE, CALCIUM CHLORIDE 600; 310; 30; 20 MG/100ML; MG/100ML; MG/100ML; MG/100ML
INJECTION, SOLUTION INTRAVENOUS CONTINUOUS
Status: DISCONTINUED | OUTPATIENT
Start: 2018-01-01 | End: 2018-01-01

## 2018-01-01 RX ORDER — NALOXONE HYDROCHLORIDE 0.4 MG/ML
.1-.4 INJECTION, SOLUTION INTRAMUSCULAR; INTRAVENOUS; SUBCUTANEOUS
Status: DISCONTINUED | OUTPATIENT
Start: 2018-01-01 | End: 2018-01-01 | Stop reason: HOSPADM

## 2018-01-01 RX ORDER — POLYETHYLENE GLYCOL 3350 17 G/17G
17 POWDER, FOR SOLUTION ORAL DAILY PRN
Status: DISCONTINUED | OUTPATIENT
Start: 2018-01-01 | End: 2018-01-01 | Stop reason: HOSPADM

## 2018-01-01 RX ORDER — ACETAMINOPHEN 325 MG/1
975 TABLET ORAL EVERY 6 HOURS
Status: DISCONTINUED | OUTPATIENT
Start: 2018-01-01 | End: 2018-01-01

## 2018-01-01 RX ORDER — LIDOCAINE 40 MG/G
CREAM TOPICAL
Status: DISCONTINUED | OUTPATIENT
Start: 2018-01-01 | End: 2018-01-01

## 2018-01-01 RX ORDER — ACETAMINOPHEN 500 MG
1000 TABLET ORAL 4 TIMES DAILY PRN
Status: DISCONTINUED | OUTPATIENT
Start: 2018-01-01 | End: 2018-01-01 | Stop reason: HOSPADM

## 2018-01-01 RX ORDER — FUROSEMIDE 10 MG/ML
20 INJECTION INTRAMUSCULAR; INTRAVENOUS ONCE
Status: COMPLETED | OUTPATIENT
Start: 2018-01-01 | End: 2018-01-01

## 2018-01-01 RX ORDER — ASPIRIN 81 MG/1
81 TABLET ORAL DAILY
Status: DISCONTINUED | OUTPATIENT
Start: 2018-01-01 | End: 2018-01-01

## 2018-01-01 RX ORDER — IPRATROPIUM BROMIDE AND ALBUTEROL SULFATE 2.5; .5 MG/3ML; MG/3ML
3 SOLUTION RESPIRATORY (INHALATION) EVERY 4 HOURS PRN
Status: DISCONTINUED | OUTPATIENT
Start: 2018-01-01 | End: 2018-01-01 | Stop reason: HOSPADM

## 2018-01-01 RX ORDER — TRAZODONE HYDROCHLORIDE 50 MG/1
50 TABLET, FILM COATED ORAL EVERY EVENING
Status: DISCONTINUED | OUTPATIENT
Start: 2018-01-01 | End: 2018-01-01 | Stop reason: HOSPADM

## 2018-01-01 RX ORDER — FUROSEMIDE 10 MG/ML
40 INJECTION INTRAMUSCULAR; INTRAVENOUS ONCE
Status: COMPLETED | OUTPATIENT
Start: 2018-01-01 | End: 2018-01-01

## 2018-01-01 RX ORDER — PANTOPRAZOLE SODIUM 40 MG/1
40 TABLET, DELAYED RELEASE ORAL EVERY MORNING
Status: DISCONTINUED | OUTPATIENT
Start: 2018-01-01 | End: 2018-01-01 | Stop reason: HOSPADM

## 2018-01-01 RX ORDER — AMLODIPINE BESYLATE 10 MG/1
TABLET ORAL
Qty: 90 TABLET | Refills: 1 | Status: SHIPPED | OUTPATIENT
Start: 2018-01-01

## 2018-01-01 RX ORDER — AMLODIPINE BESYLATE 10 MG/1
5 TABLET ORAL DAILY
Qty: 90 TABLET | DISCHARGE
Start: 2018-01-01 | End: 2018-01-01 | Stop reason: DRUGHIGH

## 2018-01-01 RX ORDER — ONDANSETRON 4 MG/1
TABLET, FILM COATED ORAL
Qty: 3 TABLET | Refills: 0 | Status: ON HOLD | OUTPATIENT
Start: 2018-01-01 | End: 2018-01-01

## 2018-01-01 RX ORDER — POTASSIUM CHLORIDE 29.8 MG/ML
20 INJECTION INTRAVENOUS
Status: DISCONTINUED | OUTPATIENT
Start: 2018-01-01 | End: 2018-01-01 | Stop reason: HOSPADM

## 2018-01-01 RX ORDER — POTASSIUM CL/LIDO/0.9 % NACL 10MEQ/0.1L
10 INTRAVENOUS SOLUTION, PIGGYBACK (ML) INTRAVENOUS
Status: DISCONTINUED | OUTPATIENT
Start: 2018-01-01 | End: 2018-01-01

## 2018-01-01 RX ORDER — POTASSIUM CHLORIDE 1.5 G/1.58G
20-40 POWDER, FOR SOLUTION ORAL
Status: DISCONTINUED | OUTPATIENT
Start: 2018-01-01 | End: 2018-01-01 | Stop reason: HOSPADM

## 2018-01-01 RX ORDER — MEPERIDINE HYDROCHLORIDE 50 MG/ML
12.5 INJECTION INTRAMUSCULAR; INTRAVENOUS; SUBCUTANEOUS EVERY 5 MIN PRN
Status: DISCONTINUED | OUTPATIENT
Start: 2018-01-01 | End: 2018-01-01 | Stop reason: HOSPADM

## 2018-01-01 RX ORDER — POTASSIUM CHLORIDE 750 MG/1
20-40 TABLET, EXTENDED RELEASE ORAL
Status: DISCONTINUED | OUTPATIENT
Start: 2018-01-01 | End: 2018-01-01 | Stop reason: HOSPADM

## 2018-01-01 RX ORDER — NEOMYCIN SULFATE 500 MG/1
TABLET ORAL
Qty: 6 TABLET | Refills: 0 | Status: ON HOLD | OUTPATIENT
Start: 2018-01-01 | End: 2018-01-01

## 2018-01-01 RX ORDER — LEVOFLOXACIN 5 MG/ML
750 INJECTION, SOLUTION INTRAVENOUS ONCE
Status: COMPLETED | OUTPATIENT
Start: 2018-01-01 | End: 2018-01-01

## 2018-01-01 RX ORDER — ACETAMINOPHEN 325 MG/1
975 TABLET ORAL 2 TIMES DAILY
Status: DISCONTINUED | OUTPATIENT
Start: 2018-01-01 | End: 2018-01-01

## 2018-01-01 RX ORDER — OLANZAPINE 10 MG/2ML
5 INJECTION, POWDER, FOR SOLUTION INTRAMUSCULAR
Status: DISCONTINUED | OUTPATIENT
Start: 2018-01-01 | End: 2018-01-01

## 2018-01-01 RX ORDER — HEPARIN SODIUM 5000 [USP'U]/.5ML
5000 INJECTION, SOLUTION INTRAVENOUS; SUBCUTANEOUS EVERY 12 HOURS
Status: DISCONTINUED | OUTPATIENT
Start: 2018-01-01 | End: 2018-01-01 | Stop reason: HOSPADM

## 2018-01-01 RX ORDER — QUETIAPINE FUMARATE 50 MG/1
50 TABLET, FILM COATED ORAL EVERY EVENING
Status: DISCONTINUED | OUTPATIENT
Start: 2018-01-01 | End: 2018-01-01

## 2018-01-01 RX ORDER — CARVEDILOL 25 MG/1
25 TABLET ORAL 2 TIMES DAILY
Start: 2018-01-01 | End: 2018-01-01

## 2018-01-01 RX ORDER — TAMSULOSIN HYDROCHLORIDE 0.4 MG/1
0.4 CAPSULE ORAL DAILY
Status: DISCONTINUED | OUTPATIENT
Start: 2018-01-01 | End: 2018-01-01 | Stop reason: HOSPADM

## 2018-01-01 RX ORDER — SULFAMETHOXAZOLE/TRIMETHOPRIM 800-160 MG
1 TABLET ORAL 2 TIMES DAILY
Qty: 20 TABLET | Refills: 0 | Status: SHIPPED | OUTPATIENT
Start: 2018-01-01 | End: 2018-01-01

## 2018-01-01 RX ORDER — LABETALOL HYDROCHLORIDE 5 MG/ML
INJECTION, SOLUTION INTRAVENOUS PRN
Status: DISCONTINUED | OUTPATIENT
Start: 2018-01-01 | End: 2018-01-01

## 2018-01-01 RX ORDER — PROPOFOL 10 MG/ML
INJECTION, EMULSION INTRAVENOUS PRN
Status: DISCONTINUED | OUTPATIENT
Start: 2018-01-01 | End: 2018-01-01

## 2018-01-01 RX ORDER — POTASSIUM CHLORIDE 7.45 MG/ML
10 INJECTION INTRAVENOUS
Status: DISCONTINUED | OUTPATIENT
Start: 2018-01-01 | End: 2018-01-01

## 2018-01-01 RX ORDER — ONDANSETRON 2 MG/ML
4 INJECTION INTRAMUSCULAR; INTRAVENOUS EVERY 6 HOURS PRN
Status: DISCONTINUED | OUTPATIENT
Start: 2018-01-01 | End: 2018-01-01 | Stop reason: HOSPADM

## 2018-01-01 RX ORDER — OLANZAPINE 10 MG/2ML
5 INJECTION, POWDER, FOR SOLUTION INTRAMUSCULAR ONCE
Status: DISCONTINUED | OUTPATIENT
Start: 2018-01-01 | End: 2018-01-01

## 2018-01-01 RX ORDER — TAMSULOSIN HYDROCHLORIDE 0.4 MG/1
0.4 CAPSULE ORAL DAILY
Qty: 60 CAPSULE | DISCHARGE
Start: 2018-01-01 | End: 2018-01-01

## 2018-01-01 RX ORDER — TRAZODONE HYDROCHLORIDE 50 MG/1
50 TABLET, FILM COATED ORAL EVERY EVENING
Qty: 60 TABLET | DISCHARGE
Start: 2018-01-01 | End: 2018-01-01

## 2018-01-01 RX ORDER — ACETAMINOPHEN 500 MG
500-1000 TABLET ORAL 4 TIMES DAILY PRN
DISCHARGE
Start: 2018-01-01 | End: 2018-01-01

## 2018-01-01 RX ORDER — ONDANSETRON 4 MG/1
4 TABLET, ORALLY DISINTEGRATING ORAL EVERY 30 MIN PRN
Status: DISCONTINUED | OUTPATIENT
Start: 2018-01-01 | End: 2018-01-01 | Stop reason: HOSPADM

## 2018-01-01 RX ORDER — FENTANYL CITRATE 50 UG/ML
INJECTION, SOLUTION INTRAMUSCULAR; INTRAVENOUS PRN
Status: DISCONTINUED | OUTPATIENT
Start: 2018-01-01 | End: 2018-01-01

## 2018-01-01 RX ORDER — HYDRALAZINE HYDROCHLORIDE 20 MG/ML
2.5-5 INJECTION INTRAMUSCULAR; INTRAVENOUS EVERY 10 MIN PRN
Status: DISCONTINUED | OUTPATIENT
Start: 2018-01-01 | End: 2018-01-01 | Stop reason: HOSPADM

## 2018-01-01 RX ORDER — OXYCODONE HYDROCHLORIDE 5 MG/1
5 TABLET ORAL ONCE
Status: COMPLETED | OUTPATIENT
Start: 2018-01-01 | End: 2018-01-01

## 2018-01-01 RX ORDER — TRAZODONE HYDROCHLORIDE 50 MG/1
100 TABLET, FILM COATED ORAL EVERY EVENING
Status: DISCONTINUED | OUTPATIENT
Start: 2018-01-01 | End: 2018-01-01

## 2018-01-01 RX ORDER — ACETAMINOPHEN 325 MG/1
975 TABLET ORAL
Status: DISCONTINUED | OUTPATIENT
Start: 2018-01-01 | End: 2018-01-01

## 2018-01-01 RX ORDER — LEVOFLOXACIN 500 MG/1
500 TABLET, FILM COATED ORAL
Status: DISCONTINUED | OUTPATIENT
Start: 2018-01-01 | End: 2018-01-01 | Stop reason: HOSPADM

## 2018-01-01 RX ORDER — ACETAMINOPHEN 10 MG/ML
1000 INJECTION, SOLUTION INTRAVENOUS EVERY 6 HOURS
Status: DISCONTINUED | OUTPATIENT
Start: 2018-01-01 | End: 2018-01-01

## 2018-01-01 RX ORDER — POLYETHYLENE GLYCOL 3350 17 G/17G
17 POWDER, FOR SOLUTION ORAL DAILY PRN
Qty: 7 PACKET | DISCHARGE
Start: 2018-01-01 | End: 2018-01-01

## 2018-01-01 RX ORDER — GABAPENTIN 300 MG/1
300 CAPSULE ORAL ONCE
Status: COMPLETED | OUTPATIENT
Start: 2018-01-01 | End: 2018-01-01

## 2018-01-01 RX ORDER — FUROSEMIDE 10 MG/ML
20 INJECTION INTRAMUSCULAR; INTRAVENOUS
Status: DISCONTINUED | OUTPATIENT
Start: 2018-01-01 | End: 2018-01-01

## 2018-01-01 RX ORDER — POTASSIUM CHLORIDE 29.8 MG/ML
20 INJECTION INTRAVENOUS
Status: DISCONTINUED | OUTPATIENT
Start: 2018-01-01 | End: 2018-01-01

## 2018-01-01 RX ORDER — CARVEDILOL 25 MG/1
25 TABLET ORAL 2 TIMES DAILY
Status: DISCONTINUED | OUTPATIENT
Start: 2018-01-01 | End: 2018-01-01 | Stop reason: HOSPADM

## 2018-01-01 RX ORDER — POTASSIUM CL/LIDO/0.9 % NACL 10MEQ/0.1L
10 INTRAVENOUS SOLUTION, PIGGYBACK (ML) INTRAVENOUS
Status: DISCONTINUED | OUTPATIENT
Start: 2018-01-01 | End: 2018-01-01 | Stop reason: HOSPADM

## 2018-01-01 RX ORDER — ACETAMINOPHEN 500 MG
1000 TABLET ORAL 4 TIMES DAILY
Status: DISCONTINUED | OUTPATIENT
Start: 2018-01-01 | End: 2018-01-01

## 2018-01-01 RX ORDER — QUETIAPINE FUMARATE 25 MG/1
25 TABLET, FILM COATED ORAL EVERY EVENING
Status: DISCONTINUED | OUTPATIENT
Start: 2018-01-01 | End: 2018-01-01

## 2018-01-01 RX ORDER — ATORVASTATIN CALCIUM 40 MG/1
40 TABLET, FILM COATED ORAL DAILY
Status: DISCONTINUED | OUTPATIENT
Start: 2018-01-01 | End: 2018-01-01 | Stop reason: HOSPADM

## 2018-01-01 RX ORDER — OXYCODONE HYDROCHLORIDE 5 MG/1
5 TABLET ORAL EVERY 4 HOURS PRN
Status: DISCONTINUED | OUTPATIENT
Start: 2018-01-01 | End: 2018-01-01

## 2018-01-01 RX ORDER — AMLODIPINE BESYLATE 10 MG/1
10 TABLET ORAL DAILY
Status: DISCONTINUED | OUTPATIENT
Start: 2018-01-01 | End: 2018-01-01

## 2018-01-01 RX ORDER — ONDANSETRON 2 MG/ML
INJECTION INTRAMUSCULAR; INTRAVENOUS PRN
Status: DISCONTINUED | OUTPATIENT
Start: 2018-01-01 | End: 2018-01-01

## 2018-01-01 RX ORDER — POTASSIUM CHLORIDE 7.45 MG/ML
10 INJECTION INTRAVENOUS
Status: DISCONTINUED | OUTPATIENT
Start: 2018-01-01 | End: 2018-01-01 | Stop reason: RX

## 2018-01-01 RX ORDER — ONDANSETRON 2 MG/ML
4 INJECTION INTRAMUSCULAR; INTRAVENOUS EVERY 30 MIN PRN
Status: DISCONTINUED | OUTPATIENT
Start: 2018-01-01 | End: 2018-01-01 | Stop reason: HOSPADM

## 2018-01-01 RX ORDER — TRAMADOL HYDROCHLORIDE 50 MG/1
50 TABLET ORAL EVERY 6 HOURS PRN
Status: DISCONTINUED | OUTPATIENT
Start: 2018-01-01 | End: 2018-01-01

## 2018-01-01 RX ORDER — METOPROLOL TARTRATE 1 MG/ML
1-2 INJECTION, SOLUTION INTRAVENOUS EVERY 5 MIN PRN
Status: DISCONTINUED | OUTPATIENT
Start: 2018-01-01 | End: 2018-01-01 | Stop reason: HOSPADM

## 2018-01-01 RX ORDER — HEPARIN SODIUM 5000 [USP'U]/.5ML
5000 INJECTION, SOLUTION INTRAVENOUS; SUBCUTANEOUS EVERY 8 HOURS
Status: DISCONTINUED | OUTPATIENT
Start: 2018-01-01 | End: 2018-01-01

## 2018-01-01 RX ORDER — NALOXONE HYDROCHLORIDE 0.4 MG/ML
.1-.4 INJECTION, SOLUTION INTRAMUSCULAR; INTRAVENOUS; SUBCUTANEOUS
Status: ACTIVE | OUTPATIENT
Start: 2018-01-01 | End: 2018-01-01

## 2018-01-01 RX ORDER — ALBUMIN, HUMAN INJ 5% 5 %
SOLUTION INTRAVENOUS CONTINUOUS PRN
Status: DISCONTINUED | OUTPATIENT
Start: 2018-01-01 | End: 2018-01-01

## 2018-01-01 RX ORDER — BUPIVACAINE HYDROCHLORIDE 2.5 MG/ML
INJECTION, SOLUTION EPIDURAL; INFILTRATION; INTRACAUDAL PRN
Status: DISCONTINUED | OUTPATIENT
Start: 2018-01-01 | End: 2018-01-01

## 2018-01-01 RX ORDER — POTASSIUM CHLORIDE 750 MG/1
20-40 TABLET, EXTENDED RELEASE ORAL
Status: DISCONTINUED | OUTPATIENT
Start: 2018-01-01 | End: 2018-01-01

## 2018-01-01 RX ORDER — HYDRALAZINE HYDROCHLORIDE 20 MG/ML
20 INJECTION INTRAMUSCULAR; INTRAVENOUS ONCE
Status: COMPLETED | OUTPATIENT
Start: 2018-01-01 | End: 2018-01-01

## 2018-01-01 RX ORDER — EPHEDRINE SULFATE 50 MG/ML
INJECTION, SOLUTION INTRAMUSCULAR; INTRAVENOUS; SUBCUTANEOUS PRN
Status: DISCONTINUED | OUTPATIENT
Start: 2018-01-01 | End: 2018-01-01

## 2018-01-01 RX ORDER — ACETAMINOPHEN 325 MG/1
975 TABLET ORAL ONCE
Status: COMPLETED | OUTPATIENT
Start: 2018-01-01 | End: 2018-01-01

## 2018-01-01 RX ORDER — POTASSIUM CHLORIDE 1.5 G/1.58G
20-40 POWDER, FOR SOLUTION ORAL
Status: DISCONTINUED | OUTPATIENT
Start: 2018-01-01 | End: 2018-01-01

## 2018-01-01 RX ORDER — FLUMAZENIL 0.1 MG/ML
0.2 INJECTION, SOLUTION INTRAVENOUS
Status: DISCONTINUED | OUTPATIENT
Start: 2018-01-01 | End: 2018-01-01 | Stop reason: HOSPADM

## 2018-01-01 RX ORDER — FUROSEMIDE 10 MG/ML
10 INJECTION INTRAMUSCULAR; INTRAVENOUS ONCE
Status: DISCONTINUED | OUTPATIENT
Start: 2018-01-01 | End: 2018-01-01

## 2018-01-01 RX ORDER — HYDRALAZINE HYDROCHLORIDE 20 MG/ML
10 INJECTION INTRAMUSCULAR; INTRAVENOUS EVERY 4 HOURS PRN
Status: DISCONTINUED | OUTPATIENT
Start: 2018-01-01 | End: 2018-01-01 | Stop reason: HOSPADM

## 2018-01-01 RX ORDER — SODIUM CHLORIDE, SODIUM LACTATE, POTASSIUM CHLORIDE, CALCIUM CHLORIDE 600; 310; 30; 20 MG/100ML; MG/100ML; MG/100ML; MG/100ML
INJECTION, SOLUTION INTRAVENOUS CONTINUOUS
Status: DISCONTINUED | OUTPATIENT
Start: 2018-01-01 | End: 2018-01-01 | Stop reason: HOSPADM

## 2018-01-01 RX ORDER — LEVOFLOXACIN 500 MG/1
500 TABLET, FILM COATED ORAL
Refills: 0 | DISCHARGE
Start: 2018-01-01 | End: 2018-01-01

## 2018-01-01 RX ORDER — CARVEDILOL 25 MG/1
25 TABLET ORAL 2 TIMES DAILY
Qty: 180 TABLET | Refills: 3 | Status: SHIPPED | OUTPATIENT
Start: 2018-01-01 | End: 2018-01-01

## 2018-01-01 RX ADMIN — CARVEDILOL 25 MG: 25 TABLET, FILM COATED ORAL at 19:47

## 2018-01-01 RX ADMIN — GABAPENTIN 300 MG: 300 CAPSULE ORAL at 06:13

## 2018-01-01 RX ADMIN — HYDROMORPHONE HYDROCHLORIDE: 10 INJECTION, SOLUTION INTRAMUSCULAR; INTRAVENOUS; SUBCUTANEOUS at 15:41

## 2018-01-01 RX ADMIN — ACETAMINOPHEN 1000 MG: 10 INJECTION, SOLUTION INTRAVENOUS at 05:13

## 2018-01-01 RX ADMIN — HEPARIN SODIUM 5000 UNITS: 5000 INJECTION, SOLUTION INTRAVENOUS; SUBCUTANEOUS at 23:54

## 2018-01-01 RX ADMIN — ASPIRIN 81 MG: 81 TABLET, COATED ORAL at 08:01

## 2018-01-01 RX ADMIN — SODIUM CHLORIDE, POTASSIUM CHLORIDE, SODIUM LACTATE AND CALCIUM CHLORIDE: 600; 310; 30; 20 INJECTION, SOLUTION INTRAVENOUS at 15:00

## 2018-01-01 RX ADMIN — TICAGRELOR 90 MG: 90 TABLET ORAL at 08:13

## 2018-01-01 RX ADMIN — ASPIRIN 81 MG: 81 TABLET, COATED ORAL at 18:11

## 2018-01-01 RX ADMIN — PANTOPRAZOLE SODIUM 40 MG: 40 INJECTION, POWDER, FOR SOLUTION INTRAVENOUS at 16:49

## 2018-01-01 RX ADMIN — METOPROLOL TARTRATE 5 MG: 5 INJECTION INTRAVENOUS at 20:56

## 2018-01-01 RX ADMIN — ROCURONIUM BROMIDE 10 MG: 10 INJECTION INTRAVENOUS at 11:29

## 2018-01-01 RX ADMIN — ACETAMINOPHEN 1000 MG: 10 INJECTION, SOLUTION INTRAVENOUS at 17:54

## 2018-01-01 RX ADMIN — PANTOPRAZOLE SODIUM 40 MG: 40 TABLET, DELAYED RELEASE ORAL at 08:13

## 2018-01-01 RX ADMIN — LEVOFLOXACIN 500 MG: 500 TABLET, FILM COATED ORAL at 09:59

## 2018-01-01 RX ADMIN — HEPARIN SODIUM 5000 UNITS: 5000 INJECTION, SOLUTION INTRAVENOUS; SUBCUTANEOUS at 08:32

## 2018-01-01 RX ADMIN — TRAZODONE HYDROCHLORIDE 100 MG: 50 TABLET ORAL at 21:55

## 2018-01-01 RX ADMIN — TRAZODONE HYDROCHLORIDE 50 MG: 50 TABLET ORAL at 19:23

## 2018-01-01 RX ADMIN — HYDRALAZINE HYDROCHLORIDE 3 MG: 20 INJECTION INTRAMUSCULAR; INTRAVENOUS at 14:04

## 2018-01-01 RX ADMIN — ROCURONIUM BROMIDE 20 MG: 10 INJECTION INTRAVENOUS at 10:28

## 2018-01-01 RX ADMIN — ACETAMINOPHEN 975 MG: 325 TABLET, FILM COATED ORAL at 20:04

## 2018-01-01 RX ADMIN — HEPARIN SODIUM 5000 UNITS: 5000 INJECTION, SOLUTION INTRAVENOUS; SUBCUTANEOUS at 08:13

## 2018-01-01 RX ADMIN — TICAGRELOR 90 MG: 90 TABLET ORAL at 19:59

## 2018-01-01 RX ADMIN — HEPARIN SODIUM 5000 UNITS: 5000 INJECTION, SOLUTION INTRAVENOUS; SUBCUTANEOUS at 19:30

## 2018-01-01 RX ADMIN — HYDROMORPHONE HYDROCHLORIDE 0.5 MG: 1 INJECTION, SOLUTION INTRAMUSCULAR; INTRAVENOUS; SUBCUTANEOUS at 08:49

## 2018-01-01 RX ADMIN — LABETALOL HYDROCHLORIDE 10 MG: 5 INJECTION INTRAVENOUS at 08:53

## 2018-01-01 RX ADMIN — TICAGRELOR 90 MG: 90 TABLET ORAL at 08:41

## 2018-01-01 RX ADMIN — ACETAMINOPHEN 975 MG: 325 TABLET, FILM COATED ORAL at 14:19

## 2018-01-01 RX ADMIN — TRAZODONE HYDROCHLORIDE 50 MG: 50 TABLET ORAL at 19:30

## 2018-01-01 RX ADMIN — TICAGRELOR 90 MG: 90 TABLET ORAL at 08:32

## 2018-01-01 RX ADMIN — POLYETHYLENE GLYCOL 3350 17 G: 17 POWDER, FOR SOLUTION ORAL at 09:58

## 2018-01-01 RX ADMIN — POTASSIUM CHLORIDE 40 MEQ: 750 TABLET, EXTENDED RELEASE ORAL at 18:53

## 2018-01-01 RX ADMIN — ACETAMINOPHEN 975 MG: 325 TABLET, FILM COATED ORAL at 06:13

## 2018-01-01 RX ADMIN — BUPIVACAINE 20 ML: 13.3 INJECTION, SUSPENSION, LIPOSOMAL INFILTRATION at 06:42

## 2018-01-01 RX ADMIN — MIDAZOLAM 1 MG: 1 INJECTION INTRAMUSCULAR; INTRAVENOUS at 07:30

## 2018-01-01 RX ADMIN — TICAGRELOR 90 MG: 90 TABLET ORAL at 08:01

## 2018-01-01 RX ADMIN — POLYETHYLENE GLYCOL 3350 17 G: 17 POWDER, FOR SOLUTION ORAL at 21:43

## 2018-01-01 RX ADMIN — Medication 10 MG: at 12:29

## 2018-01-01 RX ADMIN — PANTOPRAZOLE SODIUM 40 MG: 40 INJECTION, POWDER, FOR SOLUTION INTRAVENOUS at 17:55

## 2018-01-01 RX ADMIN — HEPARIN SODIUM 5000 UNITS: 5000 INJECTION, SOLUTION INTRAVENOUS; SUBCUTANEOUS at 08:02

## 2018-01-01 RX ADMIN — ASPIRIN 81 MG: 81 TABLET, COATED ORAL at 08:32

## 2018-01-01 RX ADMIN — FENTANYL CITRATE 150 MCG: 50 INJECTION, SOLUTION INTRAMUSCULAR; INTRAVENOUS at 08:53

## 2018-01-01 RX ADMIN — ASPIRIN 81 MG: 81 TABLET, COATED ORAL at 08:10

## 2018-01-01 RX ADMIN — ROCURONIUM BROMIDE 20 MG: 10 INJECTION INTRAVENOUS at 09:23

## 2018-01-01 RX ADMIN — SODIUM CHLORIDE, POTASSIUM CHLORIDE, SODIUM LACTATE AND CALCIUM CHLORIDE: 600; 310; 30; 20 INJECTION, SOLUTION INTRAVENOUS at 16:00

## 2018-01-01 RX ADMIN — ACETAMINOPHEN 1000 MG: 10 INJECTION, SOLUTION INTRAVENOUS at 18:01

## 2018-01-01 RX ADMIN — HEPARIN SODIUM 5000 UNITS: 5000 INJECTION, SOLUTION INTRAVENOUS; SUBCUTANEOUS at 19:23

## 2018-01-01 RX ADMIN — HYDRALAZINE HYDROCHLORIDE 10 MG: 20 INJECTION INTRAMUSCULAR; INTRAVENOUS at 04:10

## 2018-01-01 RX ADMIN — HEPARIN SODIUM 5000 UNITS: 5000 INJECTION, SOLUTION INTRAVENOUS; SUBCUTANEOUS at 08:40

## 2018-01-01 RX ADMIN — TICAGRELOR 90 MG: 90 TABLET ORAL at 08:09

## 2018-01-01 RX ADMIN — TICAGRELOR 90 MG: 90 TABLET ORAL at 19:55

## 2018-01-01 RX ADMIN — HEPARIN SODIUM 5000 UNITS: 5000 INJECTION, SOLUTION INTRAVENOUS; SUBCUTANEOUS at 17:29

## 2018-01-01 RX ADMIN — CARVEDILOL 25 MG: 25 TABLET, FILM COATED ORAL at 20:32

## 2018-01-01 RX ADMIN — HYDROMORPHONE HYDROCHLORIDE 0.5 MG: 1 INJECTION, SOLUTION INTRAMUSCULAR; INTRAVENOUS; SUBCUTANEOUS at 07:46

## 2018-01-01 RX ADMIN — HYDRALAZINE HYDROCHLORIDE 10 MG: 20 INJECTION INTRAMUSCULAR; INTRAVENOUS at 09:40

## 2018-01-01 RX ADMIN — WATER 500 MG: 1 INJECTION INTRAMUSCULAR; INTRAVENOUS; SUBCUTANEOUS at 08:30

## 2018-01-01 RX ADMIN — ATORVASTATIN CALCIUM 40 MG: 40 TABLET, FILM COATED ORAL at 08:34

## 2018-01-01 RX ADMIN — CARVEDILOL 25 MG: 25 TABLET, FILM COATED ORAL at 19:59

## 2018-01-01 RX ADMIN — ACETAMINOPHEN 975 MG: 325 TABLET, FILM COATED ORAL at 08:32

## 2018-01-01 RX ADMIN — HEPARIN SODIUM 5000 UNITS: 5000 INJECTION, SOLUTION INTRAVENOUS; SUBCUTANEOUS at 23:52

## 2018-01-01 RX ADMIN — ACETAMINOPHEN 975 MG: 325 TABLET, FILM COATED ORAL at 20:31

## 2018-01-01 RX ADMIN — CARVEDILOL 25 MG: 25 TABLET, FILM COATED ORAL at 08:11

## 2018-01-01 RX ADMIN — ATORVASTATIN CALCIUM 40 MG: 40 TABLET, FILM COATED ORAL at 08:03

## 2018-01-01 RX ADMIN — HYDRALAZINE HYDROCHLORIDE 10 MG: 20 INJECTION INTRAMUSCULAR; INTRAVENOUS at 04:15

## 2018-01-01 RX ADMIN — METOPROLOL TARTRATE 5 MG: 5 INJECTION INTRAVENOUS at 15:42

## 2018-01-01 RX ADMIN — HEPARIN SODIUM 5000 UNITS: 5000 INJECTION, SOLUTION INTRAVENOUS; SUBCUTANEOUS at 17:13

## 2018-01-01 RX ADMIN — ACETAMINOPHEN 1000 MG: 10 INJECTION, SOLUTION INTRAVENOUS at 03:34

## 2018-01-01 RX ADMIN — TAMSULOSIN HYDROCHLORIDE 0.4 MG: 0.4 CAPSULE ORAL at 08:09

## 2018-01-01 RX ADMIN — TICAGRELOR 90 MG: 90 TABLET ORAL at 07:32

## 2018-01-01 RX ADMIN — TICAGRELOR 90 MG: 90 TABLET ORAL at 21:55

## 2018-01-01 RX ADMIN — ATORVASTATIN CALCIUM 40 MG: 40 TABLET, FILM COATED ORAL at 08:50

## 2018-01-01 RX ADMIN — QUETIAPINE 25 MG: 25 TABLET ORAL at 20:32

## 2018-01-01 RX ADMIN — ATORVASTATIN CALCIUM 40 MG: 40 TABLET, FILM COATED ORAL at 08:41

## 2018-01-01 RX ADMIN — TAMSULOSIN HYDROCHLORIDE 0.4 MG: 0.4 CAPSULE ORAL at 08:13

## 2018-01-01 RX ADMIN — IPRATROPIUM BROMIDE AND ALBUTEROL SULFATE 3 ML: .5; 3 SOLUTION RESPIRATORY (INHALATION) at 01:30

## 2018-01-01 RX ADMIN — TAMSULOSIN HYDROCHLORIDE 0.4 MG: 0.4 CAPSULE ORAL at 11:50

## 2018-01-01 RX ADMIN — PANTOPRAZOLE SODIUM 40 MG: 40 TABLET, DELAYED RELEASE ORAL at 08:41

## 2018-01-01 RX ADMIN — ATORVASTATIN CALCIUM 40 MG: 40 TABLET, FILM COATED ORAL at 08:11

## 2018-01-01 RX ADMIN — METOPROLOL TARTRATE 5 MG: 5 INJECTION INTRAVENOUS at 08:04

## 2018-01-01 RX ADMIN — FUROSEMIDE 10 MG: 10 INJECTION, SOLUTION INTRAVENOUS at 08:11

## 2018-01-01 RX ADMIN — CARVEDILOL 25 MG: 25 TABLET, FILM COATED ORAL at 08:34

## 2018-01-01 RX ADMIN — ASPIRIN 81 MG: 81 TABLET, COATED ORAL at 08:04

## 2018-01-01 RX ADMIN — PANTOPRAZOLE SODIUM 40 MG: 40 INJECTION, POWDER, FOR SOLUTION INTRAVENOUS at 18:01

## 2018-01-01 RX ADMIN — ATORVASTATIN CALCIUM 40 MG: 40 TABLET, FILM COATED ORAL at 08:04

## 2018-01-01 RX ADMIN — HEPARIN SODIUM 5000 UNITS: 5000 INJECTION, SOLUTION INTRAVENOUS; SUBCUTANEOUS at 08:01

## 2018-01-01 RX ADMIN — TICAGRELOR 90 MG: 90 TABLET ORAL at 19:47

## 2018-01-01 RX ADMIN — CARVEDILOL 25 MG: 25 TABLET, FILM COATED ORAL at 08:49

## 2018-01-01 RX ADMIN — LABETALOL HYDROCHLORIDE 20 MG: 5 INJECTION INTRAVENOUS at 09:04

## 2018-01-01 RX ADMIN — HEPARIN SODIUM 5000 UNITS: 5000 INJECTION, SOLUTION INTRAVENOUS; SUBCUTANEOUS at 19:47

## 2018-01-01 RX ADMIN — LABETALOL HYDROCHLORIDE 15 MG: 5 INJECTION INTRAVENOUS at 12:57

## 2018-01-01 RX ADMIN — ACETAMINOPHEN 1000 MG: 10 INJECTION, SOLUTION INTRAVENOUS at 11:45

## 2018-01-01 RX ADMIN — ASPIRIN 81 MG: 81 TABLET, COATED ORAL at 08:49

## 2018-01-01 RX ADMIN — POTASSIUM CHLORIDE 20 MEQ: 750 TABLET, EXTENDED RELEASE ORAL at 20:31

## 2018-01-01 RX ADMIN — HYDROMORPHONE HYDROCHLORIDE 0.5 MG: 1 INJECTION, SOLUTION INTRAMUSCULAR; INTRAVENOUS; SUBCUTANEOUS at 08:52

## 2018-01-01 RX ADMIN — HEPARIN SODIUM 5000 UNITS: 5000 INJECTION, SOLUTION INTRAVENOUS; SUBCUTANEOUS at 00:18

## 2018-01-01 RX ADMIN — TRAZODONE HYDROCHLORIDE 100 MG: 50 TABLET ORAL at 23:52

## 2018-01-01 RX ADMIN — CARVEDILOL 25 MG: 25 TABLET, FILM COATED ORAL at 08:13

## 2018-01-01 RX ADMIN — HEPARIN SODIUM 5000 UNITS: 5000 INJECTION, SOLUTION INTRAVENOUS; SUBCUTANEOUS at 17:36

## 2018-01-01 RX ADMIN — QUETIAPINE FUMARATE 50 MG: 50 TABLET, FILM COATED ORAL at 21:26

## 2018-01-01 RX ADMIN — ASPIRIN 81 MG: 81 TABLET, COATED ORAL at 08:34

## 2018-01-01 RX ADMIN — QUETIAPINE FUMARATE 50 MG: 50 TABLET, FILM COATED ORAL at 19:55

## 2018-01-01 RX ADMIN — LEVOFLOXACIN 750 MG: 5 INJECTION, SOLUTION INTRAVENOUS at 14:57

## 2018-01-01 RX ADMIN — PANTOPRAZOLE SODIUM 40 MG: 40 INJECTION, POWDER, FOR SOLUTION INTRAVENOUS at 17:57

## 2018-01-01 RX ADMIN — ACETAMINOPHEN 975 MG: 325 TABLET, FILM COATED ORAL at 20:35

## 2018-01-01 RX ADMIN — SODIUM CHLORIDE, POTASSIUM CHLORIDE, SODIUM LACTATE AND CALCIUM CHLORIDE: 600; 310; 30; 20 INJECTION, SOLUTION INTRAVENOUS at 07:35

## 2018-01-01 RX ADMIN — SODIUM CHLORIDE, POTASSIUM CHLORIDE, SODIUM LACTATE AND CALCIUM CHLORIDE: 600; 310; 30; 20 INJECTION, SOLUTION INTRAVENOUS at 06:05

## 2018-01-01 RX ADMIN — PANTOPRAZOLE SODIUM 40 MG: 40 TABLET, DELAYED RELEASE ORAL at 08:50

## 2018-01-01 RX ADMIN — PANTOPRAZOLE SODIUM 40 MG: 40 TABLET, DELAYED RELEASE ORAL at 08:09

## 2018-01-01 RX ADMIN — HEPARIN SODIUM 5000 UNITS: 5000 INJECTION, SOLUTION INTRAVENOUS; SUBCUTANEOUS at 19:59

## 2018-01-01 RX ADMIN — TAMSULOSIN HYDROCHLORIDE 0.4 MG: 0.4 CAPSULE ORAL at 08:41

## 2018-01-01 RX ADMIN — ONDANSETRON 4 MG: 2 INJECTION INTRAMUSCULAR; INTRAVENOUS at 07:30

## 2018-01-01 RX ADMIN — FUROSEMIDE 20 MG: 10 INJECTION, SOLUTION INTRAVENOUS at 08:13

## 2018-01-01 RX ADMIN — METOPROLOL TARTRATE 5 MG: 5 INJECTION INTRAVENOUS at 01:31

## 2018-01-01 RX ADMIN — TRAZODONE HYDROCHLORIDE 50 MG: 50 TABLET ORAL at 19:59

## 2018-01-01 RX ADMIN — HYDROMORPHONE HYDROCHLORIDE 0.5 MG: 1 INJECTION, SOLUTION INTRAMUSCULAR; INTRAVENOUS; SUBCUTANEOUS at 08:10

## 2018-01-01 RX ADMIN — HEPARIN SODIUM 5000 UNITS: 5000 INJECTION, SOLUTION INTRAVENOUS; SUBCUTANEOUS at 08:11

## 2018-01-01 RX ADMIN — SULFUR HEXAFLUORIDE 5 ML: KIT at 09:18

## 2018-01-01 RX ADMIN — SODIUM CHLORIDE, POTASSIUM CHLORIDE, SODIUM LACTATE AND CALCIUM CHLORIDE: 600; 310; 30; 20 INJECTION, SOLUTION INTRAVENOUS at 22:36

## 2018-01-01 RX ADMIN — FLUDEOXYGLUCOSE F-18 11.41 MCI.: 500 INJECTION, SOLUTION INTRAVENOUS at 14:14

## 2018-01-01 RX ADMIN — ACETAMINOPHEN 1000 MG: 10 INJECTION, SOLUTION INTRAVENOUS at 23:40

## 2018-01-01 RX ADMIN — CARVEDILOL 25 MG: 25 TABLET, FILM COATED ORAL at 08:32

## 2018-01-01 RX ADMIN — ACETAMINOPHEN 975 MG: 325 TABLET, FILM COATED ORAL at 19:55

## 2018-01-01 RX ADMIN — CARVEDILOL 25 MG: 25 TABLET, FILM COATED ORAL at 19:23

## 2018-01-01 RX ADMIN — SODIUM CHLORIDE, POTASSIUM CHLORIDE, SODIUM LACTATE AND CALCIUM CHLORIDE: 600; 310; 30; 20 INJECTION, SOLUTION INTRAVENOUS at 19:17

## 2018-01-01 RX ADMIN — TICAGRELOR 90 MG: 90 TABLET ORAL at 08:34

## 2018-01-01 RX ADMIN — PROPOFOL 70 MG: 10 INJECTION, EMULSION INTRAVENOUS at 08:00

## 2018-01-01 RX ADMIN — ASPIRIN 81 MG: 81 TABLET, COATED ORAL at 08:03

## 2018-01-01 RX ADMIN — OLANZAPINE 5 MG: 2.5 TABLET, FILM COATED ORAL at 08:04

## 2018-01-01 RX ADMIN — FUROSEMIDE 40 MG: 10 INJECTION, SOLUTION INTRAVENOUS at 16:29

## 2018-01-01 RX ADMIN — METOPROLOL TARTRATE 5 MG: 5 INJECTION INTRAVENOUS at 08:03

## 2018-01-01 RX ADMIN — CARVEDILOL 25 MG: 25 TABLET, FILM COATED ORAL at 19:35

## 2018-01-01 RX ADMIN — TICAGRELOR 90 MG: 90 TABLET ORAL at 19:30

## 2018-01-01 RX ADMIN — HYDRALAZINE HYDROCHLORIDE 20 MG: 20 INJECTION INTRAMUSCULAR; INTRAVENOUS at 01:04

## 2018-01-01 RX ADMIN — ACETAMINOPHEN 975 MG: 325 TABLET, FILM COATED ORAL at 17:36

## 2018-01-01 RX ADMIN — LEVOFLOXACIN 500 MG: 500 TABLET, FILM COATED ORAL at 08:41

## 2018-01-01 RX ADMIN — HYDRALAZINE HYDROCHLORIDE 2.5 MG: 20 INJECTION INTRAMUSCULAR; INTRAVENOUS at 13:42

## 2018-01-01 RX ADMIN — PANTOPRAZOLE SODIUM 40 MG: 40 INJECTION, POWDER, FOR SOLUTION INTRAVENOUS at 17:16

## 2018-01-01 RX ADMIN — TICAGRELOR 90 MG: 90 TABLET ORAL at 20:32

## 2018-01-01 RX ADMIN — HEPARIN SODIUM 5000 UNITS: 5000 INJECTION, SOLUTION INTRAVENOUS; SUBCUTANEOUS at 00:15

## 2018-01-01 RX ADMIN — ATORVASTATIN CALCIUM 40 MG: 40 TABLET, FILM COATED ORAL at 08:13

## 2018-01-01 RX ADMIN — HEPARIN SODIUM 5000 UNITS: 5000 INJECTION, SOLUTION INTRAVENOUS; SUBCUTANEOUS at 08:04

## 2018-01-01 RX ADMIN — ASPIRIN 81 MG: 81 TABLET, COATED ORAL at 09:39

## 2018-01-01 RX ADMIN — TICAGRELOR 90 MG: 90 TABLET ORAL at 19:23

## 2018-01-01 RX ADMIN — TICAGRELOR 90 MG: 90 TABLET ORAL at 20:35

## 2018-01-01 RX ADMIN — ROCURONIUM BROMIDE 50 MG: 10 INJECTION INTRAVENOUS at 08:00

## 2018-01-01 RX ADMIN — CARVEDILOL 25 MG: 25 TABLET, FILM COATED ORAL at 07:32

## 2018-01-01 RX ADMIN — ACETAMINOPHEN 975 MG: 325 TABLET, FILM COATED ORAL at 16:03

## 2018-01-01 RX ADMIN — ATORVASTATIN CALCIUM 40 MG: 40 TABLET, FILM COATED ORAL at 09:39

## 2018-01-01 RX ADMIN — SUGAMMADEX 120 MG: 100 INJECTION, SOLUTION INTRAVENOUS at 12:15

## 2018-01-01 RX ADMIN — ATORVASTATIN CALCIUM 40 MG: 40 TABLET, FILM COATED ORAL at 08:01

## 2018-01-01 RX ADMIN — FUROSEMIDE 10 MG: 10 INJECTION, SOLUTION INTRAVENOUS at 08:43

## 2018-01-01 RX ADMIN — CARVEDILOL 25 MG: 25 TABLET, FILM COATED ORAL at 08:01

## 2018-01-01 RX ADMIN — PANTOPRAZOLE SODIUM 40 MG: 40 TABLET, DELAYED RELEASE ORAL at 08:34

## 2018-01-01 RX ADMIN — HEPARIN SODIUM 5000 UNITS: 5000 INJECTION, SOLUTION INTRAVENOUS; SUBCUTANEOUS at 18:01

## 2018-01-01 RX ADMIN — HEPARIN SODIUM 5000 UNITS: 5000 INJECTION, SOLUTION INTRAVENOUS; SUBCUTANEOUS at 08:09

## 2018-01-01 RX ADMIN — FENTANYL CITRATE 100 MCG: 50 INJECTION, SOLUTION INTRAMUSCULAR; INTRAVENOUS at 08:59

## 2018-01-01 RX ADMIN — PANTOPRAZOLE SODIUM 40 MG: 40 INJECTION, POWDER, FOR SOLUTION INTRAVENOUS at 17:36

## 2018-01-01 RX ADMIN — ATORVASTATIN CALCIUM 40 MG: 40 TABLET, FILM COATED ORAL at 18:10

## 2018-01-01 RX ADMIN — ACETAMINOPHEN 975 MG: 325 TABLET, FILM COATED ORAL at 09:39

## 2018-01-01 RX ADMIN — TICAGRELOR 90 MG: 90 TABLET ORAL at 08:50

## 2018-01-01 RX ADMIN — ACETAMINOPHEN 975 MG: 325 TABLET, FILM COATED ORAL at 08:11

## 2018-01-01 RX ADMIN — ATORVASTATIN CALCIUM 40 MG: 40 TABLET, FILM COATED ORAL at 08:32

## 2018-01-01 RX ADMIN — FUROSEMIDE 40 MG: 10 INJECTION, SOLUTION INTRAVENOUS at 14:56

## 2018-01-01 RX ADMIN — IPRATROPIUM BROMIDE AND ALBUTEROL SULFATE 3 ML: .5; 3 SOLUTION RESPIRATORY (INHALATION) at 07:52

## 2018-01-01 RX ADMIN — HEPARIN SODIUM 5000 UNITS: 5000 INJECTION, SOLUTION INTRAVENOUS; SUBCUTANEOUS at 16:03

## 2018-01-01 RX ADMIN — HYDRALAZINE HYDROCHLORIDE 10 MG: 20 INJECTION INTRAMUSCULAR; INTRAVENOUS at 04:11

## 2018-01-01 RX ADMIN — CARVEDILOL 25 MG: 25 TABLET, FILM COATED ORAL at 08:09

## 2018-01-01 RX ADMIN — MIDAZOLAM 1 MG: 1 INJECTION INTRAMUSCULAR; INTRAVENOUS at 06:47

## 2018-01-01 RX ADMIN — ACETAMINOPHEN 975 MG: 325 TABLET, FILM COATED ORAL at 10:30

## 2018-01-01 RX ADMIN — HYDRALAZINE HYDROCHLORIDE 10 MG: 20 INJECTION INTRAMUSCULAR; INTRAVENOUS at 21:43

## 2018-01-01 RX ADMIN — ACETAMINOPHEN 975 MG: 325 TABLET, FILM COATED ORAL at 21:25

## 2018-01-01 RX ADMIN — ATORVASTATIN CALCIUM 40 MG: 40 TABLET, FILM COATED ORAL at 08:09

## 2018-01-01 RX ADMIN — TICAGRELOR 90 MG: 90 TABLET ORAL at 08:11

## 2018-01-01 RX ADMIN — BUPIVACAINE HYDROCHLORIDE 20 ML: 2.5 INJECTION, SOLUTION EPIDURAL; INFILTRATION; INTRACAUDAL at 06:42

## 2018-01-01 RX ADMIN — CARVEDILOL 25 MG: 25 TABLET, FILM COATED ORAL at 20:35

## 2018-01-01 RX ADMIN — TRAZODONE HYDROCHLORIDE 50 MG: 50 TABLET ORAL at 19:47

## 2018-01-01 RX ADMIN — IPRATROPIUM BROMIDE AND ALBUTEROL SULFATE 3 ML: .5; 3 SOLUTION RESPIRATORY (INHALATION) at 01:01

## 2018-01-01 RX ADMIN — CARVEDILOL 25 MG: 25 TABLET, FILM COATED ORAL at 21:55

## 2018-01-01 RX ADMIN — HYDRALAZINE HYDROCHLORIDE 10 MG: 20 INJECTION INTRAMUSCULAR; INTRAVENOUS at 19:55

## 2018-01-01 RX ADMIN — CARVEDILOL 25 MG: 25 TABLET, FILM COATED ORAL at 20:04

## 2018-01-01 RX ADMIN — TICAGRELOR 90 MG: 90 TABLET ORAL at 21:26

## 2018-01-01 RX ADMIN — CARVEDILOL 25 MG: 25 TABLET, FILM COATED ORAL at 08:41

## 2018-01-01 RX ADMIN — PANTOPRAZOLE SODIUM 40 MG: 40 INJECTION, POWDER, FOR SOLUTION INTRAVENOUS at 17:48

## 2018-01-01 RX ADMIN — HEPARIN SODIUM 5000 UNITS: 5000 INJECTION, SOLUTION INTRAVENOUS; SUBCUTANEOUS at 08:33

## 2018-01-01 RX ADMIN — HYDRALAZINE HYDROCHLORIDE 10 MG: 20 INJECTION INTRAMUSCULAR; INTRAVENOUS at 17:47

## 2018-01-01 RX ADMIN — ACETAMINOPHEN 975 MG: 325 TABLET, FILM COATED ORAL at 15:10

## 2018-01-01 RX ADMIN — OLANZAPINE 5 MG: 2.5 TABLET, FILM COATED ORAL at 05:02

## 2018-01-01 RX ADMIN — FUROSEMIDE 20 MG: 10 INJECTION, SOLUTION INTRAVENOUS at 12:37

## 2018-01-01 RX ADMIN — HYDRALAZINE HYDROCHLORIDE 5 MG: 20 INJECTION INTRAMUSCULAR; INTRAVENOUS at 14:42

## 2018-01-01 RX ADMIN — ALBUMIN HUMAN: 0.05 INJECTION, SOLUTION INTRAVENOUS at 10:36

## 2018-01-01 RX ADMIN — HYDRALAZINE HYDROCHLORIDE 10 MG: 20 INJECTION INTRAMUSCULAR; INTRAVENOUS at 11:50

## 2018-01-01 RX ADMIN — ENOXAPARIN SODIUM 40 MG: 40 INJECTION SUBCUTANEOUS at 06:51

## 2018-01-01 RX ADMIN — HYDRALAZINE HYDROCHLORIDE 10 MG: 20 INJECTION INTRAMUSCULAR; INTRAVENOUS at 08:11

## 2018-01-01 RX ADMIN — FUROSEMIDE 20 MG: 10 INJECTION, SOLUTION INTRAVENOUS at 06:46

## 2018-01-01 RX ADMIN — HEPARIN SODIUM 5000 UNITS: 5000 INJECTION, SOLUTION INTRAVENOUS; SUBCUTANEOUS at 09:39

## 2018-01-01 RX ADMIN — FUROSEMIDE 40 MG: 10 INJECTION, SOLUTION INTRAVENOUS at 15:28

## 2018-01-01 RX ADMIN — OXYCODONE HYDROCHLORIDE 5 MG: 5 TABLET ORAL at 06:13

## 2018-01-01 RX ADMIN — FENTANYL CITRATE 50 MCG: 50 INJECTION, SOLUTION INTRAMUSCULAR; INTRAVENOUS at 06:47

## 2018-01-01 RX ADMIN — AMLODIPINE BESYLATE 10 MG: 10 TABLET ORAL at 08:04

## 2018-01-01 RX ADMIN — HEPARIN SODIUM 5000 UNITS: 5000 INJECTION, SOLUTION INTRAVENOUS; SUBCUTANEOUS at 15:29

## 2018-01-01 RX ADMIN — HEPARIN SODIUM 5000 UNITS: 5000 INJECTION, SOLUTION INTRAVENOUS; SUBCUTANEOUS at 08:50

## 2018-01-01 RX ADMIN — FUROSEMIDE 20 MG: 10 INJECTION, SOLUTION INTRAVENOUS at 09:59

## 2018-01-01 RX ADMIN — CARVEDILOL 25 MG: 25 TABLET, FILM COATED ORAL at 19:55

## 2018-01-01 RX ADMIN — IPRATROPIUM BROMIDE AND ALBUTEROL SULFATE 3 ML: .5; 3 SOLUTION RESPIRATORY (INHALATION) at 06:49

## 2018-01-01 RX ADMIN — CARVEDILOL 25 MG: 25 TABLET, FILM COATED ORAL at 21:25

## 2018-01-01 RX ADMIN — TICAGRELOR 90 MG: 90 TABLET ORAL at 20:04

## 2018-01-01 RX ADMIN — HEPARIN SODIUM 5000 UNITS: 5000 INJECTION, SOLUTION INTRAVENOUS; SUBCUTANEOUS at 16:49

## 2018-01-01 RX ADMIN — ACETAMINOPHEN 975 MG: 325 TABLET, FILM COATED ORAL at 08:01

## 2018-01-01 ASSESSMENT — PAIN SCALES - GENERAL
PAINLEVEL: NO PAIN (0)

## 2018-01-01 ASSESSMENT — ACTIVITIES OF DAILY LIVING (ADL)
ADLS_ACUITY_SCORE: 10
ADLS_ACUITY_SCORE: 10
ADLS_ACUITY_SCORE: 11
ADLS_ACUITY_SCORE: 8.5
ADLS_ACUITY_SCORE: 8.5
ADLS_ACUITY_SCORE: 11
ADLS_ACUITY_SCORE: 10
ADLS_ACUITY_SCORE: 11
ADLS_ACUITY_SCORE: 11
ADLS_ACUITY_SCORE: 10
ADLS_ACUITY_SCORE: 10
ADLS_ACUITY_SCORE: 11
I_NEED_ASSISTANCE_FOR_THE_FOLLOWING_DAILY_ACTIVITIES:: NO ASSISTANCE IS NEEDED
ADLS_ACUITY_SCORE: 8.5
ADLS_ACUITY_SCORE: 11
ADLS_ACUITY_SCORE: 10
ADLS_ACUITY_SCORE: 8.5
ADLS_ACUITY_SCORE: 11
ADLS_ACUITY_SCORE: 10
ADLS_ACUITY_SCORE: 8.5
ADLS_ACUITY_SCORE: 11
ADLS_ACUITY_SCORE: 10
ADLS_ACUITY_SCORE: 11
ADLS_ACUITY_SCORE: 11
CURRENT_FUNCTION: NO ASSISTANCE NEEDED
ADLS_ACUITY_SCORE: 11
ADLS_ACUITY_SCORE: 8.5
ADLS_ACUITY_SCORE: 11
ADLS_ACUITY_SCORE: 10
ADLS_ACUITY_SCORE: 10
ADLS_ACUITY_SCORE: 11
ADLS_ACUITY_SCORE: 10
ADLS_ACUITY_SCORE: 11
ADLS_ACUITY_SCORE: 10
ADLS_ACUITY_SCORE: 8.5
ADLS_ACUITY_SCORE: 11
ADLS_ACUITY_SCORE: 8.5
ADLS_ACUITY_SCORE: 11
ADLS_ACUITY_SCORE: 8.5
ADLS_ACUITY_SCORE: 11
ADLS_ACUITY_SCORE: 11
ADLS_ACUITY_SCORE: 12
ADLS_ACUITY_SCORE: 8.5
ADLS_ACUITY_SCORE: 8.5
ADLS_ACUITY_SCORE: 12
ADLS_ACUITY_SCORE: 8.5
ADLS_ACUITY_SCORE: 11
ADLS_ACUITY_SCORE: 11
ADLS_ACUITY_SCORE: 8
ADLS_ACUITY_SCORE: 11
ADLS_ACUITY_SCORE: 11
ADLS_ACUITY_SCORE: 10
ADLS_ACUITY_SCORE: 11
ADLS_ACUITY_SCORE: 10
ADLS_ACUITY_SCORE: 10
ADLS_ACUITY_SCORE: 11
ADLS_ACUITY_SCORE: 11
ADLS_ACUITY_SCORE: 10
ADLS_ACUITY_SCORE: 11
ADLS_ACUITY_SCORE: 11
ADLS_ACUITY_SCORE: 10
ADLS_ACUITY_SCORE: 10
ADLS_ACUITY_SCORE: 11
ADLS_ACUITY_SCORE: 11

## 2018-01-01 ASSESSMENT — PAIN DESCRIPTION - DESCRIPTORS: DESCRIPTORS: ACHING

## 2018-01-01 ASSESSMENT — LIFESTYLE VARIABLES: TOBACCO_USE: 1

## 2018-01-08 NOTE — LETTER
1/8/2018      RE: Jose Lira  [address]   [place] [address]       Dear Colleague,    Thank you for the opportunity to participate in the care of your patient, Jose Lira, at the [facility] at [facility]. Please see a copy of my visit note below.    CARDIOLOGY NEW OFFICE VISIT    HPI: Jose Lira is a 72 year old male being seen today for evaluation of coronary artery disease.   He has a history of 2 vessel CAD (RCA, LAD, and non hemodynamically significant LCx lesion), PAD (s/p R ICA stent), carotid artery disease s/p LICA stent, HLD, and prior CVA, along with CKD stage IV. 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 He was initially planned for CABG, but declined sternotomy and wanted to pursue PCI instead. He underwent PCI of the LAD on 11/16, and returned for PCI of the RCA on 12/19. He has been on ticagrelor and aspirin DAPT since then and had one hospitalization in November after his first PCI with rectal bleeding and a 1 gm drop in his hemoglobin, at that time the bleeding was self limiting and he did not require transfusion.     We are planning for him to stop his DAPT 6 weeks after his last stent (February 2nd). He has poor functional capacity that is limited by both his anemia and his severe PAD, he reports pain in his bilateral calves after 1/2 block and does not do much exercise. At rest and with minimal exercise he feels no chest pain, shortness of breath, syncope, or lightheadedness. He does not have any PND or orthopnea. He has no edema.     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 180 tablet 3     ticagrelor (BRILINTA) 90 MG tablet Take 1 tablet (90 mg) by mouth 2 times daily 180 tablet 3     tiZANidine (ZANAFLEX) 2 MG tablet Take 1-2 tablets (2-4 mg) by mouth nightly as needed 60 tablet PRN     aspirin 81 MG EC tablet Take 1 tablet (81 mg) by mouth daily (Patient taking differently: Take 81 mg by mouth every morning ) 30 tablet      atorvastatin (LIPITOR) 40 MG tablet Take 1 tablet (40 mg) by mouth daily (Patient taking differently: Take 40 mg by mouth every morning ) 90 tablet PRN     amLODIPine (NORVASC) 10 MG tablet Take 1 tablet (10 mg) by mouth daily (Patient taking differently: Take 10 mg by mouth every morning ) 90 tablet PRN     Saw Palmetto, Serenoa repens, (SAW PALMETTO PO) Take 1 tablet by mouth every morning        niacin 500 MG tablet Take 1 tablet (500 mg) by mouth At Bedtime 30 tablet PRN     Multiple Vitamins-Minerals (MULTIVITAMIN & MINERAL PO) Take 1 tablet by mouth every morning        diphenhydrAMINE (BENADRYL) 25 MG capsule Take 50 mg by mouth nightly as needed sleep       lactobacillus rhamnosus, GG, (CULTURELLE) 10 B CELL capsule Take 1 capsule by mouth every morning        [DISCONTINUED] olmesartan-hydrochlorothiazide (BENICAR HCT) 40-25 MG per tablet Take 1 tablet by mouth daily 90 tablet 3       PAST SURGICAL HISTORY:  Past Surgical History:   Procedure  Laterality Date     COLONOSCOPY N/A 5/19/2017    Procedure: COMBINED COLONOSCOPY, SINGLE OR MULTIPLE BIOPSY/POLYPECTOMY BY BIOPSY;  Rectal bleeding;  Surgeon: Maximus Dinero MD;  Location:  GI     SURGICAL HISTORY OF -   10/02    angioplasty and stenting of left and internal carotid artery at the bifurcation     SURGICAL HISTORY OF - 11/11    right common iliac artery stent      VASCULAR SURGERY  2001    stent placed in carotid        ALLERGIES  No known drug allergies    FAMILY HX:  Family History   Problem Relation Age of Onset     C.A.D. Father      Hypertension Father      Prostate Cancer Father      C.A.D. Brother      MI     Hypertension Brother      Arthritis Sister      DIABETES No family hx of      CEREBROVASCULAR DISEASE No family hx of      Cancer - colorectal No family hx of        SOCIAL HX:  Social History     Social History     Marital status: Single     Spouse name: N/A     Number of children: 0     Years of education: N/A     Occupational History     post       Social History Main Topics     Smoking status: Former Smoker     Packs/day: 3.00     Years: 30.00     Quit date: 8/5/1994     Smokeless tobacco: Former User     Quit date: 8/1/1994      Comment: quit 15 years ago after his stroke     Alcohol use Yes      Comment: varies. 1 per day if that      Drug use: No     Sexual activity: No     Other Topics Concern     Parent/Sibling W/ Cabg, Mi Or Angioplasty Before 65f 55m? Yes     brother and father     Social History Narrative    Patient lives alone in a highrise.  Was never .  Had no children.  Has a two brothers that have passed away.  He has two sister, one with Alzheimer and one alive and well.  Sister will be here for surgery.         ROS:  Constitutional: No fever, chills, or sweats. No weight gain/loss.   ENT: No visual disturbance, ear ache, epistaxis, sore throat.   Allergies/Immunologic: Negative.   Respiratory: No cough, hemoptysis.   Cardiovascular: As per HPI.   GI: No  "nausea, vomiting, hematemesis, melena, or hematochezia.   : No urinary frequency, dysuria, or hematuria.   Integument: Negative.   Psychiatric: Negative.   Neuro: Negative.   Endocrinology: Negative.   Musculoskeletal: No myalgia.    VITAL SIGNS:  /85 (BP Location: Left arm, Patient Position: Chair, Cuff Size: Adult Regular)  Pulse 76  Ht 1.651 m (5' 5\")  Wt 88.8 kg (195 lb 11.2 oz)  SpO2 98%  BMI 32.57 kg/m2  Body mass index is 32.57 kg/(m^2).  Wt Readings from Last 2 Encounters:   01/08/18 88.8 kg (195 lb 11.2 oz)   12/19/17 90.7 kg (200 lb)       PHYSICAL EXAM     gen nad, pale   heent perrl   neck no jvd   cv rrr no mrg   resp ctab   gi +bs, soft, nt/nd   ext non palpable pulses popliteal or PT/DP bilaterally, bilateral femoral pulses present 1/2, no ulcers, trace edema, warm   neuro grossly normal    LABS    Lab Results   Component Value Date    WBC 8.6 12/19/2017     Lab Results   Component Value Date    RBC 3.08 12/19/2017     Lab Results   Component Value Date    HGB 8.9 12/19/2017     Lab Results   Component Value Date    HCT 27.7 12/19/2017     No components found for: MCT  Lab Results   Component Value Date    MCV 90 12/19/2017     Lab Results   Component Value Date    MCH 28.9 12/19/2017     Lab Results   Component Value Date    MCHC 32.1 12/19/2017     Lab Results   Component Value Date    RDW 13.1 12/19/2017     Lab Results   Component Value Date     12/19/2017      Recent Labs   Lab Test  12/19/17   1219  12/04/17   1227   NA  141  140   POTASSIUM  4.3  4.7   CHLORIDE  110*  110*   CO2  20  17*   ANIONGAP  11  13   GLC  103*  139*   BUN  23  27   CR  2.43*  2.75*   ADRIAN  9.2  9.5     Recent Labs   Lab Test  06/26/17   1145  07/05/16   1135  09/15/15   1208  08/25/14   1206   CHOL  165  140  127  130   HDL  46  47  38*  52   LDL  83  56  48  43   TRIG  182*  186*  204*  176*   CHOLHDLRATIO   --    --   3.3  2.5   NHDL  119  93   --    --         EKG:  Sinus with early repolarization " abnormality      STRESS TEST:  8/4/17  Interpretation Summary  Abnormal stress echo.  Mid septal hypokinesis at rest, with mid septal and apical lateral ischemia at  peak stress. Suspect multi-vessel CAD.  Target heart rate was achieved. Heart rate and blood pressure response to  dobutamine were normal. With stress, the left ventricular ejection fraction  increased from 55-60% to greater than 65% and the left ventricular size  decreased appropriately.  No subjective symptoms  ST segment depression in inferolateral leads during recovery period, but not  at peak stress.  No significant valve disease on screening doppler evaluation. The aortic root  and visualized ascending aorta are normal.       CARDIAC CATH:    8/15/17  CORONARY ANGIOGRAM:   1. Both coronary arteries arise from their respective cusps.  2. Right dominant.  3. LM has diffuse disease up to 20% stenosis in the distal LM.   4. LAD supplies the entire apex (type 3) and gives rise to septal perforators, large high D1, a medium caliber D2 and trivial D3.  The proximal and mid-LAD are heavily calcified.  There is moderate(50-60%) diffuse disease noted in the pLAD with a focal 80% stenosis proximal to D2.  The mLAD has a long 99% stenosis just distal to D2.  The dLAD has moderate diffuse disease.  D1 has a focal eccentric 70% stenosis in the proximal portion followed by a discrete 60% stenosis in the mid-portion..   5. LCX is a large vessel giving rise to a trivial OM1 and large OM2.  The pLCx is heavily calcified.  There is diffuse 50% disease of the pLCx including the ostium with a focal 70% stenosis.  The mLCx and dLCx are small distal to the OM2 bifurcation.  OM2 has minimal irregularities.  6. RCA is a medium caliber vessel giving rise to the RPDA and RPLA branches. The RCA has sequential proximal to mid 50 to 80% stenoses with moderate calcification.  The dRCA mild luminal irregularities.  The ostial RPDA has 50% stenosis.  The RPLAs have mild luminal  irregularities.        FUNCTIONAL ASSESSMENT:  Adequate anticoagulation was obtained with IV UFH. An Aeris FFR wire was passed through the 6Fr diagnostic catheter into the RCA and into the RPDA.  Hyperemia was induced with IC Adenosine (80 mcg).  The wire was then withdrawn and repeated FFR measurements were assessed at the RPDA, dRCA, mRCA, and ostial RCA.  The results are below:  RPDA: 0.39  DRCA: 0.41  MRCA: 0.74  Ostial RCA: 1.0      Limited Angiogram of the Subclavian artery:  The ostium and proximal left sublcavian artery are heavily calcified without a significant stenosis.    11/16/17  SELECTIVE LEFT CORONARY ANGIOGRAM:   1. The LM has mild (<25%) proximal disease.  2. LAD is a type III vessel (supplies entire apex). Prior to PCI it is a medium caliber vessel with heavy proximal calcification, proximal 80% stenosis, mid focal 99% stenosis with LORRAINE 2 flow distally, and moderate 50-70% is the distal vessel  3. LCX has sequential ostial and proximal 60% stenoses.     PERCUTANEOUS CORONARY INTERVENTION of the mid- proximal LAD:  A 6 Fr  XB LF 3.5 GC was positioned at the ostium of the LMCA. IV UFH was administered to achieve adequate anticoagulation.     A Pilot50 wire was advanced across the LAD lesions and positioned in the dLAD. The lesions were pre-dilated with 1.2x08mm, 1.5x15mm, and 2.0x12mm balloons sequentially. A Synergy drug eluting stent 2.5x28mm was successfully deployed in the mid LAD. The proximal segment of the stent was post-dilated with 2.5x12 NC balloon. A second Synergy drug eluting stent 3.0x20 mm was placed overlapping the first from the mid to proximal LAD. This was post dilated with a 3.0 x12 NC balloon. A third Synergy drug eluting stent 3.5x16mm was placed overlapping into the proximal LAD. This was post dilated was a 3.5x12 NC balloon. The Pilot50 wire was then advanced into the D2 branch and struts were dilated with the 2.0x 12mm balloon. Final angiography showed no evidence of  perforation or dissection with residual stenosis of 0% and LORRAINE 3 flow. No complications.     FUNCTIONAL ASSESSMENT of LCx:  The Aeris pressure wire was advanced into the distal LCx. Hyperemia was induced with IC Adenosine (80 and 100 mcg).  The FFR at peak hyperemia was non-significant, 0.83 and 0.84, respectively.    12/19/17  CORONARY ANGIOGRAM:   LCA not engaged today due to contrast limitations  RCA (dominant) gives rise to PL branches and supplies PDA. The prox to mid RCA has is moderate to severely calcified, with two 80% stenoses. The distal vessel has mild diffuse disease.     PERCUTANEOUS CORONARY INTERVENTION:  Prox-Mid RCA  JR 4  IV UFH for anticoagulation     A runthrough wire was advanced across the mid RCA lesion and positioned in the distal RPDA.  A 3.0x12mm balloon was used to pre-dilate the lesion. We pre-dilated the entire lesion from the prox to mid segment of the vessel.  We had significant difficulty advancing the stent across the lesion. The vessel was again dilated with a 3.0x12mm balloon and a 6Fr guideliner was inserted for additional support. A 3.0x38mm Synergy drug eluting stent was successfully deployed across the lesion with inflation to 14 abdullahi.  We placed a second stent, 3.5x16mm Synergy NICOLE in the prox RCA overlapping with the first stent. The entire stented segment was post-dilated with a 3.5x12mm non-compliant balloon.  Final angiography showed no evidence of perforation or dissection with residual stenosis of 0% and LORRAINE 3 flow.  No complications.    ASSESSMENT AND PLAN:    72 year old gentleman with rectosigmoid malignancy found to have 2 vessel CAD LAD/RCA s/p PCI 11/16 and 12/19 respectively, LCx moderate lesion FFR 0.83, he is returning for 1 month follow up after his RCA PCI.    1. 2vCAD: s/p PCI to LAD and RCA, LCx FFR 0.83, stable, no angina, on DAPT (ticagrelor/aspirin)    2. HTN, not at a goal (<130/80), on two drug therapy    3. HLD, last LDL 83, could consider increasing  his atorvastatin to 80 mg after his surgery    4. PAD, s/p RCIA stent, [place] 3    5. Carotid stenosis s/p LICA, stable, asymptomatic    PLAN  -- increase Coreg to 25 mg BID  -- continue DAPT until 2/2/17, after which it can be paused for his surgery  -- continue with atorvastatin 40 mg and amlodipine 10 mg daily  -- follow-up with vascular medicine after surgery.  This will be discussed with him at that time.    RTC 6 months      ATTENDING ATTESTATION:   I personally examined and evaluated this patient on January 8, 2018.   I have reviewed today's vital signs, medications, labs, and imaging results. I have reviewed and edited, as necessary, the history, review of systems, physical examination, and assessment and plan. I discussed the patient with Dr. Stephens and agree with the assessment and plan of care as documented in the note above.    Thank you for allowing us to take part in the care of this very pleasant patient.  Please do not hesitate to call if any further questions or concerns arise.    Jose Willett MD, PhD  Interventional/Critical Care Cardiology  [phone]    January 8, 2018

## 2018-01-08 NOTE — PROGRESS NOTES
CARDIOLOGY NEW OFFICE VISIT    HPI: Jose Lira is a 72 year old male being seen today for evaluation of coronary artery disease.   He has a history of 2 vessel CAD (RCA, LAD, and non hemodynamically significant LCx lesion), PAD (s/p R ICA stent), carotid artery disease s/p LICA stent, HLD, and prior CVA, along with CKD stage IV. 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 He was initially planned for CABG, but declined sternotomy and wanted to pursue PCI instead. He underwent PCI of the LAD on 11/16, and returned for PCI of the RCA on 12/19. He has been on ticagrelor and aspirin DAPT since then and had one hospitalization in November after his first PCI with rectal bleeding and a 1 gm drop in his hemoglobin, at that time the bleeding was self limiting and he did not require transfusion.     We are planning for him to stop his DAPT 6 weeks after his last stent (February 2nd). He has poor functional capacity that is limited by both his anemia and his severe PAD, he reports pain in his bilateral calves after 1/2 block and does not do much exercise. At rest and with minimal exercise he feels no chest pain, shortness of breath, syncope, or lightheadedness. He does not have any PND or orthopnea. He has no edema.     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 180 tablet 3     ticagrelor (BRILINTA) 90 MG tablet Take 1 tablet (90 mg) by mouth 2 times daily 180 tablet 3     tiZANidine (ZANAFLEX) 2 MG tablet Take 1-2 tablets (2-4 mg) by mouth nightly as needed 60 tablet PRN     aspirin 81 MG EC tablet Take 1 tablet (81 mg) by mouth daily (Patient taking differently: Take 81 mg by mouth every morning ) 30 tablet      atorvastatin (LIPITOR) 40 MG tablet Take 1 tablet (40 mg) by mouth daily (Patient taking differently: Take 40 mg by mouth every morning ) 90 tablet PRN     amLODIPine (NORVASC) 10 MG tablet Take 1 tablet (10 mg) by mouth daily (Patient taking differently: Take 10 mg by mouth every morning ) 90 tablet PRN     Saw Palmetto, Serenoa repens, (SAW PALMETTO PO) Take 1 tablet by mouth every morning        niacin 500 MG tablet Take 1 tablet (500 mg) by mouth At Bedtime 30 tablet PRN     Multiple Vitamins-Minerals (MULTIVITAMIN & MINERAL PO) Take 1 tablet by mouth every morning        diphenhydrAMINE (BENADRYL) 25 MG capsule Take 50 mg by mouth nightly as needed sleep       lactobacillus rhamnosus, GG, (CULTURELLE) 10 B CELL capsule Take 1 capsule by mouth every morning        [DISCONTINUED] olmesartan-hydrochlorothiazide (BENICAR HCT) 40-25 MG per tablet Take 1 tablet by mouth daily 90 tablet 3       PAST SURGICAL HISTORY:  Past Surgical History:   Procedure Laterality Date     COLONOSCOPY N/A 5/19/2017    Procedure: COMBINED COLONOSCOPY, SINGLE OR MULTIPLE BIOPSY/POLYPECTOMY BY BIOPSY;  Rectal bleeding;  Surgeon: Maximus Dinero MD;  Location:  GI     SURGICAL HISTORY OF -   10/02    angioplasty and stenting of left and internal carotid artery at the bifurcation     SURGICAL HISTORY OF -    11/11    right common iliac artery stent      VASCULAR SURGERY  2001    stent placed in carotid        ALLERGIES  No known drug allergies    FAMILY HX:  Family History   Problem Relation Age of Onset     C.A.D. Father      Hypertension Father      Prostate Cancer Father      C.A.D. Brother      MI     Hypertension Brother      Arthritis Sister      DIABETES No family hx of      CEREBROVASCULAR DISEASE No family hx of      Cancer - colorectal No family hx of        SOCIAL HX:  Social History     Social History     Marital status: Single     Spouse name: N/A     Number of children: 0     Years of education: N/A     Occupational History     post       Social History Main Topics     Smoking status: Former Smoker     Packs/day: 3.00     Years: 30.00     Quit date: 8/5/1994     Smokeless tobacco: Former User     Quit date: 8/1/1994      Comment: quit 15 years ago after his stroke     Alcohol use Yes      Comment: varies. 1 per day if that      Drug use: No     Sexual activity: No     Other Topics Concern     Parent/Sibling W/ Cabg, Mi Or Angioplasty Before 65f 55m? Yes     brother and father     Social History Narrative    [facility]  Was never .  Had no children.  Has a two brothers that have passed away.  He has two sister, one with Alzheimer and one alive and well.  Sister will be here for surgery.         ROS:  Constitutional: No fever, chills, or sweats. No weight gain/loss.   ENT: No visual disturbance, ear ache, epistaxis, sore throat.   Allergies/Immunologic: Negative.   Respiratory: No cough, hemoptysis.   Cardiovascular: As per HPI.   GI: No nausea, vomiting, hematemesis, melena, or hematochezia.   : No urinary frequency, dysuria, or hematuria.   Integument: Negative.   Psychiatric: Negative.   Neuro: Negative.   Endocrinology: Negative.   Musculoskeletal: No myalgia.    VITAL SIGNS:  /85 (BP Location: Left arm, Patient Position: Chair, Cuff Size: Adult Regular)   "Pulse 76  Ht 1.651 m (5' 5\")  Wt 88.8 kg (195 lb 11.2 oz)  SpO2 98%  BMI 32.57 kg/m2  Body mass index is 32.57 kg/(m^2).  Wt Readings from Last 2 Encounters:   01/08/18 88.8 kg (195 lb 11.2 oz)   12/19/17 90.7 kg (200 lb)       PHYSICAL EXAM     gen nad, pale   heent perrl   neck no jvd   cv rrr no mrg   resp ctab   gi +bs, soft, nt/nd   ext non palpable pulses popliteal or PT/DP bilaterally, bilateral femoral pulses present 1/2, no ulcers, trace edema, warm   neuro grossly normal    LABS    Lab Results   Component Value Date    WBC 8.6 12/19/2017     Lab Results   Component Value Date    RBC 3.08 12/19/2017     Lab Results   Component Value Date    HGB 8.9 12/19/2017     Lab Results   Component Value Date    HCT 27.7 12/19/2017     No components found for: MCT  Lab Results   Component Value Date    MCV 90 12/19/2017     Lab Results   Component Value Date    MCH 28.9 12/19/2017     Lab Results   Component Value Date    MCHC 32.1 12/19/2017     Lab Results   Component Value Date    RDW 13.1 12/19/2017     Lab Results   Component Value Date     12/19/2017      Recent Labs   Lab Test  12/19/17   1219  12/04/17   1227   NA  141  140   POTASSIUM  4.3  4.7   CHLORIDE  110*  110*   CO2  20  17*   ANIONGAP  11  13   GLC  103*  139*   BUN  23  27   CR  2.43*  2.75*   ADRIAN  9.2  9.5     Recent Labs   Lab Test  06/26/17   1145  07/05/16   1135  09/15/15   1208  08/25/14   1206   CHOL  165  140  127  130   HDL  46  47  38*  52   LDL  83  56  48  43   TRIG  182*  186*  204*  176*   CHOLHDLRATIO   --    --   3.3  2.5   NHDL  119  93   --    --         EKG:  Sinus with early repolarization abnormality      STRESS TEST:  8/4/17  Interpretation Summary  Abnormal stress echo.  Mid septal hypokinesis at rest, with mid septal and apical lateral ischemia at  peak stress. Suspect multi-vessel CAD.  Target heart rate was achieved. Heart rate and blood pressure response to  dobutamine were normal. With stress, the left " ventricular ejection fraction  increased from 55-60% to greater than 65% and the left ventricular size  decreased appropriately.  No subjective symptoms  ST segment depression in inferolateral leads during recovery period, but not  at peak stress.  No significant valve disease on screening doppler evaluation. The aortic root  and visualized ascending aorta are normal.       CARDIAC CATH:    8/15/17  CORONARY ANGIOGRAM:   1. Both coronary arteries arise from their respective cusps.  2. Right dominant.  3. LM has diffuse disease up to 20% stenosis in the distal LM.   4. LAD supplies the entire apex (type 3) and gives rise to septal perforators, large high D1, a medium caliber D2 and trivial D3.  The proximal and mid-LAD are heavily calcified.  There is moderate(50-60%) diffuse disease noted in the pLAD with a focal 80% stenosis proximal to D2.  The mLAD has a long 99% stenosis just distal to D2.  The dLAD has moderate diffuse disease.  D1 has a focal eccentric 70% stenosis in the proximal portion followed by a discrete 60% stenosis in the mid-portion..   5. LCX is a large vessel giving rise to a trivial OM1 and large OM2.  The pLCx is heavily calcified.  There is diffuse 50% disease of the pLCx including the ostium with a focal 70% stenosis.  The mLCx and dLCx are small distal to the OM2 bifurcation.  OM2 has minimal irregularities.  6. RCA is a medium caliber vessel giving rise to the RPDA and RPLA branches. The RCA has sequential proximal to mid 50 to 80% stenoses with moderate calcification.  The dRCA mild luminal irregularities.  The ostial RPDA has 50% stenosis.  The RPLAs have mild luminal irregularities.        FUNCTIONAL ASSESSMENT:  Adequate anticoagulation was obtained with IV UFH. An AerFIGHTER Interactive FFR wire was passed through the 6Fr diagnostic catheter into the RCA and into the RPDA.  Hyperemia was induced with IC Adenosine (80 mcg).  The wire was then withdrawn and repeated FFR measurements were assessed at the  RPDA, dRCA, mRCA, and ostial RCA.  The results are below:  RPDA: 0.39  DRCA: 0.41  MRCA: 0.74  Ostial RCA: 1.0      Limited Angiogram of the Subclavian artery:  The ostium and proximal left sublcavian artery are heavily calcified without a significant stenosis.    11/16/17  SELECTIVE LEFT CORONARY ANGIOGRAM:   1. The LM has mild (<25%) proximal disease.  2. LAD is a type III vessel (supplies entire apex). Prior to PCI it is a medium caliber vessel with heavy proximal calcification, proximal 80% stenosis, mid focal 99% stenosis with LORRAINE 2 flow distally, and moderate 50-70% is the distal vessel  3. LCX has sequential ostial and proximal 60% stenoses.     PERCUTANEOUS CORONARY INTERVENTION of the mid- proximal LAD:  A 6 Fr  XB LF 3.5 GC was positioned at the ostium of the LMCA. IV UFH was administered to achieve adequate anticoagulation.     A Pilot50 wire was advanced across the LAD lesions and positioned in the dLAD. The lesions were pre-dilated with 1.2x08mm, 1.5x15mm, and 2.0x12mm balloons sequentially. A Synergy drug eluting stent 2.5x28mm was successfully deployed in the mid LAD. The proximal segment of the stent was post-dilated with 2.5x12 NC balloon. A second Synergy drug eluting stent 3.0x20 mm was placed overlapping the first from the mid to proximal LAD. This was post dilated with a 3.0 x12 NC balloon. A third Synergy drug eluting stent 3.5x16mm was placed overlapping into the proximal LAD. This was post dilated was a 3.5x12 NC balloon. The Pilot50 wire was then advanced into the D2 branch and struts were dilated with the 2.0x 12mm balloon. Final angiography showed no evidence of perforation or dissection with residual stenosis of 0% and LORRAINE 3 flow. No complications.     FUNCTIONAL ASSESSMENT of LCx:  The Aeris pressure wire was advanced into the distal LCx. Hyperemia was induced with IC Adenosine (80 and 100 mcg).  The FFR at peak hyperemia was non-significant, 0.83 and 0.84,  respectively.    12/19/17  CORONARY ANGIOGRAM:   LCA not engaged today due to contrast limitations  RCA (dominant) gives rise to PL branches and supplies PDA. The prox to mid RCA has is moderate to severely calcified, with two 80% stenoses. The distal vessel has mild diffuse disease.     PERCUTANEOUS CORONARY INTERVENTION:  Prox-Mid RCA  JR 4  IV UFH for anticoagulation     A runthrough wire was advanced across the mid RCA lesion and positioned in the distal RPDA.  A 3.0x12mm balloon was used to pre-dilate the lesion. We pre-dilated the entire lesion from the prox to mid segment of the vessel.  We had significant difficulty advancing the stent across the lesion. The vessel was again dilated with a 3.0x12mm balloon and a 6Fr guideliner was inserted for additional support. A 3.0x38mm Synergy drug eluting stent was successfully deployed across the lesion with inflation to 14 abdullahi.  We placed a second stent, 3.5x16mm Synergy NICOLE in the prox RCA overlapping with the first stent. The entire stented segment was post-dilated with a 3.5x12mm non-compliant balloon.  Final angiography showed no evidence of perforation or dissection with residual stenosis of 0% and LORRAINE 3 flow.  No complications.    ASSESSMENT AND PLAN:    72 year old gentleman with rectosigmoid malignancy found to have 2 vessel CAD LAD/RCA s/p PCI 11/16 and 12/19 respectively, LCx moderate lesion FFR 0.83, he is returning for 1 month follow up after his RCA PCI.    1. 2vCAD: s/p PCI to LAD and RCA, LCx FFR 0.83, stable, no angina, on DAPT (ticagrelor/aspirin)    2. HTN, not at a goal (<130/80), on two drug therapy    3. HLD, last LDL 83, could consider increasing his atorvastatin to 80 mg after his surgery    4. PAD, s/p RCIA stent, Ban 3    5. Carotid stenosis s/p LICA, stable, asymptomatic    PLAN  -- increase Coreg to 25 mg BID  -- continue DAPT until 2/2/17, after which it can be paused for his surgery  -- continue with atorvastatin 40 mg and  amlodipine 10 mg daily  -- follow-up with vascular medicine after surgery.  This will be discussed with him at that time.    RTC 6 months      ATTENDING ATTESTATION:   I personally examined and evaluated this patient on January 8, 2018.   I have reviewed today's vital signs, medications, labs, and imaging results. I have reviewed and edited, as necessary, the history, review of systems, physical examination, and assessment and plan. I discussed the patient with Dr. Stephens and agree with the assessment and plan of care as documented in the note above.    Thank you for allowing us to take part in the care of this very pleasant patient.  Please do not hesitate to call if any further questions or concerns arise.    Jose Willett MD, PhD  Interventional/Critical Care Cardiology  [phone]    January 8, 2018

## 2018-01-08 NOTE — NURSING NOTE
Chief Complaint   Patient presents with     Follow Up For     hospital f/u s/p pci to prox & mid RCA     Vitals were taken and medications were reconciled.  ROLAND Fox  11:37 AM

## 2018-01-08 NOTE — MR AVS SNAPSHOT
After Visit Summary   1/8/2018    Jose Lira    MRN: [id number]           Patient Information     Date Of Birth          1945        Visit Information        Provider Department      1/8/2018 12:00 PM Jose Willett MD [facility]        Today's Diagnoses     Coronary artery disease due to lipid rich plaque        Status post coronary angioplasty          Care Instructions    You were seen at the [facility] clinic today.  You saw Dr. Willett  Here are your Instructions:    1.  Increase your carvedilol to 25mg twice daily.  2.  See us back in 6 months.      Felecia Bundy RN  Nurse Care Coordinator  Office:  [phone] option #1, then #3 & ask for Felecia (nurse line)  Fax:  [phone]  After Hours:  [phone]  Appointments:  [phone] option #1, then option #1                        Follow-ups after your visit        Your next 10 appointments already scheduled     Jul 16, 2018 12:00 PM CDT   (Arrive by 11:45 AM)   Return Visit with Jose Willett MD   [facility] ([facility])    [address] [place] [id number] [phone]              Who to contact     If you have questions or need follow up information about today's clinic visit or your schedule please contact [facility] directly at [phone].  Normal or non-critical lab and imaging results will be communicated to you by MyChart, letter or phone within 4 business days after the clinic has received the results. If you do not hear from us within 7 days, please contact the clinic through MyChart or phone. If you have a critical or abnormal lab result, we will notify you by phone as soon as possible.  Submit refill requests through [employer] or call your pharmacy and they will forward the refill request to us. Please allow 3 business days for your refill to be completed.          Additional Information About  "Your Visit        [employer] Information     [employer] lets you send messages to your doctor, view your test results, renew your prescriptions, schedule appointments and more. To sign up, go to [email or web address] . Click on \"Log in\" on the left side of the screen, which will take you to the Welcome page. Then click on \"Sign up Now\" on the right side of the page.     You will be asked to enter the access code listed below, as well as some personal information. Please follow the directions to create your username and password.     Your access code is: [email or web address]  Expires: 3/25/2018  6:30 AM     Your access code will  in 90 days. If you need help or a new code, please call your [facility] clinic or [phone].        Care EveryWhere ID     This is your Care EveryWhere ID. This could be used by other organizations to access your [facility] medical records  [id number]        Your Vitals Were     Pulse Height Pulse Oximetry BMI (Body Mass Index)          76 1.651 m (5' 5\") 98% 32.57 kg/m2         Blood Pressure from Last 3 Encounters:   18 148/85   17 133/73   17 106/65    Weight from Last 3 Encounters:   18 88.8 kg (195 lb 11.2 oz)   17 90.7 kg (200 lb)   17 89.4 kg (197 lb)              Today, you had the following     No orders found for display         Today's Medication Changes          These changes are accurate as of: 18 12:58 PM.  If you have any questions, ask your nurse or doctor.               Start taking these medicines.        Dose/Directions    carvedilol 25 MG tablet   Commonly known as:  COREG   Used for:  Coronary artery disease due to lipid rich plaque, Status post coronary angioplasty   Started by:  Jose Willett MD        Dose:  25 mg   Take 1 tablet (25 mg) by mouth 2 times daily   Quantity:  180 tablet   Refills:  3         These medicines have changed or have updated prescriptions.        Dose/Directions    amLODIPine 10 MG tablet   Commonly known " as:  NORVASC   This may have changed:  when to take this   Used for:  Essential hypertension with goal blood pressure less than 130/80        Dose:  10 mg   Take 1 tablet (10 mg) by mouth daily   Quantity:  90 tablet   Refills:  PRN       aspirin 81 MG EC tablet   This may have changed:  when to take this   Used for:  Hypertension goal BP (blood pressure) < 130/80, Hyperlipidemia LDL goal <100        Dose:  81 mg   Take 1 tablet (81 mg) by mouth daily   Quantity:  30 tablet   Refills:  0       atorvastatin 40 MG tablet   Commonly known as:  LIPITOR   This may have changed:  when to take this   Used for:  Hyperlipidemia LDL goal <100        Dose:  40 mg   Take 1 tablet (40 mg) by mouth daily   Quantity:  90 tablet   Refills:  PRN            Where to get your medicines      These medications were sent to [id number] - [facility] [address]  [address] [facility] [address]     Phone:  [phone]     carvedilol 25 MG tablet                Primary Care Provider Office Phone # Fax #    Rebeca Sandoval [phone]       [address] [place] [address]  [facility] [address]        Equal Access to Services     NAE [facility] HospitalHOLLIS AH: Hadii aad ku hadasho Soomaali, waaxda luqadaha, qaybta kaalmada adeegyada, haresh castillo hayromeon kody love . So [facility] [phone].    ATENCIÓN: Si habla español, tiene a adorno disposición servicios gratuitos de asistencia lingüística. Llame al [phone].    We comply with applicable federal civil rights laws and [place] laws. We do not discriminate on the basis of race, color, national origin, age, disability, sex, sexual orientation, or gender identity.            Thank you!     Thank you for choosing [facility]  for your care. Our goal is always to provide you with excellent care. Hearing back from our patients is one way we can continue to improve our services. Please take a few minutes to complete the written survey that you may receive in the  mail after your visit with us. Thank you!             Your Updated Medication List - Protect others around you: Learn how to safely use, store and throw away your medicines at www.disposemymeds.org.          This list is accurate as of: 1/8/18 12:58 PM.  Always use your most recent med list.                   Brand Name Dispense Instructions for use Diagnosis    amLODIPine 10 MG tablet    NORVASC    90 tablet    Take 1 tablet (10 mg) by mouth daily    Essential hypertension with goal blood pressure less than 130/80       aspirin 81 MG EC tablet     30 tablet    Take 1 tablet (81 mg) by mouth daily    Hypertension goal BP (blood pressure) < 130/80, Hyperlipidemia LDL goal <100       atorvastatin 40 MG tablet    LIPITOR    90 tablet    Take 1 tablet (40 mg) by mouth daily    Hyperlipidemia LDL goal <100       BENADRYL 25 MG capsule   Generic drug:  diphenhydrAMINE      Take 50 mg by mouth nightly as needed sleep        carvedilol 25 MG tablet    COREG    180 tablet    Take 1 tablet (25 mg) by mouth 2 times daily    Coronary artery disease due to lipid rich plaque, Status post coronary angioplasty       lactobacillus rhamnosus (GG) 10 B CELL capsule    CULTURELLE     Take 1 capsule by mouth every morning        MULTIVITAMIN & MINERAL PO      Take 1 tablet by mouth every morning        niacin 500 MG tablet     30 tablet    Take 1 tablet (500 mg) by mouth At Bedtime    Hyperlipidemia LDL goal <100       SAW PALMETTO PO      Take 1 tablet by mouth every morning        ticagrelor 90 MG tablet    BRILINTA    180 tablet    Take 1 tablet (90 mg) by mouth 2 times daily    Coronary artery disease of native artery of native heart with stable angina pectoris (H)       tiZANidine 2 MG tablet    ZANAFLEX    60 tablet    Take 1-2 tablets (2-4 mg) by mouth nightly as needed    Bilateral low back pain without sciatica, unspecified chronicity

## 2018-01-08 NOTE — PATIENT INSTRUCTIONS
You were seen at the [facility] clinic today.  You saw Dr. Willett  Here are your Instructions:    1.  Increase your carvedilol to 25mg twice daily.  2.  See us back in 6 months.      Felecia Bundy RN  Nurse Care Coordinator  Office:  [phone] option #1, then #3 & ask for Felecia (nurse line)  Fax:  [phone]  After Hours:  [phone]  Appointments:  [phone] option #1, then option #1

## 2018-01-19 NOTE — TELEPHONE ENCOUNTER
"Per Dr. Willett message, called patient to schedule surgery (tentatively held 2/7/18).  Patient states he would like a \"break\" from the hospital, and would prefer to schedule 2/22/18.  Message sent to Dr. Willett and Dr. Raza to confirm they are okay with the surgery date.  Informed patient I will follow up as soon as I receive confirmation from both providers.  Also informed patient that he will need to come see Dr. Raza in clinic, along with PAC and WOC.  Informed patient that I will schedule once surgery date is confirmed.  Patient verbalized understanding.   ----- Message -----     Jose Willett MD Barnes, Zainab V; Sharan Raza MD    Hello - I hope you both had a great holiday.  I am writing with an update regarding Mr. Lira.  I performed his last PCI 12/19.  He is now completely revascularized.  I would like him to take his dual anti-platelet medications (ASA 81mg and ticagrelor) for 6 weeks following the stent.  Therefore, he should be able to discontinue the ticagrelor after January 30th.  He should then be ready for surgery within 5 days.  I would like him to restart the ticagrelor when it is safe after the surgery.  Please let me know if you have any questions or concerns.     Thank you,   Jose"

## 2018-02-09 NOTE — TELEPHONE ENCOUNTER
Called patient to update on surgery scheduling.  Patient is finalized for surgery 2/22/18 at 7:30 am.  Patient is scheduled to see PAC 2/13/18 at 1:30 pm and WOC at 3:30 pm.  Patient confirms upcoming appointments and surgery.  Informed patient I will provide his surgery packet at the time of appointments.

## 2018-02-13 NOTE — ANESTHESIA PREPROCEDURE EVALUATION
Anesthesia Evaluation     . Pt has had prior anesthetic. Type: General and MAC    No history of anesthetic complications          ROS/MED HX    ENT/Pulmonary:     (+)DAPHNE risk factors snores loudly, hypertension, obese, tobacco use (3 PPD for 30 years), Past use 3 ppd x 30 yrs packs/day  , . .   : wears corrective lenses.    Neurologic: Comment: s/p stent to Left ICA post TIA, 2002.    (+)CVA (L arm weakness) date: 1993 with deficitsTIA date: 2002 features: slurring words and general disorientation.,     Cardiovascular: Comment: PVD, s/p stent right common iliac artery , 2010.  Left subclavian artery stent.   Reports pain in legs with ambulating.     Stress 8/2017:  Interpretation Summary  Abnormal stress echo.  Mid septal hypokinesis at rest, with mid septal and apical lateral ischemia at  peak stress. Suspect multi-vessel CAD.  Target heart rate was achieved. Heart rate and blood pressure response to  dobutamine were normal. With stress, the left ventricular ejection fraction  increased from 55-60% to greater than 65% and the left ventricular size  decreased appropriately.  No subjective symptoms  ST segment depression in inferolateral leads during recovery period, but not  at peak stress.  No significant valve disease on screening doppler evaluation. The aortic root  and visualized ascending aorta are normal.    US carotid 7/2017:  Impression:     1. Right internal carotid: Occluded, unchanged. Flow is visualized  within the vertebral artery, consistent with angiogram 11/9/2011 and  carotid ultrasound 6/20/2008.     2. Left carotid stent: Less than 50 percent narrowing within common  carotid and internal carotid artery stent by velocity criteria.     3. The left external carotid artery demonstrate elevated velocities at  513 cm/sec, previously 380 cm/sec. Findings likely represent  hemodynamically significant stenosis.    (+) hypertension-Peripheral Vascular Disease-- Carotid Stenosis and Other, CAD, --stent,Nov  and Dec 2017  Drug Eluting Stent .. Taking blood thinners Pt has received instructions: Instructions Given to patient: Completed 6 weeks DAPT.  Ok to hold for surgery per cardiology. . . . :. . Previous cardiac testing Echodate:results:date: results:ECG reviewed date:2/13/18 results:NSR at 72. QTc 431ms. Cath date: Nov and Dec 2017 results:         (-) CABG   METS/Exercise Tolerance: Comment: METs<4.  Limited by PVD. Walks 1/2 block.     Hematologic:  - neg hematologic  ROS       Musculoskeletal:  - neg musculoskeletal ROS       GI/Hepatic:  - neg GI/hepatic ROS      (-) GERD   Renal/Genitourinary:     (+) chronic renal disease (ckd 4), type: CRI, Pt does not require dialysis, Pt has no history of transplant,       Endo:     (+) Obesity (BMI 35), .      Psychiatric:  - neg psychiatric ROS       Infectious Disease:  - neg infectious disease ROS       Malignancy:   (+) Malignancy History of Other  Other CA colon cancer Active status post         Other:    (+) no H/O Chronic Pain,no other significant disability                    Physical Exam      Airway   Mallampati: III  TM distance: >3 FB  Neck ROM: full  Comment: TM ~3FB with limited neck extension.     Dental   (+) missing  Comment: Dentition in poor repair with multiple missing teeth.     Cardiovascular   Rhythm and rate: regular and normal  (+) carotid bruit is present and peripheral edema   (-) no weak pulses and no murmur  PE comment: Left carotid bruit    Pulmonary    breath sounds clear to auscultation(-) no rhonchi    Other findings: 2/13/2018   Sodium: 139  Potassium: 3.9  Chloride: 109  Carbon Dioxide: 21  Urea Nitrogen: 21  Creatinine: 2.33 (H)  GFR Estimate: 28 (L)  GFR Estimate If Black: 33 (L)  Calcium: 8.8  Anion Gap: 8  Glucose: 104 (H)    WBC: 10.7  Hemoglobin: 9.5 (L)  Hematocrit: 31.8 (L)  Platelet Count: 286  RBC Count: 3.79 (L)  MCV: 84  MCH: 25.1 (L)  MCHC: 29.9 (L)  RDW: 13.2        Dobutamine echo 8/4/17  Interpretation Summary  Abnormal  stress echo.  Mid septal hypokinesis at rest, with mid septal and apical lateral ischemia at  peak stress. Suspect multi-vessel CAD.  Target heart rate was achieved. Heart rate and blood pressure response to  dobutamine were normal. With stress, the left ventricular ejection fraction  increased from 55-60% to greater than 65% and the left ventricular size  decreased appropriately.  No subjective symptoms  ST segment depression in inferolateral leads during recovery period, but not  at peak stress.  No significant valve disease on screening doppler evaluation. The aortic root  and visualized ascending aorta are normal.    Bilateral Carotid US 7/31/17  Impression:     1. Right internal carotid: Occluded, unchanged. Flow is visualized  within the vertebral artery, consistent with angiogram 11/9/2011 and  carotid ultrasound 6/20/2008.     2. Left carotid stent: Less than 50 percent narrowing within common  carotid and internal carotid artery stent by velocity criteria.     3. The left external carotid artery demonstrate elevated velocities at  513 cm/sec, previously 380 cm/sec. Findings likely represent  hemodynamically significant stenosis.    SEE H & P for cardiac cath details.                    PAC Discussion and Assessment    ASA Classification: 3  Case is suitable for: [place]  Anesthetic techniques and relevant risks discussed: GA with regional block for post-op pain control  Invasive monitoring and risk discussed: No  Types:   Possibility and Risk of blood transfusion discussed: Yes  NPO instructions given:   Additional anesthetic preparation and risks discussed:   Needs early admission to pre-op area:   Other:     PAC Resident/NP Anesthesia Assessment:  Jose Lira is scheduled for Laparoscopic Assisted Low Anterior Resection Possible Loop Ileostomy  on 2/22/18 by Dr. Raza in treatment of colon cancer.  PAC referral for risk assessment and optimization for anesthesia with comorbid conditions of:    PAC  referral for risk assessment and optimization of anesthesia with comorbid conditions of:  CAD (recent NICOLE 11/2017 and PCI 12/2017); HTN; HLD; PVD; CKD; 90 pack year smoking history and distal sigmoid/rectosigmoid adenocarcinoma.    ERAS CRS  NO b/p on left arm due to subclavian stenosis.     1.  Cardiology -  RCRI > 11%.  High risk for moderate risk surgery.         -CAD, 2 vessel disease without left main.  NICOLE x 3 to LAD 11/2017.  PCI RCA 12/19/17.   EF 55-60% without valvular disease on DSE.          - On DAPT.  Instructed to stay on 81mg ASA (hold DOS).  Stop Ticagrelor 5 days before surgery (confirmed with Dr. Willett - since completed 6+ weeks of DAPT and time sensitive surgery).         - PVD, s/p stent to left internal carotid artery (patent), 2002 and right common iliac artery, 2011.  Chronic occluded right carotid artery, chronic.  Extensive atherosclerosis of the aorta.    Left subclavian artery stenosis       - HTN-take Coreg and Norvasc DOS.         - HLD, take statin as prescribed DOS.   2.  Pulmonary - 90 pack year smoking history. Quit 1993       - DAPHNE # of risks 5/8 = high risk  3.  Hematology/Oncology - VTE risk:  4.5%       - Distal sigmoid/rectosigmoid adenocarcinoma  4.  GI - Risk of PONV score = 2.  If > 2, anti-emetic intervention recommended.  5.  Renal -  CKD stage 4: Cr 2.36, GFR 26.  Avoid nephrotoxic medications.  Recommend close monitoring of fluid balance and electrolytes throughout the perioperative period.   6.  Endocrine - HgbA1C 5.4 (8/2017)  7.  Musculoskeletal - upper extremity resting tremor  8.  Neuro - CVA 1994 and TIA 2002.  Stent to left internal carotid artery at bifurcation, 2002.  CVA 1993.    9.  Cognitive dysfunction - avoid BZD's and meds that can increase delerium.         Patient is optimized and is acceptable candidate for the proposed procedure.  No further diagnostic evaluation is needed.     Patient also evaluated by Dr. Lynch. See recommendations below.          Reviewed and Signed by PAC Mid-Level Provider/Resident  Mid-Level Provider/Resident: Isabel Jasso PA-C  Date: 2/13/18  Time: 1330    Attending Anesthesiologist Anesthesia Assessment:  73 year old for LAR possible loop ileostomy for colon cancer. Patient has known CAD - with diagnosis of colon cancer, sent to cardiologist, stress test showed 3 vessel disease, referred for CABG, but unable to complete due to open draining infection. Since then has undergone staged PCIs with both NICOLE to LAD (10/17/2017) and POBA (plan old balloon angioplasty) to other vessels (12/2017). Has been on DAPT, will stay on ASA through his surgery, but OK to hold ticagrelor for 3-5 days per cardiology). Prior CVA and TIAs as well. Consider art line to closely monitor MAP; perfusion pressure key for his angelica and heart, given the severity of his atherosclerosis. Would obtain troponin in PACU and POD 1-3.     Significant PAD as well (see above) - left subclavian stenosis - do BP only on right arm!    CKD, with creatinine ~2.    Reviewed and Signed by PAC Anesthesiologist  Anesthesiologist: cristal  Date: 2/13/2018  Time:   Pass/Fail: Pass  Disposition:     PAC Pharmacist Assessment:        Pharmacist:   Date:   Time:      Anesthesia Plan      History & Physical Review      ASA Status:  4 .    NPO Status:  > 8 hours and > 2 hours    Plan for ETT and General with Intravenous induction. Maintenance will be Balanced.      Additional equipment: 2nd IV and Arterial Line GETA. PIVx2. Standard ASA monitors. IV opioids + block + adjuncts. PACU postop.    Risks and benefits of anesthetic discussed with patient including sore throat, voice hoarseness, injury to vocal cords, throat, mouth, teeth, tongue, and lips from intubation; nausea/vomiting; cardiac arrest, respiratory complications, MI, stroke, blood clots, death. Patient with NICOLE off DAPT after being on it for a few months. Discussed intraop concern for stent thrombosis and intraop MI with  family and patients. Will proceed, as patient needs the procedure done.    Arterial line risks discussed include bleeding/hematoma, blood clot, ischemia, loss of limb.    Presented opportunity to answer patient and family questions. Questions addressed.        Postoperative Care      Consents  Anesthetic plan, risks, benefits and alternatives discussed with:  Patient..                          .

## 2018-02-13 NOTE — H&P
Pre-Operative H & P     CC:  Preoperative exam to assess for increased cardiopulmonary risk while undergoing surgery and anesthesia.    Date of Encounter: 2/13/2018  Primary Care Physician:  Rebeca Sandoval  Jose Lira is a 73 year old male who presents for pre-operative H & P in preparation for Laparoscopic Assisted Low Anterior Resection Possible Loop Ileostomy  on 2/22/18 by Dr. Raza in treatment of colon cancer.   Surgery at [facility]. Diagnosed with colon cancer 5/2017.  He saw Dr. Willett 7/2017 for risk assessment.  Stress test was positive so he underwent cardiac cath and found to have 2V CAD.   11/2017, 3 NICOLE were placed to LAD.  The following month, PCI was performed to the RCA.  He is on DAPT.  Ticagrelor will be held for surgery per cardiology.      History is obtained from the patient, electronic health record and patient's sister.     Past Medical History  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     Subclavian arterial stenosis (H)     Left     Unspecified essential hypertension        Past Surgical History  Past Surgical History:   Procedure Laterality Date     COLONOSCOPY N/A 5/19/2017    Procedure: COMBINED COLONOSCOPY, SINGLE OR MULTIPLE BIOPSY/POLYPECTOMY BY BIOPSY;  Rectal bleeding;  Surgeon: Maximus Dinero MD;  Location:  GI     SURGICAL HISTORY OF -   10/02    angioplasty and stenting of left and internal carotid artery at the bifurcation     SURGICAL HISTORY OF - 11/11    right common iliac artery stent       VASCULAR SURGERY  2001    stent placed in carotid        Hx of Blood transfusions/reactions: no     Hx of abnormal bleeding or anti-platelet use: DAPT     Steroid use in the last year: no    Personal or FH with difficulty with Anesthesia:  no    Prior to Admission Medications  Current Outpatient Prescriptions   Medication Sig Dispense Refill     carvedilol (COREG) 25 MG tablet Take 1 tablet (25 mg) by mouth 2 times daily 180 tablet 3     ticagrelor (BRILINTA) 90 MG tablet Take 1 tablet (90 mg) by mouth 2 times daily 180 tablet 3     tiZANidine (ZANAFLEX) 2 MG tablet Take 1-2 tablets (2-4 mg) by mouth nightly as needed 60 tablet PRN     aspirin 81 MG EC tablet Take 1 tablet (81 mg) by mouth daily (Patient taking differently: Take 81 mg by mouth every morning ) 30 tablet      atorvastatin (LIPITOR) 40 MG tablet Take 1 tablet (40 mg) by mouth daily (Patient taking differently: Take 40 mg by mouth every morning ) 90 tablet PRN     amLODIPine (NORVASC) 10 MG tablet Take 1 tablet (10 mg) by mouth daily (Patient taking differently: Take 10 mg by mouth every morning ) 90 tablet PRN     Saw Palmetto, Serenoa repens, (SAW PALMETTO PO) Take 1 tablet by mouth every morning        niacin 500 MG tablet Take 1 tablet (500 mg) by mouth At Bedtime 30 tablet PRN     Multiple Vitamins-Minerals (MULTIVITAMIN & MINERAL PO) Take 1 tablet by mouth every morning        diphenhydrAMINE (BENADRYL) 25 MG capsule Take 50 mg by mouth nightly as needed sleep       lactobacillus rhamnosus, GG, (CULTURELLE) 10 B CELL capsule Take 1 capsule by mouth every morning        polyethylene glycol (GOLYTELY/NULYTELY) 236 G suspension Refer to surgical packet for instructions 4000 mL 0     metroNIDAZOLE (FLAGYL) 500 MG tablet Take 1 tablet (500 mg) by mouth every 8 hours for 1 day prior to surgery.  Take in conjunction with Neomycin Sulfate. 3 tablet 0     neomycin (MYCIFRADIN) 500 MG tablet Take two tablets by mouth every 8 hours for one day prior to  surgery. Take in conjunction with Flagyl 6 tablet 0     ondansetron (ZOFRAN) 4 MG tablet Take one tablet by mouth every 6 hours as needed for nausea when taking neomycin and flagyl 3 tablet 0     [DISCONTINUED] olmesartan-hydrochlorothiazide (BENICAR HCT) 40-25 MG per tablet Take 1 tablet by mouth daily 90 tablet 3       Allergies  Allergies   Allergen Reactions     Fentanyl      Agitation (he is not positive about this reaction)       Social History  Social History     Social History     Marital status: Single     Spouse name: N/A     Number of children: 0     Years of education: N/A     Occupational History     post       Social History Main Topics     Smoking status: Former Smoker     Packs/day: 3.00     Years: 30.00     Quit date: 8/5/1994     Smokeless tobacco: Former User     Quit date: 8/1/1994      Comment: quit 15 years ago after his stroke     Alcohol use 0.6 oz/week     1 Glasses of wine per week      Comment: 1 per day     Drug use: No     Sexual activity: No     Other Topics Concern     Parent/Sibling W/ Cabg, Mi Or Angioplasty Before 65f 55m? Yes     brother and father     Social History Narrative    Patient [place].  Was never .  Had no children.  Has a two brothers that have passed away.  He has two sister, one with Alzheimer and one alive and well.  Sister will be here for surgery.         Family History  Family History   Problem Relation Age of Onset     C.A.D. Father      Hypertension Father      Prostate Cancer Father      C.A.D. Brother      MI     Hypertension Brother      Arthritis Sister      DIABETES No family hx of      CEREBROVASCULAR DISEASE No family hx of      Cancer - colorectal No family hx of          ROS/MED HX  The complete review of systems is negative other than noted in the HPI or here. '  ENT/Pulmonary:     (+)DAPHNE risk factors snores loudly, hypertension, obese, tobacco use (3 PPD for 30 years),      Neurologic: Comment: s/p stent to Left ICA post  "TIA, 2002.    (+)CVA date: 1993 without deficitsTIA date: 2002 features: slurring words and general disorientation.,     Cardiovascular: Comment: PVD, s/p stent right common iliac artery , 2010.  Left subclavian artery stent.   Reports pain in legs with ambulating.     (+) hypertension-Peripheral Vascular Disease-- Carotid Stenosis and Other, CAD, --stent,Nov and Dec 2017  Drug Eluting Stent .. Taking blood thinners Pt has received instructions: Instructions Given to patient: Completed 6 weeks DAPT.  Ok to hold for surgery per cardiology       (-) CABG   METS/Exercise Tolerance: Comment: METs<4.  Limited by PVD. Walks 1/2 block.     Hematologic:  - neg hematologic  ROS       Musculoskeletal:  - neg musculoskeletal ROS       GI/Hepatic:  - neg GI/hepatic ROS       Renal/Genitourinary:     (+) chronic renal disease, type: CRI, Pt does not require dialysis, Pt has no history of transplant,       Endo:     (+) Obesity (BMI 35), .      Psychiatric:  - neg psychiatric ROS       Infectious Disease:  - neg infectious disease ROS       Malignancy:   (+) Malignancy History of Other  Other CA colon cancer Active status post         Other:    (+) no H/O Chronic Pain,no other significant disability          Temp: 97.7  F (36.5  C) Temp src: Oral BP: 163/77 Pulse: 80   Resp: 16 SpO2: 98 %         196 lbs 12.8 oz  5' 5\"   Body mass index is 32.75 kg/(m^2).       Physical Exam  Constitutional: Awake, alert, cooperative, no apparent distress, and appears stated age.  Disheveled.   Eyes: Pupils equal, round and reactive to light,  sclera clear, conjunctiva normal.  HENT: Normocephalic, oral pharynx with moist mucus membranes, poor dentition. No goiter appreciated.   Respiratory: Clear to auscultation bilaterally, no crackles or wheezing.  Cardiovascular: Regular rate and rhythm, normal S1 and S2, and no murmur noted.  Carotids +2, with left carotid bruit.  +1 bilat edema. Pulses not palpable to LE's.   GI: Normal bowel sounds, soft, " non-distended, non-tender, no masses palpated, no hepatosplenomegaly.    Lymph/Hematologic: No cervical lymphadenopathy and no supraclavicular lymphadenopathy.  Skin: Warm and dry.  No rashes at anticipated surgical site.  Scratch marks LLE.   Musculoskeletal: Full ROM of neck. There is no redness, warmth, or swelling of the joints. Gross motor strength is normal.    Neurologic: Awake, alert, oriented to name, place and time. Cranial nerves II-XII are grossly intact. Gait is normal.   Neuropsychiatric: Calm, cooperative. Flat affect.  Poor historian.  Cognitive dysfunction.     Labs: (personally reviewed)  2/13/2018   Sodium: 139  Potassium: 3.9  Chloride: 109  Carbon Dioxide: 21  Urea Nitrogen: 21  Creatinine: 2.33 (H)  GFR Estimate: 28 (L)  GFR Estimate If Black: 33 (L)  Calcium: 8.8  Anion Gap: 8  Glucose: 104 (H)    WBC: 10.7  Hemoglobin: 9.5 (L)  Hematocrit: 31.8 (L)  Platelet Count: 286  RBC Count: 3.79 (L)  MCV: 84  MCH: 25.1 (L)  MCHC: 29.9 (L)  RDW: 13.2    Bilateral Carotid US 7/31/17  Impression:     1. Right internal carotid: Occluded, unchanged. Flow is visualized  within the vertebral artery, consistent with angiogram 11/9/2011 and  carotid ultrasound 6/20/2008.     2. Left carotid stent: Less than 50 percent narrowing within common  carotid and internal carotid artery stent by velocity criteria.     3. The left external carotid artery demonstrate elevated velocities at  513 cm/sec, previously 380 cm/sec. Findings likely represent  hemodynamically significant stenosis.    CATH 11/2017:      CATH 12/2017:      EKG: Personally reviewed but formal cardiology read pending: NSR at 72.  QTc 431 ms      Outside records reviewed from: NA    ASSESSMENT and PLAN  Jose Lira is a 73 year old male scheduled to undergo Laparoscopic Assisted Low Anterior Resection Possible Loop Ileostomy  on 2/22/18 by Dr. Raza in treatment of colon cancer.  PAC referral for risk assessment and optimization for  anesthesia with comorbid conditions of:    PAC referral for risk assessment and optimization of anesthesia with comorbid conditions of:  CAD (recent NICOLE 11/2017 and PCI 12/2017); HTN; HLD; PVD; CKD; 90 pack year smoking history and distal sigmoid/rectosigmoid adenocarcinoma.    ERAS CRS  NO b/p on left arm due to subclavian stenosis.     1.  Cardiology -  RCRI > 11%.  High risk for moderate risk surgery.         -CAD, 2 vessel disease without left main.  NICOLE x 3 to LAD 11/2017.  PCI RCA 12/19/17.   EF 55-60% without valvular disease on DSE.          - On DAPT.  Instructed to stay on 81mg ASA (hold DOS).  Stop Ticagrelor 5 days before surgery (confirmed with Dr. Willett - since completed 6+ weeks of DAPT).   RESUME TICAGRELOR ASAP AFTER SURGERY FOR TOTAL OF 6 MONTHS AFTER NICOLE 11/16/17.  STAY ON ASA, LIFE LONG.        - Recommend post op troponins daily x 3 days after surgery.  Tele.         - PVD, s/p stent to left internal carotid artery (patent), 2002 and right common iliac artery, 2011.  Chronic occluded right carotid artery, chronic.  Extensive atherosclerosis of the aorta.    Left subclavian artery stenosis       - HTN-take Coreg and Norvasc DOS.         - HLD, take statin as prescribed DOS.   2.  Pulmonary - 90 pack year smoking history. Quit 1993       - DAPHNE # of risks 5/8 = high risk  3.  Hematology/Oncology - VTE risk:  4.5%       - Distal sigmoid/rectosigmoid adenocarcinoma  4.  GI - Risk of PONV score = 2.  If > 2, anti-emetic intervention recommended.  5.  Renal -  CKD stage 4: Cr 2.36, GFR 26.  Avoid nephrotoxic medications.  Recommend close monitoring of fluid balance and electrolytes throughout the perioperative period.   6.  Endocrine - HgbA1C 5.4 (8/2017)  7.  Musculoskeletal - upper extremity resting tremor  8.  Neuro - CVA 1994 and TIA 2002.  Stent to left internal carotid artery at bifurcation, 2002.  CVA 1993.    9.  Cognitive dysfunction - avoid BZD's and meds that can increase delerium.   Use  delirium prevention protocol  10.  Dispo:  Suspect will need TCU post op  11.  DVT prophylaxis recommended.       For complete medication and diet instructions for surgery, please refer to the AVS completed by nursing.  Sister, Evelin assists.  She was given copy of AVS to help monitor meds for surgery (with patient's approval).   Patient is optimized and is acceptable candidate for the proposed procedure.  No further diagnostic evaluation is needed.  Patient was discussed with Dr Lynch and Dr. Raza.     Isabel Jasso PA-C  Preoperative Assessment Center  [facility]  Clinic and Surgery Center  Phone: [phone]  Fax: [phone]

## 2018-02-13 NOTE — PROGRESS NOTES
C Preoperative Ostomy    Referral from Dr. Raza  Consult attended by patient and sister  Diagnosis: rectal cancer Date of Surgery: 02/22/18   Type of Surgery: Laparoscopic Assisted Low Anterior Resection Possible Loop Ileostomy   Emotional readiness for surgery: no acute distress  Physical Limitations: With the following limitations:  Obese abdomen and hand tremor.   Abdomen assessed for site by: Patient's ability to visiualize area, avoidance of skin creases and scars, palpating for rectus abdominus muscle and clothing fit  Teaching: Anatomy/picture of stoma, stoma/bowel function postop, intro to pouches, written/media offered and role of WOC/postop followup explained.  Stoma site marking:  Marking pen and tegaderm   Location chosen: [facility]    Dr. Raza was available for supervision of care if needed or if questions should arise and regarding plan of care.  Conchita Duque RN CWON

## 2018-02-13 NOTE — PATIENT INSTRUCTIONS
Preparing for Your Surgery      Name:  Jose Lira   MRN:  [id number]   :  1945   Today's Date:  2018     Arriving for surgery:  Surgery date:  18  Arrival time:  5:15 am  Please come to:       [facility] [address]  [address] [place]  [address]    -   parking is available in front of the hospital from 5:15 am to 8:00 pm    -  Stop at the Information Desk in the lobby    -   Inform the information person that you are here for surgery. An escort to 3c will be provided. If you would not like an escort, please proceed to 3C on the 3rd floor. [phone]     What can I eat or drink?  -  Follow bowel prep per surgeon.  -  You may have water, apple juice or 7up/Sprite until 2 hours prior to your surgery. (5:15 am)    Which medicines can I take?       -  Hold vitamins/supplements x one week before surgery.       -  Hold Brilinta for 5 days before surgery. Last dose 18.  -  Hold Aspirin the morning of surgery.  -  Please take these medications the day of surgery: Amlodipine (Norvasc), Atorvastatin (Lipitor), Carvedilol (Coreg)      How do I prepare myself?  -  Take two showers: one the night before surgery; and one the morning of surgery.         Use Scrubcare or Hibiclens to wash from neck down.  You may use your own shampoo and conditioner. No other hair products.   -  Do NOT use lotion, powder, deodorant, or antiperspirant the day of your surgery.  -  Do NOT wear any makeup, fingernail polish or jewelry.  -  Begin using Incentive Spirometer 1 week prior to surgery.  Use 4 times per day, up to 5 breaths each time.  Bring Incentive Spirometer to hospital.  -Do not bring your own medications to the hospital, except for inhalers and eye drops.  -  Bring your ID and insurance card.    Questions or Concerns:  If you have questions or concerns, please call the  Preoperative Assessment Center, Monday-Friday 7AM-7PM:   [phone]              AFTER YOUR SURGERY  Breathing exercises   Breathing exercises help you recover faster. Take deep breaths and let the air out slowly. This will:     Help you wake up after surgery.    Help prevent complications like pneumonia.  Preventing complications will help you go home sooner.   We may give you a breathing device (incentive spirometer) to encourage you to breathe deeply.   Nausea and vomiting   You may feel sick to your stomach after surgery; if so, let your nurse know.    Pain control:  After surgery, you may have pain. Our goal is to help you manage your pain. Pain medicine will help you feel comfortable enough to do activities that will help you heal.  These activities may include breathing exercises, walking and physical therapy.   To help your health care team treat your pain we will ask: 1) If you have pain  2) where it is located 3) describe your pain in your words  Methods of pain control include medications given by mouth, vein or by nerve block for some surgeries.  We may give you a pain control pump that will:  1) Deliver the medicine through a tube placed in your vein  2) Control the amount of medicine you receive  3) Allow you to push a button to deliver a dose of pain medicine  Sequential Compression Device (SCD) or Pneumo Boots:  You may need to wear SCD S on your legs or feet. These are wraps connected to a machine that pumps in air and releases it. The repeated pumping helps prevent blood clots from forming.         Enhanced Recovery After Surgery     This is a team effort, including you, to get you back on your feet, eating and drinking normally and out of the hospital as quickly as possible.  The goals are: 1) NO INFECTIONS and   2) RETURN TO NORMAL DIET    How can we achieve these goals?  1) STAY ACTIVE: Walk every day before your surgery; try to increase the amount every day.  Walk after surgery as much as you can-the nurses will help you.  Walking speeds healing  and gets you home quicker, you heal better at home and have less risk of infection.     2) INCENTIVE SPIROMETER: Practice your incentive spirometer 4 times per day with 5-10 repetitions each time.  Using the incentive spirometer can strengthen your muscles between your ribs and help you have a strong cough after surgery.  A more effective cough can help prevent problems with your lungs.    3) STAY HYDRATED: Drink clear liquids up until 2 hours before your surgery. We would like you to purchase a drink such as Gatorade or Ensure Clear (not the milkshake type).  Drink this before bedtime and on the way into the hospital, drink between 8-12 ounces or until you feel hydrated.  Keeping well hydrated leads to your veins being plump, you wake up faster, and you are less likely to be nauseated. Start drinking water as soon as you can after surgery and advance to clear liquids and food as tolerated.  IV fluids contain salt, drinking fluids will minimize the amount of IV fluids you need and decrease the amount of salt you get.    The most common reason for the patient to be readmitted is dehydration. Staying hydrated after you go home from the hospital is very important.  Ensure or Ensure Clear are good options to keep you hydrated.     4) PAIN MANAGEMENT: If we minimize the amount of opioids and narcotics, and use regional blocks (which numb the area where your surgery is) along with oral pain medications; you will have less side effects of nausea and constipation. Narcotics can slow down your bowels and cause you to stay in the hospital longer.     Our goal is to keep you comfortable; eating and drinking normally and back home safely.           Using an Incentive Spirometer    An incentive spirometer is a device that helps you do deep breathing exercises. These exercises expand your lungs, aid in circulation, and help prevent pneumonia. Deep breathing exercises also help you breathe better and improve the function of your  lungs by:    Keeping your lungs clear    Strengthening your breathing muscles    Helping prevent respiratory complications or problems  The incentive spirometer gives you a way to take an active part in your care. A nurse or therapist will teach you breathing exercises. To do these exercises, you will breathe in through your mouth and not your nose. The incentive spirometer only works correctly if you breathe in through your mouth.    Steps to clear lungs  Step 1. Exhale normally. Then, inhale normally.    Relax and breathe out.  Step 2. Place your lips tightly around the mouthpiece.    Make sure the device is upright and not tilted.  Step 3. Inhale as much air as you can through the mouthpiece (don't breath through your nose).    Inhale slowly and deeply.    Hold your breath long enough to keep the balls or disk raised for at least 3 to 5 seconds, or as instructed by your healthcare provider.  Step 4. Repeat the exercise regularly.    Begin using the Incentive Spirometer one week prior to your surgery, 4 times per day-5 times each.

## 2018-02-13 NOTE — MR AVS SNAPSHOT
After Visit Summary   2018    Jose Lira    MRN: [id number]           Patient Information     Date Of Birth          1945        Visit Information        Provider Department      2018 2:30 PM Rn, [facility]        Care Instructions    Preparing for Your Surgery      Name:  Jose Lira   MRN:  [id number]   :  1945   Today's Date:  2018     Arriving for surgery:  Surgery date:  18  Arrival time:  5:15 am  Please come to:       [facility] [address]  [address] [place]  [address]    -   parking is available in front of the hospital from 5:15 am to 8:00 pm    -  Stop at the Information Desk in the lobby    -   Inform the information person that you are here for surgery. An escort to 3c will be provided. If you would not like an escort, please proceed to 3C on the 3rd floor. [phone]     What can I eat or drink?  -  You may have solid food or milk products until 8 hours prior to your surgery. (11:30 pm)  -  You may have water, apple juice or 7up/Sprite until 2 hours prior to your surgery. (5:15 am)    Which medicines can I take?       -  Hold vitamins/supplements x one week before surgery.       -  Hold Brivinta for 5 days before surgery. Last dose 18.  -  Hold Aspirin the morning of surgery.  -  Please take these medications the day of surgery: Amlodipine (Norvasc), Atorvastatin (Lipitor), Carvedilol (Coreg)      How do I prepare myself?  -  Take two showers: one the night before surgery; and one the morning of surgery.         Use Scrubcare or Hibiclens to wash from neck down.  You may use your own shampoo and conditioner. No other hair products.   -  Do NOT use lotion, powder, deodorant, or antiperspirant the day of your surgery.  -  Do NOT wear any makeup, fingernail polish or jewelry.  -  Begin using Incentive Spirometer 1 week prior to surgery.  Use 4 times  per day, up to 5 breaths each time.  Bring Incentive Spirometer to hospital.  -Do not bring your own medications to the hospital, except for inhalers and eye drops.  -  Bring your ID and insurance card.    Questions or Concerns:  If you have questions or concerns, please call the  Preoperative Assessment Center, Monday-Friday 7AM-7PM:  [phone]              AFTER YOUR SURGERY  Breathing exercises   Breathing exercises help you recover faster. Take deep breaths and let the air out slowly. This will:     Help you wake up after surgery.    Help prevent complications like pneumonia.  Preventing complications will help you go home sooner.   We may give you a breathing device (incentive spirometer) to encourage you to breathe deeply.   Nausea and vomiting   You may feel sick to your stomach after surgery; if so, let your nurse know.    Pain control:  After surgery, you may have pain. Our goal is to help you manage your pain. Pain medicine will help you feel comfortable enough to do activities that will help you heal.  These activities may include breathing exercises, walking and physical therapy.   To help your health care team treat your pain we will ask: 1) If you have pain  2) where it is located 3) describe your pain in your words  Methods of pain control include medications given by mouth, vein or by nerve block for some surgeries.  We may give you a pain control pump that will:  1) Deliver the medicine through a tube placed in your vein  2) Control the amount of medicine you receive  3) Allow you to push a button to deliver a dose of pain medicine  Sequential Compression Device (SCD) or Pneumo Boots:  You may need to wear SCD S on your legs or feet. These are wraps connected to a machine that pumps in air and releases it. The repeated pumping helps prevent blood clots from forming.         Enhanced Recovery After Surgery     This is a team effort, including you, to get you back on your feet, eating and drinking  normally and out of the hospital as quickly as possible.  The goals are: 1) NO INFECTIONS and   2) RETURN TO NORMAL DIET    How can we achieve these goals?  1) STAY ACTIVE: Walk every day before your surgery; try to increase the amount every day.  Walk after surgery as much as you can-the nurses will help you.  Walking speeds healing and gets you home quicker, you heal better at home and have less risk of infection.     2) INCENTIVE SPIROMETER: Practice your incentive spirometer 4 times per day with 5-10 repetitions each time.  Using the incentive spirometer can strengthen your muscles between your ribs and help you have a strong cough after surgery.  A more effective cough can help prevent problems with your lungs.    3) STAY HYDRATED: Drink clear liquids up until 2 hours before your surgery. We would like you to purchase a drink such as Gatorade or Ensure Clear (not the milkshake type).  Drink this before bedtime and on the way into the hospital, drink between 8-12 ounces or until you feel hydrated.  Keeping well hydrated leads to your veins being plump, you wake up faster, and you are less likely to be nauseated. Start drinking water as soon as you can after surgery and advance to clear liquids and food as tolerated.  IV fluids contain salt, drinking fluids will minimize the amount of IV fluids you need and decrease the amount of salt you get.    The most common reason for the patient to be readmitted is dehydration. Staying hydrated after you go home from the hospital is very important.  Ensure or Ensure Clear are good options to keep you hydrated.     4) PAIN MANAGEMENT: If we minimize the amount of opioids and narcotics, and use regional blocks (which numb the area where your surgery is) along with oral pain medications; you will have less side effects of nausea and constipation. Narcotics can slow down your bowels and cause you to stay in the hospital longer.     Our goal is to keep you comfortable; eating  and drinking normally and back home safely.           Using an Incentive Spirometer    An incentive spirometer is a device that helps you do deep breathing exercises. These exercises expand your lungs, aid in circulation, and help prevent pneumonia. Deep breathing exercises also help you breathe better and improve the function of your lungs by:    Keeping your lungs clear    Strengthening your breathing muscles    Helping prevent respiratory complications or problems  The incentive spirometer gives you a way to take an active part in your care. A nurse or therapist will teach you breathing exercises. To do these exercises, you will breathe in through your mouth and not your nose. The incentive spirometer only works correctly if you breathe in through your mouth.    Steps to clear lungs  Step 1. Exhale normally. Then, inhale normally.    Relax and breathe out.  Step 2. Place your lips tightly around the mouthpiece.    Make sure the device is upright and not tilted.  Step 3. Inhale as much air as you can through the mouthpiece (don't breath through your nose).    Inhale slowly and deeply.    Hold your breath long enough to keep the balls or disk raised for at least 3 to 5 seconds, or as instructed by your healthcare provider.  Step 4. Repeat the exercise regularly.    Begin using the Incentive Spirometer one week prior to your surgery, 4 times per day-5 times each.          Follow-ups after your visit        Your next 10 appointments already scheduled     Feb 13, 2018  3:00 PM CST   (Arrive by 2:45 PM)   PAC Anesthesia Consult with  Pac Anesthesiologist   [facility] ([facility])    [address] [place] [address]  4th [facility] [address] [phone]            Feb 13, 2018  3:15 PM CST   LAB with  LAB   [facility] Lab ([facility])    [address] [place] [id number] [phone]           Please do not eat  10-12 hours before your appointment if you are coming in fasting for labs on lipids, cholesterol, or glucose (sugar). This does not apply to pregnant women. Water, hot tea and black coffee (with nothing added) are okay. Do not drink other fluids, diet soda or chew gum.            2018  3:30 PM CST   NEW OSTOMY NURSE VISIT with Conchita Duque RN   [facility] Wound Ostomy ([facility])    [address]  4th Floor  [facility] [id number] [phone]            2018   Procedure with Sharan Raza MD   [facility], [place], Same Day Surgery (--)    [address] [facility] [id number] [phone]            2018 12:00 PM CDT   (Arrive by 11:45 AM)   Return Visit with Jose Willett MD   [facility] Heart Care ([facility])    [address]  [address]  [facility] [id number] [phone]              Who to contact     Please call your clinic at [phone] to:    Ask questions about your health    Make or cancel appointments    Discuss your medicines    Learn about your test results    Speak to your doctor            Additional Information About Your Visit        MyChart Information     [employer] is an electronic gateway that provides easy, online access to your medical records. With [employer], you can request a clinic appointment, read your test results, renew a prescription or communicate with your care team.     To sign up for [employer] visit the website at [email or web address]   You will be asked to enter the access code listed below, as well as some personal information. Please follow the directions to create your username and password.     Your access code is: [email or web address]  Expires: 3/25/2018  6:30 AM     Your access code will  in 90 days. If you need help or a new code, please contact your [facility] or call [phone] for assistance.        Care EveryWhere ID     This is your Care  EveryWhere ID. This could be used by other organizations to access your [place] medical records  [id number]         Blood Pressure from Last 3 Encounters:   02/13/18 107/70   01/08/18 148/85   12/19/17 133/73    Weight from Last 3 Encounters:   02/13/18 89.3 kg (196 lb 12.8 oz)   01/08/18 88.8 kg (195 lb 11.2 oz)   12/19/17 90.7 kg (200 lb)              Today, you had the following     No orders found for display         Today's Medication Changes          These changes are accurate as of 2/13/18  2:32 PM.  If you have any questions, ask your nurse or doctor.               These medicines have changed or have updated prescriptions.        Dose/Directions    amLODIPine 10 MG tablet   Commonly known as:  NORVASC   This may have changed:  when to take this   Used for:  Essential hypertension with goal blood pressure less than 130/80        Dose:  10 mg   Take 1 tablet (10 mg) by mouth daily   Quantity:  90 tablet   Refills:  PRN       aspirin 81 MG EC tablet   This may have changed:  when to take this   Used for:  Hypertension goal BP (blood pressure) < 130/80, Hyperlipidemia LDL goal <100        Dose:  81 mg   Take 1 tablet (81 mg) by mouth daily   Quantity:  30 tablet   Refills:  0       atorvastatin 40 MG tablet   Commonly known as:  LIPITOR   This may have changed:  when to take this   Used for:  Hyperlipidemia LDL goal <100        Dose:  40 mg   Take 1 tablet (40 mg) by mouth daily   Quantity:  90 tablet   Refills:  PRN                Primary Care Provider Office Phone # Fax #    Rebeca IDALIA Sandoval [phone] [address] [facility] [address]        Equal Access to Services     [place] campusHOLLIS AH: Hadii kylee ku hadasho Soomaali, waaxda luqadaha, qaybta kaalmada adeegyada, haresh campo. So [facility] [phone].    ATENCIÓN: Si habla español, tiene a adorno disposición servicios gratuitos de asistencia lingüística. Llame al [phone].    We comply with applicable  federal civil rights laws and [place] laws. We do not discriminate on the basis of race, color, national origin, age, disability, sex, sexual orientation, or gender identity.            Thank you!     Thank you for choosing [facility]  for your care. Our goal is always to provide you with excellent care. Hearing back from our patients is one way we can continue to improve our services. Please take a few minutes to complete the written survey that you may receive in the mail after your visit with us. Thank you!             Your Updated Medication List - Protect others around you: Learn how to safely use, store and throw away your medicines at www.disposemymeds.org.          This list is accurate as of 2/13/18  2:32 PM.  Always use your most recent med list.                   Brand Name Dispense Instructions for use Diagnosis    amLODIPine 10 MG tablet    NORVASC    90 tablet    Take 1 tablet (10 mg) by mouth daily    Essential hypertension with goal blood pressure less than 130/80       aspirin 81 MG EC tablet     30 tablet    Take 1 tablet (81 mg) by mouth daily    Hypertension goal BP (blood pressure) < 130/80, Hyperlipidemia LDL goal <100       atorvastatin 40 MG tablet    LIPITOR    90 tablet    Take 1 tablet (40 mg) by mouth daily    Hyperlipidemia LDL goal <100       BENADRYL 25 MG capsule   Generic drug:  diphenhydrAMINE      Take 50 mg by mouth nightly as needed sleep        carvedilol 25 MG tablet    COREG    180 tablet    Take 1 tablet (25 mg) by mouth 2 times daily    Coronary artery disease due to lipid rich plaque, Status post coronary angioplasty       lactobacillus rhamnosus (GG) 10 B CELL capsule    CULTURELLE     Take 1 capsule by mouth every morning        MULTIVITAMIN & MINERAL PO      Take 1 tablet by mouth every morning        niacin 500 MG tablet     30 tablet    Take 1 tablet (500 mg) by mouth At Bedtime    Hyperlipidemia LDL goal <100       SAW PALMETTO PO       Take 1 tablet by mouth every morning        ticagrelor 90 MG tablet    BRILINTA    180 tablet    Take 1 tablet (90 mg) by mouth 2 times daily    Coronary artery disease of native artery of native heart with stable angina pectoris (H)       tiZANidine 2 MG tablet    ZANAFLEX    60 tablet    Take 1-2 tablets (2-4 mg) by mouth nightly as needed    Bilateral low back pain without sciatica, unspecified chronicity

## 2018-02-13 NOTE — MR AVS SNAPSHOT
After Visit Summary   2/13/2018    Jose Lira    MRN: [id number]           Patient Information     Date Of Birth          1945        Visit Information        Provider Department      2/13/2018 3:30 PM Conchita Duque RN [facility] Wound Ostomy        Today's Diagnoses     Ostomy nurse consultation    -  1       Follow-ups after your visit        Your next 10 appointments already scheduled     Feb 22, 2018   Procedure with Sharan Raza MD   [facility], [place], Same Day Surgery (--)    [address] [facility] [id number] [phone]            Jul 16, 2018 12:00 PM CDT   (Arrive by 11:45 AM)   Return Visit with Jose Willett MD   [facility] ([facility] [place])    [address] [place] [address] [phone]              Future tests that were ordered for you today     Open Future Orders        Priority Expected Expires Ordered    ABO/Rh type and screen Routine 2/13/2018 3/15/2018 2/13/2018    Basic metabolic panel Routine 2/13/2018 3/15/2018 2/13/2018    CBC with platelets Routine 2/13/2018 3/15/2018 2/13/2018            Who to contact     Please call your clinic at [phone] to:    Ask questions about your health    Make or cancel appointments    Discuss your medicines    Learn about your test results    Speak to your doctor            Additional Information About Your Visit        MyChart Information     [employer] is an electronic gateway that provides easy, online access to your medical records. With [employer], you can request a clinic appointment, read your test results, renew a prescription or communicate with your care team.     To sign up for [employer] visit the website at [email or web address]   You will be asked to enter the access code listed below, as well as some personal information. Please follow the directions to create your username and password.     Your access code is: [email or web address]  Expires: 3/25/2018  6:30  AM     Your access code will  in 90 days. If you need help or a new code, please contact your [facility] or call [phone] for assistance.        Care EveryWhere ID     This is your Care EveryWhere ID. This could be used by other organizations to access your [place] medical records  [id number]         Blood Pressure from Last 3 Encounters:   18 163/77   18 148/85   17 133/73    Weight from Last 3 Encounters:   18 89.3 kg (196 lb 12.8 oz)   18 88.8 kg (195 lb 11.2 oz)   17 90.7 kg (200 lb)              Today, you had the following     No orders found for display         Today's Medication Changes          These changes are accurate as of 18  4:06 PM.  If you have any questions, ask your nurse or doctor.               Start taking these medicines.        Dose/Directions    metroNIDAZOLE 500 MG tablet   Commonly known as:  FLAGYL   Used for:  Rectal cancer (H)   Started by:  Sharan Raza MD        Dose:  500 mg   Take 1 tablet (500 mg) by mouth every 8 hours for 1 day prior to surgery.  Take in conjunction with Neomycin Sulfate.   Quantity:  3 tablet   Refills:  0       neomycin 500 MG tablet   Commonly known as:  MYCIFRADIN   Used for:  Rectal cancer (H)   Started by:  Sharan Raaz MD        Take two tablets by mouth every 8 hours for one day prior to surgery. Take in conjunction with Flagyl   Quantity:  6 tablet   Refills:  0       ondansetron 4 MG tablet   Commonly known as:  ZOFRAN   Used for:  Rectal cancer (H)   Started by:  Sharan Raza MD        Take one tablet by mouth every 6 hours as needed for nausea when taking neomycin and flagyl   Quantity:  3 tablet   Refills:  0       polyethylene glycol 236 G suspension   Commonly known as:  GoLYTELY/NuLYTELY   Used for:  Rectal cancer (H)   Started by:  Sharan Raza MD        Refer to surgical packet for instructions   Quantity:  4000 mL   Refills:  0         These  medicines have changed or have updated prescriptions.        Dose/Directions    amLODIPine 10 MG tablet   Commonly known as:  NORVASC   This may have changed:  when to take this   Used for:  Essential hypertension with goal blood pressure less than 130/80        Dose:  10 mg   Take 1 tablet (10 mg) by mouth daily   Quantity:  90 tablet   Refills:  PRN       aspirin 81 MG EC tablet   This may have changed:  when to take this   Used for:  Hypertension goal BP (blood pressure) < 130/80, Hyperlipidemia LDL goal <100        Dose:  81 mg   Take 1 tablet (81 mg) by mouth daily   Quantity:  30 tablet   Refills:  0       atorvastatin 40 MG tablet   Commonly known as:  LIPITOR   This may have changed:  when to take this   Used for:  Hyperlipidemia LDL goal <100        Dose:  40 mg   Take 1 tablet (40 mg) by mouth daily   Quantity:  90 tablet   Refills:  PRN            Where to get your medicines      These medications were sent to [id number] - [place], [place] - [address]  [address] [facility] [address]     Phone:  [phone]     metroNIDAZOLE 500 MG tablet    neomycin 500 MG tablet    ondansetron 4 MG tablet    polyethylene glycol 236 G suspension                Primary Care Provider Office Phone # Fax #    Rebeca IDALIA Sandoval, [phone]       [address] [facility]  [facility] [address]        Equal Access to Services     RIGO STORY : Jeremiah fultono Soomaali, waaxda luqadaha, qaybta kaalmada adeegyada, haresh campo. So [facility] [phone].    ATENCIÓN: Si habla español, tiene a adorno disposición servicios gratuitos de asistencia lingüística. Rashad al [phone].    We comply with applicable federal civil rights laws and [place] laws. We do not discriminate on the basis of race, color, national origin, age, disability, sex, sexual orientation, or gender identity.            Thank you!     Thank you for choosing [facility]  for your care. Our  goal is always to provide you with excellent care. Hearing back from our patients is one way we can continue to improve our services. Please take a few minutes to complete the written survey that you may receive in the mail after your visit with us. Thank you!             Your Updated Medication List - Protect others around you: Learn how to safely use, store and throw away your medicines at www.disposemymeds.org.          This list is accurate as of 2/13/18  4:06 PM.  Always use your most recent med list.                   Brand Name Dispense Instructions for use Diagnosis    amLODIPine 10 MG tablet    NORVASC    90 tablet    Take 1 tablet (10 mg) by mouth daily    Essential hypertension with goal blood pressure less than 130/80       aspirin 81 MG EC tablet     30 tablet    Take 1 tablet (81 mg) by mouth daily    Hypertension goal BP (blood pressure) < 130/80, Hyperlipidemia LDL goal <100       atorvastatin 40 MG tablet    LIPITOR    90 tablet    Take 1 tablet (40 mg) by mouth daily    Hyperlipidemia LDL goal <100       BENADRYL 25 MG capsule   Generic drug:  diphenhydrAMINE      Take 50 mg by mouth nightly as needed sleep        carvedilol 25 MG tablet    COREG    180 tablet    Take 1 tablet (25 mg) by mouth 2 times daily    Coronary artery disease due to lipid rich plaque, Status post coronary angioplasty       lactobacillus rhamnosus (GG) 10 B CELL capsule    CULTURELLE     Take 1 capsule by mouth every morning        metroNIDAZOLE 500 MG tablet    FLAGYL    3 tablet    Take 1 tablet (500 mg) by mouth every 8 hours for 1 day prior to surgery.  Take in conjunction with Neomycin Sulfate.    Rectal cancer (H)       MULTIVITAMIN & MINERAL PO      Take 1 tablet by mouth every morning        neomycin 500 MG tablet    MYCIFRADIN    6 tablet    Take two tablets by mouth every 8 hours for one day prior to surgery. Take in conjunction with Flagyl    Rectal cancer (H)       niacin 500 MG tablet     30 tablet    Take 1  tablet (500 mg) by mouth At Bedtime    Hyperlipidemia LDL goal <100       ondansetron 4 MG tablet    ZOFRAN    3 tablet    Take one tablet by mouth every 6 hours as needed for nausea when taking neomycin and flagyl    Rectal cancer (H)       polyethylene glycol 236 G suspension    GoLYTELY/NuLYTELY    4000 mL    Refer to surgical packet for instructions    Rectal cancer (H)       SAW PALMETTO PO      Take 1 tablet by mouth every morning        ticagrelor 90 MG tablet    BRILINTA    180 tablet    Take 1 tablet (90 mg) by mouth 2 times daily    Coronary artery disease of native artery of native heart with stable angina pectoris (H)       tiZANidine 2 MG tablet    ZANAFLEX    60 tablet    Take 1-2 tablets (2-4 mg) by mouth nightly as needed    Bilateral low back pain without sciatica, unspecified chronicity

## 2018-02-22 PROBLEM — C20 RECTAL CANCER (H): Status: ACTIVE | Noted: 2018-01-01

## 2018-02-22 NOTE — BRIEF OP NOTE
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  Post-operative diagnosis Rectal cancer  Procedure: Procedure(s):  Laparoscopic Assisted Low Anterior Resection Possible Loop Ileostomy, Sigmoidoscopy, Anesthesia Block (ERAS Patient) - Wound Class: II-Clean Contaminated   - Wound Class: II-Clean Contaminated  Surgeon: Surgeon(s) and Role:     * Sharan Raza MD - Primary     * Savage Chase MD - Resident - Assisting     * Patrick Mcneill MD - Assisting  Anesthesia: Combined General with Block   Estimated blood loss: 30ml  Drains: None  Specimens:   ID Type Source Tests Collected by Time Destination   1 : blood for study  Blood, arterial Blood OR DOCUMENTATION ONLY Sharan Raza MD 2/22/2018  8:38 AM    A : Sigmoid and rectum Tissue Large Intestine, Sigmoid SURGICAL PATHOLOGY EXAM Sharan Raza MD 2/22/2018 11:35 AM    B : Anastomosis Rings Tissue Large Intestine, Other SURGICAL PATHOLOGY EXAM Sharan Raza MD 2/22/2018 11:48 AM      Findings:   rectal cancer above peritoneal reflection, anastomosis at 13cm from anal verge.  Complications: None.  Implants: None.

## 2018-02-22 NOTE — OR NURSING
Patient to PACU with OR team.  MOnitoring connected.  Patient remains intubated, somnolent, VSS.  Anesthesia left him on CMV for only 5 minutes before putting patient on CPAP.  Patient is pulling volumes of 700's, resp rate of 7-10 bpm, and still difficult to arouse.  Patient will now arouse to voice.  Able to hold up head by self and moving extremities.  Dr Ramirez at bedside and extubated patient at 1320.  Lung sounds auscultated and present bilaterally but notable UAO.  Will continue to monitor.  HOB is 90 degrees.

## 2018-02-22 NOTE — OR NURSING
Patient with notable effort in breathing with use of accessory muscles.  O2 sats will drop into the 80's but can arouse with vigorous stimulation and O2 sats increased into the 90's.  Bilat lung sounds auscultated by this RN but noting effort.  RR at 12, periods of short apnea of approx. 5-10 sec..  BP elevated and given Hydralazine as ordered.  Dr Ramirez paged to bedside.  Nasal trumpet placed to left nare.  Placed on vent CPAP by Dr Ramirez until RT can arrive and place on CPAP machine.  Yusuf RT to bedside and set up Bipap of 12/5 with 50% FiO2.  RT reports patient only making about 20% effort and Dr Ramirez contacted and to patient bedside.  Patient will arouse with moderate stimulation but very somnolent.  Using accessory muscles.  Suctioned with little results before mask put on.  Refer to VS flowsheet for numbers. resp rate remains around 12.  Narcan given by Dr Ramirez of 80 mcg IV and saline flush.  Patient now more awake with good resp effort.  Following commands and answering questions verbally.  Denies pain, nausea, and/or shortness of breath.  Pulling volumes of 500-700.  resp rate in the 10's.  BiPap now set at 10/5 with resp rate of 8.  Dr Ramirez would like arterial line left in place until he is ready to go to floor.  Left nasal trumpet remains in place.

## 2018-02-22 NOTE — ANESTHESIA POSTPROCEDURE EVALUATION
Patient: Jose Lira    Procedure(s):  Laparoscopic Assisted Low Anterior Resection, Sigmoidoscopy, Anesthesia Block (ERAS Patient) - Wound Class: II-Clean Contaminated   - Wound Class: II-Clean Contaminated    Diagnosis:Malignant Neoplasm Of Rectosigmoid Junction  Diagnosis Additional Information: No value filed.    Anesthesia Type:  No value filed.    Note:  Anesthesia Post Evaluation    Patient location during evaluation: PACU  Patient participation: Able to fully participate in evaluation  Level of consciousness: awake and alert  Pain management: adequate  Airway patency: patent  Cardiovascular status: hemodynamically stable  Respiratory status: acceptable  Hydration status: acceptable  PONV: none             Last vitals:  Vitals:    02/22/18 1530 02/22/18 1545 02/22/18 1600   BP:   160/67   Pulse:      Resp: 17 16 16   Temp:      SpO2: 95% 98% 95%         Electronically Signed By: Serjio Estrella MD  February 22, 2018  4:37 PM

## 2018-02-22 NOTE — IP AVS SNAPSHOT
"    [address] [facility]: [phone]                                              INTERAGENCY TRANSFER FORM - PHYSICIAN ORDERS   2018                    Hospital Admission Date: 2018  ROYAL MONK   : 1945  Sex: Male        Attending Provider: Sharan Raza MD     Allergies:  Fentanyl    Infection:  None   Service:  COLORECTAL S    Ht:  1.651 m (5' 5\")   Wt:  82.2 kg (181 lb 4.8 oz)   Admission Wt:  90.2 kg (198 lb 13.7 oz)    BMI:  30.17 kg/m 2   BSA:  1.94 m 2            Patient PCP Information     Provider PCP Type    Rebeca Sandoval NP General      ED Clinical Impression     Diagnosis Description Comment Added By Time Added    Constipation, unspecified constipation type [K59.00] Constipation, unspecified constipation type [K59.00]  Kenzie Thorne PA-C 3/6/2018 11:14 AM    Rectal cancer (H) [C20] Rectal cancer (H) [C20]  Kenzie Thorne PA-C 3/6/2018 11:15 AM    HAP (hospital-acquired pneumonia) [J18.9] HAP (hospital-acquired pneumonia) [J18.9]  Kenzie Thorne PA-C 3/6/2018 11:20 AM    Other insomnia [G47.09] Other insomnia [G47.09]  Kenzie Thorne PA-C 3/6/2018 11:22 AM    Hypertension goal BP (blood pressure) < 130/80 [I10] Hypertension goal BP (blood pressure) < 130/80 [I10]  Kenzie Thorne PA-C 3/6/2018 11:24 AM    Hyperlipidemia LDL goal <100 [E78.5] Hyperlipidemia LDL goal <100 [E78.5]  Kenzie Thorne PA-C 3/6/2018 11:24 AM    Essential hypertension with goal blood pressure less than 130/80 [I10] Essential hypertension with goal blood pressure less than 130/80 [I10]  Kenzie Thorne PA-C 3/6/2018 11:27 AM      Hospital Problems as of 3/6/2018              Priority Class Noted POA    Rectal cancer (H) Medium  2018 Yes      Non-Hospital Problems as of 3/6/2018              Priority Class Noted    Peripheral vascular disease (H) Medium  2004    History of CVA (cerebrovascular accident) " Medium  11/11/2004    Hyperlipidemia LDL goal <100 Medium  10/31/2010    Hypertension goal BP (blood pressure) < 130/80 Medium  3/25/2011    CKD (chronic kidney disease) stage 3, GFR 30-59 ml/min Medium  3/25/2011    Advanced directives, counseling/discussion Medium  8/8/2011    Pulmonary nodules Medium  6/27/2017    Status post coronary angiogram Medium  8/15/2017    Rectal bleeding Medium  11/17/2017    Postsurgical percutaneous transluminal coronary angioplasty status Medium  11/27/2017    Coronary artery disease involving native heart without angina pectoris, unspecified vessel or lesion type Medium  11/27/2017    CAD S/P percutaneous coronary angioplasty Medium  12/19/2017      Code Status History     Date Active Date Inactive Code Status Order ID Comments User Context    3/6/2018 11:38 AM  Full Code [id number]  Kenzie Thorne PA-C Outpatient    2/22/2018  5:20 PM 3/6/2018 11:38 AM Full Code [id number]  Savage Chase MD Inpatient    11/18/2017 11:03 AM 2/22/2018  5:20 PM Full Code [id number]  Shyann Domingo MD Outpatient    11/17/2017 10:15 AM 11/18/2017 11:03 AM Full Code [id number]  Shyann Domingo MD Inpatient    11/9/2004  3:02 PM 11/9/2004  3:02 PM  None  Torrie Castanon Demographics         Medication Review      START taking        Dose / Directions Comments    acetaminophen 500 MG tablet   Commonly known as:  TYLENOL        Dose:  500-1000 mg   Take 1-2 tablets (500-1,000 mg) by mouth 4 times daily as needed for mild pain or fever   Refills:  0        heparin sodium PF 5000 UNIT/0.5ML injection        Dose:  5000 Units   Inject 0.5 mLs (5,000 Units) Subcutaneous every 12 hours Started 2/23 for post-operative prophylaxis.  Is to complete about 30 days of this.  Stop on 3/23.   Refills:  0    Started 2/23 for post-operative prophylaxis.  Is to complete about 30 days of this.  Stop on 3/23.       levofloxacin 500 MG tablet   Commonly known as:  LEVAQUIN   Indication:   Healthcare-Associated Pneumonia   Used for:  HAP (hospital-acquired pneumonia)        Dose:  500 mg   Start taking on:  3/7/2018   Take 1 tablet (500 mg) by mouth every 48 hours for 1 dose   Refills:  0    - Started on 3/1.    - To complete a 7 day course.     - Last dose to be given on 3/7.       polyethylene glycol Packet   Commonly known as:  MIRALAX/GLYCOLAX   Used for:  Constipation, unspecified constipation type        Dose:  17 g   Take 17 g by mouth daily as needed for constipation   Quantity:  7 packet   Refills:  0        tamsulosin 0.4 MG capsule   Commonly known as:  FLOMAX        Dose:  0.4 mg   Start taking on:  3/7/2018   Take 1 capsule (0.4 mg) by mouth daily for 14 days   Quantity:  60 capsule   Refills:  0        traZODone 50 MG tablet   Commonly known as:  DESYREL   Used for:  Other insomnia        Dose:  50 mg   Take 1 tablet (50 mg) by mouth every evening   Quantity:  60 tablet   Refills:  0          CONTINUE these medications which may have CHANGED, or have new prescriptions. If we are uncertain of the size of tablets/capsules you have at home, strength may be listed as something that might have changed.        Dose / Directions Comments    amLODIPine 10 MG tablet   Commonly known as:  NORVASC   This may have changed:  how much to take   Used for:  Essential hypertension with goal blood pressure less than 130/80        Dose:  5 mg   Start taking on:  3/8/2018   Take 0.5 tablets (5 mg) by mouth daily   Quantity:  90 tablet   Refills:  PRN        aspirin 81 MG EC tablet   This may have changed:  when to take this   Used for:  Hypertension goal BP (blood pressure) < 130/80, Hyperlipidemia LDL goal <100        Dose:  81 mg   Start taking on:  3/13/2018   Take 1 tablet (81 mg) by mouth daily   Quantity:  30 tablet   Refills:  0    Can restart home aspirin 3/13.       atorvastatin 40 MG tablet   Commonly known as:  LIPITOR   This may have changed:  when to take this   Used for:  Hyperlipidemia LDL  goal <100        Dose:  40 mg   Take 1 tablet (40 mg) by mouth daily   Quantity:  90 tablet   Refills:  PRN          CONTINUE these medications which have NOT CHANGED        Dose / Directions Comments    carvedilol 25 MG tablet   Commonly known as:  COREG   Used for:  Coronary artery disease due to lipid rich plaque, Status post coronary angioplasty        Dose:  25 mg   Take 1 tablet (25 mg) by mouth 2 times daily   Quantity:  180 tablet   Refills:  3        lactobacillus rhamnosus (GG) 10 B CELL capsule   Commonly known as:  CULTURELLE        Dose:  1 capsule   Take 1 capsule by mouth every morning   Refills:  0        MULTIVITAMIN & MINERAL PO        Dose:  1 tablet   Take 1 tablet by mouth every morning   Refills:  0        niacin 500 MG tablet   Used for:  Hyperlipidemia LDL goal <100        Dose:  500 mg   Take 1 tablet (500 mg) by mouth At Bedtime   Quantity:  30 tablet   Refills:  PRN        SAW PALMETTO PO        Dose:  1 tablet   Take 1 tablet by mouth every morning   Refills:  0        ticagrelor 90 MG tablet   Commonly known as:  BRILINTA   Used for:  Coronary artery disease of native artery of native heart with stable angina pectoris (H)        Dose:  90 mg   Take 1 tablet (90 mg) by mouth 2 times daily   Quantity:  180 tablet   Refills:  3          STOP taking     BENADRYL 25 MG capsule   Generic drug:  diphenhydrAMINE           neomycin 500 MG tablet   Commonly known as:  MYCIFRADIN           ondansetron 4 MG tablet   Commonly known as:  ZOFRAN           polyethylene glycol 236 G suspension   Commonly known as:  GoLYTELY/NuLYTELY           tiZANidine 2 MG tablet   Commonly known as:  ZANAFLEX                   After Care     Activity       Your activity upon discharge:   -No lifting, pushing, pulling greater than 10 lbs and no strenuous exercise for 6 weeks   -No driving while on narcotic analgesics (i.e. Percocet, oxycodone, Vicodin)  -No driving until you are able to fully twist to both sides or  slam on brakes quickly and without any pain  -Encourage good sleep-wake cycles       Activity - Up with nursing assistance           Daily weights       Call Provider for weight gain of more than 2 pounds per day or 5 pounds per week.       Diet       Follow this diet upon discharge:   -Low Residue Diet for at least 4-6 weeks unless cleared by Colorectal surgery.  No raw vegetables, fruit skins, fibrous foods that require a lot of chewing, nuts, seeds, corn, popcorn.   -We recommend eating slowly, chewing thoroughly, eating small frequent meals throughout the day  -Stay well hydrated.       General info for SNF       Length of Stay Estimate: Short Term Care: Estimated # of Days <30  Condition at Discharge: Stable  Level of care:skilled   Rehabilitation Potential: Fair  Admission H&P remains valid and up-to-date: Yes  Recent Chemotherapy: N/A  Use Nursing Home Standing Orders: Yes       Intake and output       Every shift. Strict in and outs.       Mantoux instructions       Give two-step Mantoux (PPD) Per Facility Policy Yes       Monitor and record       MONITOR AND RECORD:   1.  Strict in and outs  2.  Daily weights  3.  Bowel movements       Wound care (specify)       Site:   Abdominal incisions  Instructions:  Keep clean and dry.  Please check at least once daily.             Procedures     Oxygen - Nasal cannula       1-2 Lpm by nasal cannula to keep O2 sats 92% or greater.  Wean off oxygen as tolerated.             Referrals     Nutrition Services Adult IP Consult       Reason:  Calorie counts.  Optimize post-operative healing.       Occupational Therapy Adult Consult       Evaluate and treat as clinically indicated.    Reason:  Safe transition back to home.       Physical Therapy Adult Consult       Evaluate and treat as clinically indicated.    Reason:  Safe transition back to home.             Your next 10 appointments already scheduled     Jul 16, 2018 12:00 PM CDT   (Arrive by 11:45 AM)   Return Visit  with Jose Willett MD   [facility] ([facility])    [address]  [address]  [place] [address] [phone]              Follow-Up Appointment Instructions     Future Labs/Procedures    Adult [facility] Follow-up and recommended labs and tests     Comments:    DIET  -Low Residue Diet for at least 4-6 weeks unless cleared by Colorectal surgery.  No raw vegetables, fruit skins, fibrous foods that require a lot of chewing, nuts, seeds, corn, popcorn.   -We recommend eating slowly, chewing thoroughly, eating small frequent meals throughout the day  -Stay well hydrated.      ACTIVITY  -No lifting, pushing, pulling greater than 10 lbs and no strenuous exercise for 6 weeks   -No driving while on narcotic analgesics (i.e. Percocet, oxycodone, Vicodin)  -No driving until you are able to fully twist to both sides or slam on brakes quickly and without any pain    WOUND CLINIC  -Inspect your wounds daily for signs of infection (increased redness, drainage, pain)  -Keep your wound clean and dry  -You may shower, but do not soak in tub or pool    NOTIFY  Please contact Dora Rice LPN, Elise Jose RN or Flori Colmenares RN at [phone] for problems after discharge such as:  -Temperature > 101F, chills, rigors, dizziness  -Redness around or purulent drainage from wound  -Inability to tolerate diet, nausea or vomiting  -You stop passing gas, develop significant bloating, abdominal pain  -Have blood in stools/vomit  -Have severe diarrhea/constipation  -Any other questions or concerns.  - At nights (after 4:30pm), on weekends, or if urgent, call [phone] and ask the  to speak with the on-call Colorectal Surgery resident or fellow      Medication Instructions  Some of your medications may have changed. Please take only prescribed and resumed medications     FOLLOW-UP  1.  You will need to follow-up with Kailey Quiles NP in the Colon and Rectal Surgery  clinic in 1-2 week(s) and then with [facility] Staff: Dr. Raza in 2-3 weeks after.  Please contact our clinic scheduler Zainab Malina (phone # [phone]) if you have not heard from our clinic in 3 business days afer discharge to schedule a follow-up appointment.     2.  Follow up with your primary care provider in 1-2 weeks after discharge from the hospital to review this hospitalization.     3.  Please hold your usual home aspirin for now and can restart on 3/13.    4.  Maintain good sleep-wake cycles.  Avoid CNS altering medications.        Appointments on [place] and/or [facility] (with [facility] or [facility] provider or service). Call [phone] if you haven't heard regarding these appointments within 7 days of discharge.      Follow-Up Appointment Instructions     Adult [facility] Follow-up and recommended labs and tests       DIET  -Low Residue Diet for at least 4-6 weeks unless cleared by Colorectal surgery.  No raw vegetables, fruit skins, fibrous foods that require a lot of chewing, nuts, seeds, corn, popcorn.   -We recommend eating slowly, chewing thoroughly, eating small frequent meals throughout the day  -Stay well hydrated.      ACTIVITY  -No lifting, pushing, pulling greater than 10 lbs and no strenuous exercise for 6 weeks   -No driving while on narcotic analgesics (i.e. Percocet, oxycodone, Vicodin)  -No driving until you are able to fully twist to both sides or slam on brakes quickly and without any pain    WOUND CLINIC  -Inspect your wounds daily for signs of infection (increased redness, drainage, pain)  -Keep your wound clean and dry  -You may shower, but do not soak in tub or pool    NOTIFY  Please contact Dora Rice LPN, Elise Jose RN or Flori oClmenares RN at [phone] for problems after discharge such as:  -Temperature > 101F, chills, rigors, dizziness  -Redness around or purulent drainage from wound  -Inability to tolerate diet, nausea or vomiting  -You stop passing gas, develop  significant bloating, abdominal pain  -Have blood in stools/vomit  -Have severe diarrhea/constipation  -Any other questions or concerns.  - At nights (after 4:30pm), on weekends, or if urgent, call [phone] and ask the  to speak with the on-call Colorectal Surgery resident or fellow      Medication Instructions  Some of your medications may have changed. Please take only prescribed and resumed medications     FOLLOW-UP  1.  You will need to follow-up with Kailey Quiles NP in the Colon and Rectal Surgery clinic in 1-2 week(s) and then with [facility]S Staff: Dr. Raza in 2-3 weeks after.  Please contact our clinic scheduler Zainab Radford (phone # [phone]) if you have not heard from our clinic in 3 business days afer discharge to schedule a follow-up appointment.     2.  Follow up with your primary care provider in 1-2 weeks after discharge from the hospital to review this hospitalization.     3.  Please hold your usual home aspirin for now and can restart on 3/13.    4.  Maintain good sleep-wake cycles.  Avoid CNS altering medications.        Appointments on [place] and/or [facility] (with [facility] or [facility] provider or service). Call [phone] if you haven't heard regarding these appointments within 7 days of discharge.             Statement of Approval     Ordered          03/06/18 1145  I have reviewed and agree with all the recommendations and orders detailed in this document.  EFFECTIVE NOW     Approved and electronically signed by:  Kenzie Thorne PA-C

## 2018-02-22 NOTE — IP AVS SNAPSHOT
"    UNIT [address] [facility]: [phone]                                              INTERAGENCY TRANSFER FORM - LAB / IMAGING / EKG / EMG RESULTS   2018                    Hospital Admission Date: 2018  ROYAL MONK   : 1945  Sex: Male        Attending Provider: Sharan Raza MD     Allergies:  Fentanyl    Infection:  None   Service:  COLORECTAL S    Ht:  1.651 m (5' 5\")   Wt:  82.2 kg (181 lb 4.8 oz)   Admission Wt:  90.2 kg (198 lb 13.7 oz)    BMI:  30.17 kg/m 2   BSA:  1.94 m 2            Patient PCP Information     Provider PCP Type    Rebeca Sandoval NP General         Lab Results - 3 Days      Basic metabolic panel [[id number]] (Abnormal)  Resulted: 18 0733, Result status: Final result    Ordering provider: Filemon Gamble MD  18 06 Resulting lab: [facility]    Specimen Information    Type Source Collected On   Blood  18 0652          Components       Value Reference Range Flag Lab   Sodium 141 133 - 144 mmol/L  51   Potassium 3.8 3.4 - 5.3 mmol/L  51   Chloride 108 94 - 109 mmol/L  51   Carbon Dioxide 25 20 - 32 mmol/L  51   Anion Gap 9 3 - 14 mmol/L  51   Glucose 105 70 - 99 mg/dL H 51   Urea Nitrogen 37 7 - 30 mg/dL H 51   Creatinine 2.58 0.66 - 1.25 mg/dL H 51   GFR Estimate 24 >60 mL/min/1.7m2 L 51   Comment:  Non  GFR Calc   GFR Estimate If Black 30 >60 mL/min/1.7m2 L 51   Comment:  African American GFR Calc   Calcium 8.2 8.5 - 10.1 mg/dL L 51            Magnesium [[id number]]  Resulted: 18 0854, Result status: Final result    Ordering provider: Savage Chase MD  18 0528 Resulting lab: [facility]    Specimen Information    Type Source Collected On     18 0547          Components       Value Reference Range Flag Lab   Magnesium 2.3 1.6 - 2.3 mg/dL  51            Basic metabolic panel [[id number]] (Abnormal)  Resulted: 18, Result " status: Final result    Ordering provider: Savage Chase MD  03/03/18 2200 Resulting lab: [facility]    Specimen Information    Type Source Collected On   Blood  03/04/18 0547          Components       Value Reference Range Flag Lab   Sodium 140 133 - 144 mmol/L  51   Potassium 3.6 3.4 - 5.3 mmol/L  51   Chloride 107 94 - 109 mmol/L  51   Carbon Dioxide 25 20 - 32 mmol/L  51   Anion Gap 8 3 - 14 mmol/L  51   Glucose 100 70 - 99 mg/dL H 51   Urea Nitrogen 39 7 - 30 mg/dL H 51   Creatinine 2.60 0.66 - 1.25 mg/dL H 51   GFR Estimate 24 >60 mL/min/1.7m2 L 51   Comment:  Non  GFR Calc   GFR Estimate If Black 29 >60 mL/min/1.7m2 L 51   Comment:  African American GFR Calc   Calcium 8.2 8.5 - 10.1 mg/dL L 51            Basic metabolic panel [[id number]] (Abnormal)  Resulted: 03/03/18 0614, Result status: Final result    Ordering provider: John Hughes MD  03/02/18 2200 Resulting lab: [facility]    Specimen Information    Type Source Collected On   Blood  03/03/18 0521          Components       Value Reference Range Flag Lab   Sodium 143 133 - 144 mmol/L  51   Potassium 3.4 3.4 - 5.3 mmol/L  51   Chloride 105 94 - 109 mmol/L  51   Carbon Dioxide 25 20 - 32 mmol/L  51   Anion Gap 12 3 - 14 mmol/L  51   Glucose 95 70 - 99 mg/dL  51   Urea Nitrogen 36 7 - 30 mg/dL H 51   Creatinine 2.59 0.66 - 1.25 mg/dL H 51   GFR Estimate 24 >60 mL/min/1.7m2 L 51   Comment:  Non  GFR Calc   GFR Estimate If Black 29 >60 mL/min/1.7m2 L 51   Comment:  African American GFR Calc   Calcium 8.1 8.5 - 10.1 mg/dL L 51            INR [[id number]] (Abnormal)  Resulted: 03/03/18 0607, Result status: Final result    Ordering provider: Filemon Gamble MD  03/02/18 2200 Resulting lab: [facility]    Specimen Information    Type Source Collected On   Blood  03/03/18 0521          Components       Value  "Reference Range Flag Lab   INR 1.22 0.86 - 1.14 H 51            Partial thromboplastin time [[id number]] (Abnormal)  Resulted: 03/03/18 0607, Result status: Final result    Ordering provider: Filemon Gamble MD  03/02/18 2200 Resulting lab: [facility]    Specimen Information    Type Source Collected On   Blood  03/03/18 0521          Components       Value Reference Range Flag Lab   PTT 56 22 - 37 sec H 51            Testing Performed By     Lab - Abbreviation Name Director Address Valid Date Range    51 - Unknown [facility] Unknown [address] [facility] [address] 12/31/14 1010 - Present            Unresulted Labs (24h ago through future)    Start       Ordered    Unscheduled  Magnesium  (Magnesium Replacement -  Adult - \"Standard\" - Replacement for all levels less than 1.6 mg/dL )  CONDITIONAL (SPECIFY),   Routine     Comments:  Obtain Magnesium Level for these conditions:  *IF no magnesium result within 24 hrs before initiation of order set, draw magnesium level with next lab collect.    *2 HOURS AFTER last magnesium replacement dose when magnesium replacement given for level less than 1.6   *Next morning after magnesium dose.     Repeat Magnesium Replacement if necessary.    02/24/18 1127    Unscheduled  Potassium  (Potassium Replacement - \"Standard\" - For K levels less than 3.4 mmol/L - UU,UR,UA,RH,SH,PH,WY )  CONDITIONAL (SPECIFY),   Routine     Comments:  Obtain Potassium Level for these conditions:  *IF no potassium result within 24 hours before initiation of order set, draw potassium level with next lab collect.    *2 HOURS AFTER last IV potassium replacement dose and 4 hours after an oral replacement dose.  *Next morning after potassium dose.     Repeat Potassium Replacement if necessary.    02/25/18 2003         ECG/EMG Results      Echo Limited with Lumason [[id number]]  Resulted: 02/25/18 0848, Result status: Edited Result - FINAL    " Ordering provider: Savage Martinez MD  18 0744 Resulted by: JUANITA Manning MD    Performed: 18 - 18 Resulting lab: [facility]    Narrative:       [id number]  [id number]  [id number]  [id number]^MICHELLE^SAVAGE^STEPHANI           [facility]  Echocardiography [facility]  [address] [place] [address]     Name: ROYAL MONK  MRN: [id number]  : 1945  Study Date: 2018 08:48 AM  Age: 73 yrs  Gender: Male  Patient Location: [facility]  Reason For Study: Pulmonary Edema  Ordering Physician: SAVAGE MARTINEZ  Performed By: Reji Montoya RDCS     BSA: 2.0 m2  Height: 65 in  Weight: 194 lb  HR: 97  _____________________________________________________________________________  __        Procedure  Limited Portable Echo Adult. Contrast Lumason. Lumason (NDC #4908-0400-49)  given intravenously. Patient was given 5ml mixture of Lumason. 0 ml wasted.  _____________________________________________________________________________  __        Interpretation Summary  Global and regional left ventricular function is hyperkinetic with an EF of  65-70%.  No regional wall motion abnormalities are seen.  Right ventricular function, chamber size, wall motion, and thickness are  normal.  Dilation of the inferior vena cava is present with normal respiratory  variation in diameter.  _____________________________________________________________________________  __        Left Ventricle  Global and regional left ventricular function is hyperkinetic with an EF of  65-70%. No regional wall motion abnormalities are seen.     Right Ventricle  Right ventricular function, chamber size, wall motion, and thickness are  normal.     Mitral Valve  Mild mitral annular calcification is present.        Aortic Valve  Mild aortic valve sclerosis is present.     Tricuspid Valve  The tricuspid valve is normal.     Pulmonic Valve  The pulmonic valve cannot be  assessed.     Vessels  Dilation of the inferior vena cava is present with normal respiratory  variation in diameter.     Pericardium  Small organizing pericardial effusion or epicardial fat pad noted.     _____________________________________________________________________________  __        Doppler Measurements & Calculations  PA acc time: 0.13 sec        _____________________________________________________________________________  __        Report approved by: Qasim Hennessy 02/25/2018 11:33 AM       1    Type Source Collected On     02/25/18 0848          View Image (below)        Echo limited (Adults Only) [[id number]]  Resulted: 02/25/18 0919, Result status: In process    Ordering provider: Savage Chase MD  02/25/18 0744 Performed: 02/25/18 0918 - 02/25/18 0918    Resulting lab: [facility]                   Encounter-Level Documents:     There are no encounter-level documents.      Order-Level Documents:     There are no order-level documents.

## 2018-02-22 NOTE — LETTER
Transition Communication Hand-off for Care Transitions to Next Level of Care Provider    Name: Jose Lira  MRN #: [id number]  Primary Care Provider: Rebeca Sandoval     Primary Clinic: [address] [place] [id number]     Reason for Hospitalization:  Malignant Neoplasm Of Rectosigmoid Junction  Rectal cancer (H)  Admit Date/Time: 2/22/2018  5:22 AM  Discharge Date: Tuesday 3/6/18  Payor Source: Payor: MEDICARE / Plan: MEDICARE / Product Type: Medicare /     Readmission Assessment Measure (SANTOS) Risk Score/category: elevated         Reason for Communication Hand-off Referral: TCU    Discharge Plan:  [facility]  [address] [place] [address]  (P: [phone], F: [phone])      Concern for non-adherence with plan of care:   Y/N no  Discharge Needs Assessment:      Already enrolled in Tele-monitoring program and name of program:  no  Follow-up specialty is recommended: No    Follow-up plan:  Future Appointments  Date Time Provider Department Center   3/7/2018 6:00 AM Pepe Lisa OT [facility]   7/16/2018 12:00 PM Jose Willett MD [facility]       Any outstanding tests or procedures:              Key Recommendations:      Huma Blankenship    AVS/Discharge Summary is the source of truth; this is a helpful guide for improved communication of patient story

## 2018-02-22 NOTE — IP AVS SNAPSHOT
"` `     [address] [facility]: [phone]                                              INTERAGENCY TRANSFER FORM - NURSING   2018                    Hospital Admission Date: 2018  ROYAL MONK   : 1945  Sex: Male        Attending Provider: Sharan Raza MD     Allergies:  Fentanyl    Infection:  None   Service:  COLORECTAL S    Ht:  1.651 m (5' 5\")   Wt:  82.2 kg (181 lb 4.8 oz)   Admission Wt:  90.2 kg (198 lb 13.7 oz)    BMI:  30.17 kg/m 2   BSA:  1.94 m 2            Patient PCP Information     Provider PCP Type    Rebeca Sandoval NP General      Current Code Status     Date Active Code Status Order ID Comments User Context       Prior      Code Status History     Date Active Date Inactive Code Status Order ID Comments User Context    3/6/2018 11:38 AM  Full Code [id number]  Kenzie Thorne PA-C Outpatient    2018  5:20 PM 3/6/2018 11:38 AM Full Code [id number]  Savage Chase MD Inpatient    2017 11:03 AM 2018  5:20 PM Full Code [id number]  Shyann Domingo MD Outpatient    2017 10:15 AM 2017 11:03 AM Full Code [id number]  Shyann Domingo MD Inpatient    2004  3:02 PM 2004  3:02 PM  None  Torrie Castanon      Advance Directives        Scanned docmt in ACP Activity?           Yes, scanned ACP docmt        Hospital Problems as of 3/6/2018              Priority Class Noted POA    Rectal cancer (H) Medium  2018 Yes      Non-Hospital Problems as of 3/6/2018              Priority Class Noted    Peripheral vascular disease (H) Medium  2004    History of CVA (cerebrovascular accident) Medium  2004    Hyperlipidemia LDL goal <100 Medium  10/31/2010    Hypertension goal BP (blood pressure) < 130/80 Medium  3/25/2011    CKD (chronic kidney disease) stage 3, GFR 30-59 ml/min Medium  3/25/2011    Advanced directives, counseling/discussion Medium  2011    Pulmonary nodules Medium  2017    " Status post coronary angiogram Medium  8/15/2017    Rectal bleeding Medium  11/17/2017    Postsurgical percutaneous transluminal coronary angioplasty status Medium  11/27/2017    Coronary artery disease involving native heart without angina pectoris, unspecified vessel or lesion type Medium  11/27/2017    CAD S/P percutaneous coronary angioplasty Medium  12/19/2017      Immunizations     Name Date      Pneumo Conj 13-V (2010&after) 09/15/15     TDAP Vaccine (Adacel) 07/16/13          END      ASSESSMENT     Discharge Profile Flowsheet     EXPECTED DISCHARGE     Inspection under devices  Full 03/05/18 1723    Expected Discharge Date  02/27/18 02/27/18 1215   Not Inspected under devices  -- (abdominal bandage) 02/27/18 0442    GASTROINTESTINAL (ADULT,PEDIATRIC,OB)     Skin WDL  ex 03/06/18 1128    GI WDL  ex 03/06/18 1128   Skin Color/Characteristics  ashen;pale;redness blanchable 03/06/18 1128    Abdominal Appearance  distended;contour irregular 03/06/18 1128   Skin Temperature  warm 03/06/18 1128    All Quadrants Bowel Sounds  audible and active in all quadrants 03/06/18 1128   Skin Moisture  dry 03/06/18 1128    All Quadrants Palpation  taut 03/01/18 0953   Skin Elasticity  quick return to original state 03/04/18 1719    Last Bowel Movement  02/28/18 03/06/18 1128   Skin Integrity  bruise(s);drain/device(s);incision(s) 03/06/18 1128    GI Signs/Symptoms  abdominal discomfort;abdominal fullness 03/04/18 1719   SAFETY      Passing flatus  yes 03/06/18 1128   Safety WDL  WDL 03/06/18 1128    COMMUNICATION ASSESSMENT     Safety Factors  wheels locked;bed in low position;call light in reach 03/06/18 1128    Patient's communication style  spoken language (English or Bilingual) 02/13/18 1550   Airway Safety Measures  all equipment/monitors on and audible;manual resuscitator/mask/valve in room;oxygen flowmeter;suction equipment;suction regulator 03/06/18 0420    SKIN     Safety Equipment  oxygen flowmeter;manual  "resusciator with appropriate mask;suction equipment;suction regulator 03/06/18 0420    Inspection of bony prominences  Full 03/06/18 1128   All Alarms  alarm(s) activated and audible 03/06/18 1128    Full except areas not inspected   Buttock, left;Buttock, right;Sacrum;Coccyx 03/03/18 1705   Additional Documentation  Airway Safety Measures (Row) 03/02/18 1222    Procedural focused assessment (identify areas inspected)   -- 02/22/18 1729   Aspiration Risk Screen  advanced age 03/06/18 1128                 Assessment WDL (Within Defined Limits) Definitions           Safety WDL     Effective: 09/28/15    Row Information: <b>WDL Definition:</b> Bed in low position, wheels locked; call light in reach; upper side rails up x 2; ID band on<br> <font color=\"gray\"><i>Item=AS safety wdl>>List=AS safety wdl>>Version=F14</i></font>      Skin WDL     Effective: 09/28/15    Row Information: <b>WDL Definition:</b> Warm; dry; intact; elastic; without discoloration; pressure points without redness<br> <font color=\"gray\"><i>Item=AS skin wdl>>List=AS skin wdl>>Version=F14</i></font>      Vitals     Vital Signs Flowsheet     QUICK ADDS     Compliance w/ventilator (intubated patients)  Extubated 02/22/18 1610    Quick Adds  Comments 02/26/18 1143   Vocalization (extubated patients)  0 02/22/18 1610    COMMENTS     Muscle Tension  0 02/22/18 1610    Comments  Post-PT: supine with HOB elevated 03/05/18 1143   Total  0 02/22/18 1610    VITAL SIGNS     HEIGHT AND WEIGHT      Temp  98.2  F (36.8  C) 03/06/18 1113   Height  1.651 m (5' 5\") 02/22/18 0551    Temp src  Oral 03/06/18 1113   Weight  82.2 kg (181 lb 4.8 oz) 03/05/18 2328    Resp  18 03/06/18 1113   Weight Method  Standing scale 03/05/18 2328    Pulse  77 03/06/18 1113   Bed Scale  Standard (fitted sheet, draw sheet/ pad, cover/flat sheet, blanket, two pillows) 02/25/18 0250    Heart Rate  73 03/06/18 1113   BSA (Calculated - sq m)  2.03 02/22/18 0551    Pulse/Heart Rate Source "  Monitor 03/06/18 1113   BMI (Calculated)  33.16 02/22/18 0551    BP  157/71 03/06/18 1113   POSITIONING      BP Location  Right arm 03/06/18 1113   Body Position  independently positioning 03/06/18 0952    Patient Position  Lying 02/22/18 1718   Head of Bed (HOB)  HOB at 45 degrees 03/04/18 0935    OXYGEN THERAPY     Chair  Upright in chair 03/05/18 1043    SpO2  96 % 03/06/18 1113   Positioning/Transfer Devices  pillows;in use 02/28/18 0438    O2 Device  Nasal cannula 03/06/18 1113   ECG      FiO2 (%)  60 % 02/25/18 0659   ECG Rhythm  Normal sinus rhythm 03/06/18 0952    Oxygen Delivery  1.5 LPM 03/06/18 1113   Ectopy  None 03/02/18 0559    RESPIRATORY MONITORING     Lead Monitored  Lead II 02/27/18 2248    Respiratory Monitoring (EtCO2)  36 mmHg 02/23/18 0338   Ectopy Frequency  Occasional 02/28/18 0438    Integrated Pulmonary Index (IPI)  7 02/23/18 0338   ANALGESIA SIDE EFFECTS MONITORING      PAIN/COMFORT     Side Effects Monitoring: Respiratory Quality  R 02/27/18 2248    Patient Currently in Pain  denies 03/06/18 0952   Side Effects Monitoring: Respiratory Depth  N 02/27/18 2248    Preferred Pain Scale  CAPA (Clinically Aligned Pain Assessment) ([facility] Adults Only) 03/05/18 2320   Side Effects Monitoring: Sedation Level  1 02/27/18 2248    Pain Location  Back 02/27/18 1522   DAILY CARE      Pain Orientation  Right;Lower 02/27/18 1522   Activity Management  activity adjusted per tolerance 03/06/18 0952    Pain Descriptors  Aching 02/27/18 1522   Activity Assistance Provided  assistance, stand-by 03/06/18 0952    Pain Intervention(s)  Heat applied 02/27/18 1522   Assistive Device Utilized  walker 03/03/18 1705    Response to Interventions  Decrease in pain 02/27/18 1522   Additional Documentation  Activity Device Assistance (Row) 02/25/18 1819    CLINICALLY ALIGNED PAIN ASSESSMENT (CAPA) ([facility] ADULTS ONLY)     Symptoms Noted During/After Activity  shortness of breath  03/01/18 1405    Comfort  negligible pain 03/05/18 2320   POINT OF CARE TESTING      CRITICAL-CARE PAIN OBSERVATION TOOL (CPOT)     Puncture Site  fingertip 03/04/18 0800    Facial Expression  0 02/22/18 1610   Bedside Glucose (mg/dl )   94 mg/dl 03/04/18 0800    Body Movements  0 02/22/18 1610                 Patient Lines/Drains/Airways Status    Active LINES/DRAINS/AIRWAYS     Name: Placement date: Placement time: Site: Days: Last dressing change:    Peripheral IV 03/02/18 Right;Anterior Lower forearm 03/02/18   1209   Lower forearm   3     Wound 10/20/17 Left Arm Abscess 10/20/17   1140   Arm   137     Wound 11/17/17 Lower;Right Lip Other (comment);Laceration appears pt bite lip 11/17/17      Lip   109     Incision/Surgical Site 02/22/18 Abdomen 02/22/18   1229    11             Patient Lines/Drains/Airways Status    Active PICC/CVC     None            Intake/Output Detail Report     Date Intake         Output   Net    Shift P.O. I.V. IV Piggyback Colloid Blood Components Total Urine Blood Total       Day 03/05/18 0000 - 03/05/18 0659 60 -- -- -- -- 60 475 -- 475 -415    Teri 03/05/18 0700 - 03/05/18 1459 -- -- -- -- -- -- 450 -- 450 -450    Noc 03/05/18 1500 - 03/05/18 2359 600 -- -- -- -- 600 750 -- 750 -150    Day 03/06/18 0000 - 03/06/18 0659 240 -- -- -- -- 240 100 -- 100 140    Teri 03/06/18 0700 - 03/06/18 1459 -- -- -- -- -- -- 150 -- 150 -150      Last Void/BM       Most Recent Value    Urine Occurrence 1 at 03/04/2018 1735    Stool Occurrence 1 at 02/28/2018 1407      Case Management/Discharge Planning     Case Management/Discharge Planning Flowsheet     LIVING ENVIRONMENT     Filed Complexity Screen Score  6 02/23/18 0800    Lives With  alone 02/27/18 1139   ABUSE RISK SCREEN      Living Arrangements  apartment 02/27/18 1139   QUESTION TO PATIENT:  Has a member of your family or a partner(now or in the past) intimidated, hurt, manipulated, or controlled you in any way?  no 02/22/18 0533     COPING/STRESS     QUESTION TO PATIENT: Do you feel safe going back to the place where you are living?  yes 02/22/18 0533    Major Change/Loss/Stressor  surgery/procedure;hospitalization 02/13/18 5857   OBSERVATION: Is there reason to believe there has been maltreatment of a vulnerable adult (ie. Physical/Sexual/Emotional abuse, self neglect, lack of adequate food, shelter, medical care, or financial exploitation)?  no 02/22/18 0533    EXPECTED DISCHARGE     OTHER      Expected Discharge Date  02/27/18 02/27/18 1215   Are you depressed or being treated for depression?  No 02/23/18 0438    / CAREGIVER

## 2018-02-22 NOTE — IP AVS SNAPSHOT
` `     [address] [facility]: [phone]            Medication Administration Report for Jose Lira as of 03/06/18 1147   Legend:    Given Hold Not Given Due Canceled Entry Other Actions    Time Time (Time) Time  Time-Action       Inactive    Active    Linked        Medications 02/28/18 03/01/18 03/02/18 03/03/18 03/04/18 03/05/18 03/06/18    acetaminophen (TYLENOL) tablet 1,000 mg  Dose: 1,000 mg  Freq: 4 TIMES DAILY PRN Route: PO  PRN Reasons: mild pain,fever  Start: 03/02/18 1215   Admin Instructions: Maximum acetaminophen dose from all sources = 75 mg/kg/day not to exceed 4 gram               atorvastatin (LIPITOR) tablet 40 mg  Dose: 40 mg  Freq: DAILY Route: PO  Start: 02/22/18 1730    0811 (40 mg)-Given        0850 (40 mg)-Given        0834 (40 mg)-Given        0809 (40 mg)-Given        0813 (40 mg)-Given        0841 (40 mg)-Given        0813 (40 mg)-Given           carvedilol (COREG) tablet 25 mg  Dose: 25 mg  Freq: 2 TIMES DAILY Route: PO  Start: 02/24/18 2000   Admin Instructions: Hold if SBP<110 and/or HR<60.     0811 (25 mg)-Given       2035 (25 mg)-Given        0849 (25 mg)-Given       2155 (25 mg)-Given        0834 (25 mg)-Given       1947 (25 mg)-Given        0809 (25 mg)-Given       1923 (25 mg)-Given        0813 (25 mg)-Given              1935 (25 mg)-Given        0841 (25 mg)-Given       1959 (25 mg)-Given        0813 (25 mg)-Given       [ ] 2000           heparin sodium PF injection 5,000 Units  Dose: 5,000 Units  Freq: EVERY 12 HOURS Route: SC  Start: 03/02/18 2000   Admin Instructions: High concentration heparin. Not for line flush or cath care.       1947 (5,000 Units)-Given        0809 (5,000 Units)-Given       1923 (5,000 Units)-Given        0813 (5,000 Units)-Given       1930 (5,000 Units)-Given        0840 (5,000 Units)-Given       1959 (5,000 Units)-Given        0813 (5,000 Units)-Given       [ ] 2000           hydrALAZINE (APRESOLINE) injection 10 mg  Dose: 10 mg  Freq:  EVERY 4 HOURS PRN Route: IV  PRN Reason: high blood pressure  PRN Comment: Systolic Blood Pressure greater than 170 mmHg or Diastolic Blood Pressure greater than 100 mmHg.  Start: 02/22/18 1720   Admin Instructions: Hold for heart rate greater than 100  For ordered doses up to 40 mg, give IV Push undiluted over 1 minute.     0415 (10 mg)-Given       0811 (10 mg)-Given                 hypromellose-dextran (ARTIFICAL TEARS) ophthalmic solution 1 drop  Dose: 1 drop  Freq: EVERY 1 HOUR PRN Route: Both Eyes  PRN Reason: dry eyes  Start: 02/25/18 0130              ipratropium - albuterol 0.5 mg/2.5 mg/3 mL (DUONEB) neb solution 3 mL  Dose: 3 mL  Freq: EVERY 4 HOURS PRN Route: NEBULIZATION  PRN Reason: wheezing  Start: 02/25/18 0047    0649 (3 mL)-Given                 levofloxacin (LEVAQUIN) tablet 500 mg  Dose: 500 mg  Freq: EVERY 48 HOURS Route: PO  Indications of Use: HEALTHCARE-ASSOCIATED PNEUMONIA  Start: 03/03/18 0900   Admin Instructions: Dose adjusted per renal dosing policy.  Estimated CrCl = 20-49 mL/min. <br><br>Administer at least 2 hrs before or 4 hrs after aluminum, calcium, iron, zinc or magnesium containing products.<br>        0959 (500 mg)-Given         0841 (500 mg)-Given            magnesium sulfate 4 g in 100 mL sterile water (premade)  Dose: 4 g  Freq: EVERY 4 HOURS PRN Route: IV  PRN Reason: magnesium supplementation  Start: 02/24/18 1127   Admin Instructions: For serum Mg++ less than 1.6 mg/dL  Give 4 g and recheck magnesium level 2 hours after dose, and next AM.               naloxone (NARCAN) injection 0.1-0.4 mg  Dose: 0.1-0.4 mg  Freq: EVERY 2 MIN PRN Route: IV  PRN Reason: opioid reversal  Start: 02/22/18 1720   Admin Instructions: For respiratory rate LESS than or EQUAL to 8.  Partial reversal dose:  0.1 mg titrated q 2 minutes for Analgesia Side Effects Monitoring Sedation Level of 3 (frequently drowsy, arousable, drifts to sleep during conversation).Full reversal dose:  0.4 mg bolus for  Analgesia Side Effects Monitoring Sedation Level of 4 (somnolent, minimal or no response to stimulation).  For ordered doses up to 2mg give IVP. Give each 0.4mg over 15 seconds in emergency situations. For non-emergent situations further dilute in 9mL of NS to facilitate titration of response.               No lozenges or gum should be given while patient on BIPAP/AVAPS/AVAPS AE  Freq: CONTINUOUS PRN Route: XX  Start: 02/25/18 0130              ondansetron (ZOFRAN) injection 4 mg  Dose: 4 mg  Freq: EVERY 6 HOURS PRN Route: IV  PRN Reasons: nausea,vomiting  Start: 02/22/18 1720   Admin Instructions: Step 1 of nausea and vomiting management. If nausea not resolved in 15 minutes, go to Step 2 prochlorperazine (COMPAZINE).  Irritant. For ordered doses up to 4 mg, give IV Push undiluted over 2-5 minutes.               pantoprazole (PROTONIX) EC tablet 40 mg  Dose: 40 mg  Freq: EVERY MORNING Route: PO  Start: 03/01/18 0830   Admin Instructions: DO NOT CRUSH.      0850 (40 mg)-Given        0834 (40 mg)-Given        0809 (40 mg)-Given        0813 (40 mg)-Given        0841 (40 mg)-Given        0813 (40 mg)-Given           polyethylene glycol (MIRALAX/GLYCOLAX) Packet 17 g  Dose: 17 g  Freq: DAILY PRN Route: PO  PRN Reason: constipation  Start: 03/03/18 0838   Admin Instructions: 1 Packet = 17 grams. Mixed prescribed dose in 8 ounces of water. Follow with 8 oz. of water.        2143 (17 g)-Given          0958 (17 g)-Given           potassium chloride (KLOR-CON) Packet 20-40 mEq  Dose: 20-40 mEq  Freq: EVERY 2 HOURS PRN Route: ORAL OR FEED  PRN Reason: potassium supplementation  Start: 02/25/18 2003   Admin Instructions: Use if unable to tolerate tablets.  If Serum K+ 3.0-3.3, dose = 60 mEq po total dose (40 mEq x1 followed in 2 hours by 20 mEq x1). Recheck K+ level 4 hours after dose and the next AM.  If Serum K+ 2.5-2.9, dose = 80 mEq po total dose (40 mEq Q2H x2). Recheck K+ level 4 hours after dose and the next AM.  If  Serum K+ less than 2.5, See IV order.  Dissolve packet contents in 4-8 ounces of cold water or juice.               potassium chloride 10 mEq in 100 mL intermittent infusion with 10 mg lidocaine  Dose: 10 mEq  Freq: EVERY 1 HOUR PRN Route: IV  PRN Reason: potassium supplementation  Start: 02/25/18 2003   Admin Instructions: Infuse via PERIPHERAL LINE. Use potassium with lidocaine for pain with peripheral administration.  If Serum K+ 3.0-3.3, dose = 10 mEq/hr x4 doses (40 mEq IV total dose). Recheck K+ level 2 hours after dose and the next AM.  If Serum K+ less than 3.0, dose = 10 mEq/hr x6 doses (60 mEq IV total dose). Recheck K+ level 2 hours after dose and the next AM.               potassium chloride 20 mEq in 50 mL intermittent infusion  Dose: 20 mEq  Freq: EVERY 1 HOUR PRN Route: IV  PRN Reason: potassium supplementation  Start: 02/25/18 2003   Admin Instructions: Infuse via CENTRAL LINE Only. May need EKG if less than 65 kg or on TPN - Max rate is 0.3 mEq/kg/hr for patients not on EKG monitoring.   If Serum K+ 3.0-3.3, dose = 20 mEq/hr x2 doses (40 mEq IV total dose). Recheck K+ level 2 hours after dose and the next AM.  If Serum K+ less than 3.0, dose = 20 mEq/hr x3 doses (60 mEq IV total dose). Recheck K+ level 2 hours after dose and the next AM.               potassium chloride SA (K-DUR/KLOR-CON M) CR tablet 20-40 mEq  Dose: 20-40 mEq  Freq: EVERY 2 HOURS PRN Route: PO  PRN Reason: potassium supplementation  Start: 02/25/18 2003   Admin Instructions: Use if able to take PO.   If Serum K+ 3.0-3.3, dose = 60 mEq po total dose (40 mEq x1 followed in 2 hours by 20 mEq x1). Recheck K+ level 4 hours after dose and the next AM.  If Serum K+ 2.5-2.9, dose = 80 mEq po total dose (40 mEq Q2H x2). Recheck K+ level 4 hours after dose and the next AM.  If Serum K+ less than 2.5, See IV order.  DO NOT CRUSH.               sodium chloride (PF) 0.9% PF flush 3 mL  Dose: 3 mL  Freq: EVERY 8 HOURS Route: IK  Start: 02/22/18  1730   Admin Instructions: And Q1H PRN, to lock peripheral IV dormant line.     0020 (3 mL)-Given       0812 (3 mL)-Given       1529 (3 mL)-Given       2353 (3 mL)-Given        (0924)-Not Given       1507 (3 mL)-Given        (0121)-Not Given       0837 (3 mL)-Given       1529 (3 mL)-Given       2326 (3 mL)-Given        0810 (3 mL)-Given       1732 (3 mL)-Given       2311 (3 mL)-Given        0814 (3 mL)-Given       1639 (3 mL)-Given       2305 (3 mL)-Given        1011 (3 mL)-Given       1733 (3 mL)-Given        0134 (3 mL)-Given       1022 (3 mL)-Given       [ ] 1600           sodium chloride (PF) 0.9% PF flush 3 mL  Dose: 3 mL  Freq: EVERY 1 HOUR PRN Route: IK  PRN Reason: line flush  PRN Comment: for peripheral IV flush post IV meds  Start: 02/22/18 1720              tamsulosin (FLOMAX) capsule 0.4 mg  Dose: 0.4 mg  Freq: DAILY Route: PO  Start: 03/02/18 1045   Admin Instructions: Administer 30 minutes after the same meal each day.  Capsules should be swallowed whole; do not crush chew or open.       1150 (0.4 mg)-Given        0809 (0.4 mg)-Given        0813 (0.4 mg)-Given        0841 (0.4 mg)-Given        0813 (0.4 mg)-Given           ticagrelor (BRILINTA) tablet 90 mg  Dose: 90 mg  Freq: 2 TIMES DAILY Route: PO  Start: 02/24/18 2000    0811 (90 mg)-Given       2035 (90 mg)-Given        0850 (90 mg)-Given       2155 (90 mg)-Given        0834 (90 mg)-Given       1947 (90 mg)-Given        0809 (90 mg)-Given       1923 (90 mg)-Given        0813 (90 mg)-Given       1930 (90 mg)-Given        0841 (90 mg)-Given       1959 (90 mg)-Given        0813 (90 mg)-Given       [ ] 2000           traMADol (ULTRAM) half-tab 25-50 mg  Dose: 25-50 mg  Freq: EVERY 6 HOURS PRN Route: PO  PRN Reason: moderate pain  Start: 03/02/18 0718              traZODone (DESYREL) tablet 50 mg  Dose: 50 mg  Freq: EVERY EVENING Route: PO  Start: 03/02/18 2000 1947 (50 mg)-Given        1923 (50 mg)-Given        1930 (50 mg)-Given        1959 (50  mg)-Given        [ ] 2000          Completed Medications  Medications 02/28/18 03/01/18 03/02/18 03/03/18 03/04/18 03/05/18 03/06/18         Dose: 20 mg  Freq: ONCE Route: IV  Start: 03/05/18 1200   End: 03/05/18 1239   Admin Instructions: For ordered doses up to 40 mg, give IV Push undiluted over 1-2 minutes.          1237 (20 mg)-Given              Dose: 20 mg  Freq: ONCE Route: IV  Start: 03/04/18 0800   End: 03/04/18 0814   Admin Instructions: For ordered doses up to 40 mg, give IV Push undiluted over 1-2 minutes.         0813 (20 mg)-Given            Discontinued Medications  Medications 02/28/18 03/01/18 03/02/18 03/03/18 03/04/18 03/05/18 03/06/18         Dose: 5 mg  Freq: ONCE PRN Route: IM  PRN Reason: agitation  Start: 02/24/18 0719   End: 03/05/18 0845   Admin Instructions: Dissolve the contents of the 10 mg vial using 2.1 mL of Sterile Water for Injection to provide a solution containing 5 mg/mL of olanzapine. Withdraw the ordered dose from vial. Use immediately (within 1 hour) after reconstitution. Discard any unused portion.          0845-Med Discontinued

## 2018-02-22 NOTE — ANESTHESIA CARE TRANSFER NOTE
Patient: Jose Lira    Procedure(s):  Laparoscopic Assisted Low Anterior Resection, Sigmoidoscopy, Anesthesia Block (ERAS Patient) - Wound Class: II-Clean Contaminated   - Wound Class: II-Clean Contaminated    Diagnosis: Malignant Neoplasm Of Rectosigmoid Junction  Diagnosis Additional Information: No value filed.    Anesthesia Type:   No value filed.     Note:  Airway :ETT and Ventilator  Patient transferred to:PACU  Comments: On vent, Report to RNHandoff Report: Identifed the Patient, Identified the Reponsible Provider, Reviewed the pertinent medical history, Discussed the surgical course, Reviewed Intra-OP anesthesia mangement and issues during anesthesia, Set expectations for post-procedure period and Allowed opportunity for questions and acknowledgement of understanding      Vitals: (Last set prior to Anesthesia Care Transfer)    CRNA VITALS  2/22/2018 1231 - 2/22/2018 1315      2/22/2018             NIBP: 139/67    Pulse: 62    ART BP: 146/52    Temp: (!)  2.1  C (35.8  F)    SpO2: 97 %    Resp Rate (set): 8    EKG: Sinus rhythm                Electronically Signed By: CUONG Waldron CRNA  February 22, 2018  1:15 PM

## 2018-02-22 NOTE — OR NURSING
Patient resting between cares but easy to arouse.  Removed from BiPap and nasal trumpet removed.  Placed on oxiplus.  Encouraged cough and deep breath and able to perform with strong cough. Resp rate of 16 bpm noted.  HOB elevated. Dr Estrella contacted and updated on patient's progress.  Requested to take art line out but would like to wait another 30 minutes and reassess.  Yusuf RT by bedside at 1600 and updated.  Will continue to assess.

## 2018-02-22 NOTE — ANESTHESIA PROCEDURE NOTES
Peripheral Nerve Block Procedure Note    Staff:     Anesthesiologist:  GARRISON DAVIS    Resident/CRNA:  CHANDA KEARNS    Block performed by resident/CRNA in the presence of a teaching physician    Location: Pre-op  Procedure Start/Stop TImes:      2/22/2018 6:42 AM     2/22/2018 6:55 AM    patient identified, IV checked, site marked, risks and benefits discussed, informed consent, monitors and equipment checked, pre-op evaluation, at physician/surgeon's request and post-op pain management      Correct Patient: Yes      Correct Position: Yes      Correct Site: Yes      Correct Procedure: Yes      Correct Laterality:  Yes    Site Marked:  Yes  Procedure details:     Procedure:  TAP    ASA:  3    Diagnosis:  Post operative pain control    Laterality:  Bilateral    Position:  Supine    Sterile Prep: chloraprep      Local skin infiltration:  1% lidocaine  Assessment/Narrative:      Patient identified, IV checked, risks and benefits discussed, informed consent, monitors and equipment checked, pre-op evaluation, at physician/surgeon's request and post-op pain management      Correct Patient: Yes      Correct Position: Yes      Correct Site: Yes      Correct Procedure: Yes      Correct Laterality:  N/A    Site Marked:  N/A  Procedure details:     Procedure:  TAP    Diagnosis:  Post-op analgesia    Laterality:  Bilateral    Position:  Supine    Sterile Prep: chloraprep, mask and sterile gloves      Local skin infiltration:  None    Needle:  Pajunk    Needle gauge:  21    Needle length (mm):  110    Ultrasound: Yes      Ultrasound used to identify targeted nerve, plexus, or vascular structure and placed a needle adjacent to it      Permanent Image entered into patiient's record      Abnormal pain on injection: No      Blood Aspirated: No      Paresthesias:  No    Bleeding at site: No      Bolus via:  Needle    Infusion Method:  Single Shot    Complications:  None  Assessment/Narrative:      10mL 0.25% bupivacaine and  10mL Exparel diluted in 10mL NS injected into each site

## 2018-02-22 NOTE — OP NOTE
Procedure Date: 02/22/2018      PREOPERATIVE DIAGNOSIS:  Carcinoma of the rectosigmoid.      POSTOPERATIVE DIAGNOSIS:  Carcinoma of the rectosigmoid.      PROCEDURE:  Laparoscopic low anterior resection, flexible sigmoidoscopy.      HISTORY:  This 73-year-old man presented with rectal bleeding and was found to have a proximal rectal or rectosigmoid mass, biopsies of which confirmed adenocarcinoma.  The patient was referred for cardiologic evaluation due to a significant history of cardiac and atherosclerotic peripheral vascular disease.  He had an abnormal stress test and was originally scheduled for robotic revascularization.  This was canceled due to an acute infection.  The patient eventually was treated with placement of drug-eluting stents and in consultation with Cardiology we planned a resection following cessation of his ticagrelor, but with continuation of his aspirin.  I discuss the risks and alternatives of surgery with the patient and his family in detail.  I answered all their questions to their stated satisfaction.  They expressed their understanding and the patient provided written informed consent.      SURGEON:  Sharan Raza MD.      ASSISTANT:  Patrick Mcneill MD, Glenda Chase MD, Neha Rubio MD.      DESCRIPTION OF PROCEDURE:  After induction of adequate endotracheal anesthesia, the patient was placed in modified lithotomy position using Yellofin stirrups and pneumatic compression stockings.  Walter catheter was placed.  The patient was positioned on the Pink Pad and secured to the table with straps.  The abdomen was prepped and draped in a sterile fashion.  A timeout was performed and the patient and procedure confirmed.        We entered the abdomen using a Noel technique to place a 12-mm balloon port above the umbilicus.  Additional 5 mm ports were placed in the right upper and lower quadrants and in the suprapubic position.  Laparoscopic exploration of the abdomen demonstrated a  very mobile left colon with a clear tattoo near the rectosigmoid junction, but probably in the upper rectum.  There was no evidence of intraperitoneal malignancy.        We began a medial to lateral dissection.  We elevated the rectosigmoid mesentery at the level of sacral promontory and elevated the superior rectal artery.  We were unable to identify the ureter and establish the proper plane, so we converted to a lateral to medial dissection.  With this, we fully freed the sigmoid colon and upper rectum and clearly identified and preserved the ureter throughout the case.  We worked proximally back to the level just to the level of the inferior mesenteric artery.  Because of the patient's known severe vascular disease, I wished to preserved the left colic artery.  We identified the superior rectal artery at its origin and the adjacent left colic artery, which we preserved.  The superior rectal artery was divided with 3 double applications of LigaSure.  We carefully inspected the pedicle to be sure that it was hemostatic, which it was.  We then mobilized the remaining descending colon all the way up to the splenic flexure, though we did not formally take down the splenic flexure.  There was marked redundancy to the left colon and there was no problem with reach.        We turned our attention to the pelvis and opened the pelvic peritoneum circumferentially and dissected the proximal part of the rectum laparoscopically.  We next performed a Pfannenstiel incision and placed an Ze wound retractor.  With this, we dissected distally down to the level of the seminal vesicles, which were identified and preserved.  We selected our point of proximal transection immediately distal to a large arterial arcade with a good pulse.  We took down the mesentery to this level staying above the point where we divided the superior rectal artery.  We used the LigaSure for the mesenteric dissection.  The bowel was clamped, divided and  swabbed with Betadine.  The anvil of an EEA 28-mm stapler was secured in the proximal bowel limb with 2-0 Prolene.  With this, we were able to pack the entire intraabdominal contents into the upper abdomen which facilitated our pelvic exposure.  We selected a point for transection that appeared in the uncut specimen to be a good 6-7 cm below the palpable tumor.  We divided the mesentery perpendicular to the bowel at this level using the LigaSure.  The bowel was divided using a green load contour.  The specimen was sent to pathology for immediate gross examination.  This demonstrated a polypoid lesion in the distal part of the specimen with a 4 cm margin in the cut specimen.  There were several palpable lymph nodes within the colonic mesentery.        I next went to the perineal position and performed sigmoidoscopy of the rectal stump.  The closure appeared anatomically intact and air insufflation showed no evidence of air leak.  I advanced the EEA 28 stapler under abdominal guidance to the apex of the rectal stump.  The trocar was advanced to the mid portion of the staple line.  The stapler was reassembled and closed taking care to maintain proper orientation of the mesentery and to ensure that no extraneous tissue was involved in the staple line.  The stapler was then fired and removed with no difficulty.  There were 2 quite generous anastomotic rings.  Sigmoidoscopy was repeated.  The proximal and distal bowel segments were both well vascularized.  The anastomosis was visually intact and lay at the 13 cm level.  There was no evidence of air leak.  I elected not to place an ileostomy based upon the high level anastomosis, lack of previous radiation therapy and very satisfactory operative and endoscopic appearance.  I should note that due to the considerably redundant descending colon the anastomosis was absolutely floppy and tension free.      The colon was desufflated to the best of my ability.  The balloon port  site was closed with 0 Vicryl.  The Pfannenstiel incision was closed with 0 PDS.  Subcutaneous tissues were irrigated.  The skin was closed with 4-0 Monocryl and Dermabond.      Estimated blood loss was 30 mL.        The patient tolerated the procedure well and without evident complications.        Sponge, needle and instrument counts were reported as correct at the conclusion of the case x2.             KOTA TIDWELL MD             D: 2018   T: 2018   MT: NTS      Name:     ROYAL MONK   MRN:      [id number]        Account:        [id number]   :      1945           Procedure Date: 2018      Document: [id number]       cc: MALATHI Willett MD, PhD       Rebeca MARTINEZ MD       [facility] Surgery Billing

## 2018-02-22 NOTE — IP AVS SNAPSHOT
` ` Patient Information     Patient Name Sex Jose Kiran ([id number]) Male 1945       Room Bed    6234 6234-02      Patient Demographics     Address Phone    [address]   [place] [address] [phone] (Home)      Patient Ethnicity & Race     Ethnic Group Patient Race    American White      Emergency Contact(s)     Name Relation Home Work Mobile    Mitali Brito [phone]    Sarah De Jesus Other         Documents on File        Status Date Received Description       Documents for the Patient    Insurance Card  ()      Face Sheet Received () 09     Privacy Notice - [place] Received 03     Waiver - Payment  04     Insurance Card  () 05     Insurance Card  () 06     Insurance Card  () 07     Insurance Card  () 07     Insurance Card  () 09     External Medication Information Consent Accepted () 09     Insurance Card  () 09     Face Sheet Received () 07/19/10     Insurance Card Received () 07/19/10     External Medication Information Consent Accepted () 09/13/10     Patient ID Received 17 Photo on file    Consent for Services - Hospital/Clinic Received () 11     External Medication Information Consent Accepted () 11     Waiver - Payment Received 11     Consent for Services - Hospital/Clinic       Insurance Card Received () 08/10/11     Insurance Card Received () 08/10/11     Consent for Services - Hospital/Clinic Received () 12     External Medication Information Consent Accepted 12     Waiver - Payment Received 12     Consent for EHR Access  13 Copied from existing Consent for services - C/HOD collected on 2012    [employer] Specified Other       Consent for Services - Hospital/Clinic Received () 14      Insurance Card Received () 14 [employer]    Consent for Services - Hospital/Clinic Received () 09/15/15     Consent for Services/Privacy Notice - Hospital/Clinic Received () 16     Insurance Card Received () 16 MEDICARE 2016    Insurance Card Received () 16 [facility] 2016    Consent for Services - [facility]       Insurance Card Received 17 Medicare     Insurance Card Received 17 [facility]     Consent for Services/Privacy Notice - Hospital/Clinic Received 17     Care Everywhere Prospective Auth Received 10/19/17     Advance Directives and Living Will Received 10/25/17 Health Care Directive 2017    Advance Directives and Living Will Not Received  Validation of AD 2017    Consent for Services/Privacy Notice - Hospital/Clinic-Esign  (Deleted)         Documents for the Encounter    CMS IM for Patient Signature Received 18     H/P - History and Physical  18  HEALTH PRE-OP H/P/2018      Admission Information     Attending Provider Admitting Provider Admission Type Admission Date/Time    Sharan Raza MD Madoff, Robert, MD Elective 18  0522    Discharge Date Hospital Service Auth/Cert Status Service Area     Colorectal Surgery Incomplete [facility] SERVICES    Unit Room/Bed Admission Status       UU U6B 6234/6234-02 Admission (Confirmed)       Admission     Complaint    Malignant Neoplasm Of Rectosigmoid Junction, Rectal cancer (H)      Hospital Account     Name Acct ID Class Status Primary Coverage    Jose Lopez [id number] Inpatient Open MEDICARE - MEDICARE            Guarantor Account (for Hospital Account #[id number])     Name Relation to Pt Service Area Active? Acct Type    Jose Lopez Self FCS Yes Personal/Family    Address Phone          [address] [place] [address] [phone](H)              Coverage Information (for Hospital  Account #[id number])     1. MEDICARE/MEDICARE     F/O Payor/Plan Precert #    MEDICARE/MEDICARE     Subscriber Subscriber #    Jose Lopez [id number]    Address Phone    ATTN CLAIMS  [address]  [place] [address] [phone]          2. COMMERCIAL/[facility] CARE     F/O Payor/Plan Precert #    COMMERCIAL/SENIOR [facility] CARE     Subscriber Subscriber #    Jose Lopez [id number]    Address Phone    [facility] [place] [place] [address]

## 2018-02-22 NOTE — IP AVS SNAPSHOT
MRN:[id number]                      After Visit Summary   2/22/2018    Jose Lira    MRN: [id number]           Thank you!     Thank you for choosing [facility] for your care. Our goal is always to provide you with excellent care. Hearing back from our patients is one way we can continue to improve our services. Please take a few minutes to complete the written survey that you may receive in the mail after you visit with us. Thank you!        Patient Information     Date Of Birth          1945        Designated Caregiver       Most Recent Value    Caregiver    Will someone help with your care after discharge? yes    Name of designated caregiver GRETTA [Gretta- brother]    Phone number of caregiver pt does not remember see chart    Caregiver address same as pt      About your hospital stay     You were admitted on:  February 22, 2018 You last received care in the:  [address] [facility]    You were discharged on:  March 6, 2018       Who to Call     For medical emergencies, please call 911.  For non-urgent questions about your medical care, please call your primary care provider or clinic, [phone]  For questions related to your surgery, please call your surgery clinic        Attending Provider     Provider Specialty    Sharan Raza MD Colon and Rectal Surgery       Primary Care Provider Office Phone # Fax #    Rebeca Sandoval [phone]      After Care Instructions     Activity       Your activity upon discharge:   -No lifting, pushing, pulling greater than 10 lbs and no strenuous exercise for 6 weeks   -No driving while on narcotic analgesics (i.e. Percocet, oxycodone, Vicodin)  -No driving until you are able to fully twist to both sides or slam on brakes quickly and without any pain  -Encourage good sleep-wake cycles            Activity - Up with nursing assistance           Daily weights       Call Provider for weight gain of more than 2 pounds per day or 5  pounds per week.            Diet       Follow this diet upon discharge:   -Low Residue Diet for at least 4-6 weeks unless cleared by Colorectal surgery.  No raw vegetables, fruit skins, fibrous foods that require a lot of chewing, nuts, seeds, corn, popcorn.   -We recommend eating slowly, chewing thoroughly, eating small frequent meals throughout the day  -Stay well hydrated.            General info for SNF       Length of Stay Estimate: Short Term Care: Estimated # of Days <30  Condition at Discharge: Stable  Level of care:skilled   Rehabilitation Potential: Fair  Admission H&P remains valid and up-to-date: Yes  Recent Chemotherapy: N/A  Use Nursing Home Standing Orders: Yes            Intake and output       Every shift. Strict in and outs.            Mantoux instructions       Give two-step Mantoux (PPD) Per Facility Policy Yes            Monitor and record       MONITOR AND RECORD:   1.  Strict in and outs  2.  Daily weights  3.  Bowel movements            Oxygen - Nasal cannula       1-2 Lpm by nasal cannula to keep O2 sats 92% or greater.  Wean off oxygen as tolerated.            Wound care (specify)       Site:   Abdominal incisions  Instructions:  Keep clean and dry.  Please check at least once daily.                  Follow-up Appointments     Adult [facility] Follow-up and recommended labs and tests       DIET  -Low Residue Diet for at least 4-6 weeks unless cleared by Colorectal surgery.  No raw vegetables, fruit skins, fibrous foods that require a lot of chewing, nuts, seeds, corn, popcorn.   -We recommend eating slowly, chewing thoroughly, eating small frequent meals throughout the day  -Stay well hydrated.      ACTIVITY  -No lifting, pushing, pulling greater than 10 lbs and no strenuous exercise for 6 weeks   -No driving while on narcotic analgesics (i.e. Percocet, oxycodone, Vicodin)  -No driving until you are able to fully twist to both sides or slam on brakes quickly and without any pain    WOUND  CLINIC  -Inspect your wounds daily for signs of infection (increased redness, drainage, pain)  -Keep your wound clean and dry  -You may shower, but do not soak in tub or pool    NOTIFY  Please contact Dora Rice LPN, Elise Jose RN or Flori Colmenares RN at [phone] for problems after discharge such as:  -Temperature > 101F, chills, rigors, dizziness  -Redness around or purulent drainage from wound  -Inability to tolerate diet, nausea or vomiting  -You stop passing gas, develop significant bloating, abdominal pain  -Have blood in stools/vomit  -Have severe diarrhea/constipation  -Any other questions or concerns.  - At nights (after 4:30pm), on weekends, or if urgent, call [phone] and ask the  to speak with the on-call Colorectal Surgery resident or fellow      Medication Instructions  Some of your medications may have changed. Please take only prescribed and resumed medications     FOLLOW-UP  1.  You will need to follow-up with Kailey Quiles NP in the Colon and Rectal Surgery clinic in 1-2 week(s) and then with CRS Staff: Dr. Raza in 2-3 weeks after.  Please contact our clinic scheduler Zainab Radford (phone # [phone]) if you have not heard from our clinic in 3 business days afer discharge to schedule a follow-up appointment.     2.  Follow up with your primary care provider in 1-2 weeks after discharge from the hospital to review this hospitalization.     3.  Please hold your usual home aspirin for now and can restart on 3/13.    4.  Maintain good sleep-wake cycles.  Avoid CNS altering medications.        Appointments on [place] and/or [facility] (with [facility] or [facility] provider or service). Call [phone] if you haven't heard regarding these appointments within 7 days of discharge.                  Your next 10 appointments already scheduled     Jul 16, 2018 12:00 PM CDT   (Arrive by 11:45 AM)   Return Visit with Jose Willett MD   [facility]    [address]  [address] [place] [address] [phone]              Additional Services     Nutrition Services Adult IP Consult       Reason:  Calorie counts.  Optimize post-operative healing.            Occupational Therapy Adult Consult       Evaluate and treat as clinically indicated.    Reason:  Safe transition back to home.            Physical Therapy Adult Consult       Evaluate and treat as clinically indicated.    Reason:  Safe transition back to home.                  Pending Results     No orders found from 2018 to 2018.            Statement of Approval     Ordered          18 1145  I have reviewed and agree with all the recommendations and orders detailed in this document.  EFFECTIVE NOW     Approved and electronically signed by:  Kenzie Thorne PA-C             Admission Information     Date & Time Provider Department Dept. Phone    2018 Sharan Raza MD [address] [facility] [phone]      Your Vitals Were     Blood Pressure Pulse Temperature Respirations Height Weight    157/71 (BP Location: Right arm) 77 98.2  F (36.8  C) (Oral) 18 1.651 m (5' 5\") 82.2 kg (181 lb 4.8 oz)    Pulse Oximetry BMI (Body Mass Index)                96% 30.17 kg/m2          MyChart Information     [employer] lets you send messages to your doctor, view your test results, renew your prescriptions, schedule appointments and more. To sign up, go to [email or web address] . Click on \"Log in\" on the left side of the screen, which will take you to the Welcome page. Then click on \"Sign up Now\" on the right side of the page.     You will be asked to enter the access code listed below, as well as some personal information. Please follow the directions to create your username and password.     Your access code is: [email or web address]  Expires: 3/25/2018  6:30 AM     Your access code will  in 90 days. If you need help or a new code, please call your [facility] " clinic or [phone].        Care EveryWhere ID     This is your Care EveryWhere ID. This could be used by other organizations to access your [facility] medical records  [id number]        Equal Access to Services     RIGO STORY : Hadii aad ku hadmalloryo Soalejandra, waminida luqadaha, qaybta kaalmada heather, haresh tranin hayaaeaston lozadabridget zimmerman ivan campo. So [facility] [phone].    ATENCIÓN: Si habla español, tiene a adorno disposición servicios gratuitos de asistencia lingüística. Llame al [phone].    We comply with applicable federal civil rights laws and [place] laws. We do not discriminate on the basis of race, color, national origin, age, disability, sex, sexual orientation, or gender identity.               Review of your medicines      START taking        Dose / Directions    acetaminophen 500 MG tablet   Commonly known as:  TYLENOL        Dose:  500-1000 mg   Take 1-2 tablets (500-1,000 mg) by mouth 4 times daily as needed for mild pain or fever   Refills:  0       heparin sodium PF 5000 UNIT/0.5ML injection        Dose:  5000 Units   Inject 0.5 mLs (5,000 Units) Subcutaneous every 12 hours Started 2/23 for post-operative prophylaxis.  Is to complete about 30 days of this.  Stop on 3/23.   Refills:  0       levofloxacin 500 MG tablet   Commonly known as:  LEVAQUIN   Indication:  Healthcare-Associated Pneumonia   Used for:  HAP (hospital-acquired pneumonia)        Dose:  500 mg   Start taking on:  3/7/2018   Take 1 tablet (500 mg) by mouth every 48 hours for 1 dose   Refills:  0       polyethylene glycol Packet   Commonly known as:  MIRALAX/GLYCOLAX   Used for:  Constipation, unspecified constipation type        Dose:  17 g   Take 17 g by mouth daily as needed for constipation   Quantity:  7 packet   Refills:  0       tamsulosin 0.4 MG capsule   Commonly known as:  FLOMAX        Dose:  0.4 mg   Start taking on:  3/7/2018   Take 1 capsule (0.4 mg) by mouth daily for 14 days   Quantity:  60 capsule   Refills:  0        traZODone 50 MG tablet   Commonly known as:  DESYREL   Used for:  Other insomnia        Dose:  50 mg   Take 1 tablet (50 mg) by mouth every evening   Quantity:  60 tablet   Refills:  0         CONTINUE these medicines which may have CHANGED, or have new prescriptions. If we are uncertain of the size of tablets/capsules you have at home, strength may be listed as something that might have changed.        Dose / Directions    amLODIPine 10 MG tablet   Commonly known as:  NORVASC   This may have changed:  how much to take   Used for:  Essential hypertension with goal blood pressure less than 130/80        Dose:  5 mg   Start taking on:  3/8/2018   Take 0.5 tablets (5 mg) by mouth daily   Quantity:  90 tablet   Refills:  PRN       aspirin 81 MG EC tablet   This may have changed:  when to take this   Used for:  Hypertension goal BP (blood pressure) < 130/80, Hyperlipidemia LDL goal <100        Dose:  81 mg   Start taking on:  3/13/2018   Take 1 tablet (81 mg) by mouth daily   Quantity:  30 tablet   Refills:  0       atorvastatin 40 MG tablet   Commonly known as:  LIPITOR   This may have changed:  when to take this   Used for:  Hyperlipidemia LDL goal <100        Dose:  40 mg   Take 1 tablet (40 mg) by mouth daily   Quantity:  90 tablet   Refills:  PRN         CONTINUE these medicines which have NOT CHANGED        Dose / Directions    carvedilol 25 MG tablet   Commonly known as:  COREG   Used for:  Coronary artery disease due to lipid rich plaque, Status post coronary angioplasty        Dose:  25 mg   Take 1 tablet (25 mg) by mouth 2 times daily   Quantity:  180 tablet   Refills:  3       lactobacillus rhamnosus (GG) 10 B CELL capsule   Commonly known as:  CULTURELLE        Dose:  1 capsule   Take 1 capsule by mouth every morning   Refills:  0       MULTIVITAMIN & MINERAL PO        Dose:  1 tablet   Take 1 tablet by mouth every morning   Refills:  0       niacin 500 MG tablet   Used for:  Hyperlipidemia LDL goal <100         Dose:  500 mg   Take 1 tablet (500 mg) by mouth At Bedtime   Quantity:  30 tablet   Refills:  PRN       SAW PALMETTO PO        Dose:  1 tablet   Take 1 tablet by mouth every morning   Refills:  0       ticagrelor 90 MG tablet   Commonly known as:  BRILINTA   Used for:  Coronary artery disease of native artery of native heart with stable angina pectoris (H)        Dose:  90 mg   Take 1 tablet (90 mg) by mouth 2 times daily   Quantity:  180 tablet   Refills:  3         STOP taking     BENADRYL 25 MG capsule   Generic drug:  diphenhydrAMINE           neomycin 500 MG tablet   Commonly known as:  MYCIFRADIN           ondansetron 4 MG tablet   Commonly known as:  ZOFRAN           polyethylene glycol 236 G suspension   Commonly known as:  GoLYTELY/NuLYTELY           tiZANidine 2 MG tablet   Commonly known as:  ZANAFLEX                Where to get your medicines      Some of these will need a paper prescription and others can be bought over the counter. Ask your nurse if you have questions.     You don't need a prescription for these medications     acetaminophen 500 MG tablet    amLODIPine 10 MG tablet    aspirin 81 MG EC tablet    heparin sodium PF 5000 UNIT/0.5ML injection    levofloxacin 500 MG tablet    polyethylene glycol Packet    tamsulosin 0.4 MG capsule    traZODone 50 MG tablet                Protect others around you: Learn how to safely use, store and throw away your medicines at www.disposemymeds.org.        ANTIBIOTIC INSTRUCTION     You've Been Prescribed an Antibiotic - Now What?  Your healthcare team thinks that you or your loved one might have an infection. Some infections can be treated with antibiotics, which are powerful, life-saving drugs. Like all medications, antibiotics have side effects and should only be used when necessary. There are some important things you should know about your antibiotic treatment.      Your healthcare team may run tests before you start taking an antibiotic.    Your  team may take samples (e.g., from your blood, urine or other areas) to run tests to look for bacteria. These test can be important to determine if you need an antibiotic at all and, if you do, which antibiotic will work best.      Within a few days, your healthcare team might change or even stop your antibiotic.    Your team may start you on an antibiotic while they are working to find out what is making you sick.    Your team might change your antibiotic because test results show that a different antibiotic would be better to treat your infection.    In some cases, once your team has more information, they learn that you do not need an antibiotic at all. They may find out that you don't have an infection, or that the antibiotic you're taking won't work against your infection. For example, an infection caused by a virus can't be treated with antibiotics. Staying on an antibiotic when you don't need it is more likely to be harmful than helpful.      You may experience side effects from your antibiotic.    Like all medications, antibiotics have side effects. Some of these can be serious.    Let you healthcare team know if you have any known allergies when you are admitted to the hospital.    One significant side effect of nearly all antibiotics is the risk of severe and sometimes deadly diarrhea caused by Clostridium difficile (C. Difficile). This occurs when a person takes antibiotics because some good germs are destroyed. Antibiotic use allows C. diificile to take over, putting patients at high risk for this serious infection.    As a patient or caregiver, it is important to understand your or your loved one's antibiotic treatment. It is especially important for caregivers to speak up when patients can't speak for themselves. Here are some important questions to ask your healthcare team.    What infection is this antibiotic treating and how do you know I have that infection?    What side effects might occur from  this antibiotic?    How long will I need to take this antibiotic?    Is it safe to take this antibiotic with other medications or supplements (e.g., vitamins) that I am taking?     Are there any special directions I need to know about taking this antibiotic? For example, should I take it with food?    How will I be monitored to know whether my infection is responding to the antibiotic?    What tests may help to make sure the right antibiotic is prescribed for me?      Information provided by:  www.cdc.gov/getsmart  U.S. Department of Health and Human Services  Centers for disease Control and Prevention  National Center for Emerging and Zoonotic Infectious Diseases  Division of Healthcare Quality Promotion             Medication List: This is a list of all your medications and when to take them. Check marks below indicate your daily home schedule. Keep this list as a reference.      Medications           Morning Afternoon Evening Bedtime As Needed    acetaminophen 500 MG tablet   Commonly known as:  TYLENOL   Take 1-2 tablets (500-1,000 mg) by mouth 4 times daily as needed for mild pain or fever   Last time this was given:  975 mg on 2/28/2018  8:35 PM                                amLODIPine 10 MG tablet   Commonly known as:  NORVASC   Take 0.5 tablets (5 mg) by mouth daily   Start taking on:  3/8/2018   Last time this was given:  10 mg on 2/24/2018  8:04 AM                                aspirin 81 MG EC tablet   Take 1 tablet (81 mg) by mouth daily   Start taking on:  3/13/2018   Last time this was given:  81 mg on 3/2/2018  8:34 AM                                atorvastatin 40 MG tablet   Commonly known as:  LIPITOR   Take 1 tablet (40 mg) by mouth daily   Last time this was given:  40 mg on 3/6/2018  8:13 AM                                carvedilol 25 MG tablet   Commonly known as:  COREG   Take 1 tablet (25 mg) by mouth 2 times daily   Last time this was given:  25 mg on 3/6/2018  8:13 AM                                 heparin sodium PF 5000 UNIT/0.5ML injection   Inject 0.5 mLs (5,000 Units) Subcutaneous every 12 hours Started 2/23 for post-operative prophylaxis.  Is to complete about 30 days of this.  Stop on 3/23.   Last time this was given:  5,000 Units on 3/6/2018  8:13 AM                                lactobacillus rhamnosus (GG) 10 B CELL capsule   Commonly known as:  CULTURELLE   Take 1 capsule by mouth every morning                                levofloxacin 500 MG tablet   Commonly known as:  LEVAQUIN   Take 1 tablet (500 mg) by mouth every 48 hours for 1 dose   Start taking on:  3/7/2018   Last time this was given:  500 mg on 3/5/2018  8:41 AM                                MULTIVITAMIN & MINERAL PO   Take 1 tablet by mouth every morning                                niacin 500 MG tablet   Take 1 tablet (500 mg) by mouth At Bedtime                                polyethylene glycol Packet   Commonly known as:  MIRALAX/GLYCOLAX   Take 17 g by mouth daily as needed for constipation   Last time this was given:  17 g on 3/6/2018  9:58 AM                                SAW PALMETTO PO   Take 1 tablet by mouth every morning                                tamsulosin 0.4 MG capsule   Commonly known as:  FLOMAX   Take 1 capsule (0.4 mg) by mouth daily for 14 days   Start taking on:  3/7/2018   Last time this was given:  0.4 mg on 3/6/2018  8:13 AM                                ticagrelor 90 MG tablet   Commonly known as:  BRILINTA   Take 1 tablet (90 mg) by mouth 2 times daily   Last time this was given:  90 mg on 3/6/2018  8:13 AM                                traZODone 50 MG tablet   Commonly known as:  DESYREL   Take 1 tablet (50 mg) by mouth every evening   Last time this was given:  50 mg on 3/5/2018  7:59 PM

## 2018-02-22 NOTE — IP AVS SNAPSHOT
[address] [address]    Phone:  [phone]                                       After Visit Summary   2/22/2018    Jose Lira    MRN: [id number]           After Visit Summary Signature Page     I have received my discharge instructions, and my questions have been answered. I have discussed any challenges I see with this plan with the nurse or doctor.    ..........................................................................................................................................  Patient/Patient Representative Signature      ..........................................................................................................................................  Patient Representative Print Name and Relationship to Patient    ..................................................               ................................................  Date                                            Time    ..........................................................................................................................................  Reviewed by Signature/Title    ...................................................              ..............................................  Date                                                            Time

## 2018-02-22 NOTE — LETTER
Health Information Management Services               Recipient: [facility]  Fax: [phone]          Sender:  Anne-Marie Gums                   Weekend     Please contact: Huma Gaitan, [address] ,[phone]          Date: March 3, 2018  Patient Name:  Jose Lira  Routing Message:            The documents accompanying this notice contain confidential information belonging to the sender.  This information is intended only for the use of the individual or entity named above.  The authorized recipient of this information is prohibited from disclosing this information to any other party and is required to destroy the information after its stated need has been fulfilled, unless otherwise required by state law.      If you are not the intended recipient, you are hereby notified that any disclosure, copying, distribution or action taken in reliance on the contents of these documents is strictly prohibited. If you have received this document in error, please notify [facility] immediately at [phone].  You may return the document via fax ([phone]) or return mail  (Health Information Management, , [address] [place] [address]).

## 2018-02-22 NOTE — ANESTHESIA PROCEDURE NOTES
Arterial Line Procedure Note  Staff:     Anesthesiologist:  OMAR WILSON  Location: In OR After Induction  Procedure Start/Stop Times:     patient identified, IV checked, site marked, risks and benefits discussed, informed consent, monitors and equipment checked, pre-op evaluation and at physician/surgeon's request      Correct Patient: Yes      Correct Position: Yes      Correct Site: Yes      Correct Procedure: Yes      Correct Laterality:  Yes    Site Marked:  Yes  Line Placement:     Procedure:  Arterial Line    Insertion Site:  Radial    Insertion laterality:  Right    Skin Prep: Chloraprep      Patient Prep: mask, sterile gloves, hat and hand hygiene      Local skin infiltration:  None ([place])    Ultrasound Guided?: No      Catheter size: 20g 7.5cm.    Cath secured with: suture      Cath secured with comment:  Benzoin, steristrips    Dressing:  Tegaderm    Complications:  None obvious    Arterial waveform: Yes      IBP within 10% of NIBP: Yes

## 2018-02-22 NOTE — OR NURSING
Dr Monae and Yusuf RT to bedside.  Patient easily responds to questions- denies pain, nausea, and/or shortness of breath. Oral care performed by this RN. Tolerated rolling side to side well.  HOB elevated.  Pulling volumes of 400-800 on average.  BP acceptable.  Will continue to allow to rest on BiPap for 30 minutes, per Dr monae's request, and then reassess.  Dr Monae would like to try and get off BiPap before sending to floor.  Yusuf will be back in 30 minutes to help with biPap.  Continue to monitor.  Dr Simon will be taking over for Dr Monae.   Sleepy but easy to arouse.

## 2018-02-22 NOTE — IP AVS SNAPSHOT
` `     [address] [facility]: [phone]                 INTERAGENCY TRANSFER FORM - NOTES (H&P, Discharge Summary, Consults, Procedures, Therapies)   2018                    Hospital Admission Date: 2018  JOSE MONK   : 1945  Sex: Male        Patient PCP Information     Provider PCP Type    Rebeca Sandoval NP General         History & Physicals      H&P signed by Chivo Sommer at 2018 12:17 PM      Author:  Chivo Sommer Service:  (none) Author Type:  Physician    Filed:  2018 12:17 PM Date of Service:  2018 10:11 AM Creation Time:  2018 12:17 PM    Status:  Signed :  Chivo Sommer (Physician)     Scan on 2018 12:17 PM by Kemal, Provider : [facility] PRE-OP H/P/2018 1          Revision History        User Key Date/Time User Provider Type Action    > [N/A] 2018 12:17 PM Kemal, Provider Physician Sign            H&P by Isabel Jasso PA-C at 2018  1:30 PM     Author:  Isabel Jasso PA-C Service:  (none) Author Type:  Physician Assistant - C    Filed:  2018  9:46 AM Encounter Date:  2018 Status:  Signed    :  Isabel Jasso PA-C (Physician Assistant - C)             Pre-Operative H & P     CC:  Preoperative exam to assess for increased cardiopulmonary risk while undergoing surgery and anesthesia.    Date of Encounter: 2018  Primary Care Physician:  Rebeca Sandoval  Jose Monk is a 73 year old male who presents for pre-operative H & P in preparation for Laparoscopic Assisted Low Anterior Resection Possible Loop Ileostomy  on 18 by Dr. Raza in treatment of colon cancer.[DC1.1]   Surgery[DC1.2] at[DC1.1] [facility][DC1.2].[DC1.1] Diagnosed with colon cancer 2017.  He saw Dr. Willett 2017 for risk assessment.  Stress test was positive so he underwent cardiac cath and found to have 2V CAD.   2017, 3 NICOLE were placed to LAD.   The following month, PCI was performed to the RCA.  He is on DAPT.  Ticagrelor will be held for surgery per cardiology.[DC1.2]      History is obtained from the patient, electronic health record and patient's sister.     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     Subclavian arterial stenosis (H)     Left     Unspecified essential hypertension[DC1.3]        Past Surgical History[DC1.1]  Past Surgical History:   Procedure Laterality Date     COLONOSCOPY N/A 5/19/2017    Procedure: COMBINED COLONOSCOPY, SINGLE OR MULTIPLE BIOPSY/POLYPECTOMY BY BIOPSY;  Rectal bleeding;  Surgeon: Maximus Dinero MD;  Location:  GI     SURGICAL HISTORY OF -   10/02    angioplasty and stenting of left and internal carotid artery at the bifurcation     SURGICAL HISTORY OF - 11/11    right common iliac artery stent      VASCULAR SURGERY  2001    stent placed in carotid[DC1.3]        Hx of Blood transfusions/reactions: no     Hx of abnormal bleeding or anti-platelet use: DAPT     Steroid use in the last year: no    Personal or FH with difficulty with Anesthesia:  no    Prior to Admission Medications[DC1.1]  Current Outpatient Prescriptions   Medication Sig Dispense Refill     carvedilol (COREG) 25 MG tablet Take 1 tablet (25 mg) by mouth 2 times daily 180 tablet 3     ticagrelor (BRILINTA) 90 MG tablet Take 1 tablet (90 mg) by mouth 2 times daily 180 tablet 3     tiZANidine (ZANAFLEX) 2 MG tablet Take 1-2 tablets (2-4 mg) by mouth nightly as needed 60 tablet PRN     aspirin 81 MG EC tablet Take 1  tablet (81 mg) by mouth daily (Patient taking differently: Take 81 mg by mouth every morning ) 30 tablet      atorvastatin (LIPITOR) 40 MG tablet Take 1 tablet (40 mg) by mouth daily (Patient taking differently: Take 40 mg by mouth every morning ) 90 tablet PRN     amLODIPine (NORVASC) 10 MG tablet Take 1 tablet (10 mg) by mouth daily (Patient taking differently: Take 10 mg by mouth every morning ) 90 tablet PRN     Saw Palmetto, Serenoa repens, (SAW PALMETTO PO) Take 1 tablet by mouth every morning        niacin 500 MG tablet Take 1 tablet (500 mg) by mouth At Bedtime 30 tablet PRN     Multiple Vitamins-Minerals (MULTIVITAMIN & MINERAL PO) Take 1 tablet by mouth every morning        diphenhydrAMINE (BENADRYL) 25 MG capsule Take 50 mg by mouth nightly as needed sleep       lactobacillus rhamnosus, GG, (CULTURELLE) 10 B CELL capsule Take 1 capsule by mouth every morning        polyethylene glycol (GOLYTELY/NULYTELY) 236 G suspension Refer to surgical packet for instructions 4000 mL 0     metroNIDAZOLE (FLAGYL) 500 MG tablet Take 1 tablet (500 mg) by mouth every 8 hours for 1 day prior to surgery.  Take in conjunction with Neomycin Sulfate. 3 tablet 0     neomycin (MYCIFRADIN) 500 MG tablet Take two tablets by mouth every 8 hours for one day prior to surgery. Take in conjunction with Flagyl 6 tablet 0     ondansetron (ZOFRAN) 4 MG tablet Take one tablet by mouth every 6 hours as needed for nausea when taking neomycin and flagyl 3 tablet 0     [DISCONTINUED] olmesartan-hydrochlorothiazide (BENICAR HCT) 40-25 MG per tablet Take 1 tablet by mouth daily 90 tablet 3[DC1.3]       Allergies[DC1.1]  Allergies   Allergen Reactions     Fentanyl      Agitation (he is not positive about this reaction)[DC1.3]       Social History[DC1.1]  Social History     Social History     Marital status: Single     Spouse name: N/A     Number of children: 0     Years of education: N/A     Occupational History     post       Social History  Main Topics     Smoking status: Former Smoker     Packs/day: 3.00     Years: 30.00     Quit date: 8/5/1994     Smokeless tobacco: Former User     Quit date: 8/1/1994      Comment: quit 15 years ago after his stroke     Alcohol use 0.6 oz/week     1 Glasses of wine per week      Comment: 1 per day     Drug use: No     Sexual activity: No     Other Topics Concern     Parent/Sibling W/ Cabg, Mi Or Angioplasty Before 65f 55m? Yes     brother and father     Social History Narrative    Patient [place].  Was never .  Had no children.  Has a two brothers that have passed away.  He has two sister, one with Alzheimer and one alive and well.  Sister will be here for surgery.[DC1.3]         Family History[DC1.1]  Family History   Problem Relation Age of Onset     C.A.D. Father      Hypertension Father      Prostate Cancer Father      C.A.D. Brother      MI     Hypertension Brother      Arthritis Sister      DIABETES No family hx of      CEREBROVASCULAR DISEASE No family hx of      Cancer - colorectal No family hx of[DC1.3]          ROS/MED HX  The complete review of systems is negative other than noted in the HPI or here. '  ENT/Pulmonary:     (+)DAPHNE risk factors snores loudly, hypertension, obese, tobacco use (3 PPD for 30 years),      Neurologic: Comment: s/p stent to Left ICA post TIA, 2002.    (+)CVA date: 1993 without deficitsTIA date: 2002 features: slurring words and general disorientation.,     Cardiovascular: Comment: PVD, s/p stent right common iliac artery , 2010.  Left subclavian artery stent.   Reports pain in legs with ambulating.     (+) hypertension-Peripheral Vascular Disease-- Carotid Stenosis and Other, CAD, --stent,Nov and Dec 2017  Drug Eluting Stent .. Taking blood thinners Pt has received instructions: Instructions Given to patient: Completed 6 weeks DAPT.  Ok to hold for surgery per cardiology       (-) CABG   METS/Exercise Tolerance: Comment: METs<4.  Limited by PVD. Walks 1/2  "block.     Hematologic:  - neg hematologic  ROS       Musculoskeletal:  - neg musculoskeletal ROS       GI/Hepatic:  - neg GI/hepatic ROS       Renal/Genitourinary:     (+) chronic renal disease, type: CRI, Pt does not require dialysis, Pt has no history of transplant,       Endo:     (+) Obesity (BMI 35), .      Psychiatric:  - neg psychiatric ROS       Infectious Disease:  - neg infectious disease ROS       Malignancy:   (+) Malignancy History of Other  Other CA colon cancer Active status post         Other:    (+) no H/O Chronic Pain,no other significant disability[DC1.1]          Temp: 97.7  F (36.5  C) Temp src: Oral BP: 163/77 Pulse: 80   Resp: 16 SpO2: 98 %         196 lbs 12.8 oz  5' 5\"   Body mass index is 32.75 kg/(m^2).[DC1.3]       Physical Exam  Constitutional: Awake, alert, cooperative, no apparent distress, and appears stated age.[DC1.1]  Disheveled.[DC1.2]   Eyes: Pupils equal, round and reactive to light,  sclera clear, conjunctiva normal.  HENT: Normocephalic, oral pharynx with moist mucus membranes,[DC1.1] poor[DC1.2] dentition. No goiter appreciated.   Respiratory: Clear to auscultation bilaterally, no crackles or wheezing.  Cardiovascular: Regular rate and rhythm, normal S1 and S2, and no murmur noted.  Carotids +2,[DC1.1] with left carotid bruit.  +1 bilat[DC1.2] edema.[DC1.1] Pulses not palpable to LE's.[DC1.2]   GI: Normal bowel sounds, soft, non-distended, non-tender, no masses palpated, no hepatosplenomegaly.    Lymph/Hematologic: No cervical lymphadenopathy and no supraclavicular lymphadenopathy.  Skin: Warm and dry.  No rashes at anticipated surgical site.[DC1.1]  Scratch marks LLE.[DC1.2]   Musculoskeletal: Full ROM of neck. There is no redness, warmth, or swelling of the joints. Gross motor strength is normal.    Neurologic: Awake, alert, oriented to name, place and time. Cranial nerves II-XII are grossly intact. Gait is normal.   Neuropsychiatric: Calm, cooperative.[DC1.1] Flat affect. "  Poor historian.  Cognitive dysfunction.[DC1.2]     Labs: (personally reviewed)[DC1.1]  2/13/2018   Sodium: 139  Potassium: 3.9  Chloride: 109  Carbon Dioxide: 21  Urea Nitrogen: 21  Creatinine: 2.33 (H)  GFR Estimate: 28 (L)  GFR Estimate If Black: 33 (L)  Calcium: 8.8  Anion Gap: 8  Glucose: 104 (H)    WBC: 10.7  Hemoglobin: 9.5 (L)  Hematocrit: 31.8 (L)  Platelet Count: 286  RBC Count: 3.79 (L)  MCV: 84  MCH: 25.1 (L)  MCHC: 29.9 (L)  RDW: 13.2[DC1.4]    Bilateral Carotid US 7/31/17  Impression:     1. Right internal carotid: Occluded, unchanged. Flow is visualized  within the vertebral artery, consistent with angiogram 11/9/2011 and  carotid ultrasound 6/20/2008.     2. Left carotid stent: Less than 50 percent narrowing within common  carotid and internal carotid artery stent by velocity criteria.     3. The left external carotid artery demonstrate elevated velocities at  513 cm/sec, previously 380 cm/sec. Findings likely represent  hemodynamically significant stenosis.    CATH 11/2017:      CATH 12/2017:      EKG: Personally reviewed but formal cardiology read pending:[DC1.1] NSR at 72.  QTc 431 ms[DC1.3]      Outside records reviewed from: NA    ASSESSMENT and PLAN  Jose Lira is a 73 year old male scheduled to undergo Laparoscopic Assisted Low Anterior Resection Possible Loop Ileostomy  on 2/22/18 by Dr. Raza in treatment of colon cancer.  PAC referral for risk assessment and optimization for anesthesia with comorbid conditions of:    PAC referral for risk assessment and optimization of anesthesia with comorbid conditions of:  CAD (recent NICOLE 11/2017 and PCI 12/2017); HTN; HLD; PVD; CKD; 90 pack year smoking history and distal sigmoid/rectosigmoid adenocarcinoma.    ERAS CRS  NO b/p on left arm due to subclavian stenosis.     1.  Cardiology -  RCRI > 11%.  High risk for moderate risk surgery.         -CAD, 2 vessel disease without left main.  NICOLE x 3 to LAD 11/2017.  PCI RCA 12/19/17.   EF 55-60%  without valvular disease on DSE.          - On DAPT.  Instructed to stay on 81mg ASA (hold DOS).  Stop Ticagrelor 5 days before surgery (confirmed with Dr. Willett - since completed 6+ weeks of DAPT).   RESUME TICAGRELOR ASAP AFTER SURGERY FOR TOTAL OF 6 MONTHS AFTER NICOLE 11/16/17.  STAY ON ASA, LIFE LONG.        - Recommend post op troponins daily x 3 days after surgery.  Tele.         - PVD, s/p stent to left internal carotid artery (patent), 2002 and right common iliac artery, 2011.  Chronic occluded right carotid artery, chronic.  Extensive atherosclerosis of the aorta.    Left subclavian artery stenosis       - HTN-take Coreg and Norvasc DOS.         - HLD, take statin as prescribed DOS.   2.  Pulmonary - 90 pack year smoking history. Quit 1993       - DAPHNE # of risks 5/8 = high risk  3.  Hematology/Oncology - VTE risk:  4.5%       - Distal sigmoid/rectosigmoid adenocarcinoma  4.  GI - Risk of PONV score = 2.  If > 2, anti-emetic intervention recommended.  5.  Renal -  CKD stage 4: Cr 2.36, GFR 26.  Avoid nephrotoxic medications.  Recommend close monitoring of fluid balance and electrolytes throughout the perioperative period.   6.  Endocrine - HgbA1C 5.4 (8/2017)  7.  Musculoskeletal - upper extremity resting tremor  8.  Neuro - CVA 1994 and TIA 2002.  Stent to left internal carotid artery at bifurcation, 2002.  CVA 1993.    9.  Cognitive dysfunction - avoid BZD's and meds that can increase delerium.   Use delirium prevention protocol  10.  Dispo:  Suspect will need TCU post op[DC1.1]  11.  DVT prophylaxis recommended.[DC1.2]       For complete medication and diet instructions for surgery, please refer to the AVS completed by nursing.  Sister, Evelin assists.  She was given copy of AVS to help monitor meds for surgery (with patient's approval).   Patient is optimized and is acceptable candidate for the proposed procedure.  No further diagnostic evaluation is needed.  Patient was discussed with Dr Lynch[DC1.1] and  Dr. Raza.[DC1.5]     Isabel Zuniga Cousins, PA-C  Preoperative Assessment Center  [facility]  Clinic and Surgery Center  Phone: [phone]  Fax: [phone][DC1.1]     Revision History        User Key Date/Time User Provider Type Action    > DC1.5 2/19/2018  9:46 AM CousinIsabel moreno, PA-C Physician Assistant - C Sign     DC1.4 2/19/2018  9:44 AM CousinsIsabel Efrain, PA-C Physician Assistant - C      DC1.3 2/13/2018  3:48 PM Cousins, Isabel Efrain, PA-C Physician Assistant - C      DC1.2 2/13/2018  2:56 PM LouisaIsabel, PA-C Physician Assistant - C      DC1.1 2/13/2018  2:44 PM CoureyIsabel, PA-C Physician Assistant - C                   Discharge Summaries     No notes of this type exist for this encounter.         Consult Notes      Consults by Yogi Woodward MD at 2/24/2018  1:19 PM     Author:  Yogi Woodward MD Service:  Cardiology Author Type:  Physician    Filed:  2/24/2018  6:47 PM Date of Service:  2/24/2018  1:19 PM Creation Time:  2/24/2018 12:47 PM    Status:  Signed :  Yogi Woodward MD (Physician)     Consult Orders:    1. Cardiology General Adult IP Consult: Patient to be seen: Routine within 24 hrs; Call back #: [phone] until 7 p.m., after that colorectal intern on call; troponin elevation, high risk patient. MRN [id number]; Consultant may enter orders: Yes [[id number]] ordered by Neha Rubio MD at 02/24/18 1119                CARDIOLOGY CONSULTATION  [facility] [place]    Assessment & Plan     Mr. Jose Lira is a 73 year old male with past medical history of two-vessel CAD (RCA, LAD, and non-hemodynamically significant LCx lesion ), PAD status post right ICA stent, carotid artery disease status post left ICA stenting, hyperlipidemia, CKD and prior C who underwent [facility] rectosigmoid malignancy resection surgery on 2/22/2018 and now was found to have positive troponin of 0.109.    #  Troponinemia  # CAD, status post PCI with NICOLE on RCA and LAD  #Status post rectosigmoid cancer resection surgery  This troponin is likely demand ischemia.  It is very unlikely from ACS.  No chest pain and no EKG changes.  No signs of ACS on EKG.  But he does have high risk for stent thrombosis given multivessel PCI with NICOLE.  We would like to resume ticagrelor if able and also would like to resume beta-blocker.   We would like to trend troponin but as long as he is asymptomatic and no sign of ACS on EKG we will manage him medically.    Recommendations  -We would like to resume ticagrelor trend and beta-blocker if able  -Trend troponin until peak    The results and significance of today's testing was discussed with the patient in lay terms. More the 50% of time was for counseling and coordinating care.    Thank you for allowing us to participate in the care of your patient. Please do not hesitate to contact the cardiology consult service if you have any questions.     Cardiology will follow.    Patient seen and examined with cardiology consult attending cardiologist, Dr. Woodward.    Nestor Munoz MD  Cardiology Fellow [phone]      History of Present Illness   Chief complaint: Troponin elevation  Patient.  History obtained from the medical record and the patient.     Mr. Jose Lira is a 73 year old male with past medical history of two-vessel CAD (RCA, LAD, and non-hemodynamically significant LCx lesion ), PAD status post right ICA stent, carotid artery disease status post left ICA stenting, hyperlipidemia, and prior CVA, CKD.  He underwent rectosigmoid cancer resection 2 days ago.  He was found to have CAD because preop evaluation dobutamine stress echo showed abnormal.  He underwent angiogram on August 2017 which showed three-vessel CAD with significant LAD and RCA disease and negative FFR of 0.83 on LCx.  He was initially planned for CABG but was declined.  He underwent LAD PCI on November and RCA PCI  on December.  He had been on ticagrelor and aspirin since then.  He stopped ticagrelor on February 2 which was 6 weeks after the last stenting.    Rectosigmoid surgery was successful and without any complication.  On POD 2, last night, he got delirious.  Troponin was checked which was 0.109.  Initial troponin this admission was negative.  No new EKG change.  No chest symptoms.  No chest pain, no palpitation, no SOB.Cardiology was consulted for positive troponin.    EKG normal sinus rhythm no ST elevation.  T waves are more prominent than the prior but there is no sign of ACS.     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     Subclavian arterial stenosis (H)     Left     Unspecified essential hypertension      Past Surgical History:   Procedure Laterality Date     COLONOSCOPY N/A 5/19/2017    Procedure: COMBINED COLONOSCOPY, SINGLE OR MULTIPLE BIOPSY/POLYPECTOMY BY BIOPSY;  Rectal bleeding;  Surgeon: Maximus Dinero MD;  Location:  GI     SURGICAL HISTORY OF -   10/02    angioplasty and stenting of left and internal carotid artery at the bifurcation     SURGICAL HISTORY OF - 11/11    right common iliac artery stent      VASCULAR SURGERY  2001    stent placed in carotid      Social History     Social History     Marital status: Single     Spouse name: N/A     Number of children: 0     Years of education: N/A     Occupational History     post       Social History Main Topics     Smoking status: Former Smoker     Packs/day: 3.00     Years: 30.00     Quit date: 8/5/1994     Smokeless tobacco: Former  User     Quit date: 8/1/1994      Comment: quit 15 years ago after his stroke     Alcohol use 0.6 oz/week     1 Glasses of wine per week      Comment: 1 per day     Drug use: No     Sexual activity: No     Other Topics Concern     Parent/Sibling W/ Cabg, Mi Or Angioplasty Before 65f 55m? Yes     brother and father     Social History Narrative    Patient lives alone in a high rise.  Was never .  Had no children.  Has a two brothers that have passed away.  He has two sister, one with Alzheimer and one alive and well.  Sister will be here for surgery.       Family History   Problem Relation Age of Onset     C.A.D. Father      Hypertension Father      Prostate Cancer Father      C.A.D. Brother      MI     Hypertension Brother      Arthritis Sister      DIABETES No family hx of      CEREBROVASCULAR DISEASE No family hx of      Cancer - colorectal No family hx of      Prior to Admission Medications   Prior to Admission Medications   Prescriptions Last Dose Informant Patient Reported? Taking?   Multiple Vitamins-Minerals (MULTIVITAMIN & MINERAL PO) Unknown at Unknown time Self Yes No   Sig: Take 1 tablet by mouth every morning    Saw Palmetto, Serenoa repens, (SAW PALMETTO PO)  Self Yes No   Sig: Take 1 tablet by mouth every morning    amLODIPine (NORVASC) 10 MG tablet Past Week at Unknown time Self No Yes   Sig: Take 1 tablet (10 mg) by mouth daily   Patient taking differently: Take 10 mg by mouth every morning    aspirin 81 MG EC tablet 2/18/2018 Self No No   Sig: Take 1 tablet (81 mg) by mouth daily   Patient taking differently: Take 81 mg by mouth every morning    atorvastatin (LIPITOR) 40 MG tablet Unknown at Unknown time Self No No   Sig: Take 1 tablet (40 mg) by mouth daily   Patient taking differently: Take 40 mg by mouth every morning    carvedilol (COREG) 25 MG tablet Past Week at Unknown time  No Yes   Sig: Take 1 tablet (25 mg) by mouth 2 times daily   diphenhydrAMINE (BENADRYL) 25 MG capsule 2/21/2018  at 2000 Self Yes Yes   Sig: Take 50 mg by mouth nightly as needed sleep   lactobacillus rhamnosus, GG, (CULTURELLE) 10 B CELL capsule  Self Yes No   Sig: Take 1 capsule by mouth every morning    neomycin (MYCIFRADIN) 500 MG tablet 2/21/2018 at Unknown time  No Yes   Sig: Take two tablets by mouth every 8 hours for one day prior to surgery. Take in conjunction with Flagyl   niacin 500 MG tablet Unknown at Unknown time Self No No   Sig: Take 1 tablet (500 mg) by mouth At Bedtime   ondansetron (ZOFRAN) 4 MG tablet   No No   Sig: Take one tablet by mouth every 6 hours as needed for nausea when taking neomycin and flagyl   polyethylene glycol (GOLYTELY/NULYTELY) 236 G suspension 2/21/2018 at Unknown time  No Yes   Sig: Refer to surgical packet for instructions   tiZANidine (ZANAFLEX) 2 MG tablet More than a month at Unknown time  No No   Sig: Take 1-2 tablets (2-4 mg) by mouth nightly as needed   ticagrelor (BRILINTA) 90 MG tablet 2/16/2018 at Unknown time  No No   Sig: Take 1 tablet (90 mg) by mouth 2 times daily      Facility-Administered Medications: None       No current facility-administered medications on file prior to encounter.   Current Outpatient Prescriptions on File Prior to Encounter:  carvedilol (COREG) 25 MG tablet Take 1 tablet (25 mg) by mouth 2 times daily   amLODIPine (NORVASC) 10 MG tablet Take 1 tablet (10 mg) by mouth daily (Patient taking differently: Take 10 mg by mouth every morning )   diphenhydrAMINE (BENADRYL) 25 MG capsule Take 50 mg by mouth nightly as needed sleep   ticagrelor (BRILINTA) 90 MG tablet Take 1 tablet (90 mg) by mouth 2 times daily   tiZANidine (ZANAFLEX) 2 MG tablet Take 1-2 tablets (2-4 mg) by mouth nightly as needed   aspirin 81 MG EC tablet Take 1 tablet (81 mg) by mouth daily (Patient taking differently: Take 81 mg by mouth every morning )   atorvastatin (LIPITOR) 40 MG tablet Take 1 tablet (40 mg) by mouth daily (Patient taking differently: Take 40 mg by mouth every  morning )   Saw Palmetto, Serenoa repens, (SAW PALMETTO PO) Take 1 tablet by mouth every morning    [DISCONTINUED] olmesartan-hydrochlorothiazide (BENICAR HCT) 40-25 MG per tablet Take 1 tablet by mouth daily   niacin 500 MG tablet Take 1 tablet (500 mg) by mouth At Bedtime   Multiple Vitamins-Minerals (MULTIVITAMIN & MINERAL PO) Take 1 tablet by mouth every morning    lactobacillus rhamnosus, GG, (CULTURELLE) 10 B CELL capsule Take 1 capsule by mouth every morning      Allergies   Allergies   Allergen Reactions     Fentanyl      Pt states it makes him feel funny but not true allergy.      Review of Systems   The 10 point Review of Systems is negative other than noted in the HPI or here.     Physical Exam   Temp: 98.1  F (36.7  C) Temp src: Oral BP: 137/63   Heart Rate: 89 Resp: 16 SpO2: 92 % O2 Device: Nasal cannula Oxygen Delivery: 2 LPM    Vital Signs with Ranges  Temp:  [97.5  F (36.4  C)-99.3  F (37.4  C)] 98.1  F (36.7  C)  Heart Rate:  [] 89  Resp:  [14-18] 16  BP: (107-200)/() 137/63  SpO2:  [92 %-98 %] 92 %  195 lbs 9.6 oz    GEN:  Alert, oriented x 3, appears comfortable, NAD.  HEENT:  Normocephalic/atraumatic, no scleral icterus, no nasal discharge, mouth moist.  CV:  Heart is with regular rate/rhythm. No murmurs,   No JVD. Normal a and v waves and x and y descent seen.   Jugular venus pressure is with decline in inspiration. No Kussmaul's sign.  Peripheral arteries:RA, FA, DP, PT all 2+/2+  no bruit on carotid arteries bilaterally, normal uptake of the both carotid arteries.  No bruit on abdomen or upper chest.  No lower extremities edema  LUNGS:  Clear to auscultation bilaterally     Data        PULMONARY FUNCTION TESTS:   PFT Latest Ref Rng & Units 9/27/2017   FVC L 3.87   FEV1 L 2.85   FVC% % 112   FEV1% % 108       Labs  Metabolic Studies    Recent Labs  Lab 02/24/18  0859 02/23/18  0705 02/22/18  0608    142  --    POTASSIUM 3.5 3.8 3.4   CHLORIDE 109 110*  --    CO2 23 23  --     ANIONGAP 8 9  --    * 113*  --    BUN 17 21  --    CR 2.18* 2.36* 2.40*   GFRESTIMATED 30* 27* 27*   GFRESTBLACK 36* 33* 32*   ADRIAN 8.6 8.2*  --    MAG 2.0 2.0  --    PHOS  --  3.9  --      Hematology  Recent Labs  Lab 02/24/18  0859 02/23/18  0705 02/22/18  0608   WBC 12.7* 10.5  --    RBC 3.30* 3.20*  --    HGB 7.9* 7.8* 8.6*   HCT 27.4* 26.2*  --    MCV 83 82  --    MCH 23.9* 24.4*  --    MCHC 28.8* 29.8*  --    RDW 14.5 14.0  --     175  --      Troponin  Recent Labs  Lab 02/24/18  0859 02/23/18  0705   TROPI 0.109* <0.015     Lipid Panel  Recent Labs   Lab Test  06/26/17   1145  07/05/16   1135  09/15/15   1208  08/25/14   1206   CHOL  165  140  127  130   HDL  46  47  38*  52   LDL  83  56  48  43   TRIG  182*  186*  204*  176*   CHOLHDLRATIO   --    --   3.3  2.5     DM  Lab Results   Component Value Date    A1C 5.4 08/15/2017     Urine Studies  Recent Labs   Lab Test  09/27/17   1153  08/15/17   1700  10/31/16   1647  10/03/16   1412   LEUKEST  Negative  Negative  Negative  Negative   WBCU  <1  1  1  1       Immune Globulin Studies  Recent Labs   Lab Test  10/27/16   1103   IGG  759   IGM  38*   IGA  298           Nestor Munoz MD  Cardiology Fellow [id number][TO1.1]     Attending: Patient seen and examined with Dr. Munoz. The history and physical findings are accurate as recorded. My additional findings, if any, have been incorporated into the body of the note. All labs, imaging studies, ECG and telemetry data have been reviewed personally. The assessment and recommendations outlined reflect our joint decision making.    Recent coronary stent placement and need to hold dual antiplatelet therapy is always concerning. At this time rise in troponin level is not consistent with stent thrombosis/acs.     Continue aspirin and resume dual antiplatelet therapy when safe to do so from a surgical standpoint.     Yogi Woodward MD[WA1.1]       Revision History        User Key Date/Time User Provider  Type Action    > WA1.1 2/24/2018  6:47 PM Yogi Woodward MD Physician Sign     TO1.1 2/24/2018  2:24 PM Nestor Munoz MD Fellow Sign                     Progress Notes - Physician (Notes from 03/03/18 through 03/06/18)      Progress Notes by Huma Blankenship at 3/6/2018 11:09 AM     Author:  Huma Blankenship Service:  Social Work Author Type:      Filed:  3/6/2018 11:33 AM Date of Service:  3/6/2018 11:09 AM Creation Time:  3/6/2018 11:09 AM    Status:  Signed :  Huma Blankenship ()         Social Work Services Discharge Note      Patient Name:  Jose Lira     Anticipated Discharge Date: Tuesday 03/06/2018 at 1230 to [facility] via family transport.     Discharge Disposition:    Nursing Home Placement  -SNF: [address] [address]  (P: [phone], F: [phone])      Pre-Admission Screening (PAS) online form has been completed.  The Level of Care (LOC) is: Determined  Confirmation Code is: [id number]  Patient/caregiver informed of referral to [facility] for Pre-Admission Screening for skilled nursing facility (SNF) placement and to expect a phone call post discharge from SNF.     Additional Services/Equipment Arranged: Pt's sister and brother in law will transport.     Patient / Family response to discharge plan:  Pt and family in agreement w/plan.     Persons notified of above discharge plan: Pt, pt's sister Kim, pt's brother Rosario Baeza in admissions at [facility], Giulia MARTINEZ with Colorectal.    -Staff Discharge Instructions:  Please fax discharge orders and signed hard scripts for any controlled substances.  Please print a packet and send with patient.     REMIGIO Thakur, [facility]  6B Intermediate Care Unit  Phone: [phone]  Pager: [phone][ML1.1]       Revision History        User Key Date/Time User Provider Type Action    > ML1.1 3/6/2018 11:33 AM Huma Blankenship   Sign            Progress Notes by Huma Blankenship at 3/5/2018  8:21 AM     Author:  Huma Blankenship Service:  Social Work Author Type:      Filed:  3/6/2018  9:11 AM Date of Service:  3/5/2018  8:21 AM Creation Time:  3/5/2018  8:21 AM    Status:  Signed :  Huma Blankenship ()          Progress Note:    Hospital Day: 11  Date of Initial SW Assessment: 02/27/2018    Data: Jose Lira is a 72 yo male admitted to [facility] 02/22/2018.    Intervention: Met w/pt and spoke to pt's sister Kim Sears (P: [phone]).    Assessment: Pt lives alone in an apt in [place]. TCU is recommended at dc and pt is in agreement but was still hoping to dc directly home. Pt reviewed list of Medicare certified SNFs and requested referrals to the following facilities:  1. [facility]: Left VM w/admissions[ML1.1] and faxed updated notes[ML1.2].  (P: [phone], F: [phone])  2. [facility][ML1.1]: No male beds at this time, but that could change in the next day or two.[ML1.2]  (P: [phone], F: [phone])[ML1.1]  3. [facility]: referral faxed to Lesli.  (P: [phone], F: [phone])[ML1.3]    Plan:  -Discharge plans in progress:[ML1.1] Per MD, pt will likely be medically stable for discharge Tues 3/6. Referrals out to TCU; no facility has accepted yet.[ML1.2]   -Barriers to discharge plan:[ML1.1] medical clearance and TCU placement. Pt will also need to be off the VPM for 24 hrs prior to dc.[ML1.2]  -Follow up plan: SW will continue to follow and assist as needed.    REMIGIO Thakur, [facility]  6B Intermediate Care Unit   Phone: [phone]  Pager: [phone][ML1.1]           Revision History        User Key Date/Time User Provider Type Action    > ML1.2 3/6/2018  9:11 AM Huma Blankenship  Sign     ML1.3 3/5/2018  1:50 PM Huma Blankenship    "    ML1.1 3/5/2018  8:21 AM Huma Blankenship              Progress Notes by Filemon Gambel MD at 3/6/2018  6:24 AM     Author:  Filemon Gamble MD Service:  Colorectal Surgery Author Type:  Resident    Filed:  3/6/2018  6:31 AM Date of Service:  3/6/2018  6:24 AM Creation Time:  3/6/2018  6:24 AM    Status:  Signed :  Filemon Gamble MD (Resident)         Colorectal Progress Note    Subjective:   No acute issues overnight, doing well,    Objective:[JB1.1]   /58  Pulse 81  Temp 97.6  F (36.4  C) (Oral)  Resp 18  Ht 1.651 m (5' 5\")  Wt 82.2 kg (181 lb 4.8 oz)  SpO2 93%  BMI 30.17 kg/m2[JB1.2]    I/O:[JB1.1]  I/O last 3 completed shifts:  In: 660 [P.O.:660]  Out: 1675 [Urine:1675][JB1.2]    PE:  Gen: Awake, alert, NAD, ambulating with hi walker   Resp: non-labored at rest on 2L  Abd: Soft, distended, NTTP  Ext: warm and well perfused, ecchymosis of the right forearm     Labs: none    Imaging: none    A/P: Jose Lira is a 73 year old male with proximal rectal cancer s/p LAR on 2/22. Complicated by delirium, hypoxia, cardiac demand ischemia and PNA.     Doing well, O2 requirements down to 1L NC and ambulating with assist, +flatus, but still no BM, VPM d/c no issues dilirium/confusion overnight       - breathing significantly improved, will try to completely wean O2 today, if not can d/c with O2 and wean at TCU  - continue LR diet, miralax, prn pain control, Levaquin for 2 more days, GI/DVT ppx (SubQ Hep BID, hold ASA), continue flomax  - waiting discharge to TCU pending acceptance    Dispo: pending TCU placement today    Filemon Gamble MD  General Surgery PGY-1  Pager [phone][JB1.1]             Revision History        User Key Date/Time User Provider Type Action    > JB1.2 3/6/2018  6:31 AM Filemon Gamble MD Resident Sign     JB1.1 3/6/2018  6:24 AM Filemon Gamble MD Resident             Progress Notes by Filemon Gamble MD at 3/5/2018  8:50 AM     Author:  " "Filemon Gamble MD Service:  Colorectal Surgery Author Type:  Resident    Filed:  3/5/2018  9:08 AM Date of Service:  3/5/2018  8:50 AM Creation Time:  3/5/2018  8:50 AM    Status:  Attested :  Filemon Gamble MD (Resident)    Cosigner:  John Hughes MD at 3/5/2018 11:55 AM        Attestation signed by John Hughes MD at 3/5/2018 11:55 AM (Updated)        Attestation:  Physician Attestation   I, John Hughes, saw this patient with the resident and agree with the resident s findings and plan of care as documented in the resident s note.      I personally reviewed vital signs, medications and labs.    Key findings: Down to 2 lpm NC. Breathing improved. Voiding. +BMs.  - LR diet, PO pain control, GI/DVT porph (Heparin SQ BID, hold ASA), Miralax PRN, continue Flomax, Levaquin for 3 more days. DC to TCU.    John Hughes  Date of Service (when I saw the patient): 03/05/18                               Colorectal Progress Note    Subjective:   No acute issues overnight, doing well, +flatus, no BM     Objective:[JB1.1]   /86 (BP Location: Left arm)  Pulse 81  Temp 98.5  F (36.9  C) (Oral)  Resp 24  Ht 1.651 m (5' 5\")  Wt 83.1 kg (183 lb 4.8 oz)  SpO2 96%  BMI 30.5 kg/m2[JB1.2]    I/O:[JB1.1]  I/O last 3 completed shifts:  In: 409 [P.O.:400; I.V.:9]  Out: 1975 [Urine:1975][JB1.2]    PE:  Gen: Awake, alert, NAD, ambulating with hi walker   Resp: non-labored at rest on 2L  Abd: Soft, distended, NTTP  Ext: warm and well perfused, ecchymosis of the right forearm     Labs: Reviewed  Cr 2.58 < 2.60 < 5.59    Imaging: Reviewed    A/P: Jose Lira is a 73 year old male with proximal rectal cancer s/p LAR on 2/22. Complicated by delirium, hypoxia, cardiac demand ischemia and PNA.     Doing well, O2 requirements down to 2L NC and ambulating with assist, +flatus, but still no BM       Neuro:   - confusions/altered mental status at night 2/2 delirium, doing " well  - try d/c VPM today, to prepare for possible d/c to TCU   - keep bedtime Trazodone, prn zyprexa, avoid benzos     Pulm:   - Now requiring 2L O2, much improved from last week, this was likely from fluid overload and possible PNA.  - continue antibiotics course to completion   - continue to aggressive pulmonary toilet.       CV   - Fluid overload,   - Continue cardiac meds, keep holding ASA due to ecchymosis, IV Lasix 20mg today       Renal   - UOPA, continue flomax and Iv lasix   - continue to monitor ESSIE/trend Cr  - strict I/O monitoring      GI: anterograde function with flatus but no BM, continue on low residue diet and continue to monitor for now and prn miralax  Heme: no issues  PPX -GI and DVT ppx ordered    Dispo: pending TCU placement this week      Filemon Gamble MD  General Surgery PGY-1  Pager [phone][JB1.1]             Revision History        User Key Date/Time User Provider Type Action    > JB1.2 3/5/2018  9:08 AM Filemon Gamble MD Resident Sign     JB1.1 3/5/2018  8:50 AM Filemon Gamble MD Resident             Progress Notes by Theresa Rubio RN at 3/4/2018  6:04 PM     Author:  Theresa Rubio RN Service:  (none) Author Type:  Registered Nurse    Filed:  3/4/2018  6:08 PM Date of Service:  3/4/2018  6:04 PM Creation Time:  3/4/2018  6:04 PM    Status:  Signed :  Theresa Rubio RN (Registered Nurse)         Problem: Patient Care Overview  Goal: Plan of Care/Patient Progress Review  Outcome: No Change  [id number]  Neuro: Has VPM. A&O x3. Occasionally confused to situation. Used call light appropriately this shift.  Cardiac: Sinus rhythm. B/P WNL.   Respiratory: Sats low to mid 90's on 3L of O2. Titrated down from 4L. Attempted titrated to 2L but pt unable to hold sats. Encouraged ISP.   GI/: Walter out at 0930. Pt able to void on own at 1430. No BM but passing flatus.   Diet/appetite: Low fiber diet. Needs encouragement to order meals but good appetite.   Activity: Up  with SBA. Up to chair x2. Ambulated in hallways.   Pain: Denies.   Skin: Lap sites CDI with ecchymosis.   LDA's: R. PIV SL.       Plan: Encourage ISP and wean O2. Plans to d/c to TCU tomorrow. Continue with POC[BT1.1]     Revision History        User Key Date/Time User Provider Type Action    > BT1.1 3/4/2018  6:08 PM Theresa Rubio, RN Registered Nurse Sign            Progress Notes by Savage Chase MD at 3/4/2018  7:21 AM     Author:  Savage Chase MD Service:  Colorectal Surgery Author Type:  Resident    Filed:  3/4/2018  7:27 AM Date of Service:  3/4/2018  7:21 AM Creation Time:  3/4/2018  7:21 AM    Status:  Attested :  Savage Chase MD (Resident)    Cosigner:  Myriam Sharif MD at 3/4/2018  6:02 PM        Attestation signed by Myriam Sharif MD at 3/4/2018  6:02 PM        Attestation:    Physician Attestation   Myriam TIPTON, personally examined and evaluated this patient.  I discussed the patient with the resident and care team, and agree with the assessment and plan of care as documented in the resident s note of 3/4/2018     I personally reviewed vital signs and medications.    Feliz findings: Buddy is still having some confusion at times. Reports walking in couch better today. Passing flatus and stool, tolerating diet. Denies abdominal pain. Thinks his breathing is improving. Abdomen soft, NT, ND. Continued ecchymosis over abdomen, incisions c/d/i. He has extensive ecchymosis in R forearm that I did not appreciate yesterday. Voiding without difficulty. Continue abx for pneumonia, pulmonary toilet. Discharge planning for TCU. Diet as tolerated.   Myriam Sharif MD, MS  Colon and Rectal Surgery Staff on-call  [facility]  [phone]  3/4/2018 6:00 PM     Date of Service (when I saw the patient): 3/4/2018                                    Colorectal surgery progress note    S: no acute events overnight, patient slept  "relatively well, doing well with low fiber diet, breathing feels good    O:[KM1.1] /70 (BP Location: Left arm)  Pulse 81  Temp 98.6  F (37  C)  Resp 18  Ht 1.651 m (5' 5\")  Wt 84.7 kg (186 lb 11.7 oz)  SpO2 94%  BMI 31.07 kg/m2[KM1.2]  Net -1 L yesterday    Awake, alert, NAD   NLB on nasal cannula O2  Abd soft, non-tender, stable ecchymoses    Labs reviewed.     A/P: 73 year old man s/p LAR for rectal cancer, complicated by respiratory insufficiency due to volume overload and possible pneumonia, now improving.     -continue prn tylenol for pain, trazodone for sleep  -continue home cardiac medications, holding ASA due to ecchymoses on abdomen  -work on pulmonary toilet and wean O2  -remove Walter catheter today, give lasix, continue Flomax  -continue levaquin PO for pneumonia  -heparin BID for DVT ppx and protonix for GI ppx  -dispo pending appropriate TCU placement early this week    Savage Chase MD   Surgery PGY-3[KM1.1]      Revision History        User Key Date/Time User Provider Type Action    > KM1.2 3/4/2018  7:27 AM Savage Chase MD Resident Sign     KM1.1 3/4/2018  7:21 AM Savage Chase MD Resident             Progress Notes by Anne-Marie Tyler BSW at 3/3/2018  2:31 PM     Author:  Anne-Marie Tyler BSW Service:  Social Work Author Type:      Filed:  3/3/2018  2:44 PM Date of Service:  3/3/2018  2:31 PM Creation Time:  3/3/2018  2:31 PM    Status:  Signed :  Anne-Marie Tyler BSW ()         Social Work Services Progress Note    Hospital Day: 10  Date of Initial Social Work Evaluation:  2-27-18  Collaborated with:  Pt    Data:  TCU placement     Intervention: Writer met with pt to see if pt identified TCU selection. Pt identified two facilities for referrals to be initiated. The following referrals have been placed: [facility]                                                                                    " [facility]    Assessment:  Pt shared he is open to TCU placement, but would prefer to discharge back home.    Plan:    Anticipated Disposition:  Facility:  D    Barriers to d/c plan:  Medical stability    Follow Up:  SW will continue to follow    DANDY Graham  Weekend   Pager: [phone][EG1.1]       Revision History        User Key Date/Time User Provider Type Action    > EG1.1 3/3/2018  2:44 PM Anne-Marie Tyler BSW  Sign            Progress Notes by Savage Chase MD at 3/3/2018  8:33 AM     Author:  Savage Chase MD Service:  Colorectal Surgery Author Type:  Resident    Filed:  3/3/2018  8:39 AM Date of Service:  3/3/2018  8:33 AM Creation Time:  3/3/2018  8:33 AM    Status:  Attested Addendum :  Savage Chase MD (Resident)    Cosigner:  Myriam Sharif MD at 3/3/2018 11:34 AM        Attestation signed by Myriam Sharif MD at 3/3/2018 11:34 AM        Attestation:  Physician Attestation   IMyriam, personally examined and evaluated this patient.  I discussed the patient with the resident and care team, and agree with the assessment and plan of care as documented in the resident s note of 3/3/2018.      I personally reviewed vital signs, medications and labs.    Key findings: Was up in chair earlier today but then his back began to hurt. Now back in bed. Tolerating diet, no pain, no complaints. Abdomen is soft, NT, ND. Some induration and ecchymosis superior to pfannenstiel consistent with hematoma, no signs of infection. Continue low residue diet, oral pain medications. Keep cronin until tomorrow. Plan for TCU maybe on Monday  Myriam Sharif MD, MS  Colon and Rectal Surgery staff on-call  [facility]  [phone]  3/3/2018 11:34 AM     Date of Service (when I saw the patient): 03/03/18                               Colorectal surgery progress note    S: no acute  "events overnight, was able to sleep relatively well, no complaints this morning    O: /64 (BP Location: Left arm)  Pulse 76  Temp 97.7  F (36.5  C) (Oral)  Resp 22  Ht 1.651 m (5' 5\")  Wt 84.7 kg (186 lb 11.7 oz)  SpO2 97%  BMI 31.07 kg/m2    Awake, alert, NAD, oriented  NLB on nasal cannula O2  Abd soft, non-tender, non-distended  Stable ecchymoses on abdomen, incisions non-erythematous    Labs reviewed    A/P: 73 year old man s/p LAR for rectal cancer.    -continue prn tylenol for pain  -continue home cardiac meds, holding ASA for now in the setting of increased abdominal bruising  -work on pulmonary toilet and weaning O2  -regular diet, prn miralax okay today[KM1.1]  -continue levaquin for possible pneumonia, transition to PO today[KM1.2]  -continue lasix for diuresis per cardiology recommendations  -continue flomax, will plan to d/c Walter tomorrow   -GI & DVT ppx  -dispo pending optimization of respiratory status, will discharge to TCU early next week, discussed with the patient that this is an essential step in his recovery and he was in agreement this morning that a TCU stay would be beneficial. Appreciate social work assistance.    Savage Chase MD   Surgery PGY-3[KM1.1]      Revision History        User Key Date/Time User Provider Type Action    > KM1.2 3/3/2018  8:39 AM Savage Chase MD Resident Addend     KM1.1 3/3/2018  8:37 AM Savage Chase MD Resident Sign                  Procedure Notes     No notes of this type exist for this encounter.      Progress Notes - Therapies (Notes from 03/03/18 through 03/06/18)     No notes of this type exist for this encounter.      "

## 2018-02-23 NOTE — PLAN OF CARE
"Problem: Patient Care Overview  Goal: Plan of Care/Patient Progress Review  Outcome: Therapy, progress towards functional goals is fair  /70 (BP Location: Left arm)  Pulse 90  Temp 97.9  F (36.6  C) (Oral)  Resp 12  Ht 1.651 m (5' 5\")  Wt 90.2 kg (198 lb 13.7 oz)  SpO2 96%  BMI 33.09 kg/m2  Neuro: A&Ox4. Lethargic.  Cardiac: VSS.   Respiratory:  5L Oxyplus mask/ Capnography.  GI/: Walter in place. No BM this shift.   Diet/appetite: Clear liquid, drinking water for now. Denies nausea   Activity: Post PACU . Came on the floor at 530 pm.  Pain: . Denies. Has PCA pump. Pt has not pushed the button.  Skin: Intact, no new deficits noted. #3 lap sites.  Lines:  PIV X3  Drains: Walter  Post op Laparoscopic Assisted Low Anterior Resection, Sigmoidoscopy, Anesthesia Block arrived on the floor at 530 pm.Pt has been resting comfortably throughout the evening. Will continue to monitor and follow plan of care. Will Notify MD with any changes.          "

## 2018-02-23 NOTE — PROGRESS NOTES
"Colorectal Surgery Progress Note    ID: 73 year old with recent PCI (11/17 and 12/17 for LAD and RCA disease on home ticagrelor held five days for surgery) POD 1 LAR    S: NAEO, denies chest pain. Denies flatus.    O:  /70 (BP Location: Left arm, Cuff Size: Adult Regular)  Pulse 69  Temp 98  F (36.7  C) (Oral)  Resp 16  Ht 1.651 m (5' 5\")  Wt 92.3 kg (203 lb 8 oz)  SpO2 98%  BMI 33.86 kg/m2    NAD  NLB  Soft, appropriately tender, incisions c/d/i with surgical glue   WWP    A/P:  POD 1 LAR, VSS, will review morning labs to consider timing of re-starting antiplatelet agent  - FLD  - Decrease IVF  - Walter one more day  - Follow up morning labs, re-start ticagrelor as soon as clinically able  - IV pain meds, IV metoprolol for now    Seen with fellow, confirmed with staff      Neha Rubio  PGY 1 Colorectal Surgery  "

## 2018-02-23 NOTE — PROGRESS NOTES
CLINICAL NUTRITION SERVICES  Reason for Assessment:  Nutrition education regarding low fiber diet education  Diet History:  Patient has no history of low fiber diet education. Pt does not cook much at home and does not eat fruits or vegetables. Eats a lot of cereal and ice cream per pt report. Discussed education materials with patient and he seems to understand.   Nutrition Diagnosis:  Food- and nutrition-related knowledge deficit r/t no previous knowledge of low fiber diet as evidenced by patient report  Interventions:  Provided instruction on low fiber diet. Discussed each food group and foods to eat and avoid. Answered patient's questions regarding diet.   Provided handouts : Low Fiber Nutrition Therapy and Low Fiber Diet Hospital Menu  Goals:   Patient will verbalize at least 5 low fiber foods acceptable on diet and 5 high fiber foods to avoid.  Follow-up:    Patient to ask any further nutrition-related questions before discharge.  In addition, pt may request outpatient RD appointment.    Malika June RD, LD  Unit 6[address] pgr: [phone]

## 2018-02-23 NOTE — PLAN OF CARE
"Problem: Patient Care Overview  Goal: Plan of Care/Patient Progress Review  Outcome: No Change  /70 (BP Location: Left arm, Cuff Size: Adult Regular)  Pulse 69  Temp 98  F (36.7  C) (Oral)  Resp 16  Ht 1.651 m (5' 5\")  Wt 90.2 kg (198 lb 13.7 oz)  SpO2 98%  BMI 33.09 kg/m2  Neuro: A&Ox2. Confused easily redirected.   Cardiac: SR. VSS.   Respiratory: Sating at 96% on 4L via oxyplus.  GI/: Adequate urine output. No bm this shift.   Diet/appetite: Tolerating clear diet. Eating well.  Activity:  Assist of 2,  Pain: At acceptable level on current regimen. PCA pump on demand - pt did not used any .   Skin: Surgical site dermabond, no new deficits noted.  LDA's: 2 PIV> LR @ 125ml/hr     Plan: Continue with POC. Notify primary team with changes.      "

## 2018-02-24 NOTE — PLAN OF CARE
Problem: Patient Care Overview  Goal: Plan of Care/Patient Progress Review  Discharge Planner PT   Patient plan for discharge: unstated  Current status: PT 6B: PT eval complete, treatment provided. Pt confused at time of eval, displays impaired STM, recall of teaching, reduced overall safety awareness. Sitter present for visit. Pt required CGA supine>sit, CGA-RODOLFO sit<>stand, bed<>WC/commode for safety purposes. Ax2 helpful for safe pt mobilization due to lines and pt's safety impairments. Pt ambulated with CGA and light R UE support on IV pole x 130 ft. Slowed asa and some mild imbalance noted. Sats > 90% on 6L with gait. Recommend OT consult to address cognitive concerns.   Barriers to return to prior living situation: reduced activity tolerance, gait < household distances, reduced safety, impaired balance  Recommendations for discharge: TCU   Rationale for recommendations: Below baseline safety, functional status.   Entered by: Rebeca Taylor 02/24/2018 10:05 AM

## 2018-02-24 NOTE — CONSULTS
CARDIOLOGY CONSULTATION  [facility], [place]    Assessment & Plan     Mr. Jose Lira is a 73 year old male with past medical history of two-vessel CAD (RCA, LAD, and non-hemodynamically significant LCx lesion ), PAD status post right ICA stent, carotid artery disease status post left ICA stenting, hyperlipidemia, CKD and prior C who underwent [facility] rectosigmoid malignancy resection surgery on 2/22/2018 and now was found to have positive troponin of 0.109.    # Troponinemia  # CAD, status post PCI with NICOLE on RCA and LAD  #Status post rectosigmoid cancer resection surgery  This troponin is likely demand ischemia.  It is very unlikely from ACS.  No chest pain and no EKG changes.  No signs of ACS on EKG.  But he does have high risk for stent thrombosis given multivessel PCI with NICOLE.  We would like to resume ticagrelor if able and also would like to resume beta-blocker.   We would like to trend troponin but as long as he is asymptomatic and no sign of ACS on EKG we will manage him medically.    Recommendations  -We would like to resume ticagrelor trend and beta-blocker if able  -Trend troponin until peak    The results and significance of today's testing was discussed with the patient in lay terms. More the 50% of time was for counseling and coordinating care.    Thank you for allowing us to participate in the care of your patient. Please do not hesitate to contact the cardiology consult service if you have any questions.     Cardiology will follow.    Patient seen and examined with cardiology consult attending cardiologist, Dr. Woodward.    Nestor Munoz MD  Cardiology Fellow [id number]      History of Present Illness   Chief complaint: Troponin elevation  Patient.  History obtained from the medical record and the patient.     Mr. Jose Lira is a 73 year old male with past medical history of two-vessel CAD (RCA, LAD, and non-hemodynamically significant LCx lesion ), PAD  status post right ICA stent, carotid artery disease status post left ICA stenting, hyperlipidemia, and prior CVA, CKD.  He underwent rectosigmoid cancer resection 2 days ago.  He was found to have CAD because preop evaluation dobutamine stress echo showed abnormal.  He underwent angiogram on August 2017 which showed three-vessel CAD with significant LAD and RCA disease and negative FFR of 0.83 on LCx.  He was initially planned for CABG but was declined.  He underwent LAD PCI on November and RCA PCI on December.  He had been on ticagrelor and aspirin since then.  He stopped ticagrelor on February 2 which was 6 weeks after the last stenting.    Rectosigmoid surgery was successful and without any complication.  On POD 2, last night, he got delirious.  Troponin was checked which was 0.109.  Initial troponin this admission was negative.  No new EKG change.  No chest symptoms.  No chest pain, no palpitation, no SOB.Cardiology was consulted for positive troponin.    EKG normal sinus rhythm no ST elevation.  T waves are more prominent than the prior but there is no sign of ACS.     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     Subclavian arterial stenosis (H)     Left     Unspecified essential hypertension      Past Surgical History:   Procedure Laterality Date     COLONOSCOPY N/A 5/19/2017    Procedure: COMBINED COLONOSCOPY, SINGLE OR MULTIPLE BIOPSY/POLYPECTOMY BY BIOPSY;  Rectal bleeding;  Surgeon: Maximus Dinero MD;  Location:  GI     SURGICAL HISTORY OF -   10/02    angioplasty and stenting of left and  internal carotid artery at the bifurcation     SURGICAL HISTORY OF -   11/11    right common iliac artery stent      VASCULAR SURGERY  2001    stent placed in carotid      Social History     Social History     Marital status: Single     Spouse name: N/A     Number of children: 0     Years of education: N/A     Occupational History     post       Social History Main Topics     Smoking status: Former Smoker     Packs/day: 3.00     Years: 30.00     Quit date: 8/5/1994     Smokeless tobacco: Former User     Quit date: 8/1/1994      Comment: quit 15 years ago after his stroke     Alcohol use 0.6 oz/week     1 Glasses of wine per week      Comment: 1 per day     Drug use: No     Sexual activity: No     Other Topics Concern     Parent/Sibling W/ Cabg, Mi Or Angioplasty Before 65f 55m? Yes     brother and father     Social History Narrative    [place].  Was never .  Had no children.  Has a two brothers that have passed away.  He has two sister, one with Alzheimer and one alive and well.  Sister will be here for surgery.       Family History   Problem Relation Age of Onset     C.A.D. Father      Hypertension Father      Prostate Cancer Father      C.A.D. Brother      MI     Hypertension Brother      Arthritis Sister      DIABETES No family hx of      CEREBROVASCULAR DISEASE No family hx of      Cancer - colorectal No family hx of      Prior to Admission Medications   Prior to Admission Medications   Prescriptions Last Dose Informant Patient Reported? Taking?   Multiple Vitamins-Minerals (MULTIVITAMIN & MINERAL PO) Unknown at Unknown time Self Yes No   Sig: Take 1 tablet by mouth every morning    Saw Palmetto, Serenoa repens, (SAW PALMETTO PO)  Self Yes No   Sig: Take 1 tablet by mouth every morning    amLODIPine (NORVASC) 10 MG tablet Past Week at Unknown time Self No Yes   Sig: Take 1 tablet (10 mg) by mouth daily   Patient taking differently: Take 10 mg by mouth every morning     aspirin 81 MG EC tablet 2/18/2018 Self No No   Sig: Take 1 tablet (81 mg) by mouth daily   Patient taking differently: Take 81 mg by mouth every morning    atorvastatin (LIPITOR) 40 MG tablet Unknown at Unknown time Self No No   Sig: Take 1 tablet (40 mg) by mouth daily   Patient taking differently: Take 40 mg by mouth every morning    carvedilol (COREG) 25 MG tablet Past Week at Unknown time  No Yes   Sig: Take 1 tablet (25 mg) by mouth 2 times daily   diphenhydrAMINE (BENADRYL) 25 MG capsule 2/21/2018 at 2000 Self Yes Yes   Sig: Take 50 mg by mouth nightly as needed sleep   lactobacillus rhamnosus, GG, (CULTURELLE) 10 B CELL capsule  Self Yes No   Sig: Take 1 capsule by mouth every morning    neomycin (MYCIFRADIN) 500 MG tablet 2/21/2018 at Unknown time  No Yes   Sig: Take two tablets by mouth every 8 hours for one day prior to surgery. Take in conjunction with Flagyl   niacin 500 MG tablet Unknown at Unknown time Self No No   Sig: Take 1 tablet (500 mg) by mouth At Bedtime   ondansetron (ZOFRAN) 4 MG tablet   No No   Sig: Take one tablet by mouth every 6 hours as needed for nausea when taking neomycin and flagyl   polyethylene glycol (GOLYTELY/NULYTELY) 236 G suspension 2/21/2018 at Unknown time  No Yes   Sig: Refer to surgical packet for instructions   tiZANidine (ZANAFLEX) 2 MG tablet More than a month at Unknown time  No No   Sig: Take 1-2 tablets (2-4 mg) by mouth nightly as needed   ticagrelor (BRILINTA) 90 MG tablet 2/16/2018 at Unknown time  No No   Sig: Take 1 tablet (90 mg) by mouth 2 times daily      Facility-Administered Medications: None       No current facility-administered medications on file prior to encounter.   Current Outpatient Prescriptions on File Prior to Encounter:  carvedilol (COREG) 25 MG tablet Take 1 tablet (25 mg) by mouth 2 times daily   amLODIPine (NORVASC) 10 MG tablet Take 1 tablet (10 mg) by mouth daily (Patient taking differently: Take 10 mg by mouth every morning )    diphenhydrAMINE (BENADRYL) 25 MG capsule Take 50 mg by mouth nightly as needed sleep   ticagrelor (BRILINTA) 90 MG tablet Take 1 tablet (90 mg) by mouth 2 times daily   tiZANidine (ZANAFLEX) 2 MG tablet Take 1-2 tablets (2-4 mg) by mouth nightly as needed   aspirin 81 MG EC tablet Take 1 tablet (81 mg) by mouth daily (Patient taking differently: Take 81 mg by mouth every morning )   atorvastatin (LIPITOR) 40 MG tablet Take 1 tablet (40 mg) by mouth daily (Patient taking differently: Take 40 mg by mouth every morning )   Saw Palmetto, Serenoa repens, (SAW PALMETTO PO) Take 1 tablet by mouth every morning    [DISCONTINUED] olmesartan-hydrochlorothiazide (BENICAR HCT) 40-25 MG per tablet Take 1 tablet by mouth daily   niacin 500 MG tablet Take 1 tablet (500 mg) by mouth At Bedtime   Multiple Vitamins-Minerals (MULTIVITAMIN & MINERAL PO) Take 1 tablet by mouth every morning    lactobacillus rhamnosus, GG, (CULTURELLE) 10 B CELL capsule Take 1 capsule by mouth every morning      Allergies   Allergies   Allergen Reactions     Fentanyl      Pt states it makes him feel funny but not true allergy.      Review of Systems   The 10 point Review of Systems is negative other than noted in the HPI or here.     Physical Exam   Temp: 98.1  F (36.7  C) Temp src: Oral BP: 137/63   Heart Rate: 89 Resp: 16 SpO2: 92 % O2 Device: Nasal cannula Oxygen Delivery: 2 LPM    Vital Signs with Ranges  Temp:  [97.5  F (36.4  C)-99.3  F (37.4  C)] 98.1  F (36.7  C)  Heart Rate:  [] 89  Resp:  [14-18] 16  BP: (107-200)/() 137/63  SpO2:  [92 %-98 %] 92 %  195 lbs 9.6 oz    GEN:  Alert, oriented x 3, appears comfortable, NAD.  HEENT:  Normocephalic/atraumatic, no scleral icterus, no nasal discharge, mouth moist.  CV:  Heart is with regular rate/rhythm. No murmurs,   No JVD. Normal a and v waves and x and y descent seen.   Jugular venus pressure is with decline in inspiration. No Kussmaul's sign.  Peripheral arteries:RA, FA, DP, PT all  2+/2+  no bruit on carotid arteries bilaterally, normal uptake of the both carotid arteries.  No bruit on abdomen or upper chest.  No lower extremities edema  LUNGS:  Clear to auscultation bilaterally     Data        PULMONARY FUNCTION TESTS:   PFT Latest Ref Rng & Units 9/27/2017   FVC L 3.87   FEV1 L 2.85   FVC% % 112   FEV1% % 108       Labs  Metabolic Studies    Recent Labs  Lab 02/24/18  0859 02/23/18  0705 02/22/18  0608    142  --    POTASSIUM 3.5 3.8 3.4   CHLORIDE 109 110*  --    CO2 23 23  --    ANIONGAP 8 9  --    * 113*  --    BUN 17 21  --    CR 2.18* 2.36* 2.40*   GFRESTIMATED 30* 27* 27*   GFRESTBLACK 36* 33* 32*   ADRIAN 8.6 8.2*  --    MAG 2.0 2.0  --    PHOS  --  3.9  --      Hematology  Recent Labs  Lab 02/24/18  0859 02/23/18  0705 02/22/18  0608   WBC 12.7* 10.5  --    RBC 3.30* 3.20*  --    HGB 7.9* 7.8* 8.6*   HCT 27.4* 26.2*  --    MCV 83 82  --    MCH 23.9* 24.4*  --    MCHC 28.8* 29.8*  --    RDW 14.5 14.0  --     175  --      Troponin  Recent Labs  Lab 02/24/18  0859 02/23/18  0705   TROPI 0.109* <0.015     Lipid Panel  Recent Labs   Lab Test  06/26/17   1145  07/05/16   1135  09/15/15   1208  08/25/14   1206   CHOL  165  140  127  130   HDL  46  47  38*  52   LDL  83  56  48  43   TRIG  182*  186*  204*  176*   CHOLHDLRATIO   --    --   3.3  2.5     DM  Lab Results   Component Value Date    A1C 5.4 08/15/2017     Urine Studies  Recent Labs   Lab Test  09/27/17   1153  08/15/17   1700  10/31/16   1647  10/03/16   1412   LEUKEST  Negative  Negative  Negative  Negative   WBCU  <1  1  1  1       Immune Globulin Studies  Recent Labs   Lab Test  10/27/16   1103   IGG  759   IGM  38*   IGA  298           Nestor Munoz MD  Cardiology Fellow [id number]     Attending: Patient seen and examined with Dr. Munoz. The history and physical findings are accurate as recorded. My additional findings, if any, have been incorporated into the body of the note. All labs, imaging studies, ECG  and telemetry data have been reviewed personally. The assessment and recommendations outlined reflect our joint decision making.    Recent coronary stent placement and need to hold dual antiplatelet therapy is always concerning. At this time rise in troponin level is not consistent with stent thrombosis/acs.     Continue aspirin and resume dual antiplatelet therapy when safe to do so from a surgical standpoint.     Yogi Woodward MD

## 2018-02-24 NOTE — PROGRESS NOTES
"Colorectal Surgery Progress Note    ID: 73 year old with recent PCI (11/17 and 12/17 for LAD and RCA disease on home ticagrelor held five days for surgery) POD 2 LAR with delirium overnight    S: Denies delirium confusion on a.m. Rounds. Was apologetic for confused behavior on return at 11 a.m. Denies CP, SOB. Denies n/v. Tolerating fulls, passing non-bloody bowel movements.    O:  /63 (BP Location: Right arm)  Pulse 69  Temp 98.1  F (36.7  C) (Oral)  Resp 16  Ht 1.651 m (5' 5\")  Wt 88.7 kg (195 lb 9.6 oz)  SpO2 92%  BMI 32.55 kg/m2    NAD. Mildly confused. Re-directable  NLB  Soft, appropriately tender, incisions c/d/i with surgical glue   WWP    Labs   Hgb 7.9 from 7.8  Cr. 2.2 from 2.4  WBC 12.7 from 10.5    Trop 0.109 from 0.015 prior day    EKG without ST changes    A/P:  POD 2 LAR, VSS, will review morning labs though with delirium overnight with new slight troponin elevation    - LRD  - Cardiology consult given high risk and new delirium, slight troponin elevation. Hold ticaagrelor for now pending cardiology recs. Holding amlodipine/betablocker for now given hypotension, then normotensive on re-check. Will re-start tomorrow or pending cardiology recommendations.  - Decrease IVF  - Walter one more day  - Replete electrolytes PRN. Chose low replacement protocol for now given CKD. Can change to high potassium goal pending cardiology workup.  - Continue ASA. Holding betablocker  - IV pain meds, IV metoprolol for now    Seen with fellow and staff at bedside      Neha Rubio  PGY 1 Colorectal Surgery    Attestation:  This patient has been seen and evaluated by me.  Discussed with the house staff team or resident(s) and agree with the findings and plan in this note.  Patient was disoriented overnight; this am was oriented x 3 and conversant but did not really understand or exactly recall his earlier agitation.  Elevated troponins noted and appreciated cardiology input.  Currently VSS and HB stable " though down from preop.  Probably ok to resume ticagrelor and beta blocker at this point.  Appreciate cards recs and f/u.  Sharan Raza MD  Professor of Surgery  Chief, Division of Colon and Rectal Surgery  [phone]

## 2018-02-24 NOTE — PLAN OF CARE
Problem: Patient Care Overview  Goal: Plan of Care/Patient Progress Review  Outcome: No Change  Patient is A&Ox4, quite forgetful throughout the day. Weaned off O2, currently on RA. Denies pain but complains of a tightness in the abdomen. Up with 2 assist and a walker. Tolerating a full liquid diet. 1 BM today. Walter in place, to be removed tomorrow. Lap sites CDI. Will continue current plan of care and update MDs with any changes.      Problem: Cardiac Disease Comorbidity  Goal: Cardiac Disease  Patient comorbidity will be monitored for signs and symptoms of Cardiac Disease.  Problems will be absent, minimized or managed by discharge/transition of care.   Outcome: No Change  Patient is on continuous cardiac monitoring. No evidence of cardiac events.     Problem: Peripheral Vascular/Peripheral Neurovascular Disease Comorbidity  Goal: Peripheral Vascular/Peripheral Neurovascular Disease  Patient comorbidity will be monitored for signs and symptoms of Peripheral Vascular/Peripheral Neurovascular Disease condition.  Problems will be absent, minimized or managed by discharge/transition of care.   Outcome: No Change  PCDs on patient and encouraged to get up OOB.    Problem: Gastrointestinal Condition Comorbidity  Goal: Gastrointestinal Condition  Patient comorbidity will be monitored for signs and symptoms of Gastrointestinal condition.  Problems will be absent, minimized or managed by discharge/transition of care.   Outcome: No Change  Protonix IV given.

## 2018-02-24 NOTE — PLAN OF CARE
Problem: PT General Care Plan  Goal: PT Goal 1  PT Goal 1  Demonstrate low falls risk with a balance assessment score of < 13.5 seconds on TUG,  > 19/24 on Dynamic Gait Index, > 9/12 on 4-Item Dynamic Gait Index, or > 45/56 on Figueroa Balance Assessment.

## 2018-02-24 NOTE — PROGRESS NOTES
"02/24/2018  General Surgery Cross Cover Note    Was called to bedside to evaluate patient Jose Lira for confusion and elevated BP. Patient was seen reading a book in bed upon arrival. He states he was feeling well. Denies headaches, vision changes, chest pains, SOB, or abdominal pain. No nausea or vomiting.     Exam:   /81  Pulse 69  Temp 97.5  F (36.4  C) (Oral)  Resp 18  Ht 1.651 m (5' 5\")  Wt 92.3 kg (203 lb 8 oz)  SpO2 98%  BMI 33.86 kg/m2  General: Alert, interactive, & in NAD  Neuro: CN 2-12 grossly intact, PERRLA, no focal or lateralizing signs  Resp: Nonlabored breathing on NC  Cardiac: Regular rate; extremities warm;   Abdomen: Soft, appropriately tender, moderately distended.  Incision: c/d/i without erythema, warmth, or discharge.   Extremities: Patient moving all four extremities without deficit. Good  strength bilaterally. 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 He did make one confusing statement upon my arrival but he knew [facility] was [facility] and the day of the week, etc. He was not agitated    Plan:  -Patient with increasing HR today, will check EKG  -Neuro status ok for now  -Added one time dose of hydralazine earlier, patient has just received his dose of metoprolol, will continue to monitor for changes.     Steven Gallo DO   General Surgery PGY1  [phone]  (After 6AM please page primary team    Addendum:  Right at about 7 am patient had a violent patient alert alarm go off, went to see patient  Patient appeared delirious and was taking off his O2 and his O2 monitoring. He was not re-directable initially. Discussed patient with Dr. Cunha who " recommended PRN dose of Zyprexa if needed. Went to re-assess patient and he was in bed and appeared to agree to wait for the day team to arrive and round on him this AM.

## 2018-02-24 NOTE — PLAN OF CARE
Problem: Patient Care Overview  Goal: Plan of Care/Patient Progress Review  Outcome: No Change  Neuro: Disoriented to place, time, and situation. Confused and impulsive. Refusing cares at times. VPM in place.   Cardiac: SR/ST, 80-100s. VSS. Hypertensive, PRN hydralazine given x2.       Respiratory: Sating >92% on 4-5L NC.  GI/: Adequate urine output per cronin. BM X3, incontinent.  Diet/appetite: Tolerating full liquid diet. Eating well.  Activity:  Assist of 2, up to chair and in halls.  Pain: Denies.   Skin: Intact, no new deficits noted. Lap sites CDI.     R: Continue with POC. Notify primary team with changes.      Problem: Cardiac Disease Comorbidity  Goal: Cardiac Disease  Patient comorbidity will be monitored for signs and symptoms of Cardiac Disease.  Problems will be absent, minimized or managed by discharge/transition of care.   Outcome: No Change  SR/ST. BP elevated this shift. Scheduled Norvasc due to start this am at 0800.    Problem: Peripheral Vascular/Peripheral Neurovascular Disease Comorbidity  Goal: Peripheral Vascular/Peripheral Neurovascular Disease  Patient comorbidity will be monitored for signs and symptoms of Peripheral Vascular/Peripheral Neurovascular Disease condition.  Problems will be absent, minimized or managed by discharge/transition of care.   Outcome: No Change  No acute changes.    Problem: Gastrointestinal Condition Comorbidity  Goal: Gastrointestinal Condition  Patient comorbidity will be monitored for signs and symptoms of Gastrointestinal condition.  Problems will be absent, minimized or managed by discharge/transition of care.   Outcome: No Change  Incontinent of bowel x3 this shift, no bleeding noted.

## 2018-02-24 NOTE — PROVIDER NOTIFICATION
Paged Dr. Gallo to bedside regarding pt's increased confusion and agitation. Also, pt's BP has been persistently despite PRN dose of hydralazine already being given. MD ordered one time dose hydralazine and will come assess pt at bedside.

## 2018-02-25 NOTE — PROVIDER NOTIFICATION
Paged surgery cross cover regarding pt's increasing agitation and non-compliance with wearing Bipap mask. Pt is now on the Oxi plus mask at 12 LPM. O2 sats 94-95%. Recently gave PO dose of zyprexa. MD to plan with senior if additional medication should be given. Will continue to monitor.

## 2018-02-25 NOTE — PROVIDER NOTIFICATION
Paged surgery cross cover MD regarding pt's reported SOB and increased O2 needs up to 10 . MD came to bedside to assess pt and ordered STAT chest xray, ABG, and BMP. Will continue to monitor.

## 2018-02-25 NOTE — PLAN OF CARE
"Problem: Patient Care Overview  Goal: Plan of Care/Patient Progress Review  Outcome: Declining  /88  Pulse 69  Temp 98.9  F (37.2  C) (Axillary)  Resp 22  Ht 1.651 m (5' 5\")  Wt 88.3 kg (194 lb 11.2 oz)  SpO2 97%  BMI 32.4 kg/m2  Neuro: Alert, disoriented to situation. Intermittently disoriented to place. Multiple episodes of anxiety throughout the night with use of Bipap mask   Cardiac: SR/ST, 80-120s. Hypertensive, 170-190s/80-90s (MD aware), PRN hydralazine given x1..       Respiratory: Labile O2 needs with general trend upwards. Sating >92% on 10-13 LPM via OxiPlus.  GI/: Adequate urine output per cronin. BM X1  Diet/appetite: Tolerating low fiber diet.   Activity:  Assist of 2, up to chair and in halls.  Pain: Denies.    Skin: Intact, no new deficits noted.  20 mg IV lasix given. Bilateral LE US complete. One time dose 5 mg Zyprexa PO given.   R: Continue with POC. Notify primary team with changes.          "

## 2018-02-25 NOTE — PROGRESS NOTES
SURGERY CROSS-COVER NOTE  02/25/2018 2:04 AM    Patient: Jose Lira  MRN: [id number]    Subjective  Was paged regarding pt triggering sepsis protocol d/t increased HR at 110 but refusing lab draw. Pt clinically stable at that time. Upon recheck of vitals, pt HR had come down but respiratory demands increasing, sating low 90s on 10 L oximask and observed to have increased WOB per nursing.     Pt assessed at bedside with [facility] resident Sean Chavez. Pt was A&Ox4; some nonsensical answers but this is unchanged from his baseline. Pt observed to have increased WOB but able to converse. Denies chest pain and SOB.     Objective  Temp:  [97.6  F (36.4  C)-99.6  F (37.6  C)] 98.3  F (36.8  C)  Heart Rate:  [] 84  Resp:  [14-40] 24  BP: (107-189)/(63-97) 189/92  FiO2 (%):  [70 %] 70 %  SpO2:  [91 %-100 %] 100 %     General: AAO, NAD, lying in bed, appears comfortable  CV: RRR, no murmurs  Pulm: respirations shallow with belly breathing, wheezes present on auscultation  Abd: soft, appropriately tender to palpation, non-distended, incisions c/d/i with dermabond  Extremities: warm, well-perfused without edema      Intake/Output Summary (Last 24 hours) at 02/25/18 0204  Last data filed at 02/25/18 0000   Gross per 24 hour   Intake             1900 ml   Output             1600 ml   Net              300 ml     Labs:  Troponin I   Result Value Ref Range    Troponin I ES 0.096 (H) 0.000 - 0.045 ug/L   Blood gas arterial   Result Value Ref Range    pH Arterial 7.46 (H) 7.35 - 7.45 pH    pCO2 Arterial 36 35 - 45 mm Hg    pO2 Arterial 68 (L) 80 - 105 mm Hg    Bicarbonate Arterial 25 21 - 28 mmol/L    Base Excess Art 1.4 mmol/L    FIO2 7L    Lactate for Sepsis Protocol   Result Value Ref Range    Lactate for Sepsis Protocol 0.9 0.4 - 1.9 mmol/L   XR Chest Port 1 View    Narrative    Exam:  Chest radiograph, 2/25/2018 1:00 AM    History: Increased SOB    Comparison:  2/24/2018    Findings:  Single AP view of the chest. The  cardiomediastinal  silhouette is within normal limits. Pulmonary vasculature is  indistinct. No pleural effusion or pneumothorax. Minimally decreased  mixed patchy interstitial and airspace opacities. Improved lung  volumes.      Impression    Impression:  Minimally decreased patchy mixed interstitial and  airspace opacities which are greatest in the left lower lung,  pulmonary edema versus infection. Pulmonary vasculature congestion.       Assessment/Plan:  Jose Lira is a 73 year old male POD2 s/p LAR for rectal cancer with increasing respiratory demands. Recent stenting of LAD and RCA in Nov-Dec 2017 and significant vasculopathy. Extensive smoking history but quit 1994 and no current diagnosis of COPD. Cardiology consulted by primary team earlier today for increasing troponin; they feel this is likely due to demand ischemia.     Pt evaluated at bedside with [facility] resident Dr. Chavez. CRX, ABG, lactate, duonebs ordered.     CRX demonstrates some mild pulmonary edema. ABG significant for hypoxemia. Lactate WNL.     Pt's oxygen requirements came down to 7L on oximask during the course of evaluation. He was placed on BIPAP d/t hypoxemia. Will obtain repeat ABG after 1 hour on BIPAP.     Dr. Chavez discussed the pt with SICU staff Dr. Khan who agreed with the plan.     - - - - - - - - - - - - - - - - - -  Brisa Coe MD  General Surgery PGY-1

## 2018-02-25 NOTE — PLAN OF CARE
Problem: Patient Care Overview  Goal: Plan of Care/Patient Progress Review  Outcome: No Change  Neuro: Disoriented to situation - confused/impulsive.   Cardiac: SR-ST. VSS.   Respiratory: Sating 93 on 4L NC.  GI/: Adequate urine output. Multiple BMs  Diet/appetite: Tolerating diet. Eating well.  Activity:  Assist of 1-2, up to chair and in halls.  Pain: At acceptable level on current regimen.   Skin: Intact, no new deficits noted.  LDA's: Walter - good outputs throughout day    Plan: Continue with POC. Notify primary team with changes.      Problem: Cardiac Disease Comorbidity  Goal: Cardiac Disease  Patient comorbidity will be monitored for signs and symptoms of Cardiac Disease.  Problems will be absent, minimized or managed by discharge/transition of care.   Outcome: No Change  Pt remains in sinus tach, hypertensive at times    Problem: Peripheral Vascular/Peripheral Neurovascular Disease Comorbidity  Goal: Peripheral Vascular/Peripheral Neurovascular Disease  Patient comorbidity will be monitored for signs and symptoms of Peripheral Vascular/Peripheral Neurovascular Disease condition.  Problems will be absent, minimized or managed by discharge/transition of care.   Outcome: No Change  Pt denies numbness or tingling    Problem: Gastrointestinal Condition Comorbidity  Goal: Gastrointestinal Condition  Patient comorbidity will be monitored for signs and symptoms of Gastrointestinal condition.  Problems will be absent, minimized or managed by discharge/transition of care.   Outcome: No Change  No blood in stool - multiple BMs

## 2018-02-25 NOTE — PROGRESS NOTES
"Surgery Cross Cover Note    Reevaluated patient after BiPAP. Patient seems to be more awake and responsive.     /88  Pulse 69  Temp 98.9  F (37.2  C) (Axillary)  Resp 24  Ht 1.651 m (5' 5\")  Wt 88.3 kg (194 lb 11.2 oz)  SpO2 97%  BMI 32.4 kg/m2  A&Ox3, NAD  Breathing non labored on BiPAP  Soft, appropriately ttp,   Extremities warm.    Repeat ABG 7.5/32/87  Trop 011>>0.09>>0.10 (unchanged)    Hypoxia and respiratory alkalosis are concerning for a PE. Patient's Cr is elevated. Will go ahead and order a DVT LE US to start.     Sean Chavez MD  General Surgery, PGY-2  [phone]      "

## 2018-02-25 NOTE — PROGRESS NOTES
"Colorectal surgery progress note    S: overnight events reviewed; this morning patient relatively pleasant but does ask that we let him get back to sleep. Denies feeling short of breath.    O: /88  Pulse 69  Temp 98.9  F (37.2  C) (Axillary)  Resp 22  Ht 1.651 m (5' 5\")  Wt 88.3 kg (194 lb 11.2 oz)  SpO2 97%  BMI 32.4 kg/m2    Awakens to voice, NAD   Breathing appears non-labored on facemask O2  No tachycardia, but hypertensive  Abd soft, non-tender, incisions c/d/i    Overnight labs reviewed.   Troponin 0.101 > 0.097 this morning    CXR with pulmonary vascular congestion and pleural effusion    Duplex ultrasound negative for DVT    A/P: 73 year old man with proximal rectal cancer s/p LAR on 2/22. Complicated by delirium, hypoxia, cardiac demand ischemia.     Neuro- continue current pain regimen; delirium precautions and prn atypical antipsychotics  Pulm -encourage pulmonary toilet as able           -give lasix 20 IV once this morning, if good response may repeat dose this evening, will monitor electrolytes   CV -continue home cardiac medications        -obtain ECHO this morning to evaluate EF and volume status in the setting of hypoxia and hypertension with pulmonary edema on CXR  Renal -continue Walter catheter today for strict I/o monitoring while attempting to diurese   GI -as long as patient continues to have intermittent need for BiPAP, would keep NPO  Heme -no bleeding concerns at this time  PPX -GI and DVT ppx ordered  Dispo -pending improvement in O2 requirements, advancement of diet, therapy is recommending TCU    Savage Chase MD   Surgery PGY-3         "

## 2018-02-26 NOTE — PLAN OF CARE
"Problem: Patient Care Overview  Goal: Plan of Care/Patient Progress Review  Outcome: Improving   02/25/18 1820   OTHER   Plan Of Care Reviewed With patient   Plan of Care Review   Progress improving   D:  Upon taking over care of pt at 07:00, pt continues to be confused, disoriented to time, place and situation, but knows who he is.  He will sometimes answer the questions correctly then as the evening comes, pt again becomes more confused about where he is. Dr Romo pager [phone], on call physician, just saw the patient at 18:15 and stated, \"pt answered all my questions just now when I asked him.\"  Not sure if he was just tired of me asking him questions or not.  Sitter remains at the bedside.  Lungs at times with crackles at the bases or diminished.  Pt with orders to be ambulating in the hallway.  Pt on 15 l oxiplus of oxygen and will desat to 85% when his mask is taken off, or when doing activity but was satting at 94% when his mask was on his chin at 18:30 so pt's oxygen was decreased to 10 l oxiplus.    I:  RN monitored pt's respiratory status throughout the shift.  Dr Raza informed of patients status via phone at 18:30.  RN monitored pt's I and O during the shift.  RN assisted with getting patient out of the bed and ambulating in the hallway.  A: Pt ambulated around the nursing station x2 with assist of one person.  Pt the first time up c/o having calf cramping requiring time to sit and take a break before attempting to try and get up continuing his walk.  Pt did a better job with his ambulation the second time up, requiring less breaks and pt stated, \"Gosh my legs feel better this time around.  Pt denies having pain.  P:  Continue with current plan of care.  Continue to orient pt to time, place and situation as needed.  Continue to encourage activity during the shift.    Problem: Surgery Nonspecified (Adult)  Goal: Signs and Symptoms of Listed Potential Problems Will be Absent, Minimized or Managed (Surgery " Nonspecified)  Signs and symptoms of listed potential problems will be absent, minimized or managed by discharge/transition of care (reference Surgery Nonspecified (Adult) CPG).   Outcome: Improving   02/25/18 0800 02/25/18 1820   Surgery Nonspecified   Problems Assessed (Surgery) --  all   Problems Present (Surgery) bowel motility decreased;other (see comments)  (pt is confused) --    Pt had a bowel movement today 2/25/18.  Continue to work on increasing pt's activity during the shift.

## 2018-02-26 NOTE — PROGRESS NOTES
"Colorectal Surgery Progress Note    S:   Altered mental status overnight that resolved with seroquel and redirection, also had increase drainage from inferior incision seem to have resolved with dressing change, and a new hard lump underneath the xiphoid. Patient continue to require respiratory support, on 7L down from 12L oxymask. +flatus, +BM, cronin inplace. Denies shortness of breath, chest pain, nausea, vomiting. No fevers/chills      O: BP (!) 189/101 (BP Location: Right arm, Cuff Size: Adult Regular)  Pulse 69  Temp 98.4  F (36.9  C) (Oral)  Resp 18  Ht 1.651 m (5' 5\")  Wt 88.3 kg (194 lb 11.2 oz)  SpO2 94%  BMI 32.4 kg/m2    Gen: sleeping comfotably, awakens to voice, NAD   Resp: course sounds in all lung fields, breathing is non-labored on oxymask at 7L O2  CV: RRR, hypertensive  Abdo: protuberant/distended, soft, non-tender, incisions looks clean, dressing with mild serosanquinous staining, no erythema, fluctuance or purulence    Labs:  Trops 0.101 > 0.097 > 0.052 this morning  Lab Results   Component Value Date    WBC 10.2 02/26/2018    HGB 7.0 (L) 02/26/2018    HCT 24.0 (L) 02/26/2018     02/26/2018     02/26/2018    POTASSIUM 3.8 02/26/2018    CHLORIDE 112 (H) 02/26/2018    CO2 22 02/26/2018    BUN 28 02/26/2018    CR 2.63 (H) 02/26/2018    GLC 94 02/26/2018    TROPI 0.052 (H) 02/26/2018    AST 19 07/06/2017    ALT 14 07/06/2017    ALKPHOS 94 07/06/2017    BILITOTAL 0.5 07/06/2017    INR 1.02 12/04/2017       CXR   XR CHEST PORT 1 VW  2/26/2018 4:52 AM  Findings:   Single portable AP view the chest obtained. Trachea is midline. The  cardiac mediastinal silhouette is stable and mildly enlarged. There is  been slight interval worsening of the diffuse mixed interstitial and  airspace opacities. No pleural effusion. No pneumothorax.    IMPRESSION:  Mild worsening of the patchy mixed pulmonary opacities. Differential  includes pulmonary edema and infection.      A/P:   73 year old man with " proximal rectal cancer s/p LAR on . Complicated by delirium, hypoxia, cardiac demand ischemia.     Neuro:   - altered mental status likely 2/2 delirium, keep  delirium precaution, and bedtime prn Seroquel. Use narcotics sparingly and redirect night and day. Also check UA today since patient still has cronin.     Pulm:   - has course lungs fields this AM likely from pulmonary edema vs infections. Patient has normal WBC and no fevers so will hold on antibiotics and check procalcitonin   - continue to encourage pulmonary toilet   - give lasix 10 IV once this AM, and continue to monitor for ESSIE, BMP in the afternoon    CV   - Hypertensive with SBP 190s   - ECHO from yesterday notes global and regional hyperkinetic with an EF 65-70% with no wall motion abnormalities. Right ventricle is normal and IVC with normal respiratory variation in diameter.  - continue home cardiac medications and prns as needed to keep SBP < 160  - continue home BP meds and augment with prn to keep SBP < 160    Renal   - continue cronin catheter today for strict I/O monitoring while attempting to diurese     GI: No issues, start on low residue diet today  Heme: no issues  PPX -GI and DVT ppx ordered  Dispo: pending improvement in O2 requirements, advancement of diet, therapy is recommending TCU      Jeffrey Gamble MD (PGY1)  General Surgery  [phone]    Attestation:  This patient has been seen and evaluated by me.  He is much brighter today; knows where he is and who I am.  Spirits are good; abdomen benign.  Trying to gently diurese but need to follow creatinine closely. Discussed with the house staff team or resident(s) and agree with the findings and plan in this note.  Sharan Raza MD  Professor of Surgery  Chief, Division of Colon and Rectal Surgery  [phone]

## 2018-02-26 NOTE — PLAN OF CARE
Problem: Patient Care Overview  Goal: Plan of Care/Patient Progress Review  Outcome: No Change  Neuro: Alert more at the beginning of shift. Into the night patient awoke confused, agitated, and non-redirectable. See Provider Notification note for more information. Lethargic since 0400.   Cardiac: SR. VSS. Afebrile.    Respiratory: Needs fluctuating. Was on 12 L oxyplus mask, now on 6L oxyplus. Patient frequently takes mask off and does desat into low 80's taking a few minutes to recover.   GI/: Adequate urine output. Clear, yellow into cronin. Checked brief frequently for BM. Only smear and passing gas.   Diet/appetite: Tolerating clear liquid diet.Encouraging liquids.   Activity:  Assist of 1 with walker, up to chair and in halls. Riding in WC at night can be a good distraction. Patient unsteady on feet overnight.   Pain: At acceptable level on current regimen. No pain meds given this shift.   Skin: Intact, no new deficits noted. Bruising and incisions on abdomin.   LDA's: R hand PIV.     Plan: Continue with POC. Notify primary team with changes.

## 2018-02-26 NOTE — PROGRESS NOTES
"02/26/2018  General Surgery Cross Cover Note    Was called to bedside to evaluate patient Jose Lira for multiple issues. RN reports that he had 100 cc s/s drainage from his inferior incision site. It has since ceased s/p dressing application. There is a new hard lump underneath patients xiphoid. RN notes patient has started more abdominal breathing, remains on Oxymask at about 12L. Patient is drowsy, but when aroused does not complain of pain, headaches, vision changes, SOB, or chest pains.    Exam:   /74 (BP Location: Right arm)  Pulse 69  Temp 97.8  F (36.6  C) (Axillary)  Resp 18  Ht 1.651 m (5' 5\")  Wt 88.3 kg (194 lb 11.2 oz)  SpO2 97%  BMI 32.4 kg/m2  General: Drowsy, arousable to name. Aware he is at [facility].  Neuro: CN 2-12 grossly intact, no focal or lateralizing signs  Resp: Abdominal breathing on OxyMask, mild wheezes bilaterally  Cardiac: Regular rate, extremities warm  Abdomen: Soft, nontender, nondistended. There is a hard nodule located beneath his xiphoid process that is non tender to palpation. No overlying skin changes.   Incision: c/d/i without erythema, warmth, or discharge. The inferior incision site appears intact and is no longer draining fluid, dressing was recently changed and is dry.  Extremities: No LE edema or obvious joint abnormalities  Skin: Warm and dry, no jaundice or rash    I/O last 3 completed shifts:  In: 930 [P.O.:730; I.V.:200]  Out: 2360 [Urine:2360]    Assessment:  Jose Lira is a 73 year old male POD # 3 s/p LAR for rectal cancer now with multiple issues.     -For his incisional bleed, it looks controlled currently. Added quick clot underneath an island dressing.     -For his new reported abdominal breathing will check   CBC, BMP, ABG, Lactic Acid, EKG, Troponin, CXR    -The lump beneath his xiphoid appears in a CT scan from 06/17 and is not causing him pain. It feels firm and immobile.     -Saw patient with Dr." Makenzie Gallo DO   General Surgery PGY1  [phone]  (After 6AM please page primary team)

## 2018-02-26 NOTE — PLAN OF CARE
Problem: Patient Care Overview  Goal: Plan of Care/Patient Progress Review  Discharge Planner PT   Patient plan for discharge: Not stated  Current status: Reinforced precautions, but pt unable to recall after 5 minutes. However, follows instructions and answers questions appropriately. Supine>sit with mod A, log roll technique, verbal cues, precaution reminders. Sit<>stand CGA with extensive cueing for safe hand placement, poor carryover. Ambulates 260' in multiple bouts with CGA, FWW, w/c follow. Has symptoms of claudication. SpO2 89-90% after each bout on 4L O2 via NC.  Barriers to return to prior living situation: Respiratory status and O2 needs, claudication symptoms, weakness, impaired activity tolerance.  Recommendations for discharge: TCU  Rationale for recommendations: Pt presents below baseline for functional mobility, impaired safety awareness. Would benefit from further intervention to improve safe and independent mobility.       Entered by: Donnie Sofia 02/26/2018 1:11 PM

## 2018-02-26 NOTE — PROVIDER NOTIFICATION
Patient woke up at 0200, getting out of bed, confused about whereabouts and pulling lines and tubes. Non-redirectable taking oxygen mask off. Spoke to Dr. Gallo re: patient incision bleeding approx. 100-200ml from lower abdominal incision when patient stood. Incision had been secured with dermabond. Cleo to bedside, cleaned and redressed wound. Patient lethargic, but arousable. Vitals stable B/P: 142/62, T: 97.8, P: 69, R: 20. Ab xray, ABG, 12-lead and lactic acid with CBC, BMP ordered.

## 2018-02-27 NOTE — PROGRESS NOTES
"Colorectal Progress Note    Subjective:   Patient was up all night according nurse, delirious but redirectable, otherwise no acute issues. Pain controlled. He reports \"poor sleep\" this AM, denies fevers, chills, CP, SOB, and N/V. Tolerating diet. Voiding independently.  +flatus, + BM.    Objective:   /75 (BP Location: Left arm)  Pulse 90  Temp 99.8  F (37.7  C) (Axillary)  Resp 20  Ht 1.651 m (5' 5\")  Wt 88 kg (194 lb 0.1 oz)  SpO2 92%  BMI 32.28 kg/m2    I/O:  I/O last 3 completed shifts:  In: 1380 [P.O.:1380]  Out: 1550 [Urine:1550]    PE:  Gen: Awake, alert and oriented this AM, NAD   CV: RRR  Resp: non-labored at rest on 5L NC,   Abd: protuberant and distended, soft, incisions has no signs of erythema, fluctuance or purulence.  Ext: warm and well perfused    Labs:  Cr 2.77 from 2.74 > 2.63 >>> baseline 1.5 - 2    Imaging: Reviewed    A/P: Jose Lira is a 73 year old male with proximal rectal cancer s/p LAR on 2/22. Complicated by delirium, hypoxia, cardiac demand ischemia.      Neuro:   - confusions and altered mental status at night  likely 2/2 delirium, keep  delirium precaution and bedtime Seroquel, prn zyprexa, avoid benzos.  - continue use narcotics sparingly and redirect night and day.      Pulm:   - increased O2 requirement and course lungs sounds concern for pulmonary edema vs infections. Patient has normal WBC and no fevers so will hold on antibiotics.   - continue to encourage pulmonary toilet.   - will get pulmonary therapist on board to give him some pulmonary physiotherapy in hopes of getting him of the O2     CV   - Hypertensive  - ECHO from 2/25 notes global and regional hyperkinetic with an EF 65-70% with no wall motion abnormalities. Right ventricle is normal and IVC with normal respiratory variation in diameter.  - continue home cardiac medications and prns as needed to keep SBP < 160  - continue home BP meds and augment with prn to keep SBP < 160     Renal   - UOP is " adequate at 0.5cc/kg/hr for the last 24hrs  - continue to monitor ESSIE and trend Cr  - continue strict strict I/O monitoring     GI: No issues, start on low residue diet today  Heme: no issues  PPX -GI and DVT ppx ordered  Dispo: pending improvement in O2 requirements, advancement of diet, therapy is recommending TCU        Jeffrey Gamble MD (PGY1)  General Surgery  [phone]

## 2018-02-27 NOTE — PROGRESS NOTES
Colorectal Surgery Progress Note    Subjective:  Some confusion and pulling at lines yesterday. Complains of SOB and productive cough with yellow sputum. Pain is well-controlled. Denies chest pain, nausea, vomiting, flank pain.     Objective:  Temp:  [97.9  F (36.6  C)-99.6  F (37.6  C)] 99.1  F (37.3  C)  Pulse:  [71-90] 90  Heart Rate:  [67-94] 83  Resp:  [16-19] 18  BP: (112-196)/() 172/76  SpO2:  [90 %-95 %] 92 %    I/O last 3 completed shifts:  In: 1020 [P.O.:1020]  Out: 1150 [Urine:1150]    NAD, alert, better oriented than prior  RRR, WWP, NLB  Lungs with coarse crackles at bases  Abd: soft, non-tender, non-distended, laparoscope site at RLQ with minimal serosanguinous drainage. No CVA tenderness    Labs: Reviewed.  Heme:  Recent Labs  Lab 02/26/18  0446 02/25/18  0552 02/24/18  1730 02/24/18  0859 02/23/18  0705   WBC 10.2 13.7*  --  12.7* 10.5   HGB 7.0* 7.8* 8.1* 7.9* 7.8*    219  --  200 175     Chem:  Recent Labs  Lab 02/26/18  1301 02/26/18  0446 02/25/18  2020 02/25/18  1652 02/25/18  0039   POTASSIUM 4.0 3.8 3.5 3.3* 3.4   CR 2.74* 2.63* 2.28*  --  2.06*     Assessment/Plan:  73 year old man with proximal rectal cancer s/p LAR on 2/22. Complicated by delirium, hypoxia, cardiac demand ischemia.      Neuro:   - altered mental status likely 2/2 delirium, keep  delirium precaution, and bedtime prn Seroquel. Use narcotics sparingly and redirect night and day. D/c cronin today      Pulm:   - has coarse lungs fields this AM likely from pulmonary edema vs infections. Patient has normal WBC and no fevers so will hold on antibiotics; procalcitonin equivocal  - continue to encourage pulmonary toilet      CV   - Hypertensive with SBP 190s   - ECHO from yesterday notes global and regional hyperkinetic with an EF 65-70% with no wall motion abnormalities. Right ventricle is normal and IVC with normal respiratory variation in diameter.  - continue home cardiac medications and prns as needed to keep SBP <  "160  - continue home BP meds and augment with prn to keep SBP < 160     Renal   - d/c cronin today     GI: No issues, start on low residue diet today  Heme: no issues  PPX -GI and DVT ppx ordered  Dispo: pending improvement in O2 requirements, advancement of diet, therapy is recommending TCU    Discussed with CRS fellow Dr. Mcneill and will be discussed with staff.    Uday \"Davy\" Lux, MS3  Colorectal Surgery    Attestation:  This patient has been seen and evaluated by me.  Discussed with the house staff team or resident(s) and agree with the findings and plan in this note.  Sharan Raza MD  Professor of Surgery  Chief, Division of Colon and Rectal Surgery  [phone]          "

## 2018-02-27 NOTE — PLAN OF CARE
Problem: Patient Care Overview  Goal: Plan of Care/Patient Progress Review  Neuro: alert but intermittently confused. Had a sitter and now on Video monitoring trial. His confusion is getting worst after 6 PM. Trying to get out of bed and pulling his lines.    Cardiac: SR. VSS.   Respiratory: Sating 92-96% on 4-5 L by NC.   GI/: cronin patent with adequate urine output.  Diet/appetite: Tolerating regular diet. Eating well.  Activity:  Assist of one and walker, up to chair and in halls.  Pain: At acceptable level on current regimen.   Skin: Intact, no new deficits noted.  LDA's: PIV sl'd  Plan: Continue with POC. Notify primary team with changes.

## 2018-02-27 NOTE — PLAN OF CARE
Problem: Patient Care Overview  Goal: Plan of Care/Patient Progress Review  Discharge Planner PT   Patient plan for discharge: Not stated  Current status: On 8L O2 via oxymizer cannula. SpO2 > 90% throughout. Unable to recall precautions with prompting at beginning of session. Can recall with prompts at end. Sit<>stand CGA. Needs frequent verbal cues for precaution adherence and safe hand placement. Ambulates 250' in multiple bouts with FWW, CGA, and w/c follow. Limited primarily by claudication symptoms. Impulsive, attempts transfers at will.   Barriers to return to prior living situation: Respiratory status, cognition, claudication symptoms, deconditioning, abdominal precautions  Recommendations for discharge: TCU  Rationale for recommendations: Pt presents with impaired activity tolerance and safety awareness. Would benefit from further therapies to increase level of independence and facilitate safe mobility and precaution adherence.       Entered by: Donnie Sofia 02/27/2018 3:47 PM

## 2018-02-27 NOTE — PROGRESS NOTES
02/27/18 1100   Quick Adds   Type of Visit Initial Occupational Therapy Evaluation   Living Environment   Lives With alone   Living Arrangements apartment   Living Environment Comment Difficult to obtain information regarding home environment and level of assistance due to cognitive impairments. Pt reports living independently in an apartment with elevator access. Pt unable to recall bathroom set-up.    Self-Care   Usual Activity Tolerance good   Current Activity Tolerance moderate   Activity/Exercise/Self-Care Comment Pt reports independent with ADLs at baseline however distractible and unclear if pt is accurate historian.    Functional Level Prior   Ambulation 0-->independent   Transferring 0-->independent   Toileting 0-->independent   Bathing 0-->independent   Dressing 0-->independent   Eating 0-->independent   Communication 0-->understands/communicates without difficulty   Swallowing 0-->swallows foods/liquids without difficulty   Cognition 0 - no cognition issues reported   General Information   Onset of Illness/Injury or Date of Surgery - Date 02/22/18   Referring Physician Gallito Romo MD   Patient/Family Goals Statement return to PLOF    Additional Occupational Profile Info/Pertinent History of Current Problem Jose Lira is a 73 year old male with proximal rectal cancer s/p LAR on 2/22. Complicated by delirium, hypoxia, cardiac demand ischemia.    Precautions/Limitations fall precautions;oxygen therapy device and L/min;abdominal precautions   Cognitive Status Examination   Orientation place;time   Level of Consciousness confused   Able to Follow Commands moderate impairment   Personal Safety (Cognitive) moderate impairment;unaware of cognitive deficits;impulsive;one on one supervision required for safety   Memory impaired   Attention Distractible during evaluation;Sustained attention impaired;Divided attention impaired, difficulty with simultaneous tasks   Organization/Problem Solving  Problem solving impaired;Sequencing impaired   Visual Perception   Visual Perception Wears glasses   Pain Assessment   Patient Currently in Pain Yes, see Vital Sign flowsheet   Range of Motion (ROM)   ROM Comment BUEs WFL    Strength   Strength Comments BUEs with generalized weakness   Mobility   Bed Mobility Comments supine > EOB min A    Transfer Skill: Bed to Chair/Chair to Bed   Level of [place]: Bed to Chair contact guard   Assistive Device - Transfer Skill Bed to Chair Chair to Bed Rehab Eval standard walker   Transfer Skill: Sit to Stand   Level of [place]: Sit/Stand contact guard   Assistive Device for Transfer: Sit/Stand standard walker   Balance   Balance Comments Pt ambulated in room with CGA and FWW, pt distractible needing cues for navigation    Lower Body Dressing   Level of [place]: Dress Lower Body moderate assist (50% patients effort)   Grooming   Level of [place]: Grooming moderate assist (50% patients effort)   Physical Assist/Nonphysical Assist: Grooming verbal cues   Activities of Daily Living Analysis   Impairments Contributing to Impaired Activities of Daily Living balance impaired;cognition impaired;pain;post surgical precautions;strength decreased   General Therapy Interventions   Planned Therapy Interventions balance training;bed mobility training;IADL retraining;ADL retraining;cognition;strengthening;transfer training;home program guidelines;progressive activity/exercise   Clinical Impression   Criteria for Skilled Therapeutic Interventions Met yes, treatment indicated   OT Diagnosis decreased ADL I    Influenced by the following impairments pain, post surgical precautions, cognitive impairments, balance deficits, deconditioning   Assessment of Occupational Performance 3-5 Performance Deficits   Identified Performance Deficits bed mobility, dressing, bathing, toileting, home management, medication management, community mobility   Clinical Decision Making (Complexity)  "Moderate complexity   Therapy Frequency daily   Predicted Duration of Therapy Intervention (days/wks) 3 weeks    Anticipated Discharge Disposition Transitional Care Facility   Risks and Benefits of Treatment have been explained. Yes   Patient, Family & other staff in agreement with plan of care Yes   Clinical Impression Comments Pt presents with pain, post surgical precautions, cognitive impairments, balance deficits, deconditioning leading ot decreased ADL I. Pt to benefit from skilled OT intervention to address the above stated deficits.    [facility] AM-PAC TM \"6 Clicks\"   2016, Trustees of [facility], under license to [employer].  All rights reserved.   6 Clicks Short Forms Daily Activity Inpatient Short Form   [facility] AM-PAC  \"6 Clicks\" Daily Activity Inpatient Short Form   1. Putting on and taking off regular lower body clothing? 2 - A Lot   2. Bathing (including washing, rinsing, drying)? 2 - A Lot   3. Toileting, which includes using toilet, bedpan or urinal? 2 - A Lot   4. Putting on and taking off regular upper body clothing? 3 - A Little   5. Taking care of personal grooming such as brushing teeth? 2 - A Lot   6. Eating meals? 3 - A Little   Daily Activity Raw Score (Score out of 24.Lower scores equate to lower levels of function) 14   Total Evaluation Time   Total Evaluation Time (Minutes) 5     "

## 2018-02-27 NOTE — PLAN OF CARE
Problem: Patient Care Overview  Goal: Plan of Care/Patient Progress Review  Outcome: No Change    A: A&Ox2-3, disoriented to time/place/situation intermittently, progressively becoming more confused overnight. Pt remains forgetful. VS, oxy plus 5-7 L sating >90%. Required prn hydralazine 2x overnight for goal of SBP <160/<100. SR. Afebrile. Denies pain and nausea. Low Fiber diet, fair appetite. Electrolytes WNL. Ab incision with scan serosang drainage, site CDI. Adequate UO, plan to d/c cronin in AM kept for strict I&O's overnight. Cronin removed at 0600. No BM overnight, BS active x4 pt passing flatus. SBA sat in chair multiple times overnight. Pt remaining restless and intermittently agitated. Able to redirect. Sitter at bedside for impulsiveness and pulling at times. Able to make needs known.     I/O this shift:  In: 360 [P.O.:360]  Out: 550 [Urine:550]    Temp:  [97.9  F (36.6  C)-99.6  F (37.6  C)] 99.1  F (37.3  C)  Pulse:  [71-90] 90  Heart Rate:  [67-94] 83  Resp:  [16-19] 18  BP: (112-196)/() 172/76  SpO2:  [90 %-95 %] 92 %     R: Continue with POC. Notify primary team with changes.

## 2018-02-27 NOTE — PROGRESS NOTES
"Social Work: Assessment with Discharge Plan    Patient Name:  Jose Lira  :  1945  Age:  73 year old  MRN:  [id number]  Risk/Complexity Score:  Filed Complexity Screen Score: 6  Completed assessment with:  Pt's sister/next of kin Kim.    Presenting Information   Reason for Referral:  Discharge plan and Potential need for community services upon discharge  Date of Intake:  2018  Referral Source:  Chart Review  Decision Maker:  Pt is usually his own decision maker, but pt is currently confused and disoriented.   Alternate Decision Maker:  Pt's sister Kim Sears (P: [phone]).  Health Care Directive:  Copy in Chart  Living Situation:  Pt lives alone at the [facility] apartment in [place].  Previous Functional Status:  Pt's sister stated that pt was independent and driving prior to admission. Kim stated pt \"takes care of himself\", \"doesn't get out much\", but like to read and pain.   Patient and family understanding of hospitalization:  Pt's sister seems to have a good understanding of pt's surgery.  Cultural/Language/Spiritual Considerations: English speaking; no cultural or spiritual considerations.  Adjustment to Illness:  Unable to assess pt.    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   Services Needed/Recommended:  TCU    Mental Health/Chemical Dependency  Diagnosis:  none  Support/Services in Place:  n/a  Services Needed/Recommended:  n/a    Support System  Significant relationship at present time:  Pt is single, never , no children. Pt has three siblings, one brother  last year, one of pt's sisters has dementia, and pt's sister Kim is pt's main support.  Family of origin is available for support:  Pt's sister Kim and brother in law Bill are pt's primary support people. Kim calls pt 2-3x/wk and offers to take pt out but pt usually declines.  Other support available: " " none  Gaps in support system:  Pt lives alone.  Patient is caregiver to:  None     Provider Information   Primary Care Physician:  Rebeca Sandoval   [phone]   Clinic:  [address] [place] [id number]   :  There is a , Sarah De Jesus, who works in pt's building. The phone number ([phone]) pt's sister gave for that SW is incorrect.    Financial   Income Source:  Social Security  Financial Concerns:  none  Insurance:    Payor/Plan Subscriber Name Rel Member # Group #   MEDICARE - MEDICARE ROYAL MONK E*  [id number]       ATTN CLAIMS, [address]   COMMERCIAL - SENIOR P* ROYAL MONK E*  [id number]       HAND DELIVER TO [facility] STAFF       Discharge Plan   Patient and family discharge goal:  TBD  Provided education on discharge plan:  Will have further discussions when confusion clears.  Patient agreeable to discharge plan:  TBD  A list of Medicare Certified Facilities was provided to the patient and/or family to encourage patient choice. Patient's choices for facility are:  N/A at this time.  Will NH provide Skilled rehabilitation or complex medical:  N/A  General information regarding anticipated insurance coverage and possible out of pocket cost was discussed. Patient and patient's family are aware patient may incur the cost of transportation to the facility, pending insurance payment: N/A  Barriers to discharge:  Medical stability and safe dc plan.    Discharge Recommendations   Anticipated Disposition:  Pt lives alone in a senior apt in [place]. Pt was independent, driving, and did not have any services in the home. Pt has been experiencing confusion and disorientation and has been requiring a 1:1 attendant versus a VPM. PT/OT currently recommending TCU. Pt's sister/health care agent stated that pt is \"probably going to fight that\" and discussed that pt's expectation will be that he will dc directly home. SW discussed that it is possible pt will progress during this " hospitalization but that TCU is the recommendation at this time. Pt's sister recalled that during a previous pre-op meeting, pt had identified two [facility] that [facility] SW will continue to follow for dc needs and make TCU referrals if pt is agreeable.  Transportation Needs:  TBD  Name of Transportation Company and Phone:  N/A    REMIGIO Thakur, [facility]  6B Intermediate [facility] Unit  Phone: [phone]  Pager: [phone]

## 2018-02-28 NOTE — PROGRESS NOTES
[facility]   Cardiology Consults  Progress Note     Interval History: Overnight with increased oxygen requirements, found to have elevated NT-BNP and CXR with patchy mixed pulmonary opacities.    Physical Exam:  Temp:  [97.3  F (36.3  C)-98.5  F (36.9  C)] 97.3  F (36.3  C)  Pulse:  [80-88] 83  Heart Rate:  [71-99] 82  Resp:  [20-40] 34  BP: (122-185)/() 146/78  SpO2:  [91 %-97 %] 94 %    I/O:   Intake/Output Summary (Last 24 hours) at 02/28/18 1458  Last data filed at 02/28/18 1300   Gross per 24 hour   Intake             1640 ml   Output              525 ml   Net             1115 ml     Wt:   Wt Readings from Last 5 Encounters:   02/28/18 88.5 kg (195 lb)   02/13/18 89.3 kg (196 lb 12.8 oz)   01/08/18 88.8 kg (195 lb 11.2 oz)   12/19/17 90.7 kg (200 lb)   12/04/17 89.4 kg (197 lb)     GEN: NAD  Pulm: coarse lung sounds  Cardiac: JVP 8 cm, no murmur/rub/gallops  Vascular: moderate lower extremity edema and intact pulses  GI: soft, non distended  Neuro: CN II-XII grossly intact    Medications:    QUEtiapine  50 mg Oral QPM     acetaminophen  975 mg Oral Q6H WA     carvedilol  25 mg Oral BID     ticagrelor  90 mg Oral BID     heparin  5,000 Units Subcutaneous Q8H     aspirin  81 mg Oral Daily     atorvastatin  40 mg Oral Daily     sodium chloride (PF)  3 mL Intracatheter Q8H     pantoprazole (PROTONIX) IV  40 mg Intravenous Q24H       - MEDICATION INSTRUCTIONS -       - MEDICATION INSTRUCTIONS -         Labs:   CMP  Recent Labs  Lab 02/28/18  0659 02/27/18  0659 02/26/18  1301 02/26/18  0446  02/25/18  0039 02/24/18  0859 02/23/18  0705    146* 144 144  < > 144 141 142   POTASSIUM 4.0 3.8 4.0 3.8  < > 3.4 3.5 3.8   CHLORIDE 111* 112* 111* 112*  < > 112* 109 110*   CO2 21 19* 25 22  < > 24 23 23   ANIONGAP 10 14 9 10  < > 8 8 9   * 112* 113* 94  < > 131* 104* 113*   BUN 32* 33* 32* 28  < > 21 17 21   CR 2.46* 2.77* 2.74* 2.63*  < > 2.06* 2.18* 2.36*   GFRESTIMATED 26*  23* 23* 24*  < > 32* 30* 27*   GFRESTBLACK 31* 27* 28* 29*  < > 38* 36* 33*   ADRIAN 8.2* 7.8* 7.8* 8.0*  < > 8.0* 8.6 8.2*   MAG  --   --   --  2.1  --  2.0 2.0 2.0   PHOS  --   --   --  3.6  --   --   --  3.9   < > = values in this interval not displayed.  CBC  Recent Labs  Lab 02/28/18  0632 02/27/18  1628 02/26/18  0446 02/25/18  0552  02/24/18  0859   WBC 9.1  --  10.2 13.7*  --  12.7*   RBC 3.05*  --  2.85* 3.24*  --  3.30*   HGB 7.5* 6.8* 7.0* 7.8*  < > 7.9*   HCT 25.3*  --  24.0* 26.9*  --  27.4*   MCV 83  --  84 83  --  83   MCH 24.6*  --  24.6* 24.1*  --  23.9*   MCHC 29.6*  --  29.2* 29.0*  --  28.8*   RDW 15.2*  --  14.9 14.6  --  14.5     --  198 219  --  200   < > = values in this interval not displayed.  INRNo lab results found in last 7 days.  Arterial Blood Gas  Recent Labs  Lab 02/25/18  0402 02/25/18  0038   PH 7.50* 7.46*   PCO2 32* 36   PO2 87 68*   HCO3 24 25   O2PER 21.0 7L       Diagnostics:    ECHO 2/25/18:  Interpretation Summary  Global and regional left ventricular function is hyperkinetic with an EF of  65-70%.  No regional wall motion abnormalities are seen.  Right ventricular function, chamber size, wall motion, and thickness are  normal.  Dilation of the inferior vena cava is present with normal respiratory  variation in diameter.    Left Ventricle  Global and regional left ventricular function is hyperkinetic with an EF of  65-70%. No regional wall motion abnormalities are seen.     Right Ventricle  Right ventricular function, chamber size, wall motion, and thickness are  normal.     Mitral Valve  Mild mitral annular calcification is present.        Aortic Valve  Mild aortic valve sclerosis is present.     Tricuspid Valve  The tricuspid valve is normal.     Pulmonic Valve  The pulmonic valve cannot be assessed.     Vessels  Dilation of the inferior vena cava is present with normal respiratory  variation in diameter.     Pericardium  Small organizing pericardial effusion or  epicardial fat pad noted.       Coronary angiogram 12/2017:      ASSESSMENT/PLAN:  Jose Lira is a 73 year old year old male with PMH of HTN. HLD, CKD, PAD, 2-vessel CAD s/p PCI of the LAD in 2016 and PCI of the proximal and mid RCA 12/2017 and recently diagnosed rectal cancer who underwent colon resection on 2/22. Overnight developed worsening hypoxia, also found to have elevated NT-BNP of 3000, CXR with improvement in diffuse interstitial opacities concerning for infection vs edema. On physical exam he has evidence of mild volume overload. Recommend IV lasix 40 mg to achieve goal net negative 1-2 liters/24 hours. May also have underlying pulmonary infection, continue to monitor. Continue medical management of CAD with ASA 81 mg, ticagrelor 90 mg BID, atorvastatin 40 mg and carvedilol 25 mg BID.    Patient seen and discussed with Dr. Hair, who agrees with above plan.    CUONG Bell, NP-C  [facility] Cardiology Consult Team  [phone] or [phone]

## 2018-02-28 NOTE — PROVIDER NOTIFICATION
Notified primary team re: patient tachypnea and patient had a run of 5 beat run of PAC. Col. Surg following up with Cards.

## 2018-02-28 NOTE — PROGRESS NOTES
"Colorectal Progress Note    Subjective:   Hgb drop 6.8 received 1 unit overnight, delirious and up all night, increased work of breathing this AM. Reports he is feeling good, denies chest pain/discomfort, no fevers, chills, N/V. Hasn't had a BM in 2 days but passing flatus     Objective:   /78 (BP Location: Right arm)  Pulse 83  Temp 97.3  F (36.3  C) (Oral)  Resp (!) 34  Ht 1.651 m (5' 5\")  Wt 88.5 kg (195 lb)  SpO2 94%  BMI 32.45 kg/m2    I/O:  I/O last 3 completed shifts:  In: 1950 [P.O.:1650]  Out: 750 [Urine:750]    PE:  Gen: Awake, alert and oriented this AM, NAD   CV: RRR  Resp: non-labored at rest on 5L NC,   Abd: protuberant and distended, soft, incisions has no signs of erythema, fluctuance or purulence.  Ext: warm and well perfused    Labs: Reviewed  Hgb 7.5 < 6.8 << 7  Cr 2.46 from 2.77  pBNP 3491  Trops 0.019  Lactate 1.1    EKG: Unremarkable     Imaging: Reviewed  XR CHEST PORT 1 VW  2/28/2018 6:49 AM     Findings:   Single portable AP view of the chest is obtained. The trachea is  midline. The cardiac mediastinal silhouette is stable and nonenlarged.  Mild improvement in diffuse mixed interstitial and opacities. No  pneumothorax. No pleural effusion.    IMPRESSION:  Mild improvement in the patchy mixed pulmonary opacities. Differential  continues to include pulmonary edema and infection.        A/P: Jose Lira is a 73 year old male with proximal rectal cancer s/p LAR on 2/22. Complicated by delirium, hypoxia, cardiac demand ischemia.      Neuro:   - confusions and altered mental status at night  likely 2/2 delirium, keep  delirium precaution and bedtime Seroquel, prn zyprexa, avoid benzos.  - continue use narcotics sparingly and redirect night and day.      Pulm:   - increased O2 requirement and course lungs sounds concern for pulmonary edema vs infections. Patient has normal WBC and no fevers so will hold on antibiotics. CXR show mild improvement from previous imaging  - " Pulmonary edema from fluid overload is likely the cause of his increase O2 requirement. Will continue to diurese acutiously   - continue to encourage pulmonary toilet.   - pulmonary therapist on consult appreciate recs     CV   - Fluid overload, ECHO from  notes global and regional hyperkinetic with an EF 65-70% with no wall motion abnormalities. Right ventricle is normal and IVC with normal respiratory variation in diameter.  - Will get Cardiology back on board - appreciate recs     Renal   - UOPA  - continue to monitor ESSIE and trend Cr  - continue strict strict I/O monitoring     GI: No issues, continue on low residue diet   Heme: no issues  PPX -GI and DVT ppx ordered  Dispo: pending improvement in O2 requirements, advancement of diet, therapy is recommending TCU       Patient seen with Dr. Luz Marina Gamble MD (PGY1)  General Surgery  [phone]

## 2018-02-28 NOTE — PLAN OF CARE
Problem: Patient Care Overview  Goal: Plan of Care/Patient Progress Review  Outcome: No Change  Neuro: Disoriented somewhat to situation, states he's here for surgery but unable to elaborate. Other orientation questions answered slowly but correctly.  Cardiac: SR w/PVCs. VSS ex HTN in the evening, given Hydralazine 10mg IV x1.   Respiratory: Sating 93-95% on 8L Oxy cannula, up from 5L - provider aware. Pt has been refusing nebs when encouraged.  GI/: No BM. Pt voided 25ml in the AM but unable to after. Bladder scan  290 at 1130. Pt would not allow writer to bladder scan him again but did allow to straight cath at 1430, straight cath for 350ml. No void since, continue to monitor.    Diet/appetite: Tolerating low fiber diet. Eating well.  Activity:  Assist of 1, up to chair and in halls.  Pain: At acceptable level on current regimen.   Skin: 3 lap sites and incision to lower abd. Incision bleeding and covering dressing, provider notified.  LDA's: PIV SL WNL.    Pt waxes and wanes w/agitation, did not respond well to video pt monitoring and had sitter restarted. Getting up frequently and setting off alarms, taking off oxygen. Hgb checked this evening and was 6.8. Provider was notified, awaiting orders.     Plan: Continue with POC. Notify primary team with changes.      Problem: Surgery Nonspecified (Adult)  Goal: Signs and Symptoms of Listed Potential Problems Will be Absent, Minimized or Managed (Surgery Nonspecified)  Signs and symptoms of listed potential problems will be absent, minimized or managed by discharge/transition of care (reference Surgery Nonspecified (Adult) CPG).   Outcome: No Change   02/27/18 1828   Surgery Nonspecified   Problems Assessed (Surgery) all   Problems Present (Surgery) bleeding/anemia;bowel motility decreased;postoperative urinary retention;respiratory compromise;situational response

## 2018-02-28 NOTE — PLAN OF CARE
Problem: Patient Care Overview  Goal: Plan of Care/Patient Progress Review  Outcome: No Change    A:  A&Ox2-3, disoriented to time/place/situation intermittently, progressively becoming more confused overnight, orientation & agitation waxes & weans. Pt remains forgetful, needing frequent reorientation. VS, oxymizer 5-8 L sating >90%. Required prn hydralazine 1x overnight for goal of SBP <160/<100. SR. Afebrile. Denies pain and nausea. Low Fiber diet, fair appetite. Electrolytes WNL. Ab incision with scan serosang drainage, site CDI. Bladder scan post voiding, since pt has required intermittent straight cathing. Pt was incontinent and voiding via urinal (see flow sheet for output). No BM overnight, BS active x4 pt passing flatus. SBA sat in chair multiple times overnight. Pt remaining restless and intermittently agitated. Sitter at bedside for impulsiveness and pulling at times. Able to make needs known.     Pt had panic attack with lab draws in AM.. Managed to calm down with non-pharm methods. RR increased 26-40, utilized prn duoneb. MD's rounding at bedside, ordered chest xray, EKG, and various     I/O this shift:  In: 360 [P.O.:60]  Out: 250 [Urine:250]    Temp:  [97.5  F (36.4  C)-99.9  F (37.7  C)] 97.5  F (36.4  C)  Pulse:  [80-88] 82  Heart Rate:  [71-99] 83  Resp:  [20-36] 28  BP: (122-185)/(56-99) 123/99  SpO2:  [91 %-95 %] 94 %     R: Continue with POC. Notify primary team with changes.

## 2018-02-28 NOTE — PLAN OF CARE
Problem: Patient Care Overview  Goal: Plan of Care/Patient Progress Review  Discharge Planner OT   Patient plan for discharge: not addressed during today's session   Current status: Pt more lethargic, oriented to person and day of the week, disoriented to time of day, situation, and place. Pt with increased use of accessory muscles supine in bed however improved with upright activity. Pt min A for bed mobility and CGA for sit > stand. Pt completed pivot transfer to chair with CGA. Pt mod A for LB dressing. Pt completed g/h tasks seated in chair with mod cues for initiating and sequencing tasks. Pt on 5L oxymizer, SpO2 90-92% with activity. Pt quickly desats to 87% on RA at rest.   Barriers to return to prior living situation: post surgical precautions, cognitive impairments, deconditioning   Recommendations for discharge: TCU   Rationale for recommendations: Pt presents below baseline in cognition - per SW note pt was living independently and driving. Pt would benefit from continued rehab to increase independence and safety with ADLs.        Entered by: Cammie Waters 02/28/2018 9:07 AM

## 2018-03-01 NOTE — PLAN OF CARE
Problem: Patient Care Overview  Goal: Plan of Care/Patient Progress Review  Discharge Planner PT   Patient plan for discharge: Not discussed  Current status: Pt on 4L oxymizer NC throughout session. Pt still confused but knows therapist's name. Sit<>stand SBA. Needs VCs for FWW management and line awareness in room. Ambulates 250' in multiple bouts, CGA, FWW, w/c follow. Requires seated rest breaks, endorses SOB, symptoms of LE claudication. SpO2 >90% throughout.  Barriers to return to prior living situation: Cognition, deconditioning, claudication, respiratory status, abdominal precautions  Recommendations for discharge: TCU  Rationale for recommendations: Pt presents with impaired activity tolerance, LE claudication symptoms, impaired cognition. Would benefit from further therapies to increase safety with mobility, increase level of independence, and reinforce precautions.       Entered by: Donnie Sofia 03/01/2018 5:01 PM

## 2018-03-01 NOTE — PLAN OF CARE
Problem: Patient Care Overview  Goal: Plan of Care/Patient Progress Review  Discharge Planner OT   Patient plan for discharge: not addressed   Current status: Pt continues to demonstrate deficits in cognition. Therapist administered Short Blessed Test scoring 15/28 indicating moderate impairment in immediate and delayed recall. Pt continues to need re-education and cues for adhering to abdominal precautions. Pt ambulated in room 10ft + 10ft with CGA and FWW. Pt on 5L oxymizer, SpO2 >93% HR 90s.   Barriers to return to prior living situation: cognition, post surgical precautions, lives alone   Recommendations for discharge: TCU   Rationale for recommendations: Pt currently below baseline in ADLs and mobility. Per SW note - pt previously living independently and driving.        Entered by: Cammie Waters 03/01/2018 1:37 PM

## 2018-03-01 NOTE — PROGRESS NOTES
"Colorectal Progress Note    Subjective:   No acute event overnight, reports sleeping a little better than the day before, feels good, denies chest pain/discomfort, no fevers, chills, N/V, small BM last night and but passing flatus     Objective:   /76 (BP Location: Left arm)  Pulse 85  Temp 98.2  F (36.8  C) (Axillary)  Resp 22  Ht 1.651 m (5' 5\")  Wt 85.8 kg (189 lb 1.6 oz)  SpO2 93%  BMI 31.47 kg/m2    I/O:  I/O last 3 completed shifts:  In: 1120 [P.O.:1120]  Out: 1925 [Urine:1925]    PE:  Gen: Awake, alert and oriented this AM, NAD   CV: RRR  Resp: non-labored at rest on 5L NC,   Abd: protuberant and distended, soft, mild hematoma/bruising on the panus, incisions has no signs of erythema, fluctuance or purulence.  Ext: warm and well perfused    Labs: Reviewed  WBC 11.7 today 9.1 yesterday     Imaging: Reviewed.   Today's exam looks a bit worse that yesterday, will wait for the formal read.    A/P: Jose Lira is a 73 year old male with proximal rectal cancer s/p LAR on 2/22. Complicated by delirium, hypoxia, cardiac demand ischemia.      Neuro:   - confusions and altered mental status at night  likely 2/2 delirium, keep  delirium precaution and bedtime Trazodone, prn zyprexa, avoid benzos.  - continue tylenol for pain, use narcotics sparingly and redirect night and day.      Pulm:   - increased O2 requirement and course lungs sounds concern for pulmonary edema vs infections.   - WBC 11.7 today from 9.0 yesterday but no fevers, CXR show pulmonary edema vs couldn't r/o infection. Pulmonary edema likely from fluid overload. Will continue to hold antibiotics for now and continue to diurese cautiously   - continue to encourage pulmonary toilet.   - pulmonary therapist on consult appreciate recs     CV   - Fluid overload, ECHO from 2/25 notes global and regional hyperkinetic with an EF 65-70% with no wall motion abnormalities. Right ventricle is normal and IVC with normal respiratory variation in " diameter.  - Cardiology recommend IV Lasix 40mg to achieve a goal net negative 1-2L/day     Renal   - UOPA 1450cc last 24hrs  - continue to diurese and monitor ESSIE/trend Cr  - keep cronin for strict strict I/O monitoring     GI: No issues, continue on low residue diet   Heme: no issues  PPX -GI and DVT ppx ordered  Dispo: pending improvement in O2 requirements, advancement of diet, therapy is recommending TCU     Patient seen with Dr. Luz Marina Gamble MD (PGY1)  General Surgery  [phone]

## 2018-03-01 NOTE — PROGRESS NOTES
[facility], [place]    Sepsis Evaluation Progress Note    Date of Service: 2018    I was called to see Jose Lira due to abnormal vital signs triggering the Sepsis SIRS screening alert. He is not known to have an infection. But known to have pulmonary edema and opacities that can be a source of infection.     Physical Exam    Vital Signs:  Temp: 98.2  F (36.8  C) Temp src: Axillary BP: 158/76 Pulse: 85 Heart Rate: 77 Resp: 22 SpO2: 93 % O2 Device: Oxymizer cannula Oxygen Delivery: 5 LPM    Lab:  Lactic Acid   Date Value Ref Range Status   2018 1.1 0.7 - 2.0 mmol/L Final     Lactate 1.0  WBC 11.7    The patient is at baseline mental status. He often becomes delirious at night time.  He appeared comfortable when I saw him, his breathing is still a bit labored and he still sounds wet this AM. He is in normal sinus on telemetry. Abdomen is protuberant but non-tender, soft, no guarding.  The rest of their physical exam is significant for course lung sounds and labored breathing on 5L.    Assessment and Plan    The SIRS and exam findings are likely due to Pulmonary edema vs PNA/atlectasis, there is no sign of sepsis at this time.    Disposition: The patient will remain on the current unit. We will continue to monitor this patient closely. Will also repeat CBC and get a CXR to assess where we are at with his diuresis and also r/o possible PNA. Will start Levofloxacin to empiric treatment of PNA.    Discussed with Dr. Mcneill and staff Dr. Saul Gamble MD  [phone]

## 2018-03-01 NOTE — PLAN OF CARE
"Problem: Patient Care Overview  Goal: Plan of Care/Patient Progress Review  Outcome: No Change  Neuro: Patient has been oriented to self, and place, sometimes time today. Calm and cooperative, expressing feelings of depression and frustration with his current situation. Does describe having thoughts that seem confusing to him, but he is \"trying to trust the staff\".   Cardiac: NSR, did have a run of 5 beats of PAC, sent message to team. Gave PRN hydralazine at 0800 for BP of 179 systolic, he has since come down to 140's systolic.   Respiratory: 5L oximyzer, patient very tachypnec 30's RR. Using abdominal muscles and appears very fatigued. Lung sounds early this am were wheezing, afternoon they were coarse crackles throughout.   GI/: Gave 10mg of lasix early am with minimal output of 100. In afternoon inserted cronin cath and gave 40mg lasix with 650 out so far. Small loose BM today, many attempts on the commode. Feels \"one is coming\" Asked for PRN stool softeners from team. None ordered yet. Gave prune juice. Bowel sounds more active in afternoon.   Diet/appetite: Tolerating low fib diet. Eating adequately  Activity:  Assist of 1, up to chair and in halls. Spent most of today in the chair b/c it helps with his breathing.   Pain: At acceptable level on current regimen. Refused tylenol today saying he was not in pain.   Skin: Changed lower abdominal dressing. It was saturated with blood, dressing has been dry since the morning.   LDA's: R PIV SL    Plan: Continue with POC. Notify primary team with changes.      "

## 2018-03-01 NOTE — PROGRESS NOTES
[facility]   Cardiology Consults  Progress Note     Interval History: No acute overnight events. Endorses dyspnea which is unchanged from yesterday. No chest pain, palpitations, lightheadedness or syncope.    Physical Exam:  Temp:  [97.9  F (36.6  C)-98.6  F (37  C)] 98.2  F (36.8  C)  Pulse:  [80-85] 85  Heart Rate:  [73-88] 77  Resp:  [22-28] 22  BP: (121-163)/(59-84) 158/76  SpO2:  [93 %-96 %] 93 %    I/O:   Intake/Output Summary (Last 24 hours) at 03/01/18 1122  Last data filed at 03/01/18 0733   Gross per 24 hour   Intake              730 ml   Output             2200 ml   Net            -1470 ml     Wt:   Wt Readings from Last 5 Encounters:   03/01/18 85.8 kg (189 lb 1.6 oz)   02/13/18 89.3 kg (196 lb 12.8 oz)   01/08/18 88.8 kg (195 lb 11.2 oz)   12/19/17 90.7 kg (200 lb)   12/04/17 89.4 kg (197 lb)     GEN: NAD  Pulm: coarse lung sounds  Cardiac: JVP 8 cm, no murmur/rub/gallops  Vascular: mild lower extremity edema and intact pulses  GI: soft, non distended  Neuro: CN II-XII grossly intact    Medications:    pantoprazole  40 mg Oral QAM     acetaminophen  975 mg Oral BID     furosemide  40 mg Intravenous Once     traZODone  100 mg Oral QPM     carvedilol  25 mg Oral BID     ticagrelor  90 mg Oral BID     heparin  5,000 Units Subcutaneous Q8H     aspirin  81 mg Oral Daily     atorvastatin  40 mg Oral Daily     sodium chloride (PF)  3 mL Intracatheter Q8H       - MEDICATION INSTRUCTIONS -       - MEDICATION INSTRUCTIONS -         Labs:   CMP  Recent Labs  Lab 03/01/18  0506 02/28/18  2039 02/28/18  0659 02/27/18  0659  02/26/18  0446  02/25/18  0039 02/24/18  0859 02/23/18  0705    139 142 146*  < > 144  < > 144 141 142   POTASSIUM 3.7 3.7 4.0 3.8  < > 3.8  < > 3.4 3.5 3.8   CHLORIDE 108 108 111* 112*  < > 112*  < > 112* 109 110*   CO2 25 22 21 19*  < > 22  < > 24 23 23   ANIONGAP 8 8 10 14  < > 10  < > 8 8 9   * 140* 114* 112*  < > 94  < > 131* 104* 113*   BUN 40*  38* 32* 33*  < > 28  < > 21 17 21   CR 2.50* 2.52* 2.46* 2.77*  < > 2.63*  < > 2.06* 2.18* 2.36*   GFRESTIMATED 25* 25* 26* 23*  < > 24*  < > 32* 30* 27*   GFRESTBLACK 31* 30* 31* 27*  < > 29*  < > 38* 36* 33*   ADRIAN 8.2* 8.4* 8.2* 7.8*  < > 8.0*  < > 8.0* 8.6 8.2*   MAG  --   --   --   --   --  2.1  --  2.0 2.0 2.0   PHOS  --   --   --   --   --  3.6  --   --   --  3.9   < > = values in this interval not displayed.  CBC  Recent Labs  Lab 03/01/18  0816 02/28/18  2039 02/28/18  0659 02/28/18  0632  02/26/18  0446   WBC 11.7*  --  Canceled, Test credited 9.1  --  10.2   RBC 2.92*  --  Canceled, Test credited 3.05*  --  2.85*   HGB 7.2* 7.6* Canceled, Test credited 7.5*  < > 7.0*   HCT 23.9*  --  Canceled, Test credited 25.3*  --  24.0*   MCV 82  --  Canceled, Test credited 83  --  84   MCH 24.7*  --  Canceled, Test credited 24.6*  --  24.6*   MCHC 30.1*  --  Canceled, Test credited 29.6*  --  29.2*   RDW 15.5*  --  Canceled, Test credited 15.2*  --  14.9     --  Canceled, Test credited 237  --  198   < > = values in this interval not displayed.  INRNo lab results found in last 7 days.  Arterial Blood Gas    Recent Labs  Lab 02/25/18  0402 02/25/18  0038   PH 7.50* 7.46*   PCO2 32* 36   PO2 87 68*   HCO3 24 25   O2PER 21.0 7L       Diagnostics:    ECHO 2/25/18:  Interpretation Summary  Global and regional left ventricular function is hyperkinetic with an EF of  65-70%.  No regional wall motion abnormalities are seen.  Right ventricular function, chamber size, wall motion, and thickness are  normal.  Dilation of the inferior vena cava is present with normal respiratory  variation in diameter.    Left Ventricle  Global and regional left ventricular function is hyperkinetic with an EF of  65-70%. No regional wall motion abnormalities are seen.     Right Ventricle  Right ventricular function, chamber size, wall motion, and thickness are  normal.     Mitral Valve  Mild mitral annular calcification is  present.        Aortic Valve  Mild aortic valve sclerosis is present.     Tricuspid Valve  The tricuspid valve is normal.     Pulmonic Valve  The pulmonic valve cannot be assessed.     Vessels  Dilation of the inferior vena cava is present with normal respiratory  variation in diameter.     Pericardium  Small organizing pericardial effusion or epicardial fat pad noted.       Coronary angiogram 12/2017:      ASSESSMENT/PLAN:  Jose Lira is a 73 year old year old male with PMH of HTN, HLD, CKD, PAD, 2-vessel CAD s/p PCI of the LAD in 2016 and PCI of the proximal and mid RCA 12/2017 and recently diagnosed rectal cancer who underwent colon resection on 2/22. Course complicated by delirium and acute hypoxic respiratory failure likely secondary to volume overload and possible pneumonia. He received 40 mg IV lasix yesterday and was net negative 1.5 liters. No change is respiratory status today, he remains dyspneic and hypoxic with coarse lung sounds. Only mildly hypervolemic on exam. Labs today notable for rising WBC count. I am concerned he may be developing pneumonia.      -- Continue diuresis to achieve goal net negative 1 liter/24 hours  -- Usual CHF cares including strict I/O's, daily weights and daily BMP  -- Continue to watch for signs and symptoms of pneumonia as you are  -- Continue medical management of CAD with ASA 81 mg, ticagrelor 90 mg BID, atorvastatin 40 mg and carvedilol 25 mg BID    Patient seen and discussed with Dr. Hair, who agrees with above plan.    CUONG Bell, NP-C  [facility] Cardiology Consult Team  [phone] or [phone]

## 2018-03-01 NOTE — PLAN OF CARE
Problem: Patient Care Overview  Goal: Discharge Needs Assessment  Outcome: No Change  D AVSS with sat's 95% on oxygen set to 4-41/2 L/min via nasal cannula. Heart regular and lungs decreased in bases otherwise clear. Voiced no c/o pain or nausea and slept better tonight than previous nights. Patient was sleeping at 2200 last night so held his Trazodone until midnight which allowed him sleep during the night better? Good urine output via cronin catheter. Remains confused with fluctuating orientation. Sitter needing to re-orientated and calming techniques to keep patient from getting agitated.   I Vital's, assessment and med's per order.   A Resting in bed with call light in reach.   P Continue to monitor and update MD with changes.

## 2018-03-02 NOTE — PLAN OF CARE
Problem: Patient Care Overview  Goal: Plan of Care/Patient Progress Review  Outcome: No Change  Pt VSS, BP initially high, but recheck improved.  On 2-3L Oxymizer.  Disoriented to time.  Sitter discontinued around 1000, VPM in place, no issues since.  Chair alarm on and call light within reach.  Waiting for breakfast, up in chair.  Ambulated in halls with PT.  Walter in place, adequate UOP.  Encouraging IS and acapella use.  Continue with plan of care.

## 2018-03-02 NOTE — PROVIDER NOTIFICATION
Noticed INR had not been checked this hospitalization. Patient on Brillenta and getting heparin injections. Lots of bruising, IV leaking blood, hgb 7.1 today. Recommend to primary team to check, they are considering to add onto labs morning on 3/3.

## 2018-03-02 NOTE — PLAN OF CARE
Problem: Patient Care Overview  Goal: Plan of Care/Patient Progress Review  Discharge Planner PT  6B  Patient plan for discharge: Did not discuss d/t confusion  Current status: Pt required CGA for functional transfers.  Pt ambulated 150 ft x2 bouts with FWW and CGA on 3 L via oxymizer.  Instability, weakness and decreased activity tolerance noted during therapy today.  Pt's O2 sats dropped to 88% on 3 L via oxymizer taking ~1 min to return to >90%.  Barriers to return to prior living situation: Weakness, decreased activity tolerance, respiratory status, balance deficits   Recommendations for discharge: TCU   Rationale for recommendations: Decreased functional mobility        Entered by: Georgia Colmenares 03/02/2018 10:47 AM

## 2018-03-02 NOTE — PLAN OF CARE
Problem: Patient Care Overview  Goal: Plan of Care/Patient Progress Review  Discharge Planner OT   Patient plan for discharge: not addressed   Current status: Pt continues to demonstrate deficits in cognition. Pt with impaired memory, attention, and problem-solving. Pt's impulsivity and safety awareness with mild improvement. Pt CGA for functional transfers and min cues for LB dressing. Pt engaged in UE strengthening seated, pt tolerated exercises well.   Barriers to return to prior living situation: cognition, post surgical precautions, lives alone   Recommendations for discharge: TCU   Rationale for recommendations: Pt currently below baseline in ADLs and mobility. Per  note - pt previously living independently and driving.

## 2018-03-02 NOTE — PROGRESS NOTES
Social Work Services Progress Note    Hospital Day: 9  Date of Initial Social Work Evaluation:  2.27.18  Collaborated with:  Chart review, rounds, PT/OT, pt    Data:  SW involved for TCU placement    Intervention:  SW met with pt to discuss TCU options. Pt given list of Medicare facility's near his zip code to review.     Assessment:  Pt stated he told his doctor he does not want TCU placement. SW and pt discussed the objectives of going to a TCU and pt agreed to take Medicare list for review.   Pt again stated before SW left that at this time he just wants his independence back and feels he will do better going home and being in a familiar environment than going to a facility. SW will be followed up with once more oriented.   Plan:    Anticipated Disposition:  TBD    Barriers to d/c plan:  Pt's confusion and disorientation may be barriers to placement.     Follow Up:  SW will continue to follow.     REMIGIO Bland, DANDY Mehta    3/2/2018

## 2018-03-02 NOTE — PROGRESS NOTES
Clinical Nutrition Services- Brief Note    Reviewed nutrition risk factors due to LOS. Pt is tolerating a Regular diet, eating well per nursing documentation. No significant wt loss. No nutrition issues identified at this time. RD will continue to follow per nutrition protocol.  Neha Zaidi RD, MS, LD  6B- Pager: [phone]

## 2018-03-02 NOTE — PLAN OF CARE
"Problem: Patient Care Overview  Goal: Plan of Care/Patient Progress Review  Outcome: No Change  Neuro: Alert to self, time, place today. Doesn't quite understand why he is still in hospital and gets overwhelmed easily when too many ppl are talking to him. Patient Neuro checks intact otherwise.   Cardiac: SR. VSS.                 Respiratory: Sating 4.5L oxymizer and dyspnea with activity. Lungs coarse and wheezing this morning. Now just coarse. Patient received one dose of levaquin for possible pneumonia, will recheck symptoms and xray tomorrow.   GI/: Walter still in place, received 40mg lasix this afternoon with adequate output. No BM today, patient still feeling \"full\" and having discomfort. Primary team does not want to give any stool softeners because of the type of resection he had.   Diet/appetite: Tolerating low fiber diet ok. Patient states he doesn't really have an appetite; \" I can eat some times but not others.\"   Activity:  Assist of SBA up to chair.   Pain: At acceptable level on current regimen. Patient has no complaints of pain; declining tylenol.  Skin: Incisions, lower abdominal dressing C/D/I. Lots of bruising on right arm 2/2 blood draws.   LDA's: R FA PIV SL.      Plan: Continue with POC. Notify primary team with changes      "

## 2018-03-02 NOTE — PLAN OF CARE
"Problem: Patient Care Overview  Goal: Plan of Care/Patient Progress Review  Outcome: Improving  Neuro: Alert to self, time, place today. Doesn't quite understand why he is still in hospital and gets overwhelmed easily when too many ppl are talking to him. Patient Neuro checks intact otherwise.   Cardiac: SR. VSS.   Respiratory: Sating 4.5L oxymizer and dyspnea with activity. Lungs coarse and wheezing this morning. Now just coarse. Patient received one dose of levaquin for possible pneumonia, will recheck symptoms and xray tomorrow.   GI/: Walter still in place, received 40mg lasix this afternoon with adequate output. No BM today, patient still feeling \"full\" and having discomfort. Primary team does not want to give any stool softeners because of the type of resection he had.   Diet/appetite: Tolerating low fiber diet ok. He is unhappy with this because he has been very limited on what he can order. Patient states he doesn't really have an appetite.   Activity:  Assist of 2 up to chair and in halls with walker. PT had him walk over to the staff elevators today.   Pain: At acceptable level on current regimen. Patient has no complaints of pain. Oxycodone DC'd. And patient has been declining tylenol. Recommend to make tylenol a PRN.   Skin: Incisions WDL lower abdominal dressing C/D/I. Lots of bruising on right arm from blood draws.   LDA's: R FA PIV SL.     Plan: Continue with POC. Notify primary team with changes.      "

## 2018-03-02 NOTE — PROGRESS NOTES
"Colorectal Progress Note    Subjective:   No acute event overnight, slept well, feels good, denies chest pain/discomfort, no fevers, chills, N/V, but feels a bit back-up      Objective:   /65  Pulse 76  Temp 97.5  F (36.4  C) (Oral)  Resp 18  Ht 1.651 m (5' 5\")  Wt 86.3 kg (190 lb 4.1 oz)  SpO2 97%  BMI 31.66 kg/m2    I/O:  I/O last 3 completed shifts:  In: 490 [P.O.:490]  Out: 2300 [Urine:2300]    PE:  Gen: Awake, alert and oriented this AM, NAD   CV: RRR  Resp: clear to auscultation with some residual coarseness at the lower fields, non-labored at rest on 4L NC Abd: protuberant and distended, soft, mild hematoma/bruising on the panus, incisions has no signs of erythema, fluctuance or purulence.  Ext: warm and well perfused    Labs: Reviewed     Imaging: Reviewed.       A/P: Jose Lira is a 73 year old male with proximal rectal cancer s/p LAR on 2/22. Complicated by delirium, hypoxia, cardiac demand ischemia.      Neuro:   - confusions/altered mental status at night 2/2 delirium,  - keep  delirium precaution and bedtime Trazodone, prn zyprexa, avoid benzos.  - continue tylenol prn for pain,     Pulm:   - increased O2 requirement and course lungs sounds concern for pulmonary edema vs infections.   - WBC 11.6 unchanged from yesterday, no fevers, CXR show pulmonary edema vs infection, more likely from fluid overload. - Will continue antibiotics and continue to diurese cautiously   - continue to encourage pulmonary toilet.   - pulmonary therapist on consult appreciate recs and help     CV   - Fluid overload, ECHO from 2/25 notes global and regional hyperkinetic with an EF 65-70% with no wall motion abnormalities. Right ventricle is normal and IVC with normal respiratory variation in diameter.  - Cardiology recommend IV Lasix 40mg to achieve a goal net negative 1-2L/day, appreciate ongoing evaluation and recs     Renal   - UOPA 2425cc last 24hrs  - continue to diurese 1 more day and continue to " monitor ESSIE/trend Cr  - keep cronin for strict strict I/O monitoring     GI: No issues, continue on low residue diet   Heme: no issues  PPX -GI and DVT ppx ordered  Dispo: pending improvement in O2 requirements, advancement of diet, therapy is recommending TCU       Jeffrey Gamble MD (PGY1)  General Surgery  [phone]

## 2018-03-03 NOTE — PLAN OF CARE
Problem: Patient Care Overview  Goal: Plan of Care/Patient Progress Review  Outcome: Improving  Neuro: A&Ox3. Disoriented to situation. Patient doing well this afternoon. Up to chair most of shift, and used call light appropriately to get into bed. Did not set VPM off, set chair alarm off only due to shifting in chair.   Cardiac: SR. VSS. No PRN hydrazeline given this shift. Highest BP was 164 Systolic.    Respiratory: Lung sounds still coarse, light wheezing on expiratory in the bases. Weaned patient down to 2LNC. Gets more dyspnea when ambulating from chair/commode to bed, but recovered on the 2LNC.   GI/: Adequate urine output still has cronin in for strict I/O. Lasix 40mg given at 1530. No BM, but patient c/o discomfort and constipation. Had 1 light BM on 2/28, otherwise no results. Team still doesn't want to give stool softeners, but writer gave patient 2 servings of prune juice this afternoon.   Diet/appetite: Tolerating low fib diet. Is eating light snacks, has a hard time with ordering foods he likes on the low fib diet. Does endorse a low appetite. Doesn't request water, but will drink when you encourage him.   Activity:  Assist of 1, up to chair and 2 in halls. For longer walks patient requires walker.   Pain: At acceptable level on current regimen. No complaints of pain, only abdominal discomfort. No meds given for pain this shift. Tylenol was switched to PRN.   Skin: Intact, no new deficits noted. Cleaned lower abdominal incision. Not oozing any blood today.   LDA's: Needed new IV today, currently SL in right hand.     Plan: Continue with POC. Notify primary team with changes. Looking towards TCU placement.

## 2018-03-03 NOTE — PLAN OF CARE
Problem: Cardiac Disease Comorbidity  Goal: Cardiac Disease  Patient comorbidity will be monitored for signs and symptoms of Cardiac Disease.  Problems will be absent, minimized or managed by discharge/transition of care.   Outcome: Declining  Patient retaining fluid, requiring daily doses of Lasix. Respiratory status impaired d/t increased fluid onboard.    Rufina Cowan RN  03/03/18  9:53 AM

## 2018-03-03 NOTE — PLAN OF CARE
Problem: Patient Care Overview  Goal: Plan of Care/Patient Progress Review  Outcome: No Change  End Of Shift Progress Note:    Temp: 98.8  F (37.1  C) Oral  BP: 119/63  Heart Rate: 70  Resp: 20  SpO2: 97 % 3LPM via NC    Neuro: alert and oriented x3, disoriented to situation, forgetful, steady on feet with stand by assist, follows commands  Cardiac: BP/HR WNL, denied chest pain, normal peripheral pulses  Respiratory: sating well on 3LPM via NC, lung sounds coarse/crackles-diminished, reported some difficulty breathing which was relieved by lasix and repositioning  GI/: cronin remains in place, adequate output, bowel sounds active throughout, passing gas, no BM this shift  Diet/appetite: low fiber diet, ate minimally today  Activity: up with SBA  Pain: denied  Skin: no acute concerns  LDA's: PIV x1-saline locked, no concerns; cronin-patent, adequate output    Plan: Continue with plan of care. Notify primary team with changes.  To do: continue to monitor mental status, monitor weight/respiratory status, encourage getting up out of bed, encourage PO intake      Rufina Cowan RN  03/03/18  2:06 PM

## 2018-03-03 NOTE — PROGRESS NOTES
Social Work Services Progress Note    Hospital Day: 10  Date of Initial Social Work Evaluation:  2-27-18  Collaborated with:  Pt    Data:  TCU placement     Intervention: Writer met with pt to see if pt identified TCU selection. Pt identified two facilities for referrals to be initiated. The following referrals have been placed: [facility]    Assessment:  Pt shared he is open to TCU placement, but would prefer to discharge back home.    Plan:    Anticipated Disposition:  Facility:  [facility]    Barriers to d/c plan:  Medical stability    Follow Up:  SW will continue to follow    DANDY Graham  Weekend   Pager: [phone]

## 2018-03-03 NOTE — PROGRESS NOTES
"Colorectal surgery progress note    S: no acute events overnight, was able to sleep relatively well, no complaints this morning    O: /64 (BP Location: Left arm)  Pulse 76  Temp 97.7  F (36.5  C) (Oral)  Resp 22  Ht 1.651 m (5' 5\")  Wt 84.7 kg (186 lb 11.7 oz)  SpO2 97%  BMI 31.07 kg/m2    Awake, alert, NAD, oriented  NLB on nasal cannula O2  Abd soft, non-tender, non-distended  Stable ecchymoses on abdomen, incisions non-erythematous    Labs reviewed    A/P: 73 year old man s/p LAR for rectal cancer.    -continue prn tylenol for pain  -continue home cardiac meds, holding ASA for now in the setting of increased abdominal bruising  -work on pulmonary toilet and weaning O2  -regular diet, prn miralax okay today  -continue levaquin for possible pneumonia, transition to PO today  -continue lasix for diuresis per cardiology recommendations  -continue flomax, will plan to d/c Walter tomorrow   -GI & DVT ppx  -dispo pending optimization of respiratory status, will discharge to TCU early next week, discussed with the patient that this is an essential step in his recovery and he was in agreement this morning that a TCU stay would be beneficial. Appreciate social work assistance.    Savage Chase MD   Surgery PGY-3   "

## 2018-03-03 NOTE — PLAN OF CARE
"Problem: Patient Care Overview  Goal: Plan of Care/Patient Progress Review  Outcome: No Change  /68 (BP Location: Left arm)  Pulse 76  Temp 98  F (36.7  C) (Oral)  Resp 20  Ht 1.651 m (5' 5\")  Wt 86.3 kg (190 lb 4.1 oz)  SpO2 95%  BMI 31.66 kg/m2    VSS. Afebrile. Alert and oriented x 3-4. Sometimes will be confused about why he is at the hospital until re-oriented. 3L NC with sat's in the mid 90's. Lung sounds seem to be less crackly this AM from last evening. Just coarse and diminished. Good UOP per cronin post-lasix yesterday afternoon. No pain. Independently repositioning in bed. No BM noted overnight but active bowel sounds present. Tolerating diet with no n/v.       "

## 2018-03-04 NOTE — PLAN OF CARE
Problem: Patient Care Overview  Goal: Plan of Care/Patient Progress Review  Discharge Planner PT   Patient plan for discharge: Pt prefers to d/c home, understands he may need to go to TCU  Current status: Pt seems less confused today. Can recall 33% of abdominal precautions without prompting. On 4L NC during therapy. SBA with bed mobility and sit<>stand, needs cueing for precaution adherence. Ambulates 250' in 4 bouts with SBA, FWW, and w/c follow. Limited primarily by LE pain with walking that resolves with rest.  Barriers to return to prior living situation: O2 needs, confusion, deconditioning, symptoms of claudication  Recommendations for discharge: TCU  Rationale for recommendations: Pt presents with confusion and impaired functional mobility. Would benefit from further therapies to restore PLOF.       Entered by: Donnie Sofia 03/04/2018 1:25 PM

## 2018-03-04 NOTE — PROGRESS NOTES
"Colorectal surgery progress note    S: no acute events overnight, patient slept relatively well, doing well with low fiber diet, breathing feels good    O: /70 (BP Location: Left arm)  Pulse 81  Temp 98.6  F (37  C)  Resp 18  Ht 1.651 m (5' 5\")  Wt 84.7 kg (186 lb 11.7 oz)  SpO2 94%  BMI 31.07 kg/m2  Net -1 L yesterday    Awake, alert, NAD   NLB on nasal cannula O2  Abd soft, non-tender, stable ecchymoses    Labs reviewed.     A/P: 73 year old man s/p LAR for rectal cancer, complicated by respiratory insufficiency due to volume overload and possible pneumonia, now improving.     -continue prn tylenol for pain, trazodone for sleep  -continue home cardiac medications, holding ASA due to ecchymoses on abdomen  -work on pulmonary toilet and wean O2  -remove Walter catheter today, give lasix, continue Flomax  -continue levaquin PO for pneumonia  -heparin BID for DVT ppx and protonix for GI ppx  -dispo pending appropriate TCU placement early this week    Savage Chase MD   Surgery PGY-3   "

## 2018-03-05 NOTE — PROGRESS NOTES
Progress Note:    Hospital Day: 11  Date of Initial SW Assessment: 02/27/2018    Data: Jose Lira is a 74 yo male admitted to [facility] 02/22/2018.    Intervention: Met w/pt and spoke to pt's sister Kim Sears (P: [phone]).    Assessment: Pt lives alone in an apt in [place]. TCU is recommended at dc and pt is in agreement but was still hoping to dc directly home. Pt reviewed list of Medicare certified SNFs and requested referrals to the following facilities:  1. [facility]: Left VM w/admissions and faxed updated notes.  (P: [phone], F: [phone])  2. [facility]: No male beds at this time, but that could change in the next day or two.  (P: [phone], F: [phone])  3. [facility]: referral faxed to [place].  (P: [phone], F: [phone])    Plan:  -Discharge plans in progress: Per MD, pt will likely be medically stable for discharge Tues 3/6. Referrals out to TCU; no facility has accepted yet.   -Barriers to discharge plan: medical clearance and TCU placement. Pt will also need to be off the VPM for 24 hrs prior to dc.  -Follow up plan: SW will continue to follow and assist as needed.    REMIGIO Thakur, [facility]  6B Intermediate Care Unit   Phone: [phone]  Pager: [phone]

## 2018-03-05 NOTE — PLAN OF CARE
Problem: Patient Care Overview  Goal: Plan of Care/Patient Progress Review  Discharge Planner OT   Patient plan for discharge: TCU   Current status: Cognition slowly improving however still with deficits in safety awareness, memory, sequencing, and problem-solving. Pt mod I for LB dressing. Pt CGA for functional transfers and CGA with FWW for mobility. Pt completed g/h tasks standing at sink with CGA and min cues for sequencing. Pt on 2L NC, SpO2 92-95% HR 80s-90s.   Barriers to return to prior living situation: lives alone, cognitive impairments, deconditioning   Recommendations for discharge: TCU   Rationale for recommendations: Pt would benefit from continued therapies to increase independence and safety with ADLs/mobility prior to discharge home.        Entered by: Cammie Waters 03/05/2018 8:41 AM

## 2018-03-05 NOTE — PLAN OF CARE
"Problem: Patient Care Overview  Goal: Plan of Care/Patient Progress Review  Outcome: No Change  /75 (BP Location: Left arm)  Pulse 81  Temp 98.3  F (36.8  C) (Oral)  Resp 20  Ht 1.651 m (5' 5\")  Wt 84.7 kg (186 lb 11.7 oz)  SpO2 96%  BMI 31.07 kg/m2    VSS. Afebrile. Alert and oriented x 3-4 with minor forgetfulness of situation. 2L NC with sat's greater than 92%. No pain. Tolerating diet with no n/v. Good bowel sounds but no BM overnight. Denies abdominal discomfort. Adequate UOP per urinal. Hesitancy noted. Up in couch with SBA. Repositioning self in bed. Possible d/c to rehab today. Continue to monitor.      "

## 2018-03-05 NOTE — PROGRESS NOTES
"Colorectal Progress Note    Subjective:   No acute issues overnight, doing well, +flatus, no BM     Objective:   /86 (BP Location: Left arm)  Pulse 81  Temp 98.5  F (36.9  C) (Oral)  Resp 24  Ht 1.651 m (5' 5\")  Wt 83.1 kg (183 lb 4.8 oz)  SpO2 96%  BMI 30.5 kg/m2    I/O:  I/O last 3 completed shifts:  In: 409 [P.O.:400; I.V.:9]  Out: 1975 [Urine:1975]    PE:  Gen: Awake, alert, NAD, ambulating with hi walker   Resp: non-labored at rest on 2L  Abd: Soft, distended, NTTP  Ext: warm and well perfused, ecchymosis of the right forearm     Labs: Reviewed  Cr 2.58 < 2.60 < 5.59    Imaging: Reviewed    A/P: Jose Lira is a 73 year old male with proximal rectal cancer s/p LAR on 2/22. Complicated by delirium, hypoxia, cardiac demand ischemia and PNA.     Doing well, O2 requirements down to 2L NC and ambulating with assist, +flatus, but still no BM       Neuro:   - confusions/altered mental status at night 2/2 delirium, doing well  - try d/c VPM today, to prepare for possible d/c to TCU   - keep bedtime Trazodone, prn zyprexa, avoid benzos     Pulm:   - Now requiring 2L O2, much improved from last week, this was likely from fluid overload and possible PNA.  - continue antibiotics course to completion   - continue to aggressive pulmonary toilet.       CV   - Fluid overload,   - Continue cardiac meds, keep holding ASA due to ecchymosis, IV Lasix 20mg today       Renal   - UOPA, continue flomax and Iv lasix   - continue to monitor ESSIE/trend Cr  - strict I/O monitoring      GI: anterograde function with flatus but no BM, continue on low residue diet and continue to monitor for now and prn miralax  Heme: no issues  PPX -GI and DVT ppx ordered    Dispo: pending TCU placement this week      Filemon Gamble MD  General Surgery PGY-1  Pager [phone]          "

## 2018-03-06 NOTE — PROGRESS NOTES
"Colorectal Progress Note    Subjective:   No acute issues overnight, doing well,    Objective:   /58  Pulse 81  Temp 97.6  F (36.4  C) (Oral)  Resp 18  Ht 1.651 m (5' 5\")  Wt 82.2 kg (181 lb 4.8 oz)  SpO2 93%  BMI 30.17 kg/m2    I/O:  I/O last 3 completed shifts:  In: 660 [P.O.:660]  Out: 1675 [Urine:1675]    PE:  Gen: Awake, alert, NAD, ambulating with hi walker   Resp: non-labored at rest on 2L  Abd: Soft, distended, NTTP  Ext: warm and well perfused, ecchymosis of the right forearm     Labs: none    Imaging: none    A/P: Jose Lira is a 73 year old male with proximal rectal cancer s/p LAR on 2/22. Complicated by delirium, hypoxia, cardiac demand ischemia and PNA.     Doing well, O2 requirements down to 1L NC and ambulating with assist, +flatus, but still no BM, VPM d/c no issues dilirium/confusion overnight       - breathing significantly improved, will try to completely wean O2 today, if not can d/c with O2 and wean at TCU  - continue LR diet, miralax, prn pain control, Levaquin for 2 more days, GI/DVT ppx (SubQ Hep BID, hold ASA), continue flomax  - waiting discharge to TCU pending acceptance    Dispo: pending TCU placement today    Filemon Gamble MD  General Surgery PGY-1  Pager [phone]          "

## 2018-03-06 NOTE — PLAN OF CARE
"Problem: Patient Care Overview  Goal: Plan of Care/Patient Progress Review  Outcome: No Change  /58  Pulse 81  Temp 97.6  F (36.4  C) (Oral)  Resp 18  Ht 1.651 m (5' 5\")  Wt 82.2 kg (181 lb 4.8 oz)  SpO2 93%  BMI 30.17 kg/m2    Alert and oriented x3. Disoriented to situation, but easily redirected. Forgetful at times. Baseline left sided weakness. Afebrile. VSS. Sinus rhythm. Sats mid to upper 90s on 2 LPM nasal cannula. Denies pain. Tolerating a low fiber diet. No BM since 2/28. +BS and passing gas. Voiding using urinal. Abdominal incision and lab sites with bruising. Up with standby assist. Plan for possible discharge this week pending TCU placement. Will continue to monitor and notify MDs accordingly.    Problem: Cardiac Disease Comorbidity  Goal: Cardiac Disease  Patient comorbidity will be monitored for signs and symptoms of Cardiac Disease.  Problems will be absent, minimized or managed by discharge/transition of care.   Outcome: No Change  Sinus rhythm 60s-80s overnight. BP elevated.     Problem: Peripheral Vascular/Peripheral Neurovascular Disease Comorbidity  Goal: Peripheral Vascular/Peripheral Neurovascular Disease  Patient comorbidity will be monitored for signs and symptoms of Peripheral Vascular/Peripheral Neurovascular Disease condition.  Problems will be absent, minimized or managed by discharge/transition of care.   Outcome: No Change  Baseline left sided weakness. Denies numbness/tingling in extremities.     Problem: Gastrointestinal Condition Comorbidity  Goal: Gastrointestinal Condition  Patient comorbidity will be monitored for signs and symptoms of Gastrointestinal condition.  Problems will be absent, minimized or managed by discharge/transition of care.   Outcome: No Change  Abdomen distended. No BM since 2/28. +BS and passing gas. Ambulation and fluids promoted.      "

## 2018-03-06 NOTE — PLAN OF CARE
Problem: Patient Care Overview  Goal: Plan of Care/Patient Progress Review  Discharge Planner OT   Patient plan for discharge: TCU   Current status: Pt CGA for functional transfers. Pt CGA and FWW for mobility with cues for walker safety. Pt completed full shower seated on shower chair with CGA. Pt completed g/h tasks standing at sink with min cues for sequencing. Pt on 2-3L NC, SpO2 90-92% and HR 90s.   Barriers to return to prior living situation: oxygen requirements, cognitive impairments, balance deficits, deconditioning   Recommendations for discharge: TCU   Rationale for recommendations: Pt lives alone and below baseline in ADLs and mobility. Pt would benefit from continued rehab to regain independence and safety with ADLs prior to discharging home.        Entered by: Cammie Waters 03/06/2018 9:44 AM

## 2018-03-06 NOTE — PLAN OF CARE
Problem: Patient Care Overview  Goal: Plan of Care/Patient Progress Review  Outcome: No Change  [id number]   Neuro: A&O Intermittently forgetful. Easily reoriented. VPM discontinued. Possible discharge to TCU tomorrow. Bed and chair alarm still on.   Cardiac: Normal Sinus Rhythm. VSS.    Respiratory: Sating 92-95% on 2L. Tried to wean oxygen. On room air patient sating 85-88%  GI/: Adequate urine output. No BM + gas.   Diet/appetite: Tolerating a low fiber diet. Eating well.  Activity:  Assist of 1, up to chair and in halls.  Pain: Denies any pain.   Skin: Intact, no new deficits noted.  LDA's:  PIV x 1 saline locked.     Plan: Continue with POC. Notify primary team with changes.

## 2018-03-06 NOTE — PROGRESS NOTES
Social Work Services Discharge Note      Patient Name:  Jose Lira     Anticipated Discharge Date: Tuesday 03/06/2018 at 1230 to [facility] via family transport.     Discharge Disposition:    Nursing Home Placement  -SNF: [facility]  [address] [place] [address]  (P: [phone], F: [phone])      Pre-Admission Screening (PAS) online form has been completed.  The Level of Care (LOC) is: Determined  Confirmation Code is: [id number]  Patient/caregiver informed of referral to [facility] Line for Pre-Admission Screening for skilled nursing facility (SNF) placement and to expect a phone call post discharge from SNF.     Additional Services/Equipment Arranged: Pt's sister and brother in law will transport.     Patient / Family response to discharge plan:  Pt and family in agreement w/plan.     Persons notified of above discharge plan: Pt, pt's sister Kim, pt's brother AryanRosario in admissions at [facility], Giulia MARTINEZ with Colorectal.    -Staff Discharge Instructions:  Please fax discharge orders and signed hard scripts for any controlled substances.  Please print a packet and send with patient.     REMIGIO Thakur, [facility]  6B Intermediate Care Unit  Phone: [phone]  Pager: [phone]

## 2018-03-06 NOTE — PROGRESS NOTES
Patient is 88-91% on room air. Patient is being transported by family to [facility]. MD updated on situation. Patient will have oxygen at [facility]. MD is okay with patient transporting without oxygen.

## 2018-03-06 NOTE — PROGRESS NOTES
Progress Note:     Hospital Day: 12  Date of Initial SW Assessment: 02/27/2018     Data: Jose Lira is a 74 yo male admitted to [facility] 02/22/2018.     Intervention: Met w/pt and spoke to pt's sister Kim Sears (P: [phone]).     Assessment: Pt lives alone in an apt in [place]. TCU is recommended at [place] and pt is in agreement but was still hoping to dc directly home. Pt reviewed list of Medicare certified SNFs and requested referrals to the following facilities:  1. [facility]: No male beds available and no anticipated openings.  (P: [phone], F: [phone])  2. [facility]: No male beds at this time, but that could change in the next day or two.  (P: [phone], F: [phone])  3. [facility]: Spoke to Rosario in admissions.  (P: [phone], F: [phone])    [facility] admissions was wondering if there was any plan for chemo or radiation. Per Colorectal PA, pt's radiation is completed and pt needs to heal for 3-5 wks prior to considering chemo and at that time, pt would follow up OP with Oncology to determine a plan. Colorectal anticipates pt would only need 1-2 wks of TCU so there would be no overlap between TCU and chemo.     Plan:  -Discharge plans in progress: Pt is medically stable for dc. Referrals out to TCU; no facility has accepted yet.   -Barriers to discharge plan: medical clearance and TCU placement.   -Follow up plan: SW will continue to follow and assist as needed.     REMIGIO Thakur, [facility]  6B Intermediate Care Unit   Phone: [phone]  Pager: [phone]

## 2018-03-06 NOTE — PLAN OF CARE
Problem: Renal Insufficiency Comorbidity  Goal: Renal Insufficiency  Patient comorbidity will be monitored for signs and symptoms of Renal Insufficiency (Chronic) condition.  Problems will be absent, minimized or managed by discharge/transition of care.  Outcome: No Change  Voiding adequate amounts in urinal with hesitancy.

## 2018-03-06 NOTE — DISCHARGE SUMMARY
[facility]  Discharge Summary  Colon and Rectal Surgery     Jose Lira MRN# [id number]   YOB: 1945 Age: 73 year old     Date of Admission:  2/22/2018  Date of Discharge::  3/6/2018  Admitting Physician:  Sharan Raza MD  Discharge Physician:  John Hughes MD  Primary Care Physician:        Rebeca Sandoval          Admission Diagnoses:   Malignant Neoplasm Of Rectosigmoid Junction  Rectal cancer (H)    H/o CAD  H/o PCA of the  LAD, proximal and mid RCA  On chronic anticoagulation  Chronic renal insufficiency  H/o stroke  H/o bilateral endarterectomy  H/o peripheral vascular disease  hypertension          Discharge Diagnosis:   Malignant Neoplasm Of Rectosigmoid Junction  Rectal cancer (H)  H/o CAD  H/o PCA of the  LAD, proximal and mid RCA  On chronic anticoagulation  Chronic renal insufficiency  H/o stroke  H/o bilateral endarterectomy  H/o peripheral vascular disease  hypertension    Delirium  Acute hypoxic respiratory failure  Volume overload  Pulmonary edema  Possible hospital acquired pneumonia           Procedures:   2/22/2018:  Laparoscopic low anterior resection, flexible sigmoidoscopy            Consultations:   NUTRITION SERVICES ADULT IP CONSULT  PHYSICAL THERAPY ADULT IP CONSULT  OCCUPATIONAL THERAPY ADULT IP CONSULT  CARDIOLOGY GENERAL ADULT IP CONSULT  VASCULAR ACCESS CARE ADULT IP CONSULT  SOCIAL WORK IP CONSULT  NUTRITION SERVICES ADULT IP CONSULT         Imaging Studies:     Results for orders placed or performed during the hospital encounter of 02/22/18   XR Chest Port 1 View    Narrative    Exam: Chest x-ray, 1 view, 2/24/2018.    COMPARISON: 8/15/2017.    HISTORY: delirium, oxygen desaturation, no cough no chest pain.    FINDINGS: AP portable view of the chest was obtained. Low lung  volumes. Patchy mixed interstitial and airspace opacities throughout  both lungs most significant in the right midlung zone and left lower  lobe.  Indistinct pulmonary vasculature. Tortuous thoracic aorta with  calcifications. Cardiomediastinal silhouette at the upper limits of  normal. Small pleural effusions. No pneumothorax. Opacities in  bilateral apices corresponded to atherosclerotic calcifications of  bilateral subclavian arteries previously characterized on CT exam  6/2/2017.      Impression    IMPRESSION:  1. Low lung volumes with mild pulmonary vascular congestion.  2. Patchy mixed opacities throughout both lungs most significant in  the right midlung zone and left lower lobe, pulmonary edema versus  infection.  3. Small pleural effusions.    MARIAN MARROQUIN MD   XR Chest Port 1 View    Narrative    Exam:  Chest radiograph, 2/25/2018 1:00 AM    History: Increased SOB    Comparison:  2/24/2018    Findings:  Single AP view of the chest. The cardiomediastinal  silhouette is within normal limits. Pulmonary vasculature is  indistinct. No pleural effusion or pneumothorax. Minimally decreased  mixed patchy interstitial and airspace opacities. Improved lung  volumes.      Impression    Impression:  Minimally decreased patchy mixed opacities which are  greatest in the left lower lung, pulmonary edema versus infection.  Pulmonary vasculature congestion.    I have personally reviewed the examination and initial interpretation  and I agree with the findings.    MARIAN MARROQUIN MD   US Lower Extremity Venous Duplex Bilateral    Narrative    EXAMINATION: DOPPLER VENOUS ULTRASOUND OF BILATERAL LOWER EXTREMITIES,  2/25/2018 6:21 AM     COMPARISON: 8/15/2017    HISTORY: Hypoxia, concern for DVT    TECHNIQUE:  Gray-scale evaluation with compression, spectral flow and  color Doppler assessment of the deep venous system of both legs from  groin to knee, and then at the ankles.    FINDINGS:  In both lower extremities, the common femoral, femoral, popliteal and  posterior tibial veins demonstrate normal compressibility and blood  flow.      Impression     IMPRESSION:  No evidence of deep venous thrombosis in either lower extremity.    I have personally reviewed the examination and initial interpretation  and I agree with the findings.    MARIAN MARROQUIN MD   XR Chest Port 1 View    Narrative    XR CHEST PORT 1 VW  2/26/2018 4:52 AM    History: Dyspnea    Comparison: Prior day    Findings:   Single portable AP view the chest obtained. Trachea is midline. The  cardiac mediastinal silhouette is stable and mildly enlarged. There is  been slight interval worsening of the diffuse mixed interstitial and  airspace opacities. No pleural effusion. No pneumothorax.      Impression    IMPRESSION:  Mild worsening of the patchy mixed pulmonary opacities. Differential  includes pulmonary edema and infection.    I have personally reviewed the examination and initial interpretation  and I agree with the findings.    JOSE BARAJAS MD   XR Chest Port 1 View    Narrative    XR CHEST PORT 1 VW  2/28/2018 6:49 AM    History: Dyspnea    Comparison: 12/26/2018.    Findings:   Single portable AP view of the chest is obtained. The trachea is  midline. The cardiac mediastinal silhouette is stable and nonenlarged.  Mild improvement in diffuse mixed interstitial and opacities. No  pneumothorax. No pleural effusion.      Impression    IMPRESSION:  Mild improvement in the patchy mixed pulmonary opacities. Differential  continues to include pulmonary edema and infection.    I have personally reviewed the examination and initial interpretation  and I agree with the findings.    JOSE BARAJAS MD   XR Chest Port 1 View    Narrative    Exam:  Chest X-ray 3/1/2018 9:27 AM    History: dyspnea and incresase O2 requirement, undergoing active  diuresis.;     Comparison: Chest x-ray 2/20/2018    Findings: Portable upright AP radiograph of the chest. The cardiac  silhouette is enlarged, stable. The pulmonary vasculature is  indistinct. Low lung volumes. Bilateral perihilar and diffuse  mixed  interstitial and airspace opacities. No significant pleural effusion.  No pneumothorax. The upper abdomen appears unremarkable.       Impression    Impression:   1. Stable perihilar and mixed interstitial and airspace opacities,  likely represents pulmonary edema.  2. Low lung volumes and bibasilar atelectasis.    I have personally reviewed the examination and initial interpretation  and I agree with the findings.    DWAYNE APPLE MD              Medications Prior to Admission:     Prescriptions Prior to Admission   Medication Sig Dispense Refill Last Dose     carvedilol (COREG) 25 MG tablet Take 1 tablet (25 mg) by mouth 2 times daily 180 tablet 3 Past Week at Unknown time     [DISCONTINUED] polyethylene glycol (GOLYTELY/NULYTELY) 236 G suspension Refer to surgical packet for instructions 4000 mL 0 2/21/2018 at Unknown time     [DISCONTINUED] neomycin (MYCIFRADIN) 500 MG tablet Take two tablets by mouth every 8 hours for one day prior to surgery. Take in conjunction with Flagyl 6 tablet 0 2/21/2018 at Unknown time     [DISCONTINUED] ondansetron (ZOFRAN) 4 MG tablet Take one tablet by mouth every 6 hours as needed for nausea when taking neomycin and flagyl 3 tablet 0      ticagrelor (BRILINTA) 90 MG tablet Take 1 tablet (90 mg) by mouth 2 times daily 180 tablet 3 2/16/2018 at Unknown time     [DISCONTINUED] tiZANidine (ZANAFLEX) 2 MG tablet Take 1-2 tablets (2-4 mg) by mouth nightly as needed 60 tablet PRN More than a month at Unknown time     atorvastatin (LIPITOR) 40 MG tablet Take 1 tablet (40 mg) by mouth daily (Patient taking differently: Take 40 mg by mouth every morning ) 90 tablet PRN Unknown at Unknown time     Saw [place], Serenoa repens, (SAW PALMETTO PO) Take 1 tablet by mouth every morning    Taking     niacin 500 MG tablet Take 1 tablet (500 mg) by mouth At Bedtime 30 tablet PRN Unknown at Unknown time     Multiple Vitamins-Minerals (MULTIVITAMIN & MINERAL PO) Take 1 tablet by mouth every  morning    Unknown at Unknown time     lactobacillus rhamnosus, GG, (CULTURELLE) 10 B CELL capsule Take 1 capsule by mouth every morning    Taking     [DISCONTINUED] diphenhydrAMINE (BENADRYL) 25 MG capsule Take 50 mg by mouth nightly as needed sleep   2/21/2018 at 2000              Discharge Medications:     Current Discharge Medication List      START taking these medications    Details   polyethylene glycol (MIRALAX/GLYCOLAX) Packet Take 17 g by mouth daily as needed for constipation  Qty: 7 packet    Associated Diagnoses: Constipation, unspecified constipation type      acetaminophen (TYLENOL) 500 MG tablet Take 1-2 tablets (500-1,000 mg) by mouth 4 times daily as needed for mild pain or fever    Associated Diagnoses: Rectal cancer (H)      levofloxacin (LEVAQUIN) 500 MG tablet Take 1 tablet (500 mg) by mouth every 48 hours for 1 dose  Refills: 0    Comments: Started on 3/1.  To complete a 7 day course.   Last dose to be given on 3/7.  Associated Diagnoses: HAP (hospital-acquired pneumonia)      traZODone (DESYREL) 50 MG tablet Take 1 tablet (50 mg) by mouth every evening  Qty: 60 tablet    Associated Diagnoses: Other insomnia      tamsulosin (FLOMAX) 0.4 MG capsule Take 1 capsule (0.4 mg) by mouth daily for 14 days  Qty: 60 capsule    Associated Diagnoses: Rectal cancer (H)      Heparin Sodium, Porcine, (HEPARIN SODIUM PF) 5000 UNIT/0.5ML injection Inject 0.5 mLs (5,000 Units) Subcutaneous every 12 hours Started 2/23 for post-operative prophylaxis.  Is to complete about 30 days of this.  Stop on 3/23.    Comments: Started 2/23 for post-operative prophylaxis.  Is to complete about 30 days of this.  Stop on 3/23.  Associated Diagnoses: Rectal cancer (H)         CONTINUE these medications which have CHANGED    Details   amLODIPine (NORVASC) 10 MG tablet Take 0.5 tablets (5 mg) by mouth daily  Qty: 90 tablet, Refills: PRN    Associated Diagnoses: Essential hypertension with goal blood pressure less than 130/80       aspirin 81 MG EC tablet Take 1 tablet (81 mg) by mouth daily  Qty: 30 tablet    Comments: Can restart home aspirin 3/13.  Associated Diagnoses: Hypertension goal BP (blood pressure) < 130/80; Hyperlipidemia LDL goal <100         CONTINUE these medications which have NOT CHANGED    Details   carvedilol (COREG) 25 MG tablet Take 1 tablet (25 mg) by mouth 2 times daily  Qty: 180 tablet, Refills: 3    Associated Diagnoses: Coronary artery disease due to lipid rich plaque; Status post coronary angioplasty      ticagrelor (BRILINTA) 90 MG tablet Take 1 tablet (90 mg) by mouth 2 times daily  Qty: 180 tablet, Refills: 3    Associated Diagnoses: Coronary artery disease of native artery of native heart with stable angina pectoris (H)      atorvastatin (LIPITOR) 40 MG tablet Take 1 tablet (40 mg) by mouth daily  Qty: 90 tablet, Refills: PRN    Associated Diagnoses: Hyperlipidemia LDL goal <100      Saw [place], Serenoa repens, (SAW PALMETTO PO) Take 1 tablet by mouth every morning       niacin 500 MG tablet Take 1 tablet (500 mg) by mouth At Bedtime  Qty: 30 tablet, Refills: PRN    Associated Diagnoses: Hyperlipidemia LDL goal <100      Multiple Vitamins-Minerals (MULTIVITAMIN & MINERAL PO) Take 1 tablet by mouth every morning       lactobacillus rhamnosus, GG, (CULTURELLE) 10 B CELL capsule Take 1 capsule by mouth every morning          STOP taking these medications       polyethylene glycol (GOLYTELY/NULYTELY) 236 G suspension Comments:   Reason for Stopping:         neomycin (MYCIFRADIN) 500 MG tablet Comments:   Reason for Stopping:         ondansetron (ZOFRAN) 4 MG tablet Comments:   Reason for Stopping:         tiZANidine (ZANAFLEX) 2 MG tablet Comments:   Reason for Stopping:         diphenhydrAMINE (BENADRYL) 25 MG capsule Comments:   Reason for Stopping:                        Brief History of Illness:   74 y/o M, PMH of HTN, CAD, s/p PCI of LAD in 2016, PCA of proximal & mid RCA in 2017 on anticoagulation, PVD,  chronic renal insufficiency, recently diagnosed with rectal cancer.  On 2/22/2018 underwent laparoscopic low anterior resection with flexible sigmoidoscopy.  Post-operative course complicated by delirium, acute hypoxic respiratory failure due to volume overload and possible hospital acquired pneumonia.             Hospital Course:   Post-operatively was gently fluid resuscitated.  Pain was controlled on minimal opiates.  Pt developed post-operative delirium and confusion.  Due to this, his cardiac status was evaluated.  Pt did have a slight troponin elevation.  Pt was seen by Cardiology and due to lack of EKG changes and symptoms, slight troponin elevation likely due to demand ischemia and not ACS.  Pt also developed acute hypoxic respiratory failure requiring up to 12L oxymask and then bipap.  Due to elevated creatinine bilateral LE US was obtained and this was negative for blood clots.  ECHO showed an EF of 65-70%, no wall motion abnormalities, some dilation of IVC with normal respiratory variation.  CXR showed pulmonary edema.  Pt was given lasix.  Pt's home aspirin was started on POD#1 and ticagralor was restarted on POD#2.  Other work up included UA was checked and this was negative.  Pt was noted to have a slight increase in WBC to 11 on POD#7 and was started on levaquin for a possible pneumonia for which he is to complete 7 days.  Last dose of levaquin is to be given on 3/7.  Pt did have mild elevation in creatinine initially with diuresis, but this stabilized.  Pt was able to tolerate further diuresis with a stable creatinine.  Oxygen was significantly weaned down to needing only intermittently 1-2L NC by the time pt was discharged.  Pt is to continue weaning off oxygent while at rehabilitation facility.  Pt developed ecchymosis on his abdomen superior to pfannenstiel incision and right arm, therefore his home aspirin was held on 3/3 and pt is to continue holding aspirin for an additional week.  Pt had  "minimal pain at the time of discharge and can take tylenol as needed.  If pt develops constipation, pt can take miralax every few days as needed.      Patient is to follow up in the Colon and Rectal Surgery Clinic in 1-2 week with Kailey Quiles NP and then with Dr. Tidwell in 2-3 weeks after.          Day of Discharge Physical Exam:   Blood pressure 157/71, pulse 77, temperature 98.2  F (36.8  C), temperature source Oral, resp. rate 18, height 1.651 m (5' 5\"), weight 82.2 kg (181 lb 4.8 oz), SpO2 96 %.    Gen: AAOx3, NAD  Pulm: Non-labored breathing  Abd: Soft, protuberant, appropriately tender, no guarding/rebound   Incision C/D/I   Ext:  Warm and well-perfused         Final Pathology Result:   Patient Name: ROYAL MONK   MR#: [id number]   Specimen #: [id number]   Collected: 2/22/2018   Received: 2/22/2018   Reported: 3/2/2018 17:38   Ordering Phy(s): KOTA TIDWELL     For improved result formatting, select 'View Enhanced Report Format' under    Linked Documents section.     SPECIMEN(S):   A: Sigmoid and rectum   B: Anastomosis rings     FINAL DIAGNOSIS:   A. COLON, SIGMOID AND RECTUM, LOW ANTERIOR RESECTION:   - Adenocarcinoma of the rectum, moderately differentiated   - Twelve of nineteen lymph nodes with metastatic adenocarcinoma (12/19)   - One tubular adenoma, One hyperplastic polyp and One traditional serrated    adenoma     B. ANASTOMOSIS RINGS, EXCISION:   - Colonic tissue, negative for malignancy     Report Name: Colon and Rectum - Resection        Status: Submitted Checklist Inst: 1      Last Updated By: Anjel Tesfaye M.D., PhD, [facility],   03/02/2018 17:37:05   Part(s) Involved:   A: Sigmoid and rectum     Synoptic Report:     SPECIMEN     Procedure:         - Low anterior resection     TUMOR     Tumor Site:         - Rectum     Histologic Type:         - Adenocarcinoma     Histologic Grade:         - G2: Moderately differentiated     Tumor Size: 2.9 Centimeters (cm)     " Tumor Deposits:         - Not identified     Tumor Extent       Tumor Extension:           - Tumor invades through the muscularis propria into pericolorectal           tissue       Macroscopic Tumor Perforation:           - Not identified     Accessory Findings       Lymphovascular Invasion:           - Present         Lymphovascular Invasion Type:             - Small vessel lymphovascular invasion             - Large vessel (venous) invasion       Perineural Invasion:           - Present       Type of Polyp in Which Invasive Carcinoma Arose:           - None identified       Treatment Effect:           - No known presurgical therapy     MARGINS     Proximal Margin:         - Uninvolved by invasive carcinoma     Distal Margin:         - Uninvolved by invasive carcinoma     Radial or Mesenteric Margin:         - Involved by invasive carcinoma (tumor present 0-1 mm from margin)     LYMPH NODES     Number of Lymph Nodes Involved: 12     Number of Lymph Nodes Examined: 19     PATHOLOGIC STAGE CLASSIFICATION (PTNM, AJCC 8TH EDITION)     Primary Tumor (pT):         - pT3: Tumor invades through the muscularis propria into   pericolorectal         tissues     Regional Lymph Nodes (pN):         - pN2b: Seven or more regional lymph nodes are positive     2017 June AJCC 8th Edition CAP Annual Release     I have personally reviewed all specimens and/or slides, including the   listed special stains, and used them   with my medical judgement to determine or confirm the final diagnosis.     Electronically signed out by:     Anjel Tesfaye M.D., PhD, [facility]            Discharge Instructions and Follow-Up:       Discharge Procedure Orders  General info for SNF   Order Comments: Length of Stay Estimate: Short Term Care: Estimated # of Days <30  Condition at Discharge: Stable  Level of care:skilled   Rehabilitation Potential: Fair  Admission H&P remains valid and up-to-date: Yes  Recent Chemotherapy: N/A  Use Nursing Home  Standing Orders: Yes     Mantoux instructions   Order Comments: Give two-step Mantoux (PPD) Per Facility Policy Yes     Intake and output   Order Comments: Every shift. Strict in and outs.     Daily weights   Order Comments: Call Provider for weight gain of more than 2 pounds per day or 5 pounds per week.     Wound care (specify)   Order Comments: Site:   Abdominal incisions  Instructions:  Keep clean and dry.  Please check at least once daily.     Activity - Up with nursing assistance   Order Specific Question Answer Comments   Is discharge order? Yes      Activity   Order Comments: Your activity upon discharge:   -No lifting, pushing, pulling greater than 10 lbs and no strenuous exercise for 6 weeks   -No driving while on narcotic analgesics (i.e. Percocet, oxycodone, Vicodin)  -No driving until you are able to fully twist to both sides or slam on brakes quickly and without any pain  -Encourage good sleep-wake cycles   Order Specific Question Answer Comments   Is discharge order? Yes      Monitor and record   Order Comments: MONITOR AND RECORD:   1.  Strict in and outs  2.  Daily weights  3.  Bowel movements     Adult [facility] Follow-up and recommended labs and tests   Order Comments: DIET  -Low Residue Diet for at least 4-6 weeks unless cleared by Colorectal surgery.  No raw vegetables, fruit skins, fibrous foods that require a lot of chewing, nuts, seeds, corn, popcorn.   -We recommend eating slowly, chewing thoroughly, eating small frequent meals throughout the day  -Stay well hydrated.      ACTIVITY  -No lifting, pushing, pulling greater than 10 lbs and no strenuous exercise for 6 weeks   -No driving while on narcotic analgesics (i.e. Percocet, oxycodone, Vicodin)  -No driving until you are able to fully twist to both sides or slam on brakes quickly and without any pain    WOUND CLINIC  -Inspect your wounds daily for signs of infection (increased redness, drainage, pain)  -Keep your wound clean and dry  -You  may shower, but do not soak in tub or pool    NOTIFY  Please contact Dora Rice LPN, Elise Jose, RN or Flori Colmenares RN at [phone] for problems after discharge such as:  -Temperature > 101F, chills, rigors, dizziness  -Redness around or purulent drainage from wound  -Inability to tolerate diet, nausea or vomiting  -You stop passing gas, develop significant bloating, abdominal pain  -Have blood in stools/vomit  -Have severe diarrhea/constipation  -Any other questions or concerns.  - At nights (after 4:30pm), on weekends, or if urgent, call [phone] and ask the  to speak with the on-call Colorectal Surgery resident or fellow      Medication Instructions  Some of your medications may have changed. Please take only prescribed and resumed medications     FOLLOW-UP  1.  You will need to follow-up with Kailey Quiles NP in the Colon and Rectal Surgery clinic in 1-2 week(s) and then with CRS Staff: Dr. Raza in 2-3 weeks after.  Please contact our clinic scheduler Zainab Radford (phone # [phone]) if you have not heard from our clinic in 3 business days afer discharge to schedule a follow-up appointment.     2.  Follow up with your primary care provider in 1-2 weeks after discharge from the hospital to review this hospitalization.     3.  Please hold your usual home aspirin for now and can restart on 3/13.    4.  Maintain good sleep-wake cycles.  Avoid CNS altering medications.        Appointments on [place] and/or [facility] (with [facility] or [facility] provider or service). Call [phone] if you haven't heard regarding these appointments within 7 days of discharge.     Full Code     Nutrition Services Adult IP Consult   Order Comments: Reason:  Calorie counts.  Optimize post-operative healing.     Physical Therapy Adult Consult   Order Comments: Evaluate and treat as clinically indicated.    Reason:  Safe transition back to home.     Occupational Therapy Adult Consult   Order  Comments: Evaluate and treat as clinically indicated.    Reason:  Safe transition back to home.     Oxygen - Nasal cannula   Order Comments: 1-2 Lpm by nasal cannula to keep O2 sats 92% or greater.  Wean off oxygen as tolerated.     Diet   Order Comments: Follow this diet upon discharge:   -Low Residue Diet for at least 4-6 weeks unless cleared by Colorectal surgery.  No raw vegetables, fruit skins, fibrous foods that require a lot of chewing, nuts, seeds, corn, popcorn.   -We recommend eating slowly, chewing thoroughly, eating small frequent meals throughout the day  -Stay well hydrated.   Order Specific Question Answer Comments   Is discharge order? Yes               Home Health Care:   N/A- discharged to rehab facility           Discharge Disposition:   [address] [address]  (P: [phone], F: [phone])       Condition at discharge: Stable      Kenzie Thorne PA-C  Colon and Rectal Surgery     Pt was seen and discussed with Dr. Mcneill on 3/6/2018.

## 2018-03-06 NOTE — PLAN OF CARE
Problem: Patient Care Overview  Goal: Plan of Care/Patient Progress Review  Outcome: Adequate for Discharge Date Met: 03/06/18  DISCHARGE                         3/6/2018  1:05 PM  ----------------------------------------------------------------------------  Discharged to: [facility]  Via: private transportation  Accompanied by: Family  Discharge Instructions: low fiber diet, activity as tolerated, medications, follow up appointments, when to call the MD, aftercare instructions.  Prescriptions: To be filled by [facility]; medication list reviewed & sent with pt  Follow Up Appointments: arranged; information given  Belongings: All sent with pt  IV: d/c'd  Telemetry: d/c'd  Pt exhibits understanding of above discharge instructions; all questions answered.    Discharge Paperwork: Signed, copied, and sent home with patient.

## 2018-03-06 NOTE — PLAN OF CARE
Problem: Patient Care Overview  Goal: Plan of Care/Patient Progress Review  Physical Therapy Discharge Summary    Reason for therapy discharge:    Discharged to transitional care facility.    Progress towards therapy goal(s). See goals on Care Plan in [facility] electronic health record for goal details.  Goals partially met.  Barriers to achieving goals:   discharge from facility.    Therapy recommendation(s):    Continued therapy is recommended.  Rationale/Recommendations:  Pt would benefit from additional skilled therapy to address safety with all mobility, cognition, activity tolerance, precaution adherence with transfers, balance..

## 2018-03-08 NOTE — PLAN OF CARE
Problem: Patient Care Overview  Goal: Plan of Care/Patient Progress Review  Occupational Therapy Discharge Summary    Reason for therapy discharge:    Discharged to transitional care facility.    Progress towards therapy goal(s). See goals on Care Plan in [facility] electronic health record for goal details.  Goals partially met.  Barriers to achieving goals:   discharge from facility.    Therapy recommendation(s):    Continued therapy is recommended.  Rationale/Recommendations:  TCU. Pt lives alone and below baseline in ADLs and mobility. Pt would benefit from continued rehab to regain independence and safety with ADLs prior to discharging home. .

## 2018-03-08 NOTE — PROGRESS NOTES
"Per staff msg from [facility] triage:  \"Patient's sister, Kim Sears, who has medical power of , called and is concerned about some symptoms she believes are related to patient's Brilinta.  Patient is c/o itching, back pain, shortness of breath, and has scratched and caused bleeding on ankles, and fatigue.  They are requesting direction in regards to this and use of the Brilinta.  Patient is staying at [facility], the nursing stating number is [phone].\"    Phone # is not the nursing station where pt is at.  LM w/ Kim's  to call me back. [phone] is the correct # to nurses station.    Spoke to nurse Alia who was very helpful - states pt has been there since his surgery 3/6.  Pt's sister has been telling the pt he should stop the Brilinta as she read the drug label & thinks the symptoms he is having are side effects.  Pt was DCd also w/ a diagnosis of delirium & nurse agrees he is still delirius.  Symptoms are: itchy legs, back pain, & sob.  Nurse examined pt's legs while I was on the phone - no rash, just dry skin.  Pt had surgery & is currently in bed more, also had some respiratory failure issues & pneumonia post-op.  Alia agreed w/ me that the symptoms he is having are likely not side effects, just residual effects from surgery & prolonged hospitalization & laying in bed more.      Nurse Rojo will discuss w/ pt & sister the importance of continuing this medication, & our discussion about his symptoms.  However, if pt & sister are still having thoughts about stopping the medication then I can discuss w/ Dr. Willett switching to Plavix.  Also discussed w/ Alia if the sister is giving the pt dangerous advice this may need to be looked at in the future as she should be giving appropriate advice & not telling the pt to refuse important medication.  "

## 2018-03-09 NOTE — TELEPHONE ENCOUNTER
Dr. Willett reviewed my note & assessment, & he agrees w/ plan.  However if still an issue w/ pt & sister can switch to Plavix w/ 300mg loading dose.

## 2018-03-12 NOTE — PROGRESS NOTES
RN Post Op Care Coordination Note     POST-OP CALL      Patient is s/p Laparoscopic Assisted Low Anterior Resection, Sigmoidoscopy, Anesthesia Block.     Reports doing well.   GERMANIA Puente discussed who patient was doing.     Fevers/chills: Patient denies fever/chills.  Eating/drinking: Patient is able to eat and drink without any complaints. Drinking 8-10 glasses of fluids per day. Tolerating low fiber diet.   Bowel habits: Patient reports having normal soft formed bowel movements daily.  Urine output: Voiding without difficulty, light yellow urine.  Incisions: Patient denies any signs and symptoms of infection. No erythema, swelling or drainage.  Pain: Patient reports pain is controlled with tylenol.  Narcotics: No narcotics  Follow up appointment is not yet scheduled.     Patient will call with any questions or concerns, all current questions and concerns were addressed to patient's satisfaction, patient aware and in agreement with current plan of care.    GERMANIA Ruby Care Coordinator  Colon & Rectal Surgery Clinic  [facility]  Phone: [phone]  Fax: [phone]

## 2018-03-14 NOTE — TELEPHONE ENCOUNTER
Per task, patient is scheduled 3/22/18 at 8:30 am.  Called TCU and informed them of appointment.  TCU confirms appointment details and check-in location.

## 2018-03-14 NOTE — TELEPHONE ENCOUNTER
----- Message from Kenzie Thorne PA-C sent at 3/6/2018  1:47 PM CST -----  Regarding: discharge to: [facility] on 3/6  Hi Everyone,    Please schedule Jose Lira for follow-up in clinic with Kailey Quiles NP in 1-2 week(s) and then RDM in 2-3 weeks after.     Date of discharge:  3/6.  S/p lap LAR, post-op course c/b delirium and volume overload with acute resp hypoxic failure.     Discharged to:    [facility]  [address] [place] [address]  (P: [phone], F: [phone])      -Antibiotics:  Levaquin, last dose 3/7 for possible HAP.   -Pain Medications:  Tylenol prn.  Avoid opiates and benzos.   -Ostomy:  none  -Special Concerns:  General continued healing and recovery.  Developed some ecchymosis around abd incision and on right arm.  Holding his home aspirin for an additional one week from the time of discharge.     Thanks!    Giulia  Pgr: [phone]

## 2018-03-19 NOTE — TELEPHONE ENCOUNTER
Patient left a message stating he cannot come to his scheduled appointment with aKiley Quiles NP, as it is too early in the morning.  Patient requested to move his appointment between 10 am -12:00 pm.  Called and spoke with patient.  Informed patient that Kailey Quiles NP does not have later availability that day, and that her next available is 3/30/18 at 12:15 pm.  Patient confirms he would like to reschedule to that date and time.  Confirmed new appointment with patient.

## 2018-03-28 NOTE — TELEPHONE ENCOUNTER
Notified pt that he is due for a BP check with either a MA or at the pharm. He stated he will make an appt once he it's his car back next week.  Martha GIL  [place]

## 2018-03-30 NOTE — NURSING NOTE
"Chief Complaint   Patient presents with     Surgical Followup     Post op visit, surgery 2/22/18 with Dr. Raza.        Vitals:    03/30/18 1228   BP: 111/60   Pulse: 75   Temp: 98.2  F (36.8  C)   SpO2: 98%   Weight: 184 lb 6.4 oz   Height: 5' 5\"       Body mass index is 30.69 kg/(m^2).    Dora TRAN LPN                  "

## 2018-03-30 NOTE — LETTER
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 Raza. His hospital course was complicated by respiratory failure due to volume overload and possible hospital-acquired pneumonia  His postoperative course was complicated by delirium, acute hypoxic.  He was discharged to a skilled nursing facility on 3/6/2018.  He presents today with his sister and brother-in-law for follow-up.    Interval history: Buddy has been doing well.  He discharged from the skilled nursing facility and is back home for the past 2 weeks.  He is eating and drinking without difficulty and maintaining his weight.  He denies any nausea or vomiting.  He is tolerating a low residue diet.  He denies any pain.  He is having formed bowel movements but notes that these are much smaller than his usual bowel movements.  He denies any fevers or chills.    Assessment/Plan:  73 year old male  with rectosigmoid cancer who is status post laparoscopic low anterior resection and flexible sigmoidoscopy on 2018 with Dr. Raza.  Final pathology shows rectal adenocarcinoma with 12 of 19 positive lymph nodes, negative margins, lymphovascular invasion present and perineural invasion present.  I discussed these results with Buddy and his family today.  We will set him up to meet with a medical oncologist.  He is otherwise recovering quite well.  He is maintaining his weight and eating and drinking without difficulty.  Advised him to remain on a low residue diet for another 1-2 weeks and then he can slowly add fiber back into his diet.  He is having soft bowel movements but his sister said that he has had some  constipation in the past.  Discussed that he can use a laxative such as MiraLAX if he does develop constipation.  Incision sites are well-healed.  We will have him follow-up with Dr. Raza in the next 3-4 weeks as well.  Encouraged him to contact the clinic in the meantime with any questions or concerns.     Medical history:  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     Subclavian arterial stenosis (H)     Left     Unspecified essential hypertension        Surgical history:  Past Surgical History:   Procedure Laterality Date     COLONOSCOPY N/A 5/19/2017    Procedure: COMBINED COLONOSCOPY, SINGLE OR MULTIPLE BIOPSY/POLYPECTOMY BY BIOPSY;  Rectal bleeding;  Surgeon: Maximus Dinero MD;  Location: [facility]     LAPAROSCOPIC COLECTOMY LOW ANTERIOR N/A 2/22/2018    Procedure: LAPAROSCOPIC COLECTOMY LOW ANTERIOR;  Laparoscopic Assisted Low Anterior Resection, Sigmoidoscopy, Anesthesia Block (ERAS Patient);  Surgeon: Sharan Raza MD;  Location: [facility]     SIGMOIDOSCOPY FLEXIBLE N/A 2/22/2018    Procedure: SIGMOIDOSCOPY FLEXIBLE;;  Surgeon: Sharan Raza MD;  Location: [facility]     SURGICAL HISTORY OF -   10/02    angioplasty and stenting of left and internal carotid artery at the bifurcation     SURGICAL HISTORY OF - 11/11    right common iliac artery stent      VASCULAR SURGERY  2001    stent placed in carotid        Problem list:  Patient Active Problem List    Diagnosis Date Noted     Rectal cancer (H) 02/22/2018     Priority: Medium     CAD S/P percutaneous coronary angioplasty 12/19/2017     Priority:  Medium     Postsurgical percutaneous transluminal coronary angioplasty status 11/27/2017     Priority: Medium     Coronary artery disease involving native heart without angina pectoris, unspecified vessel or lesion type 11/27/2017     Priority: Medium     Rectal bleeding 11/17/2017     Priority: Medium     Status post coronary angiogram 08/15/2017     Priority: Medium     Pulmonary nodules 06/27/2017     Priority: Medium     Advanced directives, counseling/discussion 08/08/2011     Priority: Medium     Advance Directive Problem List Overview:   Name Relationship Phone    Primary Health Care Agent            Alternative Health Care Agent          Discussed advance care planning with patient; however, patient declined at this time. 8/8/2011          Hypertension goal BP (blood pressure) < 130/80 03/25/2011     Priority: Medium     Per provider       CKD (chronic kidney disease) stage 3, GFR 30-59 ml/min 03/25/2011     Priority: Medium     Per provider       Hyperlipidemia LDL goal <100 10/31/2010     Priority: Medium     Per provider       Peripheral vascular disease (H) 11/11/2004     Priority: Medium     Problem list name updated by automated process. Provider to review       History of CVA (cerebrovascular accident) 11/11/2004     Priority: Medium     stenting done 10/02      Normal thallium stress echo in 2002  Problem list name updated by automated process. Provider to review         Medications:  Current Outpatient Prescriptions   Medication Sig Dispense Refill     acetaminophen (TYLENOL) 500 MG tablet Take 1-2 tablets (500-1,000 mg) by mouth 4 times daily as needed for mild pain or fever       traZODone (DESYREL) 50 MG tablet Take 1 tablet (50 mg) by mouth every evening 60 tablet      amLODIPine (NORVASC) 10 MG tablet Take 0.5 tablets (5 mg) by mouth daily 90 tablet PRN     aspirin 81 MG EC tablet Take 1 tablet (81 mg) by mouth daily 30 tablet      carvedilol (COREG) 25 MG tablet Take 1 tablet (25 mg) by mouth  "2 times daily 180 tablet 3     ticagrelor (BRILINTA) 90 MG tablet Take 1 tablet (90 mg) by mouth 2 times daily 180 tablet 3     atorvastatin (LIPITOR) 40 MG tablet Take 1 tablet (40 mg) by mouth daily (Patient taking differently: Take 40 mg by mouth every morning ) 90 tablet PRN     Saw Palmetto, Serenoa repens, (SAW PALMETTO PO) Take 1 tablet by mouth every morning        niacin 500 MG tablet Take 1 tablet (500 mg) by mouth At Bedtime 30 tablet PRN     Multiple Vitamins-Minerals (MULTIVITAMIN & MINERAL PO) Take 1 tablet by mouth every morning        lactobacillus rhamnosus, GG, (CULTURELLE) 10 B CELL capsule Take 1 capsule by mouth every morning        [DISCONTINUED] olmesartan-hydrochlorothiazide (BENICAR HCT) 40-25 MG per tablet Take 1 tablet by mouth daily 90 tablet 3       Allergies:  Allergies   Allergen Reactions     Fentanyl      Pt states it makes him feel funny but not true allergy.        Family history:  Family History   Problem Relation Age of Onset     C.A.D. Father      Hypertension Father      Prostate Cancer Father      C.A.D. Brother      MI     Hypertension Brother      Arthritis Sister      DIABETES No family hx of      CEREBROVASCULAR DISEASE No family hx of      Cancer - colorectal No family hx of        Social history:  Social History   Substance Use Topics     Smoking status: Former Smoker     Packs/day: 3.00     Years: 30.00     Quit date: 8/5/1994     Smokeless tobacco: Former User     Quit date: 8/1/1994      Comment: quit 15 years ago after his stroke     Alcohol use 0.6 oz/week     1 Glasses of wine per week      Comment: 1 per day     Marital status: single.    Nursing Notes:   Luis Rice LPN  3/30/2018 12:31 PM  Signed  Chief Complaint   Patient presents with     Surgical Followup     Post op visit, surgery 2/22/18 with Dr. Raza.        Vitals:    03/30/18 1228   BP: 111/60   Pulse: 75   Temp: 98.2  F (36.8  C)   SpO2: 98%   Weight: 184 lb 6.4 oz   Height: 5' 5\"       Body mass " "index is 30.69 kg/(m^2).    Dora TRAN LPN                     Physical Examination: Exam was chaperoned by Dora Rice LPN   /60  Pulse 75  Temp 98.2  F (36.8  C)  Ht 5' 5\"  Wt 184 lb 6.4 oz  SpO2 98%  BMI 30.69 kg/m2  General: Alert, oriented, in no acute distress, sitting comfortably  HEENT: Mucous membranes moist  Abdomen: Soft, nondistended, nontender on palpation.  Incision sites well healed without any signs of infection  Extremities: Warm, dry, no edema    Total face to face time was 15 minutes, >50% counseling.  This is a postop visit.    CUONG Tinajero, NP-C  Colon and Rectal Surgery  [facility]    This note was created using speech recognition software and may contain unintended word substitutions.        CUONG Tinajero CNP      "

## 2018-03-30 NOTE — PROGRESS NOTES
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 Raza. His hospital course was complicated by respiratory failure due to volume overload and possible hospital-acquired pneumonia  His postoperative course was complicated by delirium, acute hypoxic.  He was discharged to a skilled nursing facility on 3/6/2018.  He presents today with his sister and brother-in-law for follow-up.    Interval history: Buddy has been doing well.  He discharged from the skilled nursing facility and is back home for the past 2 weeks.  He is eating and drinking without difficulty and maintaining his weight.  He denies any nausea or vomiting.  He is tolerating a low residue diet.  He denies any pain.  He is having formed bowel movements but notes that these are much smaller than his usual bowel movements.  He denies any fevers or chills.    Assessment/Plan:  73 year old male  with rectosigmoid cancer who is status post laparoscopic low anterior resection and flexible sigmoidoscopy on 2018 with Dr. Raza.  Final pathology shows rectal adenocarcinoma with 12 of 19 positive lymph nodes, negative margins, lymphovascular invasion present and perineural invasion present.  I discussed these results with Buddy and his family today.  We will set him up to meet with a medical oncologist.  He is otherwise recovering quite well.  He is maintaining his weight and eating and drinking without difficulty.  Advised him to remain on a low residue diet for another 1-2 weeks and then he can slowly add fiber back into his diet.  He is having soft bowel movements but his sister said that he has had some constipation in the past.  Discussed that he can use a laxative such as MiraLAX if he does develop  constipation.  Incision sites are well-healed.  We will have him follow-up with Dr. Raza in the next 3-4 weeks as well.  Encouraged him to contact the clinic in the meantime with any questions or concerns.     Medical history:  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     Subclavian arterial stenosis (H)     Left     Unspecified essential hypertension        Surgical history:  Past Surgical History:   Procedure Laterality Date     COLONOSCOPY N/A 5/19/2017    Procedure: COMBINED COLONOSCOPY, SINGLE OR MULTIPLE BIOPSY/POLYPECTOMY BY BIOPSY;  Rectal bleeding;  Surgeon: Maximus Dinero MD;  Location: [facility]     LAPAROSCOPIC COLECTOMY LOW ANTERIOR N/A 2/22/2018    Procedure: LAPAROSCOPIC COLECTOMY LOW ANTERIOR;  Laparoscopic Assisted Low Anterior Resection, Sigmoidoscopy, Anesthesia Block (ERAS Patient);  Surgeon: Sharan Raza MD;  [facility]     SIGMOIDOSCOPY FLEXIBLE N/A 2/22/2018    Procedure: SIGMOIDOSCOPY FLEXIBLE;;  Surgeon: Sharan Raza MD;  Location: [facility]     SURGICAL HISTORY OF -   10/02    angioplasty and stenting of left and internal carotid artery at the bifurcation     SURGICAL HISTORY OF -   11/11    right common iliac artery stent      VASCULAR SURGERY  2001    stent placed in carotid        Problem list:  Patient Active Problem List    Diagnosis Date Noted     Rectal cancer (H) 02/22/2018     Priority: Medium     CAD S/P percutaneous coronary angioplasty 12/19/2017     Priority: Medium     Postsurgical percutaneous transluminal coronary angioplasty status 11/27/2017     Priority:  Medium     Coronary artery disease involving native heart without angina pectoris, unspecified vessel or lesion type 11/27/2017     Priority: Medium     Rectal bleeding 11/17/2017     Priority: Medium     Status post coronary angiogram 08/15/2017     Priority: Medium     Pulmonary nodules 06/27/2017     Priority: Medium     Advanced directives, counseling/discussion 08/08/2011     Priority: Medium     Advance Directive Problem List Overview:   Name Relationship Phone    Primary Health Care Agent            Alternative Health Care Agent          Discussed advance care planning with patient; however, patient declined at this time. 8/8/2011          Hypertension goal BP (blood pressure) < 130/80 03/25/2011     Priority: Medium     Per provider       CKD (chronic kidney disease) stage 3, GFR 30-59 ml/min 03/25/2011     Priority: Medium     Per provider       Hyperlipidemia LDL goal <100 10/31/2010     Priority: Medium     Per provider       Peripheral vascular disease (H) 11/11/2004     Priority: Medium     Problem list name updated by automated process. Provider to review       History of CVA (cerebrovascular accident) 11/11/2004     Priority: Medium     stenting done 10/02      Normal thallium stress echo in 2002  Problem list name updated by automated process. Provider to review         Medications:  Current Outpatient Prescriptions   Medication Sig Dispense Refill     acetaminophen (TYLENOL) 500 MG tablet Take 1-2 tablets (500-1,000 mg) by mouth 4 times daily as needed for mild pain or fever       traZODone (DESYREL) 50 MG tablet Take 1 tablet (50 mg) by mouth every evening 60 tablet      amLODIPine (NORVASC) 10 MG tablet Take 0.5 tablets (5 mg) by mouth daily 90 tablet PRN     aspirin 81 MG EC tablet Take 1 tablet (81 mg) by mouth daily 30 tablet      carvedilol (COREG) 25 MG tablet Take 1 tablet (25 mg) by mouth 2 times daily 180 tablet 3     ticagrelor (BRILINTA) 90 MG tablet Take 1 tablet (90 mg) by mouth 2  "times daily 180 tablet 3     atorvastatin (LIPITOR) 40 MG tablet Take 1 tablet (40 mg) by mouth daily (Patient taking differently: Take 40 mg by mouth every morning ) 90 tablet PRN     Saw Palmetto, Serenoa repens, (SAW PALMETTO PO) Take 1 tablet by mouth every morning        niacin 500 MG tablet Take 1 tablet (500 mg) by mouth At Bedtime 30 tablet PRN     Multiple Vitamins-Minerals (MULTIVITAMIN & MINERAL PO) Take 1 tablet by mouth every morning        lactobacillus rhamnosus, GG, (CULTURELLE) 10 B CELL capsule Take 1 capsule by mouth every morning        [DISCONTINUED] olmesartan-hydrochlorothiazide (BENICAR HCT) 40-25 MG per tablet Take 1 tablet by mouth daily 90 tablet 3       Allergies:  Allergies   Allergen Reactions     Fentanyl      Pt states it makes him feel funny but not true allergy.        Family history:  Family History   Problem Relation Age of Onset     C.A.D. Father      Hypertension Father      Prostate Cancer Father      C.A.D. Brother      MI     Hypertension Brother      Arthritis Sister      DIABETES No family hx of      CEREBROVASCULAR DISEASE No family hx of      Cancer - colorectal No family hx of        Social history:  Social History   Substance Use Topics     Smoking status: Former Smoker     Packs/day: 3.00     Years: 30.00     Quit date: 8/5/1994     Smokeless tobacco: Former User     Quit date: 8/1/1994      Comment: quit 15 years ago after his stroke     Alcohol use 0.6 oz/week     1 Glasses of wine per week      Comment: 1 per day     Marital status: single.    Nursing Notes:   Luis Rice LPN  3/30/2018 12:31 PM  Signed  Chief Complaint   Patient presents with     Surgical Followup     Post op visit, surgery 2/22/18 with Dr. Raza.        Vitals:    03/30/18 1228   BP: 111/60   Pulse: 75   Temp: 98.2  F (36.8  C)   SpO2: 98%   Weight: 184 lb 6.4 oz   Height: 5' 5\"       Body mass index is 30.69 kg/(m^2).    Dora TRAN LPN                     Physical Examination: Exam was " "chaperoned by Dora Rice LPN   /60  Pulse 75  Temp 98.2  F (36.8  C)  Ht 5' 5\"  Wt 184 lb 6.4 oz  SpO2 98%  BMI 30.69 kg/m2  General: Alert, oriented, in no acute distress, sitting comfortably  HEENT: Mucous membranes moist  Abdomen: Soft, nondistended, nontender on palpation.  Incision sites well healed without any signs of infection  Extremities: Warm, dry, no edema    Total face to face time was 15 minutes, >50% counseling.  This is a postop visit.    CUONG Tinajero, NP-C  Colon and Rectal Surgery  [facility]    This note was created using speech recognition software and may contain unintended word substitutions.      "

## 2018-03-30 NOTE — MR AVS SNAPSHOT
After Visit Summary   3/30/2018    Jose Lira    MRN: [id number]           Patient Information     Date Of Birth          1945        Visit Information        Provider Department      3/30/2018 12:15 PM Kailey Lawson APRN [facility]         Follow-ups after your visit        Your next 10 appointments already scheduled     2018 10:15 AM CDT   (Arrive by 10:00 AM)   Return Visit with Radha Gold MD   [facility] ([facility])    [address]  [address]  [place] [place] [address] [phone]            2018 12:00 PM CDT   (Arrive by 11:45 AM)   Return Visit with Jose Willett MD   [facility] ([facility])    [address]  [address]  [place] [address] [phone]              Who to contact     Please call your clinic at [phone] to:    Ask questions about your health    Make or cancel appointments    Discuss your medicines    Learn about your test results    Speak to your doctor            Additional Information About Your Visit        MyChart Information     [employer] is an electronic gateway that provides easy, online access to your medical records. With Yhat, you can request a clinic appointment, read your test results, renew a prescription or communicate with your care team.     To sign up for [employer] visit the website at [email or web address]   You will be asked to enter the access code listed below, as well as some personal information. Please follow the directions to create your username and password.     Your access code is: [email or web address]  Expires: 2018  1:15 PM     Your access code will  in 90 days. If you need help or a new code, please contact your [facility] or call [phone] for assistance.        Care EveryWhere ID     This is your Care EveryWhere ID. This  "could be used by other organizations to access your [facility] medical records  [id number]        Your Vitals Were     Pulse Temperature Height Pulse Oximetry BMI (Body Mass Index)       75 98.2  F (36.8  C) 5' 5\" 98% 30.69 kg/m2        Blood Pressure from Last 3 Encounters:   03/30/18 111/60   03/06/18 157/71   02/13/18 163/77    Weight from Last 3 Encounters:   03/30/18 184 lb 6.4 oz   03/05/18 181 lb 4.8 oz   02/13/18 196 lb 12.8 oz              Today, you had the following     No orders found for display         Today's Medication Changes          These changes are accurate as of 3/30/18  1:15 PM.  If you have any questions, ask your nurse or doctor.               These medicines have changed or have updated prescriptions.        Dose/Directions    atorvastatin 40 MG tablet   Commonly known as:  LIPITOR   This may have changed:  when to take this   Used for:  Hyperlipidemia LDL goal <100        Dose:  40 mg   Take 1 tablet (40 mg) by mouth daily   Quantity:  90 tablet   Refills:  PRN                Primary Care Provider Office Phone # Fax #    Rebeca Sandoval [phone]       [address] [facility] [address]        Equal Access to Services     RIGO STORY AH: Hadii kylee jon hadasho Soomaali, waaxda luqadaha, qaybta kaalmada adeegyada, haresh love . So [facility] [phone].    ATENCIÓN: Si habla español, tiene a adorno disposición servicios gratuitos de asistencia lingüística. Llame al [phone].    We comply with applicable federal civil rights laws and [place] laws. We do not discriminate on the basis of race, color, national origin, age, disability, sex, sexual orientation, or gender identity.            Thank you!     Thank you for choosing [facility] COLON AND RECTAL [facility]ERY  for your care. Our goal is always to provide you with excellent care. Hearing back from our patients is one way we can continue to improve our services. Please take a few minutes to " complete the written survey that you may receive in the mail after your visit with us. Thank you!             Your Updated Medication List - Protect others around you: Learn how to safely use, store and throw away your medicines at www.disposemymeds.org.          This list is accurate as of 3/30/18  1:15 PM.  Always use your most recent med list.                   Brand Name Dispense Instructions for use Diagnosis    acetaminophen 500 MG tablet    TYLENOL     Take 1-2 tablets (500-1,000 mg) by mouth 4 times daily as needed for mild pain or fever    Rectal cancer (H)       amLODIPine 10 MG tablet    NORVASC    90 tablet    Take 0.5 tablets (5 mg) by mouth daily    Essential hypertension with goal blood pressure less than 130/80       aspirin 81 MG EC tablet     30 tablet    Take 1 tablet (81 mg) by mouth daily    Hypertension goal BP (blood pressure) < 130/80, Hyperlipidemia LDL goal <100       atorvastatin 40 MG tablet    LIPITOR    90 tablet    Take 1 tablet (40 mg) by mouth daily    Hyperlipidemia LDL goal <100       carvedilol 25 MG tablet    COREG    180 tablet    Take 1 tablet (25 mg) by mouth 2 times daily    Coronary artery disease due to lipid rich plaque, Status post coronary angioplasty       lactobacillus rhamnosus (GG) 10 B CELL capsule    CULTURELLE     Take 1 capsule by mouth every morning        MULTIVITAMIN & MINERAL PO      Take 1 tablet by mouth every morning        niacin 500 MG tablet     30 tablet    Take 1 tablet (500 mg) by mouth At Bedtime    Hyperlipidemia LDL goal <100       SAW PALMETTO PO      Take 1 tablet by mouth every morning        ticagrelor 90 MG tablet    BRILINTA    180 tablet    Take 1 tablet (90 mg) by mouth 2 times daily    Coronary artery disease of native artery of native heart with stable angina pectoris (H)       traZODone 50 MG tablet    DESYREL    60 tablet    Take 1 tablet (50 mg) by mouth every evening    Other insomnia

## 2018-04-11 NOTE — NURSING NOTE
Chief Complaint   Patient presents with     Labs Only     labs drawn via venipuncture by RN     There were no vitals taken for this visit.    Labs collected and sent from right antecubital venipuncture. Pt tolerated well. Pt checked.    Nichol Leavitt RN

## 2018-04-11 NOTE — MR AVS SNAPSHOT
After Visit Summary   4/11/2018    Jose Lira    MRN: [id number]           Patient Information     Date Of Birth          1945        Visit Information        Provider Department      4/11/2018 10:15 AM Radha Gold MD [facility]        Today's Diagnoses     Rectal cancer (H)    -  1       Follow-ups after your visit        Follow-up notes from your care team     Return in about 5 days (around 4/16/2018) for Physical Exam, Lab Work.      Your next 10 appointments already scheduled     Apr 11, 2018 11:30 AM CDT   Masonic Lab Draw with  MASONIC LAB DRAW   [facility] Masonic Lab Draw ([facility])    [address]  [address]  [facility] [facility] [place] [address] [phone]            Apr 16, 2018 12:45 PM CDT   (Arrive by 12:30 PM)   PET ONCOLOGY WHOLE BODY with UUPET1   [facility] [place] PET [facility] ([facility])    [address] [address] [phone]           Tell your doctor:   If there is any chance you may be pregnant or if you are breastfeeding.   If you have problems lying in small spaces (claustrophobia). If you do, your doctor may give you medicine to help you relax. If you have diabetes:   Have your exam early in the morning. Your blood glucose will go up as the day goes by.   Your glucose level must be 180 or less at the start of the exam. Please take any medicines you need to ensure this blood glucose level.   If you are taking insulin in the morning take with breakfast by 6 am and schedule procedure between 12 and 2:15 pm.   If you are taking insulin at night take nightly dose, fast overnight, schedule procedure before 10 am.   If you take insulin both morning and night take morning dose by 6am and schedule procedure between 12 and 2:15 pm.   24 hours before your scan: Don t do any heavy exercise. (No jogging, aerobics or other workouts.) Exercise will make your pictures  less accurate.  At least 7 hours before your scan, or the evening before if you have an early appointment: Eat a low carb, high protein meal (Lean meats, seafood, beans, soy, low-fat dairy, eggs, nuts & seeds). 6 hours before your scan:   Stop all food and liquids (except water).   Do not chew gum or suck on mints.   If you need to take medicine with food, you may take it with a few crackers.  Please call your Imaging Department at your exam site with any questions.            May 02, 2018  9:15 AM CDT   Masonic Lab Draw with  MASONIC LAB DRAW   [facility] Lab Draw ([facility])    [address] [place] [address]  [address]  [facility] [id number] [phone]            May 02, 2018  9:45 AM CDT   Return Visit with Radha Gold MD   [facility] ([facility])    [address] [place] [address]  [address]  [facility] [address] [phone]            Jul 16, 2018 12:00 PM CDT   (Arrive by 11:45 AM)   Return Visit with Jose Willett MD   [facility] ([facility])    [address] [place] [address]  [address]  [facility] [id number] [phone]              Future tests that were ordered for you today     Open Future Orders        Priority Expected Expires Ordered    PET Oncology Whole Body Routine 4/11/2018 4/11/2019 4/11/2018    Comprehensive metabolic panel Routine 4/11/2018 4/11/2019 4/11/2018    CEA Routine 4/11/2018 4/11/2019 4/11/2018    CBC with platelets differential Routine 4/11/2018 4/11/2019 4/11/2018            Who to contact     If you have questions or need follow up information about today's clinic visit or your schedule please contact [facility] directly at [phone].  Normal or non-critical lab and imaging results will be communicated to you by MyChart, letter or phone within 4 business days after the clinic has received the results. If you do not hear from us within 7 days, please  "contact the clinic through Yaphie or phone. If you have a critical or abnormal lab result, we will notify you by phone as soon as possible.  Submit refill requests through Yaphie or call your pharmacy and they will forward the refill request to us. Please allow 3 business days for your refill to be completed.          Additional Information About Your Visit        [employer] Information     Yaphie lets you send messages to your doctor, view your test results, renew your prescriptions, schedule appointments and more. To sign up, go to [email or web address] . Click on \"Log in\" on the left side of the screen, which will take you to the Welcome page. Then click on \"Sign up Now\" on the right side of the page.     You will be asked to enter the access code listed below, as well as some personal information. Please follow the directions to create your username and password.     Your access code is: [email or web address]  Expires: 2018  1:15 PM     Your access code will  in 90 days. If you need help or a new code, please call your [place] clinic or [phone].        Care EveryWhere ID     This is your Care EveryWhere ID. This could be used by other organizations to access your [place] medical records  [id number]        Your Vitals Were     Pulse Temperature Respirations Height Pulse Oximetry BMI (Body Mass Index)    73 97.9  F (36.6  C) (Oral) 18 1.651 m (5' 5\") 96% 29.65 kg/m2       Blood Pressure from Last 3 Encounters:   18 112/66   18 111/60   18 157/71    Weight from Last 3 Encounters:   18 80.8 kg (178 lb 3.2 oz)   18 83.6 kg (184 lb 6.4 oz)   18 82.2 kg (181 lb 4.8 oz)                 Today's Medication Changes          These changes are accurate as of 18 11:28 AM.  If you have any questions, ask your nurse or doctor.               These medicines have changed or have updated prescriptions.        Dose/Directions    atorvastatin 40 MG tablet   Commonly known as:  " LIPITOR   This may have changed:  when to take this   Used for:  Hyperlipidemia LDL goal <100        Dose:  40 mg   Take 1 tablet (40 mg) by mouth daily   Quantity:  90 tablet   Refills:  PRN                Primary Care Provider Office Phone # Fax #    Rebeca IDALIA Sandoval [phone] [address] [place]  [facility] [address]        Equal Access to Services     [place] VistaHOLLIS : Hadii aad ku hadasho Soomaali, waaxda luqadaha, qaybta kaalmada adeegyada, waxay idiin hayaan adeeg kharash la'aan . So [facility] [phone].    ATENCIÓN: Si habla español, tiene a adorno disposición servicios gratuitos de asistencia lingüística. [facility] [phone].    We comply with applicable federal civil rights laws and [place] laws. We do not discriminate on the basis of race, color, national origin, age, disability, sex, sexual orientation, or gender identity.            Thank you!     Thank you for choosing [facility]  for your care. Our goal is always to provide you with excellent care. Hearing back from our patients is one way we can continue to improve our services. Please take a few minutes to complete the written survey that you may receive in the mail after your visit with us. Thank you!             Your Updated Medication List - Protect others around you: Learn how to safely use, store and throw away your medicines at www.disposemymeds.org.          This list is accurate as of 4/11/18 11:28 AM.  Always use your most recent med list.                   Brand Name Dispense Instructions for use Diagnosis    acetaminophen 500 MG tablet    TYLENOL     Take 1-2 tablets (500-1,000 mg) by mouth 4 times daily as needed for mild pain or fever    Rectal cancer (H)       amLODIPine 10 MG tablet    NORVASC    90 tablet    Take 0.5 tablets (5 mg) by mouth daily    Essential hypertension with goal blood pressure less than 130/80       aspirin 81 MG EC tablet     30 tablet    Take 1 tablet (81 mg) by mouth  daily    Hypertension goal BP (blood pressure) < 130/80, Hyperlipidemia LDL goal <100       atorvastatin 40 MG tablet    LIPITOR    90 tablet    Take 1 tablet (40 mg) by mouth daily    Hyperlipidemia LDL goal <100       carvedilol 25 MG tablet    COREG    180 tablet    Take 1 tablet (25 mg) by mouth 2 times daily    Coronary artery disease due to lipid rich plaque, Status post coronary angioplasty       lactobacillus rhamnosus (GG) 10 B CELL capsule    CULTURELLE     Take 1 capsule by mouth every morning        MULTIVITAMIN & MINERAL PO      Take 1 tablet by mouth every morning        niacin 500 MG tablet     30 tablet    Take 1 tablet (500 mg) by mouth At Bedtime    Hyperlipidemia LDL goal <100       SAW PALMETTO PO      Take 1 tablet by mouth every morning        ticagrelor 90 MG tablet    BRILINTA    180 tablet    Take 1 tablet (90 mg) by mouth 2 times daily    Coronary artery disease of native artery of native heart with stable angina pectoris (H)       traZODone 50 MG tablet    DESYREL    60 tablet    Take 1 tablet (50 mg) by mouth every evening    Other insomnia

## 2018-04-11 NOTE — NURSING NOTE
"Oncology Rooming Note    April 11, 2018 10:14 AM   Jose Lira is a 73 year old male who presents for:    Chief Complaint   Patient presents with     Oncology Clinic Visit     F/U Rectal Ca     Initial Vitals: /66  Pulse 73  Temp 97.9  F (36.6  C) (Oral)  Resp 18  Ht 1.651 m (5' 5\")  Wt 80.8 kg (178 lb 3.2 oz)  SpO2 96%  BMI 29.65 kg/m2 Estimated body mass index is 29.65 kg/(m^2) as calculated from the following:    Height as of this encounter: 1.651 m (5' 5\").    Weight as of this encounter: 80.8 kg (178 lb 3.2 oz). Body surface area is 1.92 meters squared.  No Pain (0) Comment: Data Unavailable   No LMP for male patient.  Allergies reviewed: Yes  Medications reviewed: Yes    Medications: Medication refills not needed today.  Pharmacy name entered into [employer]:    [id number] - [facility] [address]    Clinical concerns: none Dr Gold was NOT notified.    10 minutes for nursing intake (face to face time)     CHRISTINE FITZGERALD LPN            "

## 2018-04-11 NOTE — LETTER
4/11/2018       RE: Jose Lira  [address]   [place] [address]     Dear Colleague,    Thank you for referring your patient, Jose Lira, to the [facility]. Please see a copy of my visit note below.    Oncology Initial visit:  Date on this visit: Apr 11, 2018    This is a new consult for me but the patient has been seen in the past by my colleague Dr Yadav and he is transferring cares to me after his surgery.     History Of Present Illness:  Michael is a 73-year-old male with a past medical history significant for peripheral vascular disease and CVA and residual left sided weakness secondary to it who started noticing bright red blood per rectum for a couple of years but recently it got worse and he had a colonoscopy done which showed a rectal mass the biopsy of which showed adenocarcinoma MSI was intact. He was lined up to get surgery but he had significant cardiac issues which delayed the surgery quite a bit. He had the surgery on 2/22/18 which was complicated with respiratory failure and delayed recovery with need to discharge to a rehab facility. He is finally at home and is feeling to start getting back to his normal life.   He still has poor energy and appetite and continues to loose weight . He states that walking is a constant struggle more so because of his PAD and also deconditioning from the surgery.   He continues to have occasional nausea and ongoing issues with the caliber of stool. He denies any pain.   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     Subclavian arterial stenosis (H)     Left     Unspecified essential hypertension      Past Surgical History:   Procedure Laterality Date     COLONOSCOPY N/A 5/19/2017    Procedure: COMBINED COLONOSCOPY, SINGLE OR MULTIPLE BIOPSY/POLYPECTOMY BY BIOPSY;  Rectal bleeding;  Surgeon: Maximus Dinero MD;  Location: [facility]     LAPAROSCOPIC COLECTOMY LOW ANTERIOR N/A 2/22/2018    Procedure: LAPAROSCOPIC COLECTOMY LOW ANTERIOR;  Laparoscopic Assisted Low Anterior Resection, Sigmoidoscopy, Anesthesia Block (ERAS Patient);  Surgeon: Sharan Raza MD;  Location: [facility]     SIGMOIDOSCOPY FLEXIBLE N/A 2/22/2018    Procedure: SIGMOIDOSCOPY FLEXIBLE;;  Surgeon: Sharan Raza MD;  Location: [facility]     SURGICAL HISTORY OF -   10/02    angioplasty and stenting of left and internal carotid artery at the bifurcation     SURGICAL HISTORY OF - 11/11    right common iliac artery stent      VASCULAR SURGERY  2001    stent placed in carotid      Cancer History:   As above    Allergies:  Allergies as of 04/11/2018 - Hank as Reviewed 04/11/2018   Allergen Reaction Noted     Fentanyl  02/13/2018     Current Medications:  Current Outpatient Prescriptions   Medication Sig Dispense Refill     traZODone (DESYREL) 50 MG tablet Take 1 tablet (50 mg) by mouth every evening 60 tablet      amLODIPine (NORVASC) 10 MG tablet Take 0.5 tablets (5 mg) by mouth daily 90 tablet PRN     aspirin 81 MG EC tablet Take 1 tablet (81 mg) by mouth daily 30 tablet      carvedilol (COREG) 25 MG tablet Take 1 tablet (25 mg) by mouth 2 times daily 180 tablet 3     ticagrelor (BRILINTA) 90 MG tablet Take 1 tablet (90 mg) by mouth 2 times daily 180 tablet 3     atorvastatin (LIPITOR) 40 MG tablet Take 1 tablet (40 mg) by mouth daily (Patient taking differently: Take 40 mg by mouth every morning ) 90 tablet PRN     Saw Palmetto, Serenoa repens, (SAW PALMETTO PO) Take  1 tablet by mouth every morning        niacin 500 MG tablet Take 1 tablet (500 mg) by mouth At Bedtime 30 tablet PRN     Multiple Vitamins-Minerals (MULTIVITAMIN & MINERAL PO) Take 1 tablet by mouth every morning        lactobacillus rhamnosus, GG, (CULTURELLE) 10 B CELL capsule Take 1 capsule by mouth every morning        acetaminophen (TYLENOL) 500 MG tablet Take 1-2 tablets (500-1,000 mg) by mouth 4 times daily as needed for mild pain or fever (Patient not taking: Reported on 4/11/2018)       [DISCONTINUED] olmesartan-hydrochlorothiazide (BENICAR HCT) 40-25 MG per tablet Take 1 tablet by mouth daily 90 tablet 3      Family History:  Family History   Problem Relation Age of Onset     C.A.D. Father      Hypertension Father      Prostate Cancer Father      C.A.D. Brother      MI     Hypertension Brother      Arthritis Sister      DIABETES No family hx of      CEREBROVASCULAR DISEASE No family hx of      Cancer - colorectal No family hx of     father had prostate cancer next Sister. had breast cancer   Social History:  Social History     Social History     Marital status: Single     Spouse name: N/A     Number of children: 0     Years of education: N/A     Occupational History     post       Social History Main Topics     Smoking status: Former Smoker     Packs/day: 3.00     Years: 30.00     Quit date: 8/5/1994     Smokeless tobacco: Former User     Quit date: 8/1/1994      Comment: quit 15 years ago after his stroke     Alcohol use 0.6 oz/week     1 Glasses of wine per week      Comment: 1 per day     Drug use: No     Sexual activity: No     Other Topics Concern     Parent/Sibling W/ Cabg, Mi Or Angioplasty Before 65f 55m? Yes     brother and father     Social History Narrative    [place].  Was never .  Had no children.  Has a two brothers that have passed away.  He has two sister, one with Alzheimer and one alive and well.  Sister will be here for surgery.      he used to  "smoke but quit in 1994. He used to drink alcohol heavily but now only drinks occasionally. He lives alone  Physical Exam:  /66  Pulse 73  Temp 97.9  F (36.6  C) (Oral)  Resp 18  Ht 1.651 m (5' 5\")  Wt 80.8 kg (178 lb 3.2 oz)  SpO2 96%  BMI 29.65 kg/m2  CONSTITUTIONAL: no acute distress, looks chronically sick   EYES: PERRLA, no palor or icterus.   ENT/MOUTH: no mouth lesions. Oropharynx normal- poor oral higiene   CVS: s1s2 no m r g .   RESPIRATORY: clear to auscultation b/l  GI: soft non tender no hepatosplenomegaly- well healed scar from the surgery   NEURO: AAOX3  Minimal residual left-sided weakness  INTEGUMENT: no obvious rashes  LYMPHATIC: no palpable cervical, supraclavicular, axillary or inguinal LAD  MUSCULOSKELETAL: Unremarkable. No bony tenderness.   EXTREMITIES: Trace edema  PSYCH: Mentation, mood and affect are normal. Decision making capacity is intact    Laboratory/Imaging Studies  Results for orders placed or performed during the hospital encounter of 02/22/18   XR Chest Port 1 View    Narrative    Exam: Chest x-ray, 1 view, 2/24/2018.    COMPARISON: 8/15/2017.    HISTORY: delirium, oxygen desaturation, no cough no chest pain.    FINDINGS: AP portable view of the chest was obtained. Low lung  volumes. Patchy mixed interstitial and airspace opacities throughout  both lungs most significant in the right midlung zone and left lower  lobe. Indistinct pulmonary vasculature. Tortuous thoracic aorta with  calcifications. Cardiomediastinal silhouette at the upper limits of  normal. Small pleural effusions. No pneumothorax. Opacities in  bilateral apices corresponded to atherosclerotic calcifications of  bilateral subclavian arteries previously characterized on CT exam  6/2/2017.      Impression    IMPRESSION:  1. Low lung volumes with mild pulmonary vascular congestion.  2. Patchy mixed opacities throughout both lungs most significant in  the right midlung zone and left lower lobe, pulmonary " edema versus  infection.  3. Small pleural effusions.    MARIAN MARROQUIN MD   XR Chest Port 1 View    Narrative    Exam:  Chest radiograph, 2/25/2018 1:00 AM    History: Increased SOB    Comparison:  2/24/2018    Findings:  Single AP view of the chest. The cardiomediastinal  silhouette is within normal limits. Pulmonary vasculature is  indistinct. No pleural effusion or pneumothorax. Minimally decreased  mixed patchy interstitial and airspace opacities. Improved lung  volumes.      Impression    Impression:  Minimally decreased patchy mixed opacities which are  greatest in the left lower lung, pulmonary edema versus infection.  Pulmonary vasculature congestion.    I have personally reviewed the examination and initial interpretation  and I agree with the findings.    MARIAN MARROQUIN MD   US Lower Extremity Venous Duplex Bilateral    Narrative    EXAMINATION: DOPPLER VENOUS ULTRASOUND OF BILATERAL LOWER EXTREMITIES,  2/25/2018 6:21 AM     COMPARISON: 8/15/2017    HISTORY: Hypoxia, concern for DVT    TECHNIQUE:  Gray-scale evaluation with compression, spectral flow and  color Doppler assessment of the deep venous system of both legs from  groin to knee, and then at the ankles.    FINDINGS:  In both lower extremities, the common femoral, femoral, popliteal and  posterior tibial veins demonstrate normal compressibility and blood  flow.      Impression    IMPRESSION:  No evidence of deep venous thrombosis in either lower extremity.    I have personally reviewed the examination and initial interpretation  and I agree with the findings.    MARIAN MARROQUIN MD   XR Chest Port 1 View    Narrative    XR CHEST PORT 1 VW  2/26/2018 4:52 AM    History: Dyspnea    Comparison: Prior day    Findings:   Single portable AP view the chest obtained. Trachea is midline. The  cardiac mediastinal silhouette is stable and mildly enlarged. There is  been slight interval worsening of the diffuse mixed interstitial and  airspace  opacities. No pleural effusion. No pneumothorax.      Impression    IMPRESSION:  Mild worsening of the patchy mixed pulmonary opacities. Differential  includes pulmonary edema and infection.    I have personally reviewed the examination and initial interpretation  and I agree with the findings.    JOSE BARAJAS MD   XR Chest Port 1 View    Narrative    XR CHEST PORT 1 VW  2/28/2018 6:49 AM    History: Dyspnea    Comparison: 12/26/2018.    Findings:   Single portable AP view of the chest is obtained. The trachea is  midline. The cardiac mediastinal silhouette is stable and nonenlarged.  Mild improvement in diffuse mixed interstitial and opacities. No  pneumothorax. No pleural effusion.      Impression    IMPRESSION:  Mild improvement in the patchy mixed pulmonary opacities. Differential  continues to include pulmonary edema and infection.    I have personally reviewed the examination and initial interpretation  and I agree with the findings.    JOSE BARAJAS MD   XR Chest Port 1 View    Narrative    Exam:  Chest X-ray 3/1/2018 9:27 AM    History: dyspnea and incresase O2 requirement, undergoing active  diuresis.;     Comparison: Chest x-ray 2/20/2018    Findings: Portable upright AP radiograph of the chest. The cardiac  silhouette is enlarged, stable. The pulmonary vasculature is  indistinct. Low lung volumes. Bilateral perihilar and diffuse mixed  interstitial and airspace opacities. No significant pleural effusion.  No pneumothorax. The upper abdomen appears unremarkable.       Impression    Impression:   1. Stable perihilar and mixed interstitial and airspace opacities,  likely represents pulmonary edema.  2. Low lung volumes and bibasilar atelectasis.    I have personally reviewed the examination and initial interpretation  and I agree with the findings.    DWAYNE APPLE MD   Hemoglobin   Result Value Ref Range    Hemoglobin 8.6 (L) 13.3 - 17.7 g/dL   Creatinine   Result Value Ref Range    Creatinine 2.40 (H)  0.66 - 1.25 mg/dL    GFR Estimate 27 (L) >60 mL/min/1.7m2    GFR Estimate If Black 32 (L) >60 mL/min/1.7m2   Potassium   Result Value Ref Range    Potassium 3.4 3.4 - 5.3 mmol/L   Glucose by meter   Result Value Ref Range    Glucose 97 70 - 99 mg/dL   Surgical pathology exam   Result Value Ref Range    Copath Report       Patient Name: ROYAL MONK  MR#: [id number]  Specimen #: [id number]  Collected: 2/22/2018  Received: 2/22/2018  Reported: 3/2/2018 17:38  Ordering Phy(s): KOTA TIDWELL    For improved result formatting, select 'View Enhanced Report Format' under   Linked Documents section.    SPECIMEN(S):  A: Sigmoid and rectum  B: Anastomosis rings    FINAL DIAGNOSIS:  A. COLON, SIGMOID AND RECTUM, LOW ANTERIOR RESECTION:  - Adenocarcinoma of the rectum, moderately differentiated  - Twelve of nineteen lymph nodes with metastatic adenocarcinoma (12/19)  - One tubular adenoma, One hyperplastic polyp and One traditional serrated   adenoma    B. ANASTOMOSIS RINGS, EXCISION:  - Colonic tissue, negative for malignancy    Report Name: Colon and Rectum - Resection       Status: Submitted Checklist Inst: 1      Last Updated By: Anjel Tesfaye M.D., PhD, Physicians,  03/02/2018 17:37:05  Part(s) Involved:  A: Sigmoid and rectum    Synoptic Report:    SPECIMEN    Procedure:        - Low anterior r esection    TUMOR    Tumor Site:        - Rectum    Histologic Type:        - Adenocarcinoma    Histologic Grade:        - G2: Moderately differentiated    Tumor Size: 2.9 Centimeters (cm)    Tumor Deposits:        - Not identified    Tumor Extent      Tumor Extension:          - Tumor invades through the muscularis propria into pericolorectal          tissue      Macroscopic Tumor Perforation:          - Not identified    Accessory Findings      Lymphovascular Invasion:          - Present        Lymphovascular Invasion Type:            - Small vessel lymphovascular invasion            - Large vessel (venous)  "invasion      Perineural Invasion:          - Present      Type of Polyp in Which Invasive Carcinoma Arose:          - None identified      Treatment Effect:          - No known presurgical therapy    MARGINS    Proximal Margin:        - Uninvolved by invasive carcinoma    Distal Margin:        - Uninvolved by invasive carcinoma    Radial or Mesenteric Margin:        - Involved by  invasive carcinoma (tumor present 0-1 mm from margin)    LYMPH NODES    Number of Lymph Nodes Involved: 12    Number of Lymph Nodes Examined: 19    PATHOLOGIC STAGE CLASSIFICATION (PTNM, AJCC 8TH EDITION)    Primary Tumor (pT):        - pT3: Tumor invades through the muscularis propria into   pericolorectal        tissues    Regional Lymph Nodes (pN):        - pN2b: Seven or more regional lymph nodes are positive    2017 June AJCC 8th Edition CAP Annual Release    I have personally reviewed all specimens and/or slides, including the   listed special stains, and used them  with my medical judgement to determine or confirm the final diagnosis.    Electronically signed out by:    Anjel Tesfaye M.D., PhD, [facility]    CLINICAL HISTORY:  Previous diagnosis of rectal adenocarcinoma.  GROSS:  A:  The specimen is received in formalin with proper patient   identification, labeled \"large intestine,  sigmoid; sigmoid and rectum\".  The specimen consists of a 27.3 cm in   length proctocolect magda specimen with a  6.5 cm average open diameter at the sigmoid colon and 8.0 cm average open   diameter at the rectum.  The  proximal and distal margins are stapled closed.  There is a moderate   portion of pericolonic fat that averages  7.2 cm in thickness.  The mesorectum is partially complete.  The posterior   peritoneal reflection is located  12.5 cm superior to the distal margin.  The serosa is pink-tan, smooth and   glistening with an area of  puckering identified.  The specimen is differentially inked as follows:    Peritoneal puckering: " blue  Radial rectal margin: black  Free retroperitoneal margin: orange  Vascular pedicle margin: blue    Opening the specimen reveals an antimesenteric colonic 2.9 x 2.7 x 0.9 cm   fungating mass located 2.2 cm  proximal to the rectal/distal margin, 19.1 cm distal to the colon/proximal   margin, and 8.4 cm distal to the  posterior peritoneal reflection.  Serial sectioning through the lesion   reveals it extends to a greatest depth  of 1.1 cm a nd grossly invades the wall and possibly into the overlying   peritoneum (corresponding to the blue  inked peritoneal puckering).  The mass comes to within 1.0 cm of the   radial margin, 6.5 cm of the mesenteric  root/vascular pedicle margin and 8.5 cm from the free retroperitoneal   margin.  The remaining uninvolved  colonic and rectal mucosa is pink-tan and velvety with normal infoldings.    A 0.6 x 0.5 x 0.4 cm smaller antimesenteric polypoid mass is noted 3.0 cm   proximal to the primary lesion.  The  smaller mass is located 8.5 cm from the distal margin, 16.1 cm from the   proximal margin, 4.0 cm from the  radial margin, 4.2 cm from the vascular pedicle, and 5.0 cm from the free   retroperitoneal margin.  The smaller  mass is grossly confined to the mucosa.    The 10.2 cm in length the distal portion of rectal mucosa is remarkable   for diffuse areas of mucosal heaping  that range from 0.3-0.4 cm in greatest dimension.  The uninvolved rectal   and colonic mucosa is remarkable for   scant diverticulum and are otherwise pink-tan and velvety with a normal   colonic and rectal infoldings.  Palpation and serial sectioning through the surrounding adipose tissue   reveals 19 lymph node candidates  ranging from 0.3-2.2 cm in greatest dimension.  Of the 19 lymph nodes 6   are grossly positive; the grossly  positive lymph nodes range from 0.4-2.2 cm in greatest dimension and have   a white tan and chalky cut surface.  The largest grossly positive lymph node candidate is fragmented  "and   disrupted.  Representative sections,  including all lymph node candidates, are submitted in cassettes A1-23.    Summary of Sections:  A1-A2 - distal margin, en face  A3 - proximal margin, en face  A4 - nearest radial margin to larger mass, en face  A5-A8 - 50% of primary mass to peritoneal puckering  A9 - intervening uninvolved mucosa between larger mass and smaller mass  A10 - smaller mass, entirely submitted  A11 - distal rectal mucosa with mucosal heapings  A12 - vascular pedicle, en f ace  A13 - nearest free retroperitoneal margin, en face  A14 - two lymph node candidates, bisected and differentially inked  A15 - four lymph node candidates  A16 - three lymph node candidates  A17 - four lymph node candidates  A18-A21 - one bisected grossly positive lymph node candidate each  A22 - representative cross section of largest, disrupted grossly positive   lymph node candidate  A23 - representative cross-section of grossly positive lymph node   candidate    B:  The specimen is received in formalin with proper patient   identification, labeled \"anastomosis rings\".  The  specimen consists of two GI mucosal rings that are 1.5 cm in diameter x   1.0 cm in length and 1.6 cm in  diameter x 0.9 cm in length, respectively.  The rings are on a metal   medical device.  The mucosa is pink-tan  and velvety with no definitive lesion or mass identified.  Both rings are   bisected and entirely submitted in  cassette B1-2.    Summary of Sections:  B1 - longer mucosal ring, bisected  B2 - s horter mucosal ring, bisected (Dictated by: Berna Leigh [facility]   2/23/2018 09:47 AM)    MICROSCOPIC:  Microscopic examination was performed.    CPT Codes:  A: 00687-BX7  B: 49170-DX9    TESTING LAB LOCATION:  [address]   [address]  [phone]    COLLECTION SITE:  Client: [facility]  Location: [facility] (B)     "   Basic metabolic panel   Result Value Ref Range    Sodium 142 133 - 144 mmol/L    Potassium 3.8 3.4 - 5.3 mmol/L    Chloride 110 (H) 94 - 109 mmol/L    Carbon Dioxide 23 20 - 32 mmol/L    Anion Gap 9 3 - 14 mmol/L    Glucose 113 (H) 70 - 99 mg/dL    Urea Nitrogen 21 7 - 30 mg/dL    Creatinine 2.36 (H) 0.66 - 1.25 mg/dL    GFR Estimate 27 (L) >60 mL/min/1.7m2    GFR Estimate If Black 33 (L) >60 mL/min/1.7m2    Calcium 8.2 (L) 8.5 - 10.1 mg/dL   CBC with platelets   Result Value Ref Range    WBC 10.5 4.0 - 11.0 10e9/L    RBC Count 3.20 (L) 4.4 - 5.9 10e12/L    Hemoglobin 7.8 (L) 13.3 - 17.7 g/dL    Hematocrit 26.2 (L) 40.0 - 53.0 %    MCV 82 78 - 100 fl    MCH 24.4 (L) 26.5 - 33.0 pg    MCHC 29.8 (L) 31.5 - 36.5 g/dL    RDW 14.0 10.0 - 15.0 %    Platelet Count 175 150 - 450 10e9/L   Magnesium   Result Value Ref Range    Magnesium 2.0 1.6 - 2.3 mg/dL   Phosphorus   Result Value Ref Range    Phosphorus 3.9 2.5 - 4.5 mg/dL   Troponin I   Result Value Ref Range    Troponin I ES <0.015 0.000 - 0.045 ug/L   CBC with platelets   Result Value Ref Range    WBC 12.7 (H) 4.0 - 11.0 10e9/L    RBC Count 3.30 (L) 4.4 - 5.9 10e12/L    Hemoglobin 7.9 (L) 13.3 - 17.7 g/dL    Hematocrit 27.4 (L) 40.0 - 53.0 %    MCV 83 78 - 100 fl    MCH 23.9 (L) 26.5 - 33.0 pg    MCHC 28.8 (L) 31.5 - 36.5 g/dL    RDW 14.5 10.0 - 15.0 %    Platelet Count 200 150 - 450 10e9/L   Basic metabolic panel   Result Value Ref Range    Sodium 141 133 - 144 mmol/L    Potassium 3.5 3.4 - 5.3 mmol/L    Chloride 109 94 - 109 mmol/L    Carbon Dioxide 23 20 - 32 mmol/L    Anion Gap 8 3 - 14 mmol/L    Glucose 104 (H) 70 - 99 mg/dL    Urea Nitrogen 17 7 - 30 mg/dL    Creatinine 2.18 (H) 0.66 - 1.25 mg/dL    GFR Estimate 30 (L) >60 mL/min/1.7m2    GFR Estimate If Black 36 (L) >60 mL/min/1.7m2    Calcium 8.6 8.5 - 10.1 mg/dL   Troponin I   Result Value Ref Range    Troponin I ES 0.109 (H) 0.000 - 0.045 ug/L   Magnesium   Result Value Ref Range    Magnesium 2.0 1.6 - 2.3  mg/dL   Troponin I   Result Value Ref Range    Troponin I ES 0.096 (H) 0.000 - 0.045 ug/L   Hemoglobin   Result Value Ref Range    Hemoglobin 8.1 (L) 13.3 - 17.7 g/dL   Troponin I   Result Value Ref Range    Troponin I ES 0.097 (H) 0.000 - 0.045 ug/L   Basic metabolic panel   Result Value Ref Range    Sodium 144 133 - 144 mmol/L    Potassium 3.4 3.4 - 5.3 mmol/L    Chloride 112 (H) 94 - 109 mmol/L    Carbon Dioxide 24 20 - 32 mmol/L    Anion Gap 8 3 - 14 mmol/L    Glucose 131 (H) 70 - 99 mg/dL    Urea Nitrogen 21 7 - 30 mg/dL    Creatinine 2.06 (H) 0.66 - 1.25 mg/dL    GFR Estimate 32 (L) >60 mL/min/1.7m2    GFR Estimate If Black 38 (L) >60 mL/min/1.7m2    Calcium 8.0 (L) 8.5 - 10.1 mg/dL   Magnesium   Result Value Ref Range    Magnesium 2.0 1.6 - 2.3 mg/dL   Blood gas arterial   Result Value Ref Range    pH Arterial 7.46 (H) 7.35 - 7.45 pH    pCO2 Arterial 36 35 - 45 mm Hg    pO2 Arterial 68 (L) 80 - 105 mm Hg    Bicarbonate Arterial 25 21 - 28 mmol/L    Base Excess Art 1.4 mmol/L    FIO2 7L    Lactate for Sepsis Protocol   Result Value Ref Range    Lactate for Sepsis Protocol 0.9 0.4 - 1.9 mmol/L   Troponin I   Result Value Ref Range    Troponin I ES 0.101 (H) 0.000 - 0.045 ug/L   Blood gas arterial with oxyhemoglobin   Result Value Ref Range    pH Arterial 7.50 (H) 7.35 - 7.45 pH    pCO2 Arterial 32 (L) 35 - 45 mm Hg    pO2 Arterial 87 80 - 105 mm Hg    Bicarbonate Arterial 24 21 - 28 mmol/L    FIO2 21.0     Oxyhemoglobin Arterial 96 92 - 100 %    Base Excess Art 1.3 mmol/L   CBC with platelets   Result Value Ref Range    WBC 13.7 (H) 4.0 - 11.0 10e9/L    RBC Count 3.24 (L) 4.4 - 5.9 10e12/L    Hemoglobin 7.8 (L) 13.3 - 17.7 g/dL    Hematocrit 26.9 (L) 40.0 - 53.0 %    MCV 83 78 - 100 fl    MCH 24.1 (L) 26.5 - 33.0 pg    MCHC 29.0 (L) 31.5 - 36.5 g/dL    RDW 14.6 10.0 - 15.0 %    Platelet Count 219 150 - 450 10e9/L   Potassium   Result Value Ref Range    Potassium 3.3 (L) 3.4 - 5.3 mmol/L   Basic metabolic panel    Result Value Ref Range    Sodium 143 133 - 144 mmol/L    Potassium 3.5 3.4 - 5.3 mmol/L    Chloride 109 94 - 109 mmol/L    Carbon Dioxide 25 20 - 32 mmol/L    Anion Gap 9 3 - 14 mmol/L    Glucose 96 70 - 99 mg/dL    Urea Nitrogen 22 7 - 30 mg/dL    Creatinine 2.28 (H) 0.66 - 1.25 mg/dL    GFR Estimate 28 (L) >60 mL/min/1.7m2    GFR Estimate If Black 34 (L) >60 mL/min/1.7m2    Calcium 8.0 (L) 8.5 - 10.1 mg/dL   Lactic acid whole blood   Result Value Ref Range    Lactic Acid 1.1 0.7 - 2.0 mmol/L   Basic metabolic panel   Result Value Ref Range    Sodium 144 133 - 144 mmol/L    Potassium 3.8 3.4 - 5.3 mmol/L    Chloride 112 (H) 94 - 109 mmol/L    Carbon Dioxide 22 20 - 32 mmol/L    Anion Gap 10 3 - 14 mmol/L    Glucose 94 70 - 99 mg/dL    Urea Nitrogen 28 7 - 30 mg/dL    Creatinine 2.63 (H) 0.66 - 1.25 mg/dL    GFR Estimate 24 (L) >60 mL/min/1.7m2    GFR Estimate If Black 29 (L) >60 mL/min/1.7m2    Calcium 8.0 (L) 8.5 - 10.1 mg/dL   CBC with platelets   Result Value Ref Range    WBC 10.2 4.0 - 11.0 10e9/L    RBC Count 2.85 (L) 4.4 - 5.9 10e12/L    Hemoglobin 7.0 (L) 13.3 - 17.7 g/dL    Hematocrit 24.0 (L) 40.0 - 53.0 %    MCV 84 78 - 100 fl    MCH 24.6 (L) 26.5 - 33.0 pg    MCHC 29.2 (L) 31.5 - 36.5 g/dL    RDW 14.9 10.0 - 15.0 %    Platelet Count 198 150 - 450 10e9/L   Magnesium   Result Value Ref Range    Magnesium 2.1 1.6 - 2.3 mg/dL   Phosphorus   Result Value Ref Range    Phosphorus 3.6 2.5 - 4.5 mg/dL   Troponin I   Result Value Ref Range    Troponin I ES 0.052 (H) 0.000 - 0.045 ug/L   Basic metabolic panel   Result Value Ref Range    Sodium 144 133 - 144 mmol/L    Potassium 4.0 3.4 - 5.3 mmol/L    Chloride 111 (H) 94 - 109 mmol/L    Carbon Dioxide 25 20 - 32 mmol/L    Anion Gap 9 3 - 14 mmol/L    Glucose 113 (H) 70 - 99 mg/dL    Urea Nitrogen 32 (H) 7 - 30 mg/dL    Creatinine 2.74 (H) 0.66 - 1.25 mg/dL    GFR Estimate 23 (L) >60 mL/min/1.7m2    GFR Estimate If Black 28 (L) >60 mL/min/1.7m2    Calcium 7.8 (L)  8.5 - 10.1 mg/dL   UA with Microscopic reflex to Culture   Result Value Ref Range    Color Urine Yellow     Appearance Urine Clear     Glucose Urine Negative NEG^Negative mg/dL    Bilirubin Urine Negative NEG^Negative    Ketones Urine Negative NEG^Negative mg/dL    Specific Gravity Urine 1.011 1.003 - 1.035    Blood Urine Small (A) NEG^Negative    pH Urine 6.0 5.0 - 7.0 pH    Protein Albumin Urine 100 (A) NEG^Negative mg/dL    Urobilinogen mg/dL Normal 0.0 - 2.0 mg/dL    Nitrite Urine Negative NEG^Negative    Leukocyte Esterase Urine Negative NEG^Negative    Source Catheterized Urine     WBC Urine 1 0 - 2 /HPF    RBC Urine 9 (H) 0 - 2 /HPF    Mucous Urine Present (A) NEG^Negative /LPF    Hyaline Casts 4 (H) 0 - 2 /LPF   Procalcitonin   Result Value Ref Range    Procalcitonin 1.03 ng/ml   Procalcitonin   Result Value Ref Range    Procalcitonin Canceled, Test credited ng/ml   Basic metabolic panel   Result Value Ref Range    Sodium 146 (H) 133 - 144 mmol/L    Potassium 3.8 3.4 - 5.3 mmol/L    Chloride 112 (H) 94 - 109 mmol/L    Carbon Dioxide 19 (L) 20 - 32 mmol/L    Anion Gap 14 3 - 14 mmol/L    Glucose 112 (H) 70 - 99 mg/dL    Urea Nitrogen 33 (H) 7 - 30 mg/dL    Creatinine 2.77 (H) 0.66 - 1.25 mg/dL    GFR Estimate 23 (L) >60 mL/min/1.7m2    GFR Estimate If Black 27 (L) >60 mL/min/1.7m2    Calcium 7.8 (L) 8.5 - 10.1 mg/dL     *Note: Due to a large number of results and/or encounters for the requested time period, some results have not been displayed. A complete set of results can be found in Results Review.       ASSESSMENT/PLAN:  1. Colorectal Cancer [id number]: pt with MSI -H adenocarcinoma - I am concerned that he may have metastatic disease and want him to get restaged with a PET CT Scan - based on the staging scan we will discuss treatment landscape -   We discussed if stage III -need for adjuvant chemo with 5FU vs if stage IV further discussion regarding treatment choices as at this point the patient states  that he may be strongly considering no active therapy and goals towards QOL and may enroll in hospice.       2. CKD - this will impact the treatment quite significantly - discussed this with the patient     3. Nutrition - discussed that we can have him meet a nutritionist but for now he can start with 1-2 bottles of ensure/boost as nutritional supplement.     I spent 55 minutes in the care of this patient >50% of which was spent in coordinating and counseling.    Radha Gold   of Medicine   Hematology and medical Oncology   [facility]

## 2018-04-13 NOTE — PROGRESS NOTES
Oncology Initial visit:  Date on this visit: Apr 11, 2018    This is a new consult for me but the patient has been seen in the past by my colleague Dr Yadav and he is transferring cares to me after his surgery.     History Of Present Illness:  Michael is a 73-year-old male with a past medical history significant for peripheral vascular disease and CVA and residual left sided weakness secondary to it who started noticing bright red blood per rectum for a couple of years but recently it got worse and he had a colonoscopy done which showed a rectal mass the biopsy of which showed adenocarcinoma MSI was intact. He was lined up to get surgery but he had significant cardiac issues which delayed the surgery quite a bit. He had the surgery on 2/22/18 which was complicated with respiratory failure and delayed recovery with need to discharge to a rehab facility. He is finally at home and is feeling to start getting back to his normal life.   He still has poor energy and appetite and continues to loose weight . He states that walking is a constant struggle more so because of his PAD and also deconditioning from the surgery.   He continues to have occasional nausea and ongoing issues with the caliber of stool. He denies any pain.   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     Subclavian arterial stenosis (H)     Left     Unspecified essential hypertension      Past Surgical History:   Procedure Laterality Date     COLONOSCOPY N/A 5/19/2017     Procedure: COMBINED COLONOSCOPY, SINGLE OR MULTIPLE BIOPSY/POLYPECTOMY BY BIOPSY;  Rectal bleeding;  Surgeon: Maximus Dinero MD;  Location: [facility]     LAPAROSCOPIC COLECTOMY LOW ANTERIOR N/A 2/22/2018    Procedure: LAPAROSCOPIC COLECTOMY LOW ANTERIOR;  Laparoscopic Assisted Low Anterior Resection, Sigmoidoscopy, Anesthesia Block (ERAS Patient);  Surgeon: Sharan Raza MD;  Location: [facility]     SIGMOIDOSCOPY FLEXIBLE N/A 2/22/2018    Procedure: SIGMOIDOSCOPY FLEXIBLE;;  Surgeon: Sharan Raza MD;  Location: [facility]     SURGICAL HISTORY OF -   10/02    angioplasty and stenting of left and internal carotid artery at the bifurcation     SURGICAL HISTORY OF - 11/11    right common iliac artery stent      VASCULAR SURGERY  2001    stent placed in carotid      Cancer History:   As above    Allergies:  Allergies as of 04/11/2018 - Hank as Reviewed 04/11/2018   Allergen Reaction Noted     Fentanyl  02/13/2018     Current Medications:  Current Outpatient Prescriptions   Medication Sig Dispense Refill     traZODone (DESYREL) 50 MG tablet Take 1 tablet (50 mg) by mouth every evening 60 tablet      amLODIPine (NORVASC) 10 MG tablet Take 0.5 tablets (5 mg) by mouth daily 90 tablet PRN     aspirin 81 MG EC tablet Take 1 tablet (81 mg) by mouth daily 30 tablet      carvedilol (COREG) 25 MG tablet Take 1 tablet (25 mg) by mouth 2 times daily 180 tablet 3     ticagrelor (BRILINTA) 90 MG tablet Take 1 tablet (90 mg) by mouth 2 times daily 180 tablet 3     atorvastatin (LIPITOR) 40 MG tablet Take 1 tablet (40 mg) by mouth daily (Patient taking differently: Take 40 mg by mouth every morning ) 90 tablet PRN     Saw Palmetto, Serenoa repens, (SAW PALMETTO PO) Take 1 tablet by mouth every morning        niacin 500 MG tablet Take 1 tablet (500 mg) by mouth At Bedtime 30 tablet PRN     Multiple Vitamins-Minerals (MULTIVITAMIN & MINERAL PO) Take 1 tablet by mouth every morning        lactobacillus rhamnosus, GG, (CULTURELLE) 10 B CELL  "capsule Take 1 capsule by mouth every morning        acetaminophen (TYLENOL) 500 MG tablet Take 1-2 tablets (500-1,000 mg) by mouth 4 times daily as needed for mild pain or fever (Patient not taking: Reported on 4/11/2018)       [DISCONTINUED] olmesartan-hydrochlorothiazide (BENICAR HCT) 40-25 MG per tablet Take 1 tablet by mouth daily 90 tablet 3      Family History:  Family History   Problem Relation Age of Onset     C.A.D. Father      Hypertension Father      Prostate Cancer Father      C.A.D. Brother      MI     Hypertension Brother      Arthritis Sister      DIABETES No family hx of      CEREBROVASCULAR DISEASE No family hx of      Cancer - colorectal No family hx of     father had prostate cancer next Sister. had breast cancer   Social History:  Social History     Social History     Marital status: Single     Spouse name: N/A     Number of children: 0     Years of education: N/A     Occupational History     post       Social History Main Topics     Smoking status: Former Smoker     Packs/day: 3.00     Years: 30.00     Quit date: 8/5/1994     Smokeless tobacco: Former User     Quit date: 8/1/1994      Comment: quit 15 years ago after his stroke     Alcohol use 0.6 oz/week     1 Glasses of wine per week      Comment: 1 per day     Drug use: No     Sexual activity: No     Other Topics Concern     Parent/Sibling W/ Cabg, Mi Or Angioplasty Before 65f 55m? Yes     brother and father     Social History Narrative    [place]  Was never .  Had no children.  Has a two brothers that have passed away.  He has two sister, one with Alzheimer and one alive and well.  Sister will be here for surgery.      he used to smoke but quit in 1994. He used to drink alcohol heavily but now only drinks occasionally. He lives alone  Physical Exam:  /66  Pulse 73  Temp 97.9  F (36.6  C) (Oral)  Resp 18  Ht 1.651 m (5' 5\")  Wt 80.8 kg (178 lb 3.2 oz)  SpO2 96%  BMI 29.65 " kg/m2  CONSTITUTIONAL: no acute distress, looks chronically sick   EYES: PERRLA, no palor or icterus.   ENT/MOUTH: no mouth lesions. Oropharynx normal- poor oral higiene   CVS: s1s2 no m r g .   RESPIRATORY: clear to auscultation b/l  GI: soft non tender no hepatosplenomegaly- well healed scar from the surgery   NEURO: AAOX3  Minimal residual left-sided weakness  INTEGUMENT: no obvious rashes  LYMPHATIC: no palpable cervical, supraclavicular, axillary or inguinal LAD  MUSCULOSKELETAL: Unremarkable. No bony tenderness.   EXTREMITIES: Trace edema  PSYCH: Mentation, mood and affect are normal. Decision making capacity is intact    Laboratory/Imaging Studies  Results for orders placed or performed during the hospital encounter of 02/22/18   XR Chest Port 1 View    Narrative    Exam: Chest x-ray, 1 view, 2/24/2018.    COMPARISON: 8/15/2017.    HISTORY: delirium, oxygen desaturation, no cough no chest pain.    FINDINGS: AP portable view of the chest was obtained. Low lung  volumes. Patchy mixed interstitial and airspace opacities throughout  both lungs most significant in the right midlung zone and left lower  lobe. Indistinct pulmonary vasculature. Tortuous thoracic aorta with  calcifications. Cardiomediastinal silhouette at the upper limits of  normal. Small pleural effusions. No pneumothorax. Opacities in  bilateral apices corresponded to atherosclerotic calcifications of  bilateral subclavian arteries previously characterized on CT exam  6/2/2017.      Impression    IMPRESSION:  1. Low lung volumes with mild pulmonary vascular congestion.  2. Patchy mixed opacities throughout both lungs most significant in  the right midlung zone and left lower lobe, pulmonary edema versus  infection.  3. Small pleural effusions.    MARIAN MARROQUIN MD   XR Chest Port 1 View    Narrative    Exam:  Chest radiograph, 2/25/2018 1:00 AM    History: Increased SOB    Comparison:  2/24/2018    Findings:  Single AP view of the chest. The  cardiomediastinal  silhouette is within normal limits. Pulmonary vasculature is  indistinct. No pleural effusion or pneumothorax. Minimally decreased  mixed patchy interstitial and airspace opacities. Improved lung  volumes.      Impression    Impression:  Minimally decreased patchy mixed opacities which are  greatest in the left lower lung, pulmonary edema versus infection.  Pulmonary vasculature congestion.    I have personally reviewed the examination and initial interpretation  and I agree with the findings.    MARIAN MARROQUIN MD   US Lower Extremity Venous Duplex Bilateral    Narrative    EXAMINATION: DOPPLER VENOUS ULTRASOUND OF BILATERAL LOWER EXTREMITIES,  2/25/2018 6:21 AM     COMPARISON: 8/15/2017    HISTORY: Hypoxia, concern for DVT    TECHNIQUE:  Gray-scale evaluation with compression, spectral flow and  color Doppler assessment of the deep venous system of both legs from  groin to knee, and then at the ankles.    FINDINGS:  In both lower extremities, the common femoral, femoral, popliteal and  posterior tibial veins demonstrate normal compressibility and blood  flow.      Impression    IMPRESSION:  No evidence of deep venous thrombosis in either lower extremity.    I have personally reviewed the examination and initial interpretation  and I agree with the findings.    MARIAN MARROQUIN MD   XR Chest Port 1 View    Narrative    XR CHEST PORT 1 VW  2/26/2018 4:52 AM    History: Dyspnea    Comparison: Prior day    Findings:   Single portable AP view the chest obtained. Trachea is midline. The  cardiac mediastinal silhouette is stable and mildly enlarged. There is  been slight interval worsening of the diffuse mixed interstitial and  airspace opacities. No pleural effusion. No pneumothorax.      Impression    IMPRESSION:  Mild worsening of the patchy mixed pulmonary opacities. Differential  includes pulmonary edema and infection.    I have personally reviewed the examination and initial  interpretation  and I agree with the findings.    JOSE BARAJAS MD   XR Chest Port 1 View    Narrative    XR CHEST PORT 1 VW  2/28/2018 6:49 AM    History: Dyspnea    Comparison: 12/26/2018.    Findings:   Single portable AP view of the chest is obtained. The trachea is  midline. The cardiac mediastinal silhouette is stable and nonenlarged.  Mild improvement in diffuse mixed interstitial and opacities. No  pneumothorax. No pleural effusion.      Impression    IMPRESSION:  Mild improvement in the patchy mixed pulmonary opacities. Differential  continues to include pulmonary edema and infection.    I have personally reviewed the examination and initial interpretation  and I agree with the findings.    JOSE BARAJAS MD   XR Chest Port 1 View    Narrative    Exam:  Chest X-ray 3/1/2018 9:27 AM    History: dyspnea and incresase O2 requirement, undergoing active  diuresis.;     Comparison: Chest x-ray 2/20/2018    Findings: Portable upright AP radiograph of the chest. The cardiac  silhouette is enlarged, stable. The pulmonary vasculature is  indistinct. Low lung volumes. Bilateral perihilar and diffuse mixed  interstitial and airspace opacities. No significant pleural effusion.  No pneumothorax. The upper abdomen appears unremarkable.       Impression    Impression:   1. Stable perihilar and mixed interstitial and airspace opacities,  likely represents pulmonary edema.  2. Low lung volumes and bibasilar atelectasis.    I have personally reviewed the examination and initial interpretation  and I agree with the findings.    DWAYNE APPLE MD   Hemoglobin   Result Value Ref Range    Hemoglobin 8.6 (L) 13.3 - 17.7 g/dL   Creatinine   Result Value Ref Range    Creatinine 2.40 (H) 0.66 - 1.25 mg/dL    GFR Estimate 27 (L) >60 mL/min/1.7m2    GFR Estimate If Black 32 (L) >60 mL/min/1.7m2   Potassium   Result Value Ref Range    Potassium 3.4 3.4 - 5.3 mmol/L   Glucose by meter   Result Value Ref Range    Glucose 97 70 - 99 mg/dL    Surgical pathology exam   Result Value Ref Range    Copath Report       Patient Name: ROYAL MONK  MR#: [id number]  Specimen #: [id number]  Collected: 2/22/2018  Received: 2/22/2018  Reported: 3/2/2018 17:38  Ordering Phy(s): KOTA TIDWELL    For improved result formatting, select 'View Enhanced Report Format' under   Linked Documents section.    SPECIMEN(S):  A: Sigmoid and rectum  B: Anastomosis rings    FINAL DIAGNOSIS:  A. COLON, SIGMOID AND RECTUM, LOW ANTERIOR RESECTION:  - Adenocarcinoma of the rectum, moderately differentiated  - Twelve of nineteen lymph nodes with metastatic adenocarcinoma (12/19)  - One tubular adenoma, One hyperplastic polyp and One traditional serrated   adenoma    B. ANASTOMOSIS RINGS, EXCISION:  - Colonic tissue, negative for malignancy    Report Name: Colon and Rectum - Resection       Status: Submitted Checklist Inst: 1      Last Updated By: Anjel Tesfaye M.D., PhD, [facility],  03/02/2018 17:37:05  Part(s) Involved:  A: Sigmoid and rectum    Synoptic Report:    SPECIMEN    Procedure:        - Low anterior r esection    TUMOR    Tumor Site:        - Rectum    Histologic Type:        - Adenocarcinoma    Histologic Grade:        - G2: Moderately differentiated    Tumor Size: 2.9 Centimeters (cm)    Tumor Deposits:        - Not identified    Tumor Extent      Tumor Extension:          - Tumor invades through the muscularis propria into pericolorectal          tissue      Macroscopic Tumor Perforation:          - Not identified    Accessory Findings      Lymphovascular Invasion:          - Present        Lymphovascular Invasion Type:            - Small vessel lymphovascular invasion            - Large vessel (venous) invasion      Perineural Invasion:          - Present      Type of Polyp in Which Invasive Carcinoma Arose:          - None identified      Treatment Effect:          - No known presurgical therapy    MARGINS    Proximal Margin:        - Uninvolved by  "invasive carcinoma    Distal Margin:        - Uninvolved by invasive carcinoma    Radial or Mesenteric Margin:        - Involved by  invasive carcinoma (tumor present 0-1 mm from margin)    LYMPH NODES    Number of Lymph Nodes Involved: 12    Number of Lymph Nodes Examined: 19    PATHOLOGIC STAGE CLASSIFICATION (PTNM, AJCC 8TH EDITION)    Primary Tumor (pT):        - pT3: Tumor invades through the muscularis propria into   pericolorectal        tissues    Regional Lymph Nodes (pN):        - pN2b: Seven or more regional lymph nodes are positive    2017 June AJCC 8th Edition CAP Annual Release    I have personally reviewed all specimens and/or slides, including the   listed special stains, and used them  with my medical judgement to determine or confirm the final diagnosis.    Electronically signed out by:    Anjel Tesfaye M.D., PhD, [facility]    CLINICAL HISTORY:  Previous diagnosis of rectal adenocarcinoma.  GROSS:  A:  The specimen is received in formalin with proper patient   identification, labeled \"large intestine,  sigmoid; sigmoid and rectum\".  The specimen consists of a 27.3 cm in   length proctocolect magda specimen with a  6.5 cm average open diameter at the sigmoid colon and 8.0 cm average open   diameter at the rectum.  The  proximal and distal margins are stapled closed.  There is a moderate   portion of pericolonic fat that averages  7.2 cm in thickness.  The mesorectum is partially complete.  The posterior   peritoneal reflection is located  12.5 cm superior to the distal margin.  The serosa is pink-tan, smooth and   glistening with an area of  puckering identified.  The specimen is differentially inked as follows:    Peritoneal puckering: blue  Radial rectal margin: black  Free retroperitoneal margin: orange  Vascular pedicle margin: blue    Opening the specimen reveals an antimesenteric colonic 2.9 x 2.7 x 0.9 cm   fungating mass located 2.2 cm  proximal to the rectal/distal margin, 19.1 cm " distal to the colon/proximal   margin, and 8.4 cm distal to the  posterior peritoneal reflection.  Serial sectioning through the lesion   reveals it extends to a greatest depth  of 1.1 cm a nd grossly invades the wall and possibly into the overlying   peritoneum (corresponding to the blue  inked peritoneal puckering).  The mass comes to within 1.0 cm of the   radial margin, 6.5 cm of the mesenteric  root/vascular pedicle margin and 8.5 cm from the free retroperitoneal   margin.  The remaining uninvolved  colonic and rectal mucosa is pink-tan and velvety with normal infoldings.    A 0.6 x 0.5 x 0.4 cm smaller antimesenteric polypoid mass is noted 3.0 cm   proximal to the primary lesion.  The  smaller mass is located 8.5 cm from the distal margin, 16.1 cm from the   proximal margin, 4.0 cm from the  radial margin, 4.2 cm from the vascular pedicle, and 5.0 cm from the free   retroperitoneal margin.  The smaller  mass is grossly confined to the mucosa.    The 10.2 cm in length the distal portion of rectal mucosa is remarkable   for diffuse areas of mucosal heaping  that range from 0.3-0.4 cm in greatest dimension.  The uninvolved rectal   and colonic mucosa is remarkable for   scant diverticulum and are otherwise pink-tan and velvety with a normal   colonic and rectal infoldings.  Palpation and serial sectioning through the surrounding adipose tissue   reveals 19 lymph node candidates  ranging from 0.3-2.2 cm in greatest dimension.  Of the 19 lymph nodes 6   are grossly positive; the grossly  positive lymph nodes range from 0.4-2.2 cm in greatest dimension and have   a white tan and chalky cut surface.  The largest grossly positive lymph node candidate is fragmented and   disrupted.  Representative sections,  including all lymph node candidates, are submitted in cassettes A1-23.    Summary of Sections:  A1-A2 - distal margin, en face  A3 - proximal margin, en face  A4 - nearest radial margin to larger mass, en  "face  A5-A8 - 50% of primary mass to peritoneal puckering  A9 - intervening uninvolved mucosa between larger mass and smaller mass  A10 - smaller mass, entirely submitted  A11 - distal rectal mucosa with mucosal heapings  A12 - vascular pedicle, en f ace  A13 - nearest free retroperitoneal margin, en face  A14 - two lymph node candidates, bisected and differentially inked  A15 - four lymph node candidates  A16 - three lymph node candidates  A17 - four lymph node candidates  A18-A21 - one bisected grossly positive lymph node candidate each  A22 - representative cross section of largest, disrupted grossly positive   lymph node candidate  A23 - representative cross-section of grossly positive lymph node   candidate    B:  The specimen is received in formalin with proper patient   identification, labeled \"anastomosis rings\".  The  specimen consists of two GI mucosal rings that are 1.5 cm in diameter x   1.0 cm in length and 1.6 cm in  diameter x 0.9 cm in length, respectively.  The rings are on a metal   medical device.  The mucosa is pink-tan  and velvety with no definitive lesion or mass identified.  Both rings are   bisected and entirely submitted in  cassette B1-2.    Summary of Sections:  B1 - longer mucosal ring, bisected  B2 - s horter mucosal ring, bisected (Dictated by: Berna Leigh [facility]   2/23/2018 09:47 AM)    MICROSCOPIC:  Microscopic examination was performed.    CPT Codes:  A: 05379-OD0  B: 73565-SF3    TESTING LAB LOCATION:  [facility], [address]   [id number] [phone]    COLLECTION SITE:  Client: [facility]  Location: [facility] (B)       Basic metabolic panel   Result Value Ref Range    Sodium 142 133 - 144 mmol/L    Potassium 3.8 3.4 - 5.3 mmol/L    Chloride 110 (H) 94 - 109 mmol/L    Carbon Dioxide 23 20 - 32 mmol/L    Anion Gap 9 3 - 14 mmol/L    Glucose 113 (H) 70 - 99 mg/dL    " Urea Nitrogen 21 7 - 30 mg/dL    Creatinine 2.36 (H) 0.66 - 1.25 mg/dL    GFR Estimate 27 (L) >60 mL/min/1.7m2    GFR Estimate If Black 33 (L) >60 mL/min/1.7m2    Calcium 8.2 (L) 8.5 - 10.1 mg/dL   CBC with platelets   Result Value Ref Range    WBC 10.5 4.0 - 11.0 10e9/L    RBC Count 3.20 (L) 4.4 - 5.9 10e12/L    Hemoglobin 7.8 (L) 13.3 - 17.7 g/dL    Hematocrit 26.2 (L) 40.0 - 53.0 %    MCV 82 78 - 100 fl    MCH 24.4 (L) 26.5 - 33.0 pg    MCHC 29.8 (L) 31.5 - 36.5 g/dL    RDW 14.0 10.0 - 15.0 %    Platelet Count 175 150 - 450 10e9/L   Magnesium   Result Value Ref Range    Magnesium 2.0 1.6 - 2.3 mg/dL   Phosphorus   Result Value Ref Range    Phosphorus 3.9 2.5 - 4.5 mg/dL   Troponin I   Result Value Ref Range    Troponin I ES <0.015 0.000 - 0.045 ug/L   CBC with platelets   Result Value Ref Range    WBC 12.7 (H) 4.0 - 11.0 10e9/L    RBC Count 3.30 (L) 4.4 - 5.9 10e12/L    Hemoglobin 7.9 (L) 13.3 - 17.7 g/dL    Hematocrit 27.4 (L) 40.0 - 53.0 %    MCV 83 78 - 100 fl    MCH 23.9 (L) 26.5 - 33.0 pg    MCHC 28.8 (L) 31.5 - 36.5 g/dL    RDW 14.5 10.0 - 15.0 %    Platelet Count 200 150 - 450 10e9/L   Basic metabolic panel   Result Value Ref Range    Sodium 141 133 - 144 mmol/L    Potassium 3.5 3.4 - 5.3 mmol/L    Chloride 109 94 - 109 mmol/L    Carbon Dioxide 23 20 - 32 mmol/L    Anion Gap 8 3 - 14 mmol/L    Glucose 104 (H) 70 - 99 mg/dL    Urea Nitrogen 17 7 - 30 mg/dL    Creatinine 2.18 (H) 0.66 - 1.25 mg/dL    GFR Estimate 30 (L) >60 mL/min/1.7m2    GFR Estimate If Black 36 (L) >60 mL/min/1.7m2    Calcium 8.6 8.5 - 10.1 mg/dL   Troponin I   Result Value Ref Range    Troponin I ES 0.109 (H) 0.000 - 0.045 ug/L   Magnesium   Result Value Ref Range    Magnesium 2.0 1.6 - 2.3 mg/dL   Troponin I   Result Value Ref Range    Troponin I ES 0.096 (H) 0.000 - 0.045 ug/L   Hemoglobin   Result Value Ref Range    Hemoglobin 8.1 (L) 13.3 - 17.7 g/dL   Troponin I   Result Value Ref Range    Troponin I ES 0.097 (H) 0.000 - 0.045 ug/L    Basic metabolic panel   Result Value Ref Range    Sodium 144 133 - 144 mmol/L    Potassium 3.4 3.4 - 5.3 mmol/L    Chloride 112 (H) 94 - 109 mmol/L    Carbon Dioxide 24 20 - 32 mmol/L    Anion Gap 8 3 - 14 mmol/L    Glucose 131 (H) 70 - 99 mg/dL    Urea Nitrogen 21 7 - 30 mg/dL    Creatinine 2.06 (H) 0.66 - 1.25 mg/dL    GFR Estimate 32 (L) >60 mL/min/1.7m2    GFR Estimate If Black 38 (L) >60 mL/min/1.7m2    Calcium 8.0 (L) 8.5 - 10.1 mg/dL   Magnesium   Result Value Ref Range    Magnesium 2.0 1.6 - 2.3 mg/dL   Blood gas arterial   Result Value Ref Range    pH Arterial 7.46 (H) 7.35 - 7.45 pH    pCO2 Arterial 36 35 - 45 mm Hg    pO2 Arterial 68 (L) 80 - 105 mm Hg    Bicarbonate Arterial 25 21 - 28 mmol/L    Base Excess Art 1.4 mmol/L    FIO2 7L    Lactate for Sepsis Protocol   Result Value Ref Range    Lactate for Sepsis Protocol 0.9 0.4 - 1.9 mmol/L   Troponin I   Result Value Ref Range    Troponin I ES 0.101 (H) 0.000 - 0.045 ug/L   Blood gas arterial with oxyhemoglobin   Result Value Ref Range    pH Arterial 7.50 (H) 7.35 - 7.45 pH    pCO2 Arterial 32 (L) 35 - 45 mm Hg    pO2 Arterial 87 80 - 105 mm Hg    Bicarbonate Arterial 24 21 - 28 mmol/L    FIO2 21.0     Oxyhemoglobin Arterial 96 92 - 100 %    Base Excess Art 1.3 mmol/L   CBC with platelets   Result Value Ref Range    WBC 13.7 (H) 4.0 - 11.0 10e9/L    RBC Count 3.24 (L) 4.4 - 5.9 10e12/L    Hemoglobin 7.8 (L) 13.3 - 17.7 g/dL    Hematocrit 26.9 (L) 40.0 - 53.0 %    MCV 83 78 - 100 fl    MCH 24.1 (L) 26.5 - 33.0 pg    MCHC 29.0 (L) 31.5 - 36.5 g/dL    RDW 14.6 10.0 - 15.0 %    Platelet Count 219 150 - 450 10e9/L   Potassium   Result Value Ref Range    Potassium 3.3 (L) 3.4 - 5.3 mmol/L   Basic metabolic panel   Result Value Ref Range    Sodium 143 133 - 144 mmol/L    Potassium 3.5 3.4 - 5.3 mmol/L    Chloride 109 94 - 109 mmol/L    Carbon Dioxide 25 20 - 32 mmol/L    Anion Gap 9 3 - 14 mmol/L    Glucose 96 70 - 99 mg/dL    Urea Nitrogen 22 7 - 30 mg/dL     Creatinine 2.28 (H) 0.66 - 1.25 mg/dL    GFR Estimate 28 (L) >60 mL/min/1.7m2    GFR Estimate If Black 34 (L) >60 mL/min/1.7m2    Calcium 8.0 (L) 8.5 - 10.1 mg/dL   Lactic acid whole blood   Result Value Ref Range    Lactic Acid 1.1 0.7 - 2.0 mmol/L   Basic metabolic panel   Result Value Ref Range    Sodium 144 133 - 144 mmol/L    Potassium 3.8 3.4 - 5.3 mmol/L    Chloride 112 (H) 94 - 109 mmol/L    Carbon Dioxide 22 20 - 32 mmol/L    Anion Gap 10 3 - 14 mmol/L    Glucose 94 70 - 99 mg/dL    Urea Nitrogen 28 7 - 30 mg/dL    Creatinine 2.63 (H) 0.66 - 1.25 mg/dL    GFR Estimate 24 (L) >60 mL/min/1.7m2    GFR Estimate If Black 29 (L) >60 mL/min/1.7m2    Calcium 8.0 (L) 8.5 - 10.1 mg/dL   CBC with platelets   Result Value Ref Range    WBC 10.2 4.0 - 11.0 10e9/L    RBC Count 2.85 (L) 4.4 - 5.9 10e12/L    Hemoglobin 7.0 (L) 13.3 - 17.7 g/dL    Hematocrit 24.0 (L) 40.0 - 53.0 %    MCV 84 78 - 100 fl    MCH 24.6 (L) 26.5 - 33.0 pg    MCHC 29.2 (L) 31.5 - 36.5 g/dL    RDW 14.9 10.0 - 15.0 %    Platelet Count 198 150 - 450 10e9/L   Magnesium   Result Value Ref Range    Magnesium 2.1 1.6 - 2.3 mg/dL   Phosphorus   Result Value Ref Range    Phosphorus 3.6 2.5 - 4.5 mg/dL   Troponin I   Result Value Ref Range    Troponin I ES 0.052 (H) 0.000 - 0.045 ug/L   Basic metabolic panel   Result Value Ref Range    Sodium 144 133 - 144 mmol/L    Potassium 4.0 3.4 - 5.3 mmol/L    Chloride 111 (H) 94 - 109 mmol/L    Carbon Dioxide 25 20 - 32 mmol/L    Anion Gap 9 3 - 14 mmol/L    Glucose 113 (H) 70 - 99 mg/dL    Urea Nitrogen 32 (H) 7 - 30 mg/dL    Creatinine 2.74 (H) 0.66 - 1.25 mg/dL    GFR Estimate 23 (L) >60 mL/min/1.7m2    GFR Estimate If Black 28 (L) >60 mL/min/1.7m2    Calcium 7.8 (L) 8.5 - 10.1 mg/dL   UA with Microscopic reflex to Culture   Result Value Ref Range    Color Urine Yellow     Appearance Urine Clear     Glucose Urine Negative NEG^Negative mg/dL    Bilirubin Urine Negative NEG^Negative    Ketones Urine Negative  NEG^Negative mg/dL    Specific Gravity Urine 1.011 1.003 - 1.035    Blood Urine Small (A) NEG^Negative    pH Urine 6.0 5.0 - 7.0 pH    Protein Albumin Urine 100 (A) NEG^Negative mg/dL    Urobilinogen mg/dL Normal 0.0 - 2.0 mg/dL    Nitrite Urine Negative NEG^Negative    Leukocyte Esterase Urine Negative NEG^Negative    Source Catheterized Urine     WBC Urine 1 0 - 2 /HPF    RBC Urine 9 (H) 0 - 2 /HPF    Mucous Urine Present (A) NEG^Negative /LPF    Hyaline Casts 4 (H) 0 - 2 /LPF   Procalcitonin   Result Value Ref Range    Procalcitonin 1.03 ng/ml   Procalcitonin   Result Value Ref Range    Procalcitonin Canceled, Test credited ng/ml   Basic metabolic panel   Result Value Ref Range    Sodium 146 (H) 133 - 144 mmol/L    Potassium 3.8 3.4 - 5.3 mmol/L    Chloride 112 (H) 94 - 109 mmol/L    Carbon Dioxide 19 (L) 20 - 32 mmol/L    Anion Gap 14 3 - 14 mmol/L    Glucose 112 (H) 70 - 99 mg/dL    Urea Nitrogen 33 (H) 7 - 30 mg/dL    Creatinine 2.77 (H) 0.66 - 1.25 mg/dL    GFR Estimate 23 (L) >60 mL/min/1.7m2    GFR Estimate If Black 27 (L) >60 mL/min/1.7m2    Calcium 7.8 (L) 8.5 - 10.1 mg/dL     *Note: Due to a large number of results and/or encounters for the requested time period, some results have not been displayed. A complete set of results can be found in Results Review.       ASSESSMENT/PLAN:  1. Colorectal Cancer [id number]: pt with MSI -H adenocarcinoma - I am concerned that he may have metastatic disease and want him to get restaged with a PET CT Scan - based on the staging scan we will discuss treatment landscape -   We discussed if stage III -need for adjuvant chemo with 5FU vs if stage IV further discussion regarding treatment choices as at this point the patient states that he may be strongly considering no active therapy and goals towards QOL and may enroll in hospice.       2. CKD - this will impact the treatment quite significantly - discussed this with the patient     3. Nutrition - discussed that we can  have him meet a nutritionist but for now he can start with 1-2 bottles of ensure/boost as nutritional supplement.     I spent 55 minutes in the care of this patient >50% of which was spent in coordinating and counseling.    Radha Gold   of Medicine   Hematology and medical Oncology   [facility]

## 2018-05-02 NOTE — NURSING NOTE
"Oncology Rooming Note    May 2, 2018 9:39 AM   Jose Lira is a 73 year old male who presents for:    Chief Complaint   Patient presents with     Blood Draw     labs drawn via venipuncture by RN     Oncology Clinic Visit     Rectal Cancer     Initial Vitals: /56 (BP Location: Right arm, Patient Position: Chair, Cuff Size: Adult Large)  Pulse 68  Temp 97.2  F (36.2  C) (Oral)  Ht 1.651 m (5' 5\")  Wt 79.2 kg (174 lb 8 oz)  SpO2 95%  BMI 29.04 kg/m2 Estimated body mass index is 29.04 kg/(m^2) as calculated from the following:    Height as of this encounter: 1.651 m (5' 5\").    Weight as of this encounter: 79.2 kg (174 lb 8 oz). Body surface area is 1.91 meters squared.  No Pain (0) Comment: Data Unavailable   No LMP for male patient.  Allergies reviewed: Yes  Medications reviewed: Yes    Medications: Pt. States that he needs a refill of Brilinta and Carvedilol .    Pharmacy name entered into TRUSTe:    [id number] - [facility] [id number]    Clinical concerns: No New Concerns    5 minutes for nursing intake (face to face time)     ROLAND Jorgensen      "

## 2018-05-02 NOTE — NURSING NOTE
Chief Complaint   Patient presents with     Blood Draw     labs drawn via venipuncture by RN     /56 (BP Location: Right arm, Patient Position: Chair, Cuff Size: Adult Large)  Pulse 68  Temp 97.2  F (36.2  C) (Oral)  Wt 79.2 kg (174 lb 8 oz)  SpO2 95%  BMI 29.04 kg/m2    Vitals taken.  Labs collected and sent from right antecubital venipuncture. Pt tolerated well. Pt checked in for next appointment.    Nichol Leavitt RN

## 2018-05-02 NOTE — LETTER
5/2/2018       RE: Jose Lira  [address]   [place] [address]     Dear Colleague,    Thank you for referring your patient, Jose Lira, to the [facility]. Please see a copy of my visit note below.    SUBJECTIVE:  This is a followup visit for a diagnosis of colon cancer, status post resection.  The patient is here with a restaging scan to discuss adjuvant versus palliative chemotherapy.        BACKGROUND HISTORY:  Kindly refer to my prior note for details of the patient's presentation.  Briefly, the patient presented with early-stage colon cancer on imaging.  Unfortunately, he had several cardiac issues and it took several months before he actually could undergo his colon surgery.  Over the course of time, he did not have any imaging.  When his surgical specimen was looked at by pathology, he was found to have pT3 N2b disease, and I was quite concerned that he may have metastatic disease.  Hence, I had set him up for a PET/CT scan to determine the extent of disease and return for treatment discussion.  He comes in today and states that his functional capacity has improved over time since we last met.  His movement is getting better.  He has started to drive again.  He went to the grocery store.  He is continuing to do ADLs with minimal help but continues to struggle as well.  He has not had any falls, denies any fevers, chills, coughing, breathing trouble, abdominal pain, nausea or vomiting.  Bowel movements are getting better and bulkier.      LABORATORY EVALUATION:  Remarkable for chronic kidney disease.  He is anemic.  Platelets are stable.  White cell count is stable.  CEA was within normal range.  He had a PET/CT scan that was remarkable for metastatic disease evident in liver and possibly lung and a subcarinal lymph node which could be a second malignancy, at this point unclear to me.      ASSESSMENT AND PLAN:  Patient with metastatic colon cancer.   At this point, I had a detailed discussion with Buddy that with the scan findings, we have documented that he has metastatic disease, meaning by we cannot cure him of this colon cancer, but we can slow the progression of the disease down by use of chemotherapy.  He has multiple liver lesions and lung lesion and a subcarinal lymph node.  At this point in treatment, given his renal function, treatment would involve use of FOLFOX plus Avastin which would be every 2 weeks.  He will need a port, which he does not have right now. We discussed the details of treatment including chemotherapy side effects which are inclusive but not limited to nausea/vomiting , hair loss, drop in blood counts causing anemia, infections , bleeding leading to hospitalization , fatigue, diarrhea, mouth sores, skin changes, constipation/diarrhea, muscle and bone pain , neuropathy, impact on the kidney and liver function , cystitis , risk for toxicity to heart and lung , infusion reactions,  decreased libido , teratogenicity in case of conception , permanent infertility and potential immunological side effects were explained to them . They asked several intelligent questions all of which were answered to their satisfaction and after processing  all the information they decided to proceed with the proposed treatment albiet they do understand that at any point in time they can decide to stop the current  treatment and consider other options .     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  He asked me about prognosis, treatment versus no treatment, which I detailed out to him.  I also emphasized that given his significant cardiac issues and limited reserves, he may have cardiac complications from 5-FU.  We will be monitoring things very closely.  In terms of timeline we were looking at 6 months to a year without treatment and a year to 2-1/2 with treatment depending on his  "mutation status and development of therapy over time.  Buddy is leaning towards not pursuing active therapy, but he took all of the information that I provided him and he is going to ponder over and make a decision regarding treatment versus hospice.  I have given him our clinic information.  He will call and let us know how he wants to proceed, and depending on the path we will figure out the logistics of getting a port in place in the setting of chemo infusion versus initiating a hospice referral.     I spent 35 minutes in the care of this patient >50% of which was spent in coordinating and counseling.  Radha Gold   of Medicine   Hematology and medical Oncology   [facility]        Addendum ]\": he chose tp pursue hospice - we will get him set up with [facility].     Radha Gold   of Medicine   Hematology and medical Oncology   [facility]        '  "

## 2018-05-02 NOTE — PROGRESS NOTES
SUBJECTIVE:  This is a followup visit for a diagnosis of colon cancer, status post resection.  The patient is here with a restaging scan to discuss adjuvant versus palliative chemotherapy.        BACKGROUND HISTORY:  Kindly refer to my prior note for details of the patient's presentation.  Briefly, the patient presented with early-stage colon cancer on imaging.  Unfortunately, he had several cardiac issues and it took several months before he actually could undergo his colon surgery.  Over the course of time, he did not have any imaging.  When his surgical specimen was looked at by pathology, he was found to have pT3 N2b disease, and I was quite concerned that he may have metastatic disease.  Hence, I had set him up for a PET/CT scan to determine the extent of disease and return for treatment discussion.  He comes in today and states that his functional capacity has improved over time since we last met.  His movement is getting better.  He has started to drive again.  He went to the grocery store.  He is continuing to do ADLs with minimal help but continues to struggle as well.  He has not had any falls, denies any fevers, chills, coughing, breathing trouble, abdominal pain, nausea or vomiting.  Bowel movements are getting better and bulkier.      LABORATORY EVALUATION:  Remarkable for chronic kidney disease.  He is anemic.  Platelets are stable.  White cell count is stable.  CEA was within normal range.  He had a PET/CT scan that was remarkable for metastatic disease evident in liver and possibly lung and a subcarinal lymph node which could be a second malignancy, at this point unclear to me.      ASSESSMENT AND PLAN:  Patient with metastatic colon cancer.  At this point, I had a detailed discussion with Buddy that with the scan findings, we have documented that he has metastatic disease, meaning by we cannot cure him of this colon cancer, but we can slow the progression of the disease down by use of chemotherapy.   He has multiple liver lesions and lung lesion and a subcarinal lymph node.  At this point in treatment, given his renal function, treatment would involve use of FOLFOX plus Avastin which would be every 2 weeks.  He will need a port, which he does not have right now. We discussed the details of treatment including chemotherapy side effects which are inclusive but not limited to nausea/vomiting , hair loss, drop in blood counts causing anemia, infections , bleeding leading to hospitalization , fatigue, diarrhea, mouth sores, skin changes, constipation/diarrhea, muscle and bone pain , neuropathy, impact on the kidney and liver function , cystitis , risk for toxicity to heart and lung , infusion reactions,  decreased libido , teratogenicity in case of conception , permanent infertility and potential immunological side effects were explained to them . They asked several intelligent questions all of which were answered to their satisfaction and after processing  all the information they decided to proceed with the proposed treatment albiet they do understand that at any point in time they can decide to stop the current  treatment and consider other options .     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  He asked me about prognosis, treatment versus no treatment, which I detailed out to him.  I also emphasized that given his significant cardiac issues and limited reserves, he may have cardiac complications from 5-FU.  We will be monitoring things very closely.  In terms of timeline we were looking at 6 months to a year without treatment and a year to 2-1/2 with treatment depending on his mutation status and development of therapy over time.  Buddy is leaning towards not pursuing active therapy, but he took all of the information that I provided him and he is going to ponder over and make a decision regarding treatment versus hospice.  I have given  "him our clinic information.  He will call and let us know how he wants to proceed, and depending on the path we will figure out the logistics of getting a port in place in the setting of chemo infusion versus initiating a hospice referral.     I spent 35 minutes in the care of this patient >50% of which was spent in coordinating and counseling.  Radha You Professor of Medicine   Hematology and medical Oncology   [facility]        Addendum ]\": he chose tp pursue hospice - we will get him set up with [facility].     Radha Gold   of Medicine   Hematology and medical Oncology   [facility]    "

## 2018-05-02 NOTE — MR AVS SNAPSHOT
"              After Visit Summary   5/2/2018    Jose Lira    MRN: [id number]           Patient Information     Date Of Birth          1945        Visit Information        Provider Department      5/2/2018 9:45 AM Radha Gold MD [facility]        Today's Diagnoses     Status post percutaneous transluminal coronary angioplasty           Follow-ups after your visit        Your next 10 appointments already scheduled     Jul 16, 2018 12:00 PM CDT   (Arrive by 11:45 AM)   Return Visit with Jose Willett MD   [facility] ([facility])    [address] [place] [address]  [address] [place] [id number] [phone]              Who to contact     If you have questions or need follow up information about today's clinic visit or your schedule please contact [facility] directly at [phone].  Normal or non-critical lab and imaging results will be communicated to you by MyChart, letter or phone within 4 business days after the clinic has received the results. If you do not hear from us within 7 days, please contact the clinic through MyChart or phone. If you have a critical or abnormal lab result, we will notify you by phone as soon as possible.  Submit refill requests through Via6 or call your pharmacy and they will forward the refill request to us. Please allow 3 business days for your refill to be completed.          Additional Information About Your Visit        MyChart Information     Via6 lets you send messages to your doctor, view your test results, renew your prescriptions, schedule appointments and more. To sign up, go to [email or web address] . Click on \"Log in\" on the left side of the screen, which will take you to the Welcome page. Then click on \"Sign up Now\" on the right side of the page.     You will be asked to enter the access code listed below, as well as some personal information. Please follow the directions to " "create your username and password.     Your access code is: [email or web address]  Expires: 2018  1:15 PM     Your access code will  in 90 days. If you need help or a new code, please call your [facility] clinic or [phone].        Care EveryWhere ID     This is your Care EveryWhere ID. This could be used by other organizations to access your [facility] medical records  [id number]        Your Vitals Were     Pulse Temperature Height Pulse Oximetry BMI (Body Mass Index)       68 97.2  F (36.2  C) (Oral) 1.651 m (5' 5\") 95% 29.04 kg/m2        Blood Pressure from Last 3 Encounters:   18 124/56   18 112/66   18 111/60    Weight from Last 3 Encounters:   18 79.2 kg (174 lb 8 oz)   18 80.8 kg (178 lb 3.2 oz)   18 83.6 kg (184 lb 6.4 oz)              We Performed the Following     Basic metabolic panel     CBC with platelets          Today's Medication Changes          These changes are accurate as of 18 11:59 PM.  If you have any questions, ask your nurse or doctor.               These medicines have changed or have updated prescriptions.        Dose/Directions    atorvastatin 40 MG tablet   Commonly known as:  LIPITOR   This may have changed:  when to take this   Used for:  Hyperlipidemia LDL goal <100        Dose:  40 mg   Take 1 tablet (40 mg) by mouth daily   Quantity:  90 tablet   Refills:  PRN                Primary Care Provider Office Phone # Fax #    Rebeca Sandoval [phone] [address] [facility] [address]        Equal Access to Services     [facility] JEZ : Hadii kylee Olvera, abhinav cuevas, qaybta haresh adames . So [facility] [phone].    ATENCIÓN: Si habla español, tiene a adorno disposición servicios gratuitos de asistencia lingüística. Llame al [phone].    We comply with applicable federal civil rights laws and [place] laws. We do not discriminate on the basis of race, " color, national origin, age, disability, sex, sexual orientation, or gender identity.            Thank you!     Thank you for choosing [facility]  for your care. Our goal is always to provide you with excellent care. Hearing back from our patients is one way we can continue to improve our services. Please take a few minutes to complete the written survey that you may receive in the mail after your visit with us. Thank you!             Your Updated Medication List - Protect others around you: Learn how to safely use, store and throw away your medicines at www.disposemymeds.org.          This list is accurate as of 5/2/18 11:59 PM.  Always use your most recent med list.                   Brand Name Dispense Instructions for use Diagnosis    acetaminophen 500 MG tablet    TYLENOL     Take 1-2 tablets (500-1,000 mg) by mouth 4 times daily as needed for mild pain or fever    Rectal cancer (H)       amLODIPine 10 MG tablet    NORVASC    90 tablet    Take 0.5 tablets (5 mg) by mouth daily    Essential hypertension with goal blood pressure less than 130/80       aspirin 81 MG EC tablet     30 tablet    Take 1 tablet (81 mg) by mouth daily    Hypertension goal BP (blood pressure) < 130/80, Hyperlipidemia LDL goal <100       atorvastatin 40 MG tablet    LIPITOR    90 tablet    Take 1 tablet (40 mg) by mouth daily    Hyperlipidemia LDL goal <100       carvedilol 25 MG tablet    COREG    180 tablet    Take 1 tablet (25 mg) by mouth 2 times daily    Coronary artery disease due to lipid rich plaque, Status post coronary angioplasty       lactobacillus rhamnosus (GG) 10 B CELL capsule    CULTURELLE     Take 1 capsule by mouth every morning        MULTIVITAMIN & MINERAL PO      Take 1 tablet by mouth every morning        niacin 500 MG tablet     30 tablet    Take 1 tablet (500 mg) by mouth At Bedtime    Hyperlipidemia LDL goal <100       SAW PALMETTO PO      Take 1 tablet by mouth every morning        ticagrelor  90 MG tablet    BRILINTA    180 tablet    Take 1 tablet (90 mg) by mouth 2 times daily    Coronary artery disease of native artery of native heart with stable angina pectoris (H)       traZODone 50 MG tablet    DESYREL    60 tablet    Take 1 tablet (50 mg) by mouth every evening    Other insomnia

## 2018-05-07 NOTE — PROGRESS NOTES
Called Buddy in follow up to his visit with Dr. Gold last week.  Buddy denies having any questions and request Hospice consult, as anticipated by the previous two conversation.    Reviewed with Dr. Gold and will TORB Hospice orders.

## 2018-07-10 PROBLEM — N18.4 CKD (CHRONIC KIDNEY DISEASE) STAGE 4, GFR 15-29 ML/MIN (H): Status: ACTIVE | Noted: 2018-01-01

## 2018-07-10 NOTE — PATIENT INSTRUCTIONS
Preventive Health Recommendations:       Male Ages 65 and over    Yearly exam:             See your health care provider every year in order to  o   Review health changes.   o   Discuss preventive care.    o   Review your medicines if your doctor has prescribed any.    Talk with your health care provider about whether you should have a test to screen for prostate cancer (PSA).    Every 3 years, have a diabetes test (fasting glucose). If you are at risk for diabetes, you should have this test more often.    Every 5 years, have a cholesterol test. Have this test more often if you are at risk for high cholesterol or heart disease.     Every 10 years, have a colonoscopy. Or, have a yearly FIT test (stool test). These exams will check for colon cancer.    Talk to with your health care provider about screening for Abdominal Aortic Aneurysm if you have a family history of AAA or have a history of smoking.  Shots:     Get a flu shot each year.     Get a tetanus shot every 10 years.     Talk to your doctor about your pneumonia vaccines. There are now two you should receive - Pneumovax (PPSV 23) and Prevnar (PCV 13).    Talk to your pharmacist about a shingles vaccine.     Talk to your doctor about the hepatitis B vaccine.  Nutrition:     Eat at least 5 servings of fruits and vegetables each day.     Eat whole-grain bread, whole-wheat pasta and brown rice instead of white grains and rice.     Get adequate Calcium and Vitamin D.   Lifestyle    Exercise for at least 150 minutes a week (30 minutes a day, 5 days a week). This will help you control your weight and prevent disease.     Limit alcohol to one drink per day.     No smoking.     Wear sunscreen to prevent skin cancer.     See your dentist every six months for an exam and cleaning.     See your eye doctor every 1 to 2 years to screen for conditions such as glaucoma, macular degeneration and cataracts.

## 2018-07-10 NOTE — MR AVS SNAPSHOT
After Visit Summary   7/10/2018    Jose Lira    MRN: [id number]           Patient Information     Date Of Birth          1945        Visit Information        Provider Department      7/10/2018 1:15 PM Rebeca Sandoval NP [facility]        Today's Diagnoses     Encounter for routine adult health examination without abnormal findings    -  1    Colon cancer metastasized to multiple sites (H)        CKD (chronic kidney disease) stage 4, GFR 15-29 ml/min (H)        Need for prophylactic vaccination against Streptococcus pneumoniae (pneumococcus)        Hyperlipidemia LDL goal <70          Care Instructions      Preventive Health Recommendations:       Male Ages 65 and over    Yearly exam:             See your health care provider every year in order to  o   Review health changes.   o   Discuss preventive care.    o   Review your medicines if your doctor has prescribed any.    Talk with your health care provider about whether you should have a test to screen for prostate cancer (PSA).    Every 3 years, have a diabetes test (fasting glucose). If you are at risk for diabetes, you should have this test more often.    Every 5 years, have a cholesterol test. Have this test more often if you are at risk for high cholesterol or heart disease.     Every 10 years, have a colonoscopy. Or, have a yearly FIT test (stool test). These exams will check for colon cancer.    Talk to with your health care provider about screening for Abdominal Aortic Aneurysm if you have a family history of AAA or have a history of smoking.  Shots:     Get a flu shot each year.     Get a tetanus shot every 10 years.     Talk to your doctor about your pneumonia vaccines. There are now two you should receive - Pneumovax (PPSV 23) and Prevnar (PCV 13).    Talk to your pharmacist about a shingles vaccine.     Talk to your doctor about the hepatitis B vaccine.  Nutrition:     Eat at least 5 servings of  fruits and vegetables each day.     Eat whole-grain bread, whole-wheat pasta and brown rice instead of white grains and rice.     Get adequate Calcium and Vitamin D.   Lifestyle    Exercise for at least 150 minutes a week (30 minutes a day, 5 days a week). This will help you control your weight and prevent disease.     Limit alcohol to one drink per day.     No smoking.     Wear sunscreen to prevent skin cancer.     See your dentist every six months for an exam and cleaning.     See your eye doctor every 1 to 2 years to screen for conditions such as glaucoma, macular degeneration and cataracts.          Follow-ups after your visit        Your next 10 appointments already scheduled     Jul 16, 2018 11:30 AM CDT   Lab with  LAB   [facility] ([facility])    [address]  1st Floor  [facility] [address] [phone]            Jul 16, 2018 12:00 PM CDT   (Arrive by 11:45 AM)   Return Visit with Jose Willett MD   [facility] ([facility])    [address] [place] [address]  [address] [place] [address] [phone]              Who to contact     If you have questions or need follow up information about today's clinic visit or your schedule please contact [facility] directly at [phone].  Normal or non-critical lab and imaging results will be communicated to you by [employer], letter or phone within 4 business days after the clinic has received the results. If you do not hear from us within 7 days, please contact the clinic through [employer] or phone. If you have a critical or abnormal lab result, we will notify you by phone as soon as possible.  Submit refill requests through [address] or call your pharmacy and they will forward the refill request to us. Please allow 3 business days for your refill to be completed.          Additional Information About Your Visit        [address] Information     [address] lets you send messages to  "your doctor, view your test results, renew your prescriptions, schedule appointments and more. To sign up, go to [email or web address] . Click on \"Log in\" on the left side of the screen, which will take you to the Welcome page. Then click on \"Sign up Now\" on the right side of the page.     You will be asked to enter the access code listed below, as well as some personal information. Please follow the directions to create your username and password.     Your access code is: [email or web address]  Expires: 2018  6:31 AM     Your access code will  in 90 days. If you need help or a new code, please call your [facility] clinic or [phone].        Care EveryWhere ID     This is your Care EveryWhere ID. This could be used by other organizations to access your [facility] medical records  [id number]        Your Vitals Were     Pulse Temperature Respirations Pulse Oximetry BMI (Body Mass Index)       75 98.1  F (36.7  C) (Oral) 18 100% 28.46 kg/m2        Blood Pressure from Last 3 Encounters:   07/10/18 138/60   18 124/56   18 112/66    Weight from Last 3 Encounters:   07/10/18 171 lb (77.6 kg)   18 174 lb 8 oz (79.2 kg)   18 178 lb 3.2 oz (80.8 kg)              We Performed the Following     ALT     AST     Basic metabolic panel     CBC with platelets     Lipid panel reflex to direct LDL Fasting     PPSV23, IM/SUBQ (2+ YRS) - [id number]          Today's Medication Changes          These changes are accurate as of 7/10/18  2:01 PM.  If you have any questions, ask your nurse or doctor.               These medicines have changed or have updated prescriptions.        Dose/Directions    atorvastatin 40 MG tablet   Commonly known as:  LIPITOR   This may have changed:  when to take this   Used for:  Hyperlipidemia LDL goal <100        Dose:  40 mg   Take 1 tablet (40 mg) by mouth daily   Quantity:  90 tablet   Refills:  PRN                Primary Care Provider Office Phone # Fax #    Rebeca FRIEDMAN" JAYCEE Sandoval [phone]       [address] [facility] [address]        Equal Access to Services     RIGO STORY : Hadii aad ku hadmallorygena Soalejandra, waminida luqángelha, qapiata kagrayda heather, haresh tranin hayaan nevillebridget zimmerman laRonalneetu campo. So [facility] [phone].    ATENCIÓN: Si habla español, tiene a adorno disposición servicios gratuitos de asistencia lingüística. Llame al [phone].    We comply with applicable federal civil rights laws and [place] laws. We do not discriminate on the basis of race, color, national origin, age, disability, sex, sexual orientation, or gender identity.            Thank you!     Thank you for choosing [facility]  for your care. Our goal is always to provide you with excellent care. Hearing back from our patients is one way we can continue to improve our services. Please take a few minutes to complete the written survey that you may receive in the mail after your visit with us. Thank you!             Your Updated Medication List - Protect others around you: Learn how to safely use, store and throw away your medicines at www.disposemymeds.org.          This list is accurate as of 7/10/18  2:01 PM.  Always use your most recent med list.                   Brand Name Dispense Instructions for use Diagnosis    acetaminophen 500 MG tablet    TYLENOL     Take 1-2 tablets (500-1,000 mg) by mouth 4 times daily as needed for mild pain or fever    Rectal cancer (H)       amLODIPine 10 MG tablet    NORVASC    90 tablet    Take 0.5 tablets (5 mg) by mouth daily    Essential hypertension with goal blood pressure less than 130/80       aspirin 81 MG EC tablet     30 tablet    Take 1 tablet (81 mg) by mouth daily    Hypertension goal BP (blood pressure) < 130/80, Hyperlipidemia LDL goal <100       atorvastatin 40 MG tablet    LIPITOR    90 tablet    Take 1 tablet (40 mg) by mouth daily    Hyperlipidemia LDL goal <100       carvedilol 25 MG tablet    COREG    180 tablet     Take 1 tablet (25 mg) by mouth 2 times daily    Coronary artery disease due to lipid rich plaque, Status post coronary angioplasty       lactobacillus rhamnosus (GG) 10 B CELL capsule    CULTURELLE     Take 1 capsule by mouth every morning        MULTIVITAMIN & MINERAL PO      Take 1 tablet by mouth every morning        niacin 500 MG tablet     30 tablet    Take 1 tablet (500 mg) by mouth At Bedtime    Hyperlipidemia LDL goal <100       SAW PALMETTO PO      Take 1 tablet by mouth every morning        ticagrelor 90 MG tablet    BRILINTA    180 tablet    Take 1 tablet (90 mg) by mouth 2 times daily    Coronary artery disease of native artery of native heart with stable angina pectoris (H)       traZODone 50 MG tablet    DESYREL    60 tablet    Take 1 tablet (50 mg) by mouth every evening    Other insomnia

## 2018-07-10 NOTE — LETTER
July 12, 2018      Jose Lira  [address]   [place] [address]        Dear ,    We are writing to inform you of your test results.    Kidney function is stable.  Hemoglobin is stable.    Resulted Orders   Basic metabolic panel   Result Value Ref Range    Sodium 141 133 - 144 mmol/L    Potassium 3.8 3.4 - 5.3 mmol/L    Chloride 111 (H) 94 - 109 mmol/L    Carbon Dioxide 20 20 - 32 mmol/L    Anion Gap 10 3 - 14 mmol/L    Glucose 104 (H) 70 - 99 mg/dL      Comment:      Non Fasting    Urea Nitrogen 21 7 - 30 mg/dL    Creatinine 2.12 (H) 0.66 - 1.25 mg/dL    GFR Estimate 31 (L) >60 mL/min/1.7m2      Comment:      Non  GFR Calc    GFR Estimate If Black 37 (L) >60 mL/min/1.7m2      Comment:       GFR Calc    Calcium 9.1 8.5 - 10.1 mg/dL   CBC with platelets   Result Value Ref Range    WBC 7.7 4.0 - 11.0 10e9/L    RBC Count 3.65 (L) 4.4 - 5.9 10e12/L    Hemoglobin 9.7 (L) 13.3 - 17.7 g/dL    Hematocrit 31.3 (L) 40.0 - 53.0 %    MCV 86 78 - 100 fl    MCH 26.6 26.5 - 33.0 pg    MCHC 31.0 (L) 31.5 - 36.5 g/dL    RDW 15.6 (H) 10.0 - 15.0 %    Platelet Count 196 150 - 450 10e9/L       If you have any questions or concerns, please call the clinic at the number listed above.       Sincerely,        Rebeca Sandoval, NP/nr

## 2018-07-10 NOTE — PROGRESS NOTES
SUBJECTIVE:   CC: Jose Lira is an 73 year old woman who presents for preventive health visit.     Physical   Annual:     Getting at least 3 servings of Calcium per day:  NO    Bi-annual eye exam:  NO    Dental care twice a year:  NO    Sleep apnea or symptoms of sleep apnea:  None    Diet:  Regular (no restrictions)    Frequency of exercise:  None    Taking medications regularly:  Yes    Medication side effects:  Not applicable    Additional concerns today:  YES    Ability to successfully perform activities of daily living: no assistance needed    Home Safety:  No safety concerns identified    Hearing Impairment: no hearing concerns    {Outside tests to abstract? :799689}    {additional problems to add (Optional):042889}    Today's PHQ-2 Score:   PHQ-2 ( 1999 Pfizer) 7/10/2018   Q1: Little interest or pleasure in doing things 0   Q2: Feeling down, depressed or hopeless 0   PHQ-2 Score 0   Q1: Little interest or pleasure in doing things Not at all   Q2: Feeling down, depressed or hopeless Not at all   PHQ-2 Score 0       Abuse: Current or Past(Physical, Sexual or Emotional)- No  Do you feel safe in your environment - Yes    Social History   Substance Use Topics     Smoking status: Former Smoker     Packs/day: 3.00     Years: 30.00     Quit date: 8/5/1994     Smokeless tobacco: Former User     Quit date: 8/1/1994      Comment: quit 15 years ago after his stroke     Alcohol use 0.6 oz/week     1 Glasses of wine per week      Comment: 1 per day     Alcohol Use 7/10/2018   If you drink alcohol do you typically have greater than 3 drinks per day OR greater than 7 drinks per week? No       Reviewed orders with patient.  Reviewed health maintenance and updated orders accordingly - Yes  {Chronicprobdata (Optional):769934}    {Mammo Decision Support (Optional):950683}    Pertinent mammograms are reviewed under the imaging tab.  History of abnormal Pap smear: {PAP HX:948170}     Reviewed and updated as needed  "this visit by clinical staff  Tobacco  Allergies  Meds         Reviewed and updated as needed this visit by Provider        {HISTORY OPTIONS (Optional):970081}    Review of Systems  {FEMALE ROS (Optional):997852}     OBJECTIVE:   /71  Pulse 75  Temp 98.1  F (36.7  C) (Oral)  Resp 18  Wt 171 lb (77.6 kg)  SpO2 100%  BMI 28.46 kg/m2  Physical Exam  {Exam Choices (Optional):247810}    {Diagnostic Test Results (Optional):239741::\"Diagnostic Test Results:\",\"none \"}    ASSESSMENT/PLAN:   {Diag Picklist:004004}    COUNSELING:  {FEMALE COUNSELING MESSAGES:502841::\"Reviewed preventive health counseling, as reflected in patient instructions\"}    BP Readings from Last 1 Encounters:   07/10/18 151/71     Estimated body mass index is 28.46 kg/(m^2) as calculated from the following:    Height as of 5/2/18: 5' 5\" (1.651 m).    Weight as of this encounter: 171 lb (77.6 kg).    {BP Counseling- Complete if BP >= 120/80  (Optional):129277}  {Weight Management Plan (ACO) Complete if BMI is abnormal-  Ages 18-64  BMI >24.9.  Age 65+ with BMI <23 or >30 (Optional):787544}     reports that he quit smoking about 23 years ago. He has a 90.00 pack-year smoking history. He quit smokeless tobacco use about 23 years ago.  {Tobacco Cessation -- Complete if patient is a smoker (Optional):032428}    Counseling Resources:  ATP IV Guidelines  Pooled Cohorts Equation Calculator  Breast Cancer Risk Calculator  FRAX Risk Assessment  ICSI Preventive Guidelines  Dietary Guidelines for Americans, 2010  [employer]'s MyPlate  ASA Prophylaxis  Lung CA Screening    Rebeca Sandoval NP  [facility]  Answers for HPI/ROS submitted by the patient on 7/10/2018   PHQ-2 Score: 0    "

## 2018-07-10 NOTE — NURSING NOTE
Prior to injection verified patient identity using patient's name and date of birth.  Due to injection administration, patient instructed to remain in clinic for 15 minutes  afterwards, and to report any adverse reaction to me immediately.    Screening Questionnaire for Adult Immunization    Are you sick today?   No   Do you have allergies to medications, food, a vaccine component or latex?   No   Have you ever had a serious reaction after receiving a vaccination?   No   Do you have a long-term health problem with heart disease, lung disease, asthma, kidney disease, metabolic disease (e.g. diabetes), anemia, or other blood disorder?   No   Do you have cancer, leukemia, HIV/AIDS, or any other immune system problem?  Cancer   In the past 3 months, have you taken medications that affect  your immune system, such as prednisone, other steroids, or anticancer drugs; drugs for the treatment of rheumatoid arthritis, Crohn s disease, or psoriasis; or have you had radiation treatments?   No   Have you had a seizure, or a brain or other nervous system problem?   No   During the past year, have you received a transfusion of blood or blood     products, or been given immune (gamma) globulin or antiviral drug?   No   For women: Are you pregnant or is there a chance you could become        pregnant during the next month?   No   Have you received any vaccinations in the past 4 weeks?   No     Immunization questionnaire was positive for at least one answer.  Notified PCP.        Per orders of Rebeca Sandoval, injection of Pneumovax 23 given by Anu Lopez. Patient instructed to remain in clinic for 15 minutes afterwards, and to report any adverse reaction to me immediately.       Screening performed by Anu Lopez on 7/10/2018 at 2:07 PM.

## 2018-07-10 NOTE — PROGRESS NOTES
SUBJECTIVE:   Jose Lira is a 73 year old male who presents for Preventive Visit.    Are you in the first 12 months of your Medicare coverage?  No    Physical   Annual:     Getting at least 3 servings of Calcium per day:  NO    Bi-annual eye exam:  NO    Dental care twice a year:  NO    Sleep apnea or symptoms of sleep apnea:  None    Diet:  Regular (no restrictions)    Frequency of exercise:  None    Taking medications regularly:  Yes    Medication side effects:  Not applicable    Additional concerns today:  YES    Ability to successfully perform activities of daily living: no assistance needed    Home Safety:  No safety concerns identified    Hearing Impairment: no hearing concerns        Fall risk: COMPLETED   Fallen 2 or more times in the past year?: No  Any fall with injury in the past year?: No    COGNITIVE SCREEN  1) Repeat 3 items (Leader, Season, Table)    2) Clock draw: ABNORMAL   3) 3 item recall: Recalls 3 objects  Results: ABNORMAL clock, 3 WORDS RECALLED      Mini-CogTM Copyright S Amy. Licensed by the author for use in [facility]; reprinted with permission (nhung@[facility]). All rights reserved.        Reviewed and updated as needed this visit by clinical staff  Tobacco  Allergies  Meds         Reviewed and updated as needed this visit by Provider        Social History   Substance Use Topics     Smoking status: Former Smoker     Packs/day: 3.00     Years: 30.00     Quit date: 8/5/1994     Smokeless tobacco: Former User     Quit date: 8/1/1994      Comment: quit 15 years ago after his stroke     Alcohol use 0.6 oz/week     1 Glasses of wine per week      Comment: 1 per day       Alcohol Use 7/10/2018   If you drink alcohol do you typically have greater than 3 drinks per day OR greater than 7 drinks per week? No     Surgery for rectal cancer 2/22/18.  Follow up appointment with his cardiologist next week.    He does have metastatic colon cancer and has chosen to not pursue any  treatment.  He has been in contact with hospice, currently is not in need of any services.  He lives alone, is stable.  He has a sister that is available for assistance if needed.           Today's PHQ-2 Score:   PHQ-2 ( 1999 Pfizer) 7/10/2018   Q1: Little interest or pleasure in doing things 0   Q2: Feeling down, depressed or hopeless 0   PHQ-2 Score 0   Q1: Little interest or pleasure in doing things Not at all   Q2: Feeling down, depressed or hopeless Not at all   PHQ-2 Score 0       Do you feel safe in your environment - Yes    Do you have a Health Care Directive?: Yes: Advance Directive has been received and scanned.    Current providers sharing in care for this patient include:   Patient Care Team:  Rebeca Sandoval NP as PCP - General  Orion Motley MD as MD (Radiology)  Estefania Akers RN as Registered Nurse  Cehr Yadav MD as MD (Hematology & Oncology)  Cynthia Mondragon, RN as Nurse Coordinator (Oncology)  Felecia Bundy, RN as Nurse Coordinator (Cardiology)  Carline Quezada, GERMANIA as Nurse Coordinator (Thoracic Surgery (Cardiothoracic Vascular Surgery))  Radha Gold MD as MD (Hematology & Oncology)  Angeles Castro, GERMANIA as Nurse Coordinator (Oncology)    The following health maintenance items are reviewed in Epic and correct as of today:  Health Maintenance   Topic Date Due     ADVANCE DIRECTIVE PLANNING Q5 YRS  08/08/2016     PNEUMOCOCCAL (2 of 2 - PPSV23) 09/15/2016     MEDICARE ANNUAL WELLNESS VISIT  06/26/2018     INFLUENZA VACCINE (1) 09/01/2018     FALL RISK ASSESSMENT  11/27/2018     PHQ-2 Q1 YR  11/27/2018     LIPID SCREEN Q5 YR MALE (SYSTEM ASSIGNED)  06/26/2022     TETANUS IMMUNIZATION (SYSTEM ASSIGNED)  07/16/2023     COLON CANCER SCREEN (SYSTEM ASSIGNED)  05/24/2027     AORTIC ANEURYSM SCREENING (SYSTEM ASSIGNED)  Completed             Review of Systems  CONSTITUTIONAL: NEGATIVE for fever, chills, change in weight  ENT/MOUTH: NEGATIVE for ear, mouth and throat problems  RESP:  "NEGATIVE for significant cough or SOB  CV: NEGATIVE for chest pain, palpitations or peripheral edema  GI: NEGATIVE for nausea, abdominal pain, heartburn, or change in bowel habits  : negative for dysuria, hematuria  MUSCULOSKELETAL: NEGATIVE for significant arthralgias or myalgia  NEURO: NEGATIVE for weakness, dizziness or paresthesias  ENDOCRINE: NEGATIVE for temperature intolerance, skin/hair changes  PSYCHIATRIC: NEGATIVE for changes in mood or affect    OBJECTIVE:   /60  Pulse 75  Temp 98.1  F (36.7  C) (Oral)  Resp 18  Wt 171 lb (77.6 kg)  SpO2 100%  BMI 28.46 kg/m2 Estimated body mass index is 28.46 kg/(m^2) as calculated from the following:    Height as of 5/2/18: 5' 5\" (1.651 m).    Weight as of this encounter: 171 lb (77.6 kg).  Physical Exam  GENERAL: healthy, alert and no distress  EYES: Eyes grossly normal to inspection, PERRL and conjunctivae and sclerae normal  HENT: ear canals and TM's normal, nose and mouth without ulcers or lesions  NECK: no adenopathy, no asymmetry, masses, or scars and thyroid normal to palpation  RESP: lungs clear to auscultation - no rales, rhonchi or wheezes  CV: regular rate and rhythm, normal S1 S2, no S3 or S4, 2/6 systolic murmur RUSB, click or rub, no peripheral edema and peripheral pulses strong  ABDOMEN: soft, nontender, no hepatosplenomegaly, no masses and bowel sounds normal  MS: no gross musculoskeletal defects noted, no edema  SKIN: no suspicious lesions or rashes  NEURO: Normal strength and tone, mentation intact and speech normal  PSYCH: mentation appears normal, affect somewhat flatted, which is normal for him        ASSESSMENT / PLAN:   1. Encounter for routine adult health examination without abnormal findings      2. Colon cancer metastasized to multiple sites (H)  He wishes to follow up only if needed.  He has met with hospice nurse and .   - CBC with platelets    3. CKD (chronic kidney disease) stage 4, GFR 15-29 ml/min (H)    - " "Basic metabolic panel    4. Coronary artery disease involving native heart without angina pectoris, unspecified vessel or lesion type  Stable.  Cardiology appointment next week.     5. Hyperlipidemia LDL goal <70    - Lipid panel reflex to direct LDL Fasting  - ALT  - AST    6. Need for prophylactic vaccination against Streptococcus pneumoniae (pneumococcus)    - PPSV23, IM/SUBQ (2+ YRS) - [id number]    7. Peripheral vascular disease (H)  Stable  The current medical regimen is effective;  continue present plan and medications.       End of Life Planning:  Patient currently has an advanced directive: Yes.  Practitioner is supportive of decision.    COUNSELING:  Reviewed preventive health counseling, as reflected in patient instructions    BP Readings from Last 1 Encounters:   07/10/18 138/60     Estimated body mass index is 28.46 kg/(m^2) as calculated from the following:    Height as of 5/2/18: 5' 5\" (1.651 m).    Weight as of this encounter: 171 lb (77.6 kg).           reports that he quit smoking about 23 years ago. He has a 90.00 pack-year smoking history. He quit smokeless tobacco use about 23 years ago.      Appropriate preventive services were discussed with this patient, including applicable screening as appropriate for cardiovascular disease, diabetes, osteopenia/osteoporosis, and glaucoma.  As appropriate for age/gender, discussed screening for colorectal cancer, prostate cancer, breast cancer, and cervical cancer. Checklist reviewing preventive services available has been given to the patient.    Reviewed patients plan of care and provided an AVS. The Basic Care Plan (routine screening as documented in Health Maintenance) for Jose meets the Care Plan requirement. This Care Plan has been established and reviewed with the Patient.    Counseling Resources:  ATP IV Guidelines  Pooled Cohorts Equation Calculator  Breast Cancer Risk Calculator  FRAX Risk Assessment  ICSI Preventive Guidelines  Dietary " Guidelines for Americans, 2010  USDA's MyPlate  ASA Prophylaxis  Lung CA Screening    Rebcea Sandoval, JAYCEE  [facility]

## 2018-07-12 NOTE — TELEPHONE ENCOUNTER
[facility] Call Center    Phone Message    May a detailed message be left on voicemail: yes    Reason for Call: Other: Patient called in to see if he still needs to complete labs before his appointment on the 16th. He said he just had labs done with Rebeca Sandoval on 9/10 during his annual exam. Patient says he is tired of fasting from labs, with his cancer it makes him feel very weak. Please call Buddy back and confirm whether or not he will need to have the labs done prior to his visit with Dr. Willett.      Action Taken: Message routed to:  [facility] ([facility] Cardiology

## 2018-07-16 NOTE — NURSING NOTE
Chief Complaint   Patient presents with     Follow Up For     manage CAD & HLD       Vitals were taken and medications were reconciled.    Meme Jeff MA    11:57 AM

## 2018-07-16 NOTE — PATIENT INSTRUCTIONS
Patient Instructions:  It was a pleasure to see you in the cardiology clinic today.      If you have any questions, call  Emmy Grant RN, at [phone].  Press Option #1 for the [facility], and then press Option #3 for nursing.  We are encouraging the use of [employer] to communicate with your HealthCare Provider    Note the new medications: none  Stop the following medications: none    The results from today include: none    Please follow up with Dr. Jose Willett in January      If you have an urgent need after hours (8:00 am to 4:30 pm) please call [phone] and ask for the cardiology fellow on call.

## 2018-07-16 NOTE — PROGRESS NOTES
[facility]  CARDIOVASCULAR MEDICINE CLINIC NOTE    Referring Provider: Referred Self   Primary Care Provider: Rebeca Sandoval     Patient Name: Jose Lira   MRN: [id number]     HPI: Jose Lira is a 73 year old gentleman w/ h/o CAD s/p PCI to the RCA and LAD, PAD s/p R ICA stent, carotid artery dz s/p LICA stent, HLD, prior CVA, CKD stage IV, and rectosigmoid CA.  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 He was discovered to have metastatic disease.  He has restarted DAPT without issues. He currently denies any easy bruising, melena, hematochezia or bleeding problems. No current orthopnea, PND, leg swelling, palpitations or chest pain.     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     Subclavian arterial stenosis (H)     Left     Unspecified essential hypertension        CURRENT MEDICATIONS:  Current Outpatient Prescriptions   Medication Sig Dispense Refill     amLODIPine (NORVASC) 10 MG tablet Take 0.5 tablets (5 mg) by mouth daily 90 tablet PRN     aspirin 81 MG EC tablet Take 1 tablet (81 mg) by mouth daily 30 tablet      atorvastatin (LIPITOR) 40 MG tablet Take 1 tablet (40 mg) by mouth daily (Patient taking differently: Take 40 mg by mouth every morning ) 90 tablet PRN     carvedilol (COREG) 25 MG tablet Take 1 tablet (25 mg) by mouth 2 times daily 180 tablet 3     lactobacillus rhamnosus, GG, (CULTURELLE) 10 B CELL capsule Take 1 capsule by mouth every morning        Multiple Vitamins-Minerals (MULTIVITAMIN &  MINERAL PO) Take 1 tablet by mouth every morning        niacin 500 MG tablet Take 1 tablet (500 mg) by mouth At Bedtime 30 tablet PRN     Saw Palmetto, Serenoa repens, (SAW PALMETTO PO) Take 1 tablet by mouth every morning        ticagrelor (BRILINTA) 90 MG tablet Take 1 tablet (90 mg) by mouth 2 times daily 180 tablet 3     acetaminophen (TYLENOL) 500 MG tablet Take 1-2 tablets (500-1,000 mg) by mouth 4 times daily as needed for mild pain or fever (Patient not taking: Reported on 4/11/2018)       traZODone (DESYREL) 50 MG tablet Take 1 tablet (50 mg) by mouth every evening (Patient not taking: Reported on 7/10/2018) 60 tablet      [DISCONTINUED] olmesartan-hydrochlorothiazide (BENICAR HCT) 40-25 MG per tablet Take 1 tablet by mouth daily 90 tablet 3       PAST SURGICAL HISTORY:  Past Surgical History:   Procedure Laterality Date     COLONOSCOPY N/A 5/19/2017    Procedure: COMBINED COLONOSCOPY, SINGLE OR MULTIPLE BIOPSY/POLYPECTOMY BY BIOPSY;  Rectal bleeding;  Surgeon: Maximus Dinero MD;  Location: [facility]     LAPAROSCOPIC COLECTOMY LOW ANTERIOR N/A 2/22/2018    Procedure: LAPAROSCOPIC COLECTOMY LOW ANTERIOR;  Laparoscopic Assisted Low Anterior Resection, Sigmoidoscopy, Anesthesia Block (ERAS Patient);  Surgeon: Sharan Raza MD;  Location: [facility] OR     SIGMOIDOSCOPY FLEXIBLE N/A 2/22/2018    Procedure: SIGMOIDOSCOPY FLEXIBLE;;  Surgeon: Sharan Raza MD;  Location: [facility] OR     SURGICAL HISTORY OF -   10/02    angioplasty and stenting of left and internal carotid artery at the bifurcation     SURGICAL HISTORY OF - 11/11    right common iliac artery stent      VASCULAR SURGERY  2001    stent placed in carotid        ALLERGIES  Fentanyl    FAMILY HX:  Family History   Problem Relation Age of Onset     C.A.D. Father      Hypertension Father      Prostate Cancer Father      C.A.D. Brother      MI     Hypertension Brother      Arthritis Sister      Diabetes No family hx of      Cerebrovascular Disease No family hx of   "    Cancer - colorectal No family hx of        SOCIAL HX:  Social History     Social History     Marital status: Single     Spouse name: N/A     Number of children: 0     Years of education: N/A     Occupational History     post       Social History Main Topics     Smoking status: Former Smoker     Packs/day: 3.00     Years: 30.00     Quit date: 8/5/1994     Smokeless tobacco: Former User     Quit date: 8/1/1994      Comment: quit 15 years ago after his stroke     Alcohol use Yes      Comment: varies. 1 per day if that      Drug use: No     Sexual activity: No     Other Topics Concern     Parent/Sibling W/ Cabg, Mi Or Angioplasty Before 65f 55m? Yes     brother and father     Social History Narrative    [facility]  Was never .  Had no children.  Has a two brothers that have passed away.  He has two sister, one with Alzheimer and one alive and well.  Sister will be here for surgery.         ROS:  Constitutional: No fever, chills, or sweats. No weight gain/loss.   ENT: No visual disturbance, ear ache, epistaxis, sore throat.   Allergies/Immunologic: Negative.   Respiratory: No cough, hemoptysis.   Cardiovascular: As per HPI.   GI: No nausea, vomiting, hematemesis, melena, or hematochezia.   : No urinary frequency, dysuria, or hematuria.   Integument: Negative.   Psychiatric: Negative.   Neuro: Negative.   Endocrinology: Negative.   Musculoskeletal: No myalgia.    VITAL SIGNS:  /68 (BP Location: Right arm, Patient Position: Chair, Cuff Size: Adult Regular)  Pulse 63  Ht 1.651 m (5' 5\")  Wt 78.3 kg (172 lb 11.2 oz)  SpO2 98%  BMI 28.74 kg/m2  Body mass index is 28.74 kg/(m^2).  Wt Readings from Last 2 Encounters:   07/16/18 78.3 kg (172 lb 11.2 oz)   07/10/18 77.6 kg (171 lb)       PHYSICAL EXAM  Constitutional: no acute distress, pleasant and cooperative, appears overall well.  Eyes: sclera white, conjunctiva clear, without icterus or pallor   Cardiovascular: RRR nl " S1S2, JVP not elevated, extremities with no edema or cyanosis  Respiratory: clear to auscultation and percussion bilaterally anterior and posterior  Gastrointestinal: soft, nontender, non distended, no hepatosplenomegaly or masses  Musculoskeletal: normal muscle bulk and tone, joints   Skin: normal skin appearance without worrisome lesions.   Neurologic: no gross deficits, no tremor  Psychiatric: appropriate affect, eye contact, intact thought and speech    LABS    Lab Results   Component Value Date    WBC 8.6 12/19/2017     Lab Results   Component Value Date    RBC 3.08 12/19/2017     Lab Results   Component Value Date    HGB 8.9 12/19/2017     Lab Results   Component Value Date    HCT 27.7 12/19/2017     No components found for: MCT  Lab Results   Component Value Date    MCV 90 12/19/2017     Lab Results   Component Value Date    MCH 28.9 12/19/2017     Lab Results   Component Value Date    MCHC 32.1 12/19/2017     Lab Results   Component Value Date    RDW 13.1 12/19/2017     Lab Results   Component Value Date     12/19/2017      Recent Labs   Lab Test  12/19/17   1219  12/04/17   1227   NA  141  140   POTASSIUM  4.3  4.7   CHLORIDE  110*  110*   CO2  20  17*   ANIONGAP  11  13   GLC  103*  139*   BUN  23  27   CR  2.43*  2.75*   ADRIAN  9.2  9.5     Recent Labs   Lab Test  06/26/17   1145  07/05/16   1135  09/15/15   1208  08/25/14   1206   CHOL  165  140  127  130   HDL  46  47  38*  52   LDL  83  56  48  43   TRIG  182*  186*  204*  176*   CHOLHDLRATIO   --    --   3.3  2.5   NHDL  119  93   --    --         EKG:  Sinus with early repolarization abnormality      STRESS TEST:  8/4/17  Interpretation Summary  Abnormal stress echo.  Mid septal hypokinesis at rest, with mid septal and apical lateral ischemia at  peak stress. Suspect multi-vessel CAD.  Target heart rate was achieved. Heart rate and blood pressure response to  dobutamine were normal. With stress, the left ventricular ejection fraction  increased  from 55-60% to greater than 65% and the left ventricular size  decreased appropriately.  No subjective symptoms  ST segment depression in inferolateral leads during recovery period, but not  at peak stress.  No significant valve disease on screening doppler evaluation. The aortic root  and visualized ascending aorta are normal.       CARDIAC CATH:    8/15/17  CORONARY ANGIOGRAM:   1. Both coronary arteries arise from their respective cusps.  2. Right dominant.  3. LM has diffuse disease up to 20% stenosis in the distal LM.   4. LAD supplies the entire apex (type 3) and gives rise to septal perforators, large high D1, a medium caliber D2 and trivial D3.  The proximal and mid-LAD are heavily calcified.  There is moderate(50-60%) diffuse disease noted in the pLAD with a focal 80% stenosis proximal to D2.  The mLAD has a long 99% stenosis just distal to D2.  The dLAD has moderate diffuse disease.  D1 has a focal eccentric 70% stenosis in the proximal portion followed by a discrete 60% stenosis in the mid-portion..   5. LCX is a large vessel giving rise to a trivial OM1 and large OM2.  The pLCx is heavily calcified.  There is diffuse 50% disease of the pLCx including the ostium with a focal 70% stenosis.  The mLCx and dLCx are small distal to the OM2 bifurcation.  OM2 has minimal irregularities.  6. RCA is a medium caliber vessel giving rise to the RPDA and RPLA branches. The RCA has sequential proximal to mid 50 to 80% stenoses with moderate calcification.  The dRCA mild luminal irregularities.  The ostial RPDA has 50% stenosis.  The RPLAs have mild luminal irregularities.        FUNCTIONAL ASSESSMENT:  Adequate anticoagulation was obtained with IV UFH. An [employer] FFR wire was passed through the 6Fr diagnostic catheter into the RCA and into the RPDA.  Hyperemia was induced with IC Adenosine (80 mcg).  The wire was then withdrawn and repeated FFR measurements were assessed at the RPDA, dRCA, mRCA, and ostial RCA.  The  results are below:  RPDA: 0.39  DRCA: 0.41  MRCA: 0.74  Ostial RCA: 1.0      Limited Angiogram of the Subclavian artery:  The ostium and proximal left sublcavian artery are heavily calcified without a significant stenosis.    11/16/17  SELECTIVE LEFT CORONARY ANGIOGRAM:   1. The LM has mild (<25%) proximal disease.  2. LAD is a type III vessel (supplies entire apex). Prior to PCI it is a medium caliber vessel with heavy proximal calcification, proximal 80% stenosis, mid focal 99% stenosis with LORRAINE 2 flow distally, and moderate 50-70% is the distal vessel  3. LCX has sequential ostial and proximal 60% stenoses.     PERCUTANEOUS CORONARY INTERVENTION of the mid- proximal LAD:  A 6 Fr  XB LF 3.5 GC was positioned at the ostium of the LMCA. IV UFH was administered to achieve adequate anticoagulation.     A Pilot50 wire was advanced across the LAD lesions and positioned in the dLAD. The lesions were pre-dilated with 1.2x08mm, 1.5x15mm, and 2.0x12mm balloons sequentially. A Synergy drug eluting stent 2.5x28mm was successfully deployed in the mid LAD. The proximal segment of the stent was post-dilated with 2.5x12 NC balloon. A second Synergy drug eluting stent 3.0x20 mm was placed overlapping the first from the mid to proximal LAD. This was post dilated with a 3.0 x12 NC balloon. A third Synergy drug eluting stent 3.5x16mm was placed overlapping into the proximal LAD. This was post dilated was a 3.5x12 NC balloon. The Pilot50 wire was then advanced into the D2 branch and struts were dilated with the 2.0x 12mm balloon. Final angiography showed no evidence of perforation or dissection with residual stenosis of 0% and LORRAINE 3 flow. No complications.     FUNCTIONAL ASSESSMENT of LCx:  The Aeris pressure wire was advanced into the distal LCx. Hyperemia was induced with IC Adenosine (80 and 100 mcg).  The FFR at peak hyperemia was non-significant, 0.83 and 0.84, respectively.    12/19/17  CORONARY ANGIOGRAM:   LCA not engaged  today due to contrast limitations  RCA (dominant) gives rise to PL branches and supplies PDA. The prox to mid RCA has is moderate to severely calcified, with two 80% stenoses. The distal vessel has mild diffuse disease.     PERCUTANEOUS CORONARY INTERVENTION:  Prox-Mid RCA  JR 4  IV UFH for anticoagulation     A runthrough wire was advanced across the mid RCA lesion and positioned in the distal RPDA.  A 3.0x12mm balloon was used to pre-dilate the lesion. We pre-dilated the entire lesion from the prox to mid segment of the vessel.  We had significant difficulty advancing the stent across the lesion. The vessel was again dilated with a 3.0x12mm balloon and a 6Fr guideliner was inserted for additional support. A 3.0x38mm Synergy drug eluting stent was successfully deployed across the lesion with inflation to 14 abdullahi.  We placed a second stent, 3.5x16mm Synergy NICOLE in the prox RCA overlapping with the first stent. The entire stented segment was post-dilated with a 3.5x12mm non-compliant balloon.  Final angiography showed no evidence of perforation or dissection with residual stenosis of 0% and LORRAINE 3 flow.  No complications.    ASSESSMENT AND PLAN:  72 year old gentleman with rectosigmoid malignancy found to have 2 vessel CAD LAD/RCA s/p PCI 11/16 and 12/19 respectively, LCx moderate lesion FFR 0.83. Presenting today for routine follow up.     1. 2vCAD: s/p PCI to LAD and RCA, LCx FFR 0.83, stable, no angina, on DAPT (ticagrelor/aspirin)    2. HTN, not at a goal (<130/80), on two drug therapy    3. HLD, last LDL 83    4. PAD, s/p RCIA stent, Iron 3    5. Carotid stenosis s/p LICA, stable, asymptomatic    PLAN  -- continue Coreg 25 mg BID  -- continue DAPT until 12/2018 at which time we will switch to ASA and plavix   -- continue with atorvastatin 40 mg and amlodipine 10 mg daily  -- follow-up with vascular medicine regarding PAD    RTC Jan 2019.     Patient seen and discussed with Dr. Willett.     Tristan Juarez,  MD  Cardiology Fellow     ATTENDING ATTESTATION:   I personally examined and evaluated this patient on July 16, 2018.   I have reviewed today's vital signs, medications, labs, and imaging results. I have reviewed and edited, as necessary, the history, review of systems, physical examination, and assessment and plan. I discussed the patient with Dr. Juarez, and agree with the assessment and plan of care as documented in the note above.    Thank you for allowing us to take part in the care of this very pleasant patient.  Please do not hesitate to call if any further questions or concerns arise.    Jose Willett MD, PhD  Interventional/Critical Care Cardiology  [phone]    July 16, 2018

## 2018-07-16 NOTE — MR AVS SNAPSHOT
After Visit Summary   7/16/2018    Jose Lira    MRN: [id number]           Patient Information     Date Of Birth          1945        Visit Information        Provider Department      7/16/2018 12:00 PM Jose Willett MD [facility]        Today's Diagnoses     Coronary artery disease due to lipid rich plaque        Status post coronary angioplasty          Care Instructions    Patient Instructions:  It was a pleasure to see you in the cardiology clinic today.      If you have any questions, call  Emmy Grant RN, at [phone].  Press Option #1 for the [facility], and then press Option #3 for nursing.  We are encouraging the use of [employer] to communicate with your HealthCare Provider    Note the new medications: none  Stop the following medications: none    The results from today include: none    Please follow up with Dr. Jose Willett in January      If you have an urgent need after hours (8:00 am to 4:30 pm) please call [phone] and ask for the cardiology fellow on call.            Follow-ups after your visit        Additional Services     Follow-Up with Cardiologist                 Your next 10 appointments already scheduled     Jan 14, 2019  1:00 PM CST   (Arrive by 12:45 PM)   Return Visit with Jose Willett MD   [facility] ([facility])    [address] [place] [address]  [address] [place] [id number] [phone]              Future tests that were ordered for you today     Open Future Orders        Priority Expected Expires Ordered    Follow-Up with Cardiologist Routine 1/12/2019 4/12/2019 7/16/2018            Who to contact     If you have questions or need follow up information about today's clinic visit or your schedule please contact [facility] directly at [phone].  Normal or non-critical lab and imaging results will be communicated to you by MyChart, letter or phone within 4 business  "days after the clinic has received the results. If you do not hear from us within 7 days, please contact the clinic through [employer] or phone. If you have a critical or abnormal lab result, we will notify you by phone as soon as possible.  Submit refill requests through [employer] or call your pharmacy and they will forward the refill request to us. Please allow 3 business days for your refill to be completed.          Additional Information About Your Visit        [employer] Information     [employer] lets you send messages to your doctor, view your test results, renew your prescriptions, schedule appointments and more. To sign up, go to [email or web address] . Click on \"Log in\" on the left side of the screen, which will take you to the Welcome page. Then click on \"Sign up Now\" on the right side of the page.     You will be asked to enter the access code listed below, as well as some personal information. Please follow the directions to create your username and password.     Your access code is: [email or web address]  Expires: 2018  6:31 AM     Your access code will  in 90 days. If you need help or a new code, please call your [place] clinic or [phone].        Care EveryWhere ID     This is your Care EveryWhere ID. This could be used by other organizations to access your [facility] medical records  [id number]        Your Vitals Were     Pulse Height Pulse Oximetry BMI (Body Mass Index)          63 1.651 m (5' 5\") 98% 28.74 kg/m2         Blood Pressure from Last 3 Encounters:   18 152/68   07/10/18 138/60   18 124/56    Weight from Last 3 Encounters:   18 78.3 kg (172 lb 11.2 oz)   07/10/18 77.6 kg (171 lb)   18 79.2 kg (174 lb 8 oz)              We Performed the Following     Follow-Up with Cardiologist          Today's Medication Changes          These changes are accurate as of 18 12:57 PM.  If you have any questions, ask your nurse or doctor.               These medicines have " changed or have updated prescriptions.        Dose/Directions    atorvastatin 40 MG tablet   Commonly known as:  LIPITOR   This may have changed:  when to take this   Used for:  Hyperlipidemia LDL goal <100        Dose:  40 mg   Take 1 tablet (40 mg) by mouth daily   Quantity:  90 tablet   Refills:  PRN                Primary Care Provider Office Phone # Fax #    Rebeca Sandoval [phone] [address] [facility] [address]        Equal Access to Services     RIGO STORY AH: Hadii aad ku hadasho Soomaali, waaxda luqadaha, qaybta kaalmada adeegyada, waxay idiin hayaan adeeg kharash la'neetu . So [facility] [phone].    ATENCIÓN: Si habla español, tiene a adorno disposición servicios gratuitos de asistencia lingüística. [facility] [phone].    We comply with applicable federal civil rights laws and [place] laws. We do not discriminate on the basis of race, color, national origin, age, disability, sex, sexual orientation, or gender identity.            Thank you!     Thank you for choosing [facility]  for your care. Our goal is always to provide you with excellent care. Hearing back from our patients is one way we can continue to improve our services. Please take a few minutes to complete the written survey that you may receive in the mail after your visit with us. Thank you!             Your Updated Medication List - Protect others around you: Learn how to safely use, store and throw away your medicines at www.disposemymeds.org.          This list is accurate as of 7/16/18 12:57 PM.  Always use your most recent med list.                   Brand Name Dispense Instructions for use Diagnosis    amLODIPine 10 MG tablet    NORVASC    90 tablet    Take 0.5 tablets (5 mg) by mouth daily    Essential hypertension with goal blood pressure less than 130/80       aspirin 81 MG EC tablet     30 tablet    Take 1 tablet (81 mg) by mouth daily    Hypertension goal BP (blood pressure) < 130/80,  Hyperlipidemia LDL goal <100       atorvastatin 40 MG tablet    LIPITOR    90 tablet    Take 1 tablet (40 mg) by mouth daily    Hyperlipidemia LDL goal <100       carvedilol 25 MG tablet    COREG    180 tablet    Take 1 tablet (25 mg) by mouth 2 times daily    Coronary artery disease due to lipid rich plaque, Status post coronary angioplasty       lactobacillus rhamnosus (GG) 10 B CELL capsule    CULTURELLE     Take 1 capsule by mouth every morning        MULTIVITAMIN & MINERAL PO      Take 1 tablet by mouth every morning        niacin 500 MG tablet     30 tablet    Take 1 tablet (500 mg) by mouth At Bedtime    Hyperlipidemia LDL goal <100       SAW PALMETTO PO      Take 1 tablet by mouth every morning        ticagrelor 90 MG tablet    BRILINTA    180 tablet    Take 1 tablet (90 mg) by mouth 2 times daily    Coronary artery disease of native artery of native heart with stable angina pectoris (H)

## 2018-07-16 NOTE — PROGRESS NOTES
[facility]  CARDIOVASCULAR MEDICINE CLINIC NOTE    Referring Provider: Referred Self   Primary Care Provider: Rebeca Sandoval     Patient Name: Jose Lira   MRN: [id number]     PERTINENT CLINICAL HISTORY:   Jose Lira is a 73 year old male ***       PAST MEDICAL HISTORY:     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     Subclavian arterial stenosis (H)     Left     Unspecified essential hypertension         PAST SURGICAL HISTORY:     Past Surgical History:   Procedure Laterality Date     COLONOSCOPY N/A 5/19/2017    Procedure: COMBINED COLONOSCOPY, SINGLE OR MULTIPLE BIOPSY/POLYPECTOMY BY BIOPSY;  Rectal bleeding;  Surgeon: Maximus Dinero MD;  [facility]: [facility]     LAPAROSCOPIC COLECTOMY LOW ANTERIOR N/A 2/22/2018    Procedure: LAPAROSCOPIC COLECTOMY LOW ANTERIOR;  Laparoscopic Assisted Low Anterior Resection, Sigmoidoscopy, Anesthesia Block (ERAS Patient);  Surgeon: Sharan Raza MD;  Location: [facility]     SIGMOIDOSCOPY FLEXIBLE N/A 2/22/2018    Procedure: SIGMOIDOSCOPY FLEXIBLE;;  Surgeon: Sharan Raza MD;  Location: [facility]     SURGICAL HISTORY OF -   10/02    angioplasty and stenting of left and internal carotid artery at the bifurcation     SURGICAL HISTORY OF - 11/11    right common iliac artery stent      VASCULAR SURGERY  2001    stent placed in carotid         CURRENT MEDICATIONS:     Current Outpatient Prescriptions   Medication Sig Dispense Refill     amLODIPine (NORVASC) 10 MG tablet Take 0.5 tablets (5 mg) by mouth daily 90 tablet PRN     aspirin 81 MG  EC tablet Take 1 tablet (81 mg) by mouth daily 30 tablet      atorvastatin (LIPITOR) 40 MG tablet Take 1 tablet (40 mg) by mouth daily (Patient taking differently: Take 40 mg by mouth every morning ) 90 tablet PRN     carvedilol (COREG) 25 MG tablet Take 1 tablet (25 mg) by mouth 2 times daily 180 tablet 3     lactobacillus rhamnosus, GG, (CULTURELLE) 10 B CELL capsule Take 1 capsule by mouth every morning        Multiple Vitamins-Minerals (MULTIVITAMIN & MINERAL PO) Take 1 tablet by mouth every morning        niacin 500 MG tablet Take 1 tablet (500 mg) by mouth At Bedtime 30 tablet PRN     Saw Palmetto, Serenoa repens, (SAW PALMETTO PO) Take 1 tablet by mouth every morning        ticagrelor (BRILINTA) 90 MG tablet Take 1 tablet (90 mg) by mouth 2 times daily 180 tablet 3     acetaminophen (TYLENOL) 500 MG tablet Take 1-2 tablets (500-1,000 mg) by mouth 4 times daily as needed for mild pain or fever (Patient not taking: Reported on 4/11/2018)       traZODone (DESYREL) 50 MG tablet Take 1 tablet (50 mg) by mouth every evening (Patient not taking: Reported on 7/10/2018) 60 tablet      [DISCONTINUED] olmesartan-hydrochlorothiazide (BENICAR HCT) 40-25 MG per tablet Take 1 tablet by mouth daily 90 tablet 3        ALLERGIES:     Allergies   Allergen Reactions     Fentanyl      Pt states it makes him feel funny but not true allergy.         FAMILY HISTORY:     Family History   Problem Relation Age of Onset     C.A.D. Father      Hypertension Father      Prostate Cancer Father      C.A.D. Brother      MI     Hypertension Brother      Arthritis Sister      Diabetes No family hx of      Cerebrovascular Disease No family hx of      Cancer - colorectal No family hx of         SOCIAL HISTORY:     Social History     Social History     Marital status: Single     Spouse name: N/A     Number of children: 0     Years of education: N/A     Occupational History     post       Social History Main Topics     Smoking status: Former  "Smoker     Packs/day: 3.00     Years: 30.00     Quit date: 8/5/1994     Smokeless tobacco: Former User     Quit date: 8/1/1994      Comment: quit 15 years ago after his stroke     Alcohol use 0.6 oz/week     1 Glasses of wine per week      Comment: 1 per day     Drug use: No     Sexual activity: No     Other Topics Concern     Parent/Sibling W/ Cabg, Mi Or Angioplasty Before 65f 55m? Yes     brother and father        REVIEW OF SYSTEMS:   A comprehensive review of systems was performed and negative unless otherwise noted in the HPI above.      PHYSICAL EXAMINATION:   /68 (BP Location: Right arm, Patient Position: Chair, Cuff Size: Adult Regular)  Pulse 63  Ht 1.651 m (5' 5\")  Wt 78.3 kg (172 lb 11.2 oz)  SpO2 98%  BMI 28.74 kg/m2  Body mass index is 28.74 kg/(m^2).  Wt Readings from Last 2 Encounters:   07/16/18 78.3 kg (172 lb 11.2 oz)   07/10/18 77.6 kg (171 lb)     Constitutional: no acute distress, pleasant and cooperative, appears overall well.  Eyes: EOMI, PERRLA, sclera white, conjunctiva clear, without icterus or pallor   Cardiovascular: RRR nl S1S2, JVP not elevated, extremities with no edema or cyanosis  Respiratory: clear to auscultation and percussion bilaterally anterior and posterior  Gastrointestinal: soft, nontender, non distended, no hepatosplenomegaly or masses  Musculoskeletal: normal muscle bulk and tone, joints   Skin: normal skin appearance without worrisome lesions.   Neurologic: AOx3, CNII-XII intact, reflexes are normal in the biceps, patellar, and achilles tendons, sensation and strength intact in the b/l upper and lower extremities.  gait smooth and symmetric  Psychiatric: appropriate affect, eye contact, intact thought and speech       LABORATORY DATA:     LIPID RESULTS:  Recent Labs   Lab Test  07/10/18   1415  06/26/17   1145   09/15/15   1208  08/25/14   1206   CHOL  137  165   < >  127  130   HDL  70  46   < >  38*  52   LDL  49  83   < >  48  43   TRIG  90  182*   < >  204* "  176*   CHOLHDLRATIO   --    --    --   3.3  2.5    < > = values in this interval not displayed.        LIVER ENZYME RESULTS:  Recent Labs   Lab Test  07/10/18   1415  04/11/18   1144   AST  19  15   ALT  14  9       CBC RESULTS:  Recent Labs   Lab Test  07/10/18   1415  05/02/18   0915   WBC  7.7  7.3   HGB  9.7*  9.2*   HCT  31.3*  29.7*   PLT  196  194       BMP RESULTS:  Recent Labs   Lab Test  07/10/18   1415  05/02/18   0915   NA  141  139   POTASSIUM  3.8  3.9   CHLORIDE  111*  108   CO2  20  21   ANIONGAP  10  10   GLC  104*  87   BUN  21  16   CR  2.12*  2.37*   ADRIAN  9.1  9.1       A1C RESULTS:  Lab Results   Component Value Date    A1C 5.4 08/15/2017       INR RESULTS:  Recent Labs   Lab Test  03/03/18   0521  12/04/17   1318   INR  1.22*  1.02          PROCEDURES & FURTHER ASSESSMENTS:     EKG: ***    ECHO: ***    STRESS TEST:  ***    CARDIAC CATH:  ***    CABG/ Cardiac surgery:***       CLINICAL IMPRESSION:     Jose Lira is a 73 year old male ***      PLAN:        Follow-up: January 2019 to discuss simplification of medications    Thank you for allowing us to take part in the care of this very pleasant patient.  Please do not hesitate to call if any further questions or concerns arise.    Jose Willett MD, PhD  Interventional/Critical Care Cardiology  [phone]    July 16, 2018      CC  Patient Care Team:  Rebeca Sandoval NP as PCP - General  Orion Motley MD as MD (Radiology)  Estefania Akers RN as Registered Nurse  Cher Yadav MD as MD (Hematology & Oncology)  Cynthia Mnodragon, GER[place] as Nurse Coordinator (Oncology)  Felecia Bundy, RN as Nurse Coordinator (Cardiology)  Carline Quezada, [place] as Nurse Coordinator (Thoracic Surgery (Cardiothoracic Vascular Surgery))  Radha Gold MD as MD (Hematology & Oncology)  Angeles Castro, [place]ER[place] as Nurse Coordinator (Oncology)  SELF, REFERRED

## 2018-07-16 NOTE — LETTER
7/16/2018      RE: Jose Lira  [address]  [place] [address]       Dear Colleague,    Thank you for the opportunity to participate in the care of your patient, Jose Lira, at the [facility] HEART [facility] at [facility]. Please see a copy of my visit note below.    [facility]  CARDIOVASCULAR MEDICINE CLINIC NOTE    Referring Provider: Referred Self   Primary Care Provider: Rebeca Sandoval     Patient Name: Jose Lira   MRN: [id number]     HPI: Jose Lira is a 73 year old gentleman w/ h/o CAD s/p PCI to the RCA and LAD, PAD s/p R ICA stent, carotid artery dz s/p LICA stent, HLD, prior CVA, CKD stage IV, and rectosigmoid CA.  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 He was discovered to have metastatic disease.  He has restarted DAPT without issues. He currently denies any easy bruising, melena, hematochezia or bleeding problems. No current orthopnea, PND, leg swelling, palpitations or chest pain.     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     Subclavian arterial stenosis (H)     Left     Unspecified essential hypertension        CURRENT MEDICATIONS:  Current Outpatient Prescriptions   Medication Sig Dispense Refill     amLODIPine (NORVASC) 10 MG tablet Take 0.5 tablets (5 mg) by mouth daily 90 tablet PRN     aspirin 81 MG EC tablet Take 1 tablet (81 mg) by mouth daily 30 tablet      atorvastatin (LIPITOR) 40 MG tablet Take 1 tablet (40 mg)  by mouth daily (Patient taking differently: Take 40 mg by mouth every morning ) 90 tablet PRN     carvedilol (COREG) 25 MG tablet Take 1 tablet (25 mg) by mouth 2 times daily 180 tablet 3     lactobacillus rhamnosus, GG, (CULTURELLE) 10 B CELL capsule Take 1 capsule by mouth every morning        Multiple Vitamins-Minerals (MULTIVITAMIN & MINERAL PO) Take 1 tablet by mouth every morning        niacin 500 MG tablet Take 1 tablet (500 mg) by mouth At Bedtime 30 tablet PRN     Saw Palmetto, Serenoa repens, (SAW PALMETTO PO) Take 1 tablet by mouth every morning        ticagrelor (BRILINTA) 90 MG tablet Take 1 tablet (90 mg) by mouth 2 times daily 180 tablet 3     acetaminophen (TYLENOL) 500 MG tablet Take 1-2 tablets (500-1,000 mg) by mouth 4 times daily as needed for mild pain or fever (Patient not taking: Reported on 4/11/2018)       traZODone (DESYREL) 50 MG tablet Take 1 tablet (50 mg) by mouth every evening (Patient not taking: Reported on 7/10/2018) 60 tablet      [DISCONTINUED] olmesartan-hydrochlorothiazide (BENICAR HCT) 40-25 MG per tablet Take 1 tablet by mouth daily 90 tablet 3       PAST SURGICAL HISTORY:  Past Surgical History:   Procedure Laterality Date     COLONOSCOPY N/A 5/19/2017    Procedure: COMBINED COLONOSCOPY, SINGLE OR MULTIPLE BIOPSY/POLYPECTOMY BY BIOPSY;  Rectal bleeding;  Surgeon: Maximus Dinero MD;  Location: [facility]     LAPAROSCOPIC COLECTOMY LOW ANTERIOR N/A 2/22/2018    Procedure: LAPAROSCOPIC COLECTOMY LOW ANTERIOR;  Laparoscopic Assisted Low Anterior Resection, Sigmoidoscopy, Anesthesia Block (ERAS Patient);  Surgeon: Sharan Raza MD;  [facility]     SIGMOIDOSCOPY FLEXIBLE N/A 2/22/2018    Procedure: SIGMOIDOSCOPY FLEXIBLE;;  Surgeon: Sharan Raza MD;  Location: [facility]     SURGICAL HISTORY OF -   10/02    angioplasty and stenting of left and internal carotid artery at the bifurcation     SURGICAL HISTORY OF - 11/11    right common iliac artery stent      VASCULAR SURGERY  2001  "   stent placed in carotid        ALLERGIES  Fentanyl    FAMILY HX:  Family History   Problem Relation Age of Onset     C.A.D. Father      Hypertension Father      Prostate Cancer Father      C.A.D. Brother      MI     Hypertension Brother      Arthritis Sister      Diabetes No family hx of      Cerebrovascular Disease No family hx of      Cancer - colorectal No family hx of        SOCIAL HX:  Social History     Social History     Marital status: Single     Spouse name: N/A     Number of children: 0     Years of education: N/A     Occupational History     post       Social History Main Topics     Smoking status: Former Smoker     Packs/day: 3.00     Years: 30.00     Quit date: 8/5/1994     Smokeless tobacco: Former User     Quit date: 8/1/1994      Comment: quit 15 years ago after his stroke     Alcohol use Yes      Comment: varies. 1 per day if that      Drug use: No     Sexual activity: No     Other Topics Concern     Parent/Sibling W/ Cabg, Mi Or Angioplasty Before 65f 55m? Yes     brother and father     Social History Narrative    [facility]  Was never .  Had no children.  Has a two brothers that have passed away.  He has two sister, one with Alzheimer and one alive and well.  Sister will be here for surgery.         ROS:  Constitutional: No fever, chills, or sweats. No weight gain/loss.   ENT: No visual disturbance, ear ache, epistaxis, sore throat.   Allergies/Immunologic: Negative.   Respiratory: No cough, hemoptysis.   Cardiovascular: As per HPI.   GI: No nausea, vomiting, hematemesis, melena, or hematochezia.   : No urinary frequency, dysuria, or hematuria.   Integument: Negative.   Psychiatric: Negative.   Neuro: Negative.   Endocrinology: Negative.   Musculoskeletal: No myalgia.    VITAL SIGNS:  /68 (BP Location: Right arm, Patient Position: Chair, Cuff Size: Adult Regular)  Pulse 63  Ht 1.651 m (5' 5\")  Wt 78.3 kg (172 lb 11.2 oz)  SpO2 98%  BMI 28.74 " kg/m2  Body mass index is 28.74 kg/(m^2).  Wt Readings from Last 2 Encounters:   07/16/18 78.3 kg (172 lb 11.2 oz)   07/10/18 77.6 kg (171 lb)       PHYSICAL EXAM  Constitutional: no acute distress, pleasant and cooperative, appears overall well.  Eyes: sclera white, conjunctiva clear, without icterus or pallor   Cardiovascular: RRR nl S1S2, JVP not elevated, extremities with no edema or cyanosis  Respiratory: clear to auscultation and percussion bilaterally anterior and posterior  Gastrointestinal: soft, nontender, non distended, no hepatosplenomegaly or masses  Musculoskeletal: normal muscle bulk and tone, joints   Skin: normal skin appearance without worrisome lesions.   Neurologic: no gross deficits, no tremor  Psychiatric: appropriate affect, eye contact, intact thought and speech    LABS    Lab Results   Component Value Date    WBC 8.6 12/19/2017     Lab Results   Component Value Date    RBC 3.08 12/19/2017     Lab Results   Component Value Date    HGB 8.9 12/19/2017     Lab Results   Component Value Date    HCT 27.7 12/19/2017     No components found for: MCT  Lab Results   Component Value Date    MCV 90 12/19/2017     Lab Results   Component Value Date    MCH 28.9 12/19/2017     Lab Results   Component Value Date    MCHC 32.1 12/19/2017     Lab Results   Component Value Date    RDW 13.1 12/19/2017     Lab Results   Component Value Date     12/19/2017      Recent Labs   Lab Test  12/19/17   1219  12/04/17   1227   NA  141  140   POTASSIUM  4.3  4.7   CHLORIDE  110*  110*   CO2  20  17*   ANIONGAP  11  13   GLC  103*  139*   BUN  23  27   CR  2.43*  2.75*   ADRIAN  9.2  9.5     Recent Labs   Lab Test  06/26/17   1145  07/05/16   1135  09/15/15   1208  08/25/14   1206   CHOL  165  140  127  130   HDL  46  47  38*  52   LDL  83  56  48  43   TRIG  182*  186*  204*  176*   CHOLHDLRATIO   --    --   3.3  2.5   NHDL  119  93   --    --         EKG:  Sinus with early repolarization abnormality      STRESS TEST:   8/4/17  Interpretation Summary  Abnormal stress echo.  Mid septal hypokinesis at rest, with mid septal and apical lateral ischemia at  peak stress. Suspect multi-vessel CAD.  Target heart rate was achieved. Heart rate and blood pressure response to  dobutamine were normal. With stress, the left ventricular ejection fraction  increased from 55-60% to greater than 65% and the left ventricular size  decreased appropriately.  No subjective symptoms  ST segment depression in inferolateral leads during recovery period, but not  at peak stress.  No significant valve disease on screening doppler evaluation. The aortic root  and visualized ascending aorta are normal.       CARDIAC CATH:    8/15/17  CORONARY ANGIOGRAM:   1. Both coronary arteries arise from their respective cusps.  2. Right dominant.  3. LM has diffuse disease up to 20% stenosis in the distal LM.   4. LAD supplies the entire apex (type 3) and gives rise to septal perforators, large high D1, a medium caliber D2 and trivial D3.  The proximal and mid-LAD are heavily calcified.  There is moderate(50-60%) diffuse disease noted in the pLAD with a focal 80% stenosis proximal to D2.  The mLAD has a long 99% stenosis just distal to D2.  The dLAD has moderate diffuse disease.  D1 has a focal eccentric 70% stenosis in the proximal portion followed by a discrete 60% stenosis in the mid-portion..   5. LCX is a large vessel giving rise to a trivial OM1 and large OM2.  The pLCx is heavily calcified.  There is diffuse 50% disease of the pLCx including the ostium with a focal 70% stenosis.  The mLCx and dLCx are small distal to the OM2 bifurcation.  OM2 has minimal irregularities.  6. RCA is a medium caliber vessel giving rise to the RPDA and RPLA branches. The RCA has sequential proximal to mid 50 to 80% stenoses with moderate calcification.  The dRCA mild luminal irregularities.  The ostial RPDA has 50% stenosis.  The RPLAs have mild luminal irregularities.         FUNCTIONAL ASSESSMENT:  Adequate anticoagulation was obtained with IV UFH. An Aeris FFR wire was passed through the 6Fr diagnostic catheter into the RCA and into the RPDA.  Hyperemia was induced with IC Adenosine (80 mcg).  The wire was then withdrawn and repeated FFR measurements were assessed at the RPDA, dRCA, mRCA, and ostial RCA.  The results are below:  RPDA: 0.39  DRCA: 0.41  MRCA: 0.74  Ostial RCA: 1.0      Limited Angiogram of the Subclavian artery:  The ostium and proximal left sublcavian artery are heavily calcified without a significant stenosis.    11/16/17  SELECTIVE LEFT CORONARY ANGIOGRAM:   1. The LM has mild (<25%) proximal disease.  2. LAD is a type III vessel (supplies entire apex). Prior to PCI it is a medium caliber vessel with heavy proximal calcification, proximal 80% stenosis, mid focal 99% stenosis with LORRAINE 2 flow distally, and moderate 50-70% is the distal vessel  3. LCX has sequential ostial and proximal 60% stenoses.     PERCUTANEOUS CORONARY INTERVENTION of the mid- proximal LAD:  A 6 Fr  XB LF 3.5 GC was positioned at the ostium of the LMCA. IV UFH was administered to achieve adequate anticoagulation.     A Pilot50 wire was advanced across the LAD lesions and positioned in the dLAD. The lesions were pre-dilated with 1.2x08mm, 1.5x15mm, and 2.0x12mm balloons sequentially. A Synergy drug eluting stent 2.5x28mm was successfully deployed in the mid LAD. The proximal segment of the stent was post-dilated with 2.5x12 NC balloon. A second Synergy drug eluting stent 3.0x20 mm was placed overlapping the first from the mid to proximal LAD. This was post dilated with a 3.0 x12 NC balloon. A third Synergy drug eluting stent 3.5x16mm was placed overlapping into the proximal LAD. This was post dilated was a 3.5x12 NC balloon. The Pilot50 wire was then advanced into the D2 branch and struts were dilated with the 2.0x 12mm balloon. Final angiography showed no evidence of perforation or  dissection with residual stenosis of 0% and LORRAINE 3 flow. No complications.     FUNCTIONAL ASSESSMENT of LCx:  The Aeris pressure wire was advanced into the distal LCx. Hyperemia was induced with IC Adenosine (80 and 100 mcg).  The FFR at peak hyperemia was non-significant, 0.83 and 0.84, respectively.    12/19/17  CORONARY ANGIOGRAM:   LCA not engaged today due to contrast limitations  RCA (dominant) gives rise to PL branches and supplies PDA. The prox to mid RCA has is moderate to severely calcified, with two 80% stenoses. The distal vessel has mild diffuse disease.     PERCUTANEOUS CORONARY INTERVENTION:  Prox-Mid RCA  JR 4  IV UFH for anticoagulation     A runthrough wire was advanced across the mid RCA lesion and positioned in the distal RPDA.  A 3.0x12mm balloon was used to pre-dilate the lesion. We pre-dilated the entire lesion from the prox to mid segment of the vessel.  We had significant difficulty advancing the stent across the lesion. The vessel was again dilated with a 3.0x12mm balloon and a 6Fr guideliner was inserted for additional support. A 3.0x38mm Synergy drug eluting stent was successfully deployed across the lesion with inflation to 14 abdullahi.  We placed a second stent, 3.5x16mm Synergy NICOLE in the prox RCA overlapping with the first stent. The entire stented segment was post-dilated with a 3.5x12mm non-compliant balloon.  Final angiography showed no evidence of perforation or dissection with residual stenosis of 0% and LORRAINE 3 flow.  No complications.    ASSESSMENT AND PLAN:  72 year old gentleman with rectosigmoid malignancy found to have 2 vessel CAD LAD/RCA s/p PCI 11/16 and 12/19 respectively, LCx moderate lesion FFR 0.83. Presenting today for routine follow up.     1. 2vCAD: s/p PCI to LAD and RCA, LCx FFR 0.83, stable, no angina, on DAPT (ticagrelor/aspirin)    2. HTN, not at a goal (<130/80), on two drug therapy    3. HLD, last LDL 83    4. PAD, s/p RCIA stent, Curry 3    5. Carotid  stenosis s/p LICA, stable, asymptomatic    PLAN  -- continue Coreg 25 mg BID  -- continue DAPT until 12/2018 at which time we will switch to ASA and plavix   -- continue with atorvastatin 40 mg and amlodipine 10 mg daily  -- follow-up with vascular medicine regarding PAD    RTC Jan 2019.     Patient seen and discussed with Dr. Willett.     Tristan Juarez MD  Cardiology Fellow     ATTENDING ATTESTATION:   I personally examined and evaluated this patient on July 16, 2018.   I have reviewed today's vital signs, medications, labs, and imaging results. I have reviewed and edited, as necessary, the history, review of systems, physical examination, and assessment and plan. I discussed the patient with Dr. Juarez, and agree with the assessment and plan of care as documented in the note above.    Thank you for allowing us to take part in the care of this very pleasant patient.  Please do not hesitate to call if any further questions or concerns arise.    Jose Willett MD, PhD  Interventional/Critical Care Cardiology  [phone]    July 16, 2018

## 2018-08-14 NOTE — TELEPHONE ENCOUNTER
"Requested Prescriptions   Pending Prescriptions Disp Refills     atorvastatin (LIPITOR) 40 MG tablet [Pharmacy Med Name: Atorvastatin Calcium Oral Tablet 40 MG]  Last Written Prescription Date:  6-26-17  Last Fill Quantity: 90 tab,  # refills: PRN   Last office visit: 7/10/2018 with prescribing provider:  Arlet Sandoval    Future Office Visit:    90 tablet PRN     Sig: TAKE ONE TABLET BY MOUTH ONE TIME DAILY    Statins Protocol Passed    8/13/2018  2:22 PM       Passed - LDL on file in past 12 months    Recent Labs   Lab Test  07/10/18   1415   LDL  49          Passed - No abnormal creatine kinase in past 12 months    No lab results found.        Passed - Recent (12 mo) or future (30 days) visit within the authorizing provider's specialty    Patient had office visit in the last 12 months or has a visit in the next 30 days with authorizing provider or within the authorizing provider's specialty.  See \"Patient Info\" tab in inbasket, or \"Choose Columns\" in Meds & Orders section of the refill encounter.           Passed - Patient is age 18 or older          "

## 2018-09-07 NOTE — TELEPHONE ENCOUNTER
"Requested Prescriptions   Pending Prescriptions Disp Refills     amLODIPine (NORVASC) 10 MG tablet [Pharmacy Med Name: AmLODIPine Besylate Oral Tablet 10 MG]  DUPLICATE.   Last Written Prescription Date:  3-8-18  Last Fill Quantity: 90 tab,  # refills: PRN   Last office visit: 7/10/2018 with prescribing provider:  Arlet Sandoval    Future Office Visit:    90 tablet PRN     Sig: TAKE ONE TABLET BY MOUTH ONE TIME DAILY    Calcium Channel Blockers Protocol  Failed    9/6/2018  7:56 AM       Failed - Blood pressure under 140/90 in past 12 months    BP Readings from Last 3 Encounters:   07/16/18 152/68   07/10/18 138/60   05/02/18 124/56          Failed - Normal serum creatinine on file in past 12 months    Recent Labs   Lab Test  07/10/18   1415   06/02/17   1122   CR  2.12*   < >   --    CREAT   --    --   2.6*    < > = values in this interval not displayed.          Passed - Recent (12 mo) or future (30 days) visit within the authorizing provider's specialty    Patient had office visit in the last 12 months or has a visit in the next 30 days with authorizing provider or within the authorizing provider's specialty.  See \"Patient Info\" tab in inbasket, or \"Choose Columns\" in Meds & Orders section of the refill encounter.           Passed - Patient is age 18 or older          "

## 2018-09-08 NOTE — TELEPHONE ENCOUNTER
Routing refill request to provider for review/approval because:  Labs out of range:  Creatinine  -BP elevated      Arlet-Please sign if agree.    Thank you!  NATALIE Morley, AZULN, RN

## 2018-10-17 NOTE — MR AVS SNAPSHOT
After Visit Summary   10/17/2018    Jose Lira    MRN: [id number]           Patient Information     Date Of Birth          1945        Visit Information        Provider Department      10/17/2018 1:45 PM Rebeca Sandoval NP [facility]        Today's Diagnoses     Boil    -  1    Hypertension goal BP (blood pressure) < 130/80          Care Instructions    Take the antibiotic twice daily for 10 days.  Use a warm compress to the area 3-4 times a day.  Let me know if it is not resolved in 2 weeks or sooner if worsens.    Check your blood pressure at home a couple of times a week.           Follow-ups after your visit        Your next 10 appointments already scheduled     Jan 14, 2019  1:00 PM CST   (Arrive by 12:45 PM)   Return Visit with Jose Willett MD   [facility] ([facility])    [address] [place] [address]  [address] [place] [id number] [phone]              Who to contact     If you have questions or need follow up information about today's clinic visit or your schedule please contact [facility] directly at [phone].  Normal or non-critical lab and imaging results will be communicated to you by MyChart, letter or phone within 4 business days after the clinic has received the results. If you do not hear from us within 7 days, please contact the clinic through MyChart or phone. If you have a critical or abnormal lab result, we will notify you by phone as soon as possible.  Submit refill requests through [facility] or call your pharmacy and they will forward the refill request to us. Please allow 3 business days for your refill to be completed.          Additional Information About Your Visit        Care EveryWhere ID     This is your Care EveryWhere ID. This could be used by other organizations to access your [facility] medical records  [id number]        Your Vitals Were     Pulse Temperature  Respirations Pulse Oximetry BMI (Body Mass Index)       79 97  F (36.1  C) (Oral) 16 97% 29.18 kg/m2        Blood Pressure from Last 3 Encounters:   10/17/18 148/68   07/16/18 152/68   07/10/18 138/60    Weight from Last 3 Encounters:   10/17/18 175 lb 6 oz (79.5 kg)   07/16/18 172 lb 11.2 oz (78.3 kg)   07/10/18 171 lb (77.6 kg)              Today, you had the following     No orders found for display         Today's Medication Changes          These changes are accurate as of 10/17/18  2:26 PM.  If you have any questions, ask your nurse or doctor.               Start taking these medicines.        Dose/Directions    sulfamethoxazole-trimethoprim 800-160 MG per tablet   Commonly known as:  BACTRIM DS/SEPTRA DS   Used for:  Boil   Started by:  Rebeca Sandoval NP        Dose:  1 tablet   Take 1 tablet by mouth 2 times daily for 10 days   Quantity:  20 tablet   Refills:  0         These medicines have changed or have updated prescriptions.        Dose/Directions    amLODIPine 10 MG tablet   Commonly known as:  NORVASC   This may have changed:  Another medication with the same name was removed. Continue taking this medication, and follow the directions you see here.   Used for:  Essential hypertension with goal blood pressure less than 130/80   Changed by:  Rebeca Sandoval NP        TAKE ONE TABLET BY MOUTH ONE TIME DAILY   Quantity:  90 tablet   Refills:  1            Where to get your medicines      These medications were sent to [id number] - [place], [place] - [address]  [address] [facility] [address]     Phone:  [phone]     sulfamethoxazole-trimethoprim 800-160 MG per tablet                Primary Care Provider Office Phone # Fax #    Rebeca Sandoval [phone] [address] [facility] [address]        Equal Access to Services     RIGO STORY AH: Jeremiah Olvera, waminida luqadaha, qaybta kaalharesh vasquez  charlottesylviareuben casianoRonalaaeaston ah. So [facility] [phone].    ATENCIÓN: Si murray diehl, tiene a adorno disposición servicios gratuitos de asistencia lingüística. Rashad ceja [phone].    We comply with applicable federal civil rights laws and [place] laws. We do not discriminate on the basis of race, color, national origin, age, disability, sex, sexual orientation, or gender identity.            Thank you!     Thank you for choosing [facility]  for your care. Our goal is always to provide you with excellent care. Hearing back from our patients is one way we can continue to improve our services. Please take a few minutes to complete the written survey that you may receive in the mail after your visit with us. Thank you!             Your Updated Medication List - Protect others around you: Learn how to safely use, store and throw away your medicines at www.disposemymeds.org.          This list is accurate as of 10/17/18  2:26 PM.  Always use your most recent med list.                   Brand Name Dispense Instructions for use Diagnosis    amLODIPine 10 MG tablet    NORVASC    90 tablet    TAKE ONE TABLET BY MOUTH ONE TIME DAILY    Essential hypertension with goal blood pressure less than 130/80       aspirin 81 MG EC tablet     30 tablet    Take 1 tablet (81 mg) by mouth daily    Hypertension goal BP (blood pressure) < 130/80, Hyperlipidemia LDL goal <100       atorvastatin 40 MG tablet    LIPITOR    90 tablet    TAKE ONE TABLET BY MOUTH ONE TIME DAILY    Hyperlipidemia LDL goal <100       carvedilol 25 MG tablet    COREG    180 tablet    Take 1 tablet (25 mg) by mouth 2 times daily    Coronary artery disease due to lipid rich plaque, Status post coronary angioplasty       lactobacillus rhamnosus (GG) 10 B CELL capsule    CULTURELLE     Take 1 capsule by mouth every morning        MULTIVITAMIN & MINERAL PO      Take 1 tablet by mouth every morning        niacin 500 MG tablet     30 tablet    Take 1 tablet (500 mg)  by mouth At Bedtime    Hyperlipidemia LDL goal <100       SAW PALMETTO PO      Take 1 tablet by mouth every morning        sulfamethoxazole-trimethoprim 800-160 MG per tablet    BACTRIM DS/SEPTRA DS    20 tablet    Take 1 tablet by mouth 2 times daily for 10 days    Boil       ticagrelor 90 MG tablet    BRILINTA    180 tablet    Take 1 tablet (90 mg) by mouth 2 times daily    Coronary artery disease of native artery of native heart with stable angina pectoris (H)

## 2018-10-17 NOTE — PROGRESS NOTES
SUBJECTIVE:   Jose Lira is a 73 year old male who presents to clinic today for the following health issues:      Mouth/Lip Problem      Duration: 1 week     Description (location/character/radiation): boil on right side of mouth     Accompanying signs and symptoms: none     History (similar episodes/previous evaluation): None    Therapies tried and outcome: nothing   Slightly tender.  No drainage.   No significant change in size.  No fever.     He has been doing well emotionally.  Feels a bit tired.  He denies any chest pain or shortness of breath.    He does have metastatic colon cancer and has chosen to not pursue any treatment.  He has been in contact with hospice, currently is not in need of any services.  He lives alone, is stable.  He has a sister that is available for assistance if needed.     Problem list and histories reviewed & adjusted, as indicated.  Additional history: as documented        Reviewed and updated as needed this visit by clinical staff  Tobacco  Meds  Med Hx  Surg Hx  Fam Hx  Soc Hx      Reviewed and updated as needed this visit by Provider           OBJECTIVE:     /68  Pulse 79  Temp 97  F (36.1  C) (Oral)  Resp 16  Wt 175 lb 6 oz (79.5 kg)  SpO2 97%  BMI 29.18 kg/m2  Body mass index is 29.18 kg/(m^2).  GENERAL: healthy, alert and no distress  HENT: normal cephalic/atraumatic, ear canals and TM's normal and there is a 1 cm erythematous nodule near the left corner of his mouth, consistent for a boil  RESP: lungs clear to auscultation - no rales, rhonchi or wheezes  CV: regular rate and rhythm, normal S1 S2, no S3 or S4, no murmur, click or rub, no peripheral edema and peripheral pulses strong  PSYCH: mentation appears normal, affect flattened        ASSESSMENT/PLAN:             1. Boil  Patient Instructions   Take the antibiotic twice daily for 10 days.  Use a warm compress to the area 3-4 times a day.  Let me know if it is not resolved in 2 weeks or sooner if  worsens.    Check your blood pressure at home a couple of times a week.      - sulfamethoxazole-trimethoprim (BACTRIM DS/SEPTRA DS) 800-160 MG per tablet; Take 1 tablet by mouth 2 times daily for 10 days  Dispense: 20 tablet; Refill: 0    2. Hypertension goal BP (blood pressure) < 130/80  Not at goal  Check blood pressure at home and call with results.     3. CKD (chronic kidney disease) stage 3, GFR 30-59 ml/min (H)  Stable.  He declines follow up with nephrology.     4. Rectal cancer (H)  Currently stable.           Rebeca Sandoval, JAYCEE  [facility]

## 2018-10-17 NOTE — PATIENT INSTRUCTIONS
Take the antibiotic twice daily for 10 days.  Use a warm compress to the area 3-4 times a day.  Let me know if it is not resolved in 2 weeks or sooner if worsens.    Check your blood pressure at home a couple of times a week.

## 2018-11-05 ENCOUNTER — TELEPHONE (OUTPATIENT)
Dept: FAMILY MEDICINE | Facility: CLINIC | Age: 73
End: 2018-11-05

## 2018-11-05 NOTE — TELEPHONE ENCOUNTER
Rebeca Wick NP - TC     Please review message from medical examiner   Phone call to medical examiner - [place] police responded to scene - patient found in bathroom after activating his med alert - found  in bathroom - no trauma, stage 4 colon cancer, he was released to  home - appears natural death, 2205 pm     Please hira chart as  TC time of death 2018 10:05 pm       Thank you,    Felecia Nuno Registered Nurse   [facility]

## 2018-11-05 NOTE — TELEPHONE ENCOUNTER
Reason for Call:  Other call back    Detailed comments: [facility] Medical Examiners Office states the patient has passed away. They are wondering if PCP will sign the death certificate. Please assist. Thanks!    Phone Number Patient can be reached at: Other phone number: [phone]    Best Time: Any    Can we leave a detailed message on this number? Not Applicable    Call taken on 11/5/2018 at 10:35 AM by Elizabeth Ortega

## 2020-05-26 NOTE — PROGRESS NOTES
Please call her and tell her she needs to get her TSH ordered and set up a follow up with me.   Overdue to get tsh checked   I refilled 30 days Problem: Patient Care Overview  Goal: Plan of Care/Patient Progress Review  Outcome: No Change  [id number]  Neuro: Has VPM. A&O x3. Occasionally confused to situation. Used call light appropriately this shift.  Cardiac: Sinus rhythm. B/P WNL.   Respiratory: Sats low to mid 90's on 3L of O2. Titrated down from 4L. Attempted titrated to 2L but pt unable to hold sats. Encouraged ISP.   GI/: Walter out at 0930. Pt able to void on own at 1430. No BM but passing flatus.   Diet/appetite: Low fiber diet. Needs encouragement to order meals but good appetite.   Activity: Up with SBA. Up to chair x2. Ambulated in hallways.   Pain: Denies.   Skin: Lap sites CDI with ecchymosis.   LDA's: R. PIV SL.       Plan: Encourage ISP and wean O2. Plans to d/c to TCU tomorrow. Continue with POC

## 2021-05-31 VITALS — BODY MASS INDEX: 31.28 KG/M2 | WEIGHT: 188 LBS

## 2021-05-31 VITALS — WEIGHT: 186.8 LBS | BODY MASS INDEX: 31.09 KG/M2

## 2021-06-01 VITALS — WEIGHT: 186.6 LBS | BODY MASS INDEX: 31.05 KG/M2

## 2021-06-01 VITALS — WEIGHT: 186 LBS | BODY MASS INDEX: 30.95 KG/M2

## 2021-06-16 NOTE — PROGRESS NOTES
[facility]    Facility:   [facility] [[id number]]   Code Status: FULL CODE      CHIEF COMPLAINT/REASON FOR VISIT:  Chief Complaint   Patient presents with     Problem Visit     Medication Management       HISTORY:      HPI: Jose is a 73 y.o. male with multiple significant medical problems as listed in past medical history section who was recently admitted to the [facility] TCU after having a colon resection for colon and rectosigmoid cancer that was complicated. He was admitted to [facility] on 2/22/2018 and was discharged on 3/6/2018.     Hospital course has been presented in previous notes. Today we are having a brief discussion regarding a medication pharmacy consult. The consultation revealed that he should be taking medications with food and that he should consider discontinuing tizanidine. However, the patient states he has been taking tizanidine for quite some time and he does not want to stop taking this medication. He does not have any urinary retention or other urinary symptoms. He states this medication works well for him. He understands risks and benefits.     Past Medical History:   Diagnosis Date     Acute, but ill-defined, cerebrovascular disease      CAD (coronary artery disease)      CKD (chronic kidney disease), stage IV      Colon cancer      Essential hypertension      History of CVA (cerebrovascular accident)      Intermittent claudication      Pulmonary nodules      Pure hypercholesterolemia      PVD (peripheral vascular disease)      Rectosigmoid cancer      Subclavian arterial stenosis     left             Family History   Problem Relation Age of Onset     Coronary artery disease Father      Hypertension Father      Prostate cancer Father      Arthritis Sister      Coronary artery disease Brother      Hypertension Brother      Heart attack Brother      Social History     Social History     Marital status: Single     Spouse name: N/A      Number of children: N/A     Years of education: N/A     Social History Main Topics     Smoking status: Former Smoker     Packs/day: 3.00     Years: 30.00     Quit date: 8/5/1994     Smokeless tobacco: Former User     Quit date: 8/1/1994     Alcohol use 0.6 oz/week     1 Glasses of wine per week     Drug use: No     Sexual activity: No     Other Topics Concern     None     Social History Narrative       REVIEW OF SYSTEM:  Pertinent items are noted in HPI.    PHYSICAL EXAM:   /72  Pulse 81  Temp 98.2  F (36.8  C)  Resp 18  Wt 186 lb 12.8 oz (84.7 kg)  SpO2 90%  General appearance: alert, appears stated age and cooperative  Head: Normocephalic, without obvious abnormality, atraumatic  Lungs: respirations without effort  Skin: PWDI except for incision above umbilicus which is clean, dry, and approximated  Neurologic: Grossly normal      LABS:   None today.     ASSESSMENT:      ICD-10-CM    1. Medication management Z79.899        PLAN:   Medication Management  -Patient did not want to change medications even though there are risks in elderly patients.   -Paperwork and orders signed and left with nursing staff. Changed order to ensure patient was taking medications with food.    -Patient verbalized understanding of risks and benefits and continues to disagrees to medication changes, medication to stay the same.   -Follow up as needed.       Continue previous plan:    Admission history and physical per MD in the next week. At this time continue current care plan for all chronic medical conditions, as they are stable. Encouraged patient to engage in PT/OT for strengthening and conditioning. Encouraged patient to work closely with nursing staff to ensure any medical complaints are quickly addressed. Follow up this week or sooner if needed. Will continue to monitor patient and work with nursing staff collaboratively to work toward positive patient outcomes.    Physical Deconditioning due to Colon  Cancer/Rectosigmoid Cancer and recent Colon Resection  -Continue PT/OT and other therapies as per care plan.  -Encouraged good nutrition and movement habits.   -Discussed care plan and expected course of stay.   -Continue to follow-up per routine schedule or sooner if needed.     Greater than 15 minutes spent with patient with at least 55% of this time spent on counseling, education, and discussion of the above care plan with nursing staff, and patient.     Electronically signed by: Alysha Asencio CNP

## 2021-06-16 NOTE — PROGRESS NOTES
"[facility]    Facility:   [facility] [[id number]]   Code Status: FULL CODE      CHIEF COMPLAINT/REASON FOR VISIT:  Chief Complaint   Patient presents with     Review Of Multiple Medical Conditions     Colon Cancer, Rectosigmoid Cancer, Colon Resection, Physical Deconditioning       HISTORY:      HPI: Jose is a 73 y.o. male with multiple significant medical problems as listed in past medical history section who was recently admitted to the [facility] TCU after having a colon resection for colon and rectosigmoid cancer that was complicated. He was admitted to [facility] on 2/22/2018 and was discharged on 3/6/2018.     Hospital course presented from previous notes as such:   \"74 y/o M, PMH of HTN, CAD, s/p PCI of LAD in 2016, PCA of proximal & mid RCA in 2017 on anticoagulation, PVD, chronic renal insufficiency, recently diagnosed with rectal cancer. On 2/22/2018 underwent laparoscopic low anterior resection with flexible sigmoidoscopy. Post-operative course complicated by delirium, acute hypoxic respiratory failure due to volume overload and possible hospital acquired pneumonia.     Hospital Course:   Post-operatively was gently fluid resuscitated. Pain was controlled on minimal opiates. Pt developed post-operative delirium and confusion. Due to this, his cardiac status was evaluated. Pt did have a slight troponin elevation. Pt was seen by Cardiology and due to lack of EKG changes and symptoms, slight troponin elevation likely due to demand ischemia and not ACS. Pt also developed acute hypoxic respiratory failure requiring up to 12L oxymask and then bipap. Due to elevated creatinine bilateral LE US was obtained and this was negative for blood clots. ECHO showed an EF of 65-70%, no wall motion abnormalities, some dilation of IVC with normal respiratory variation. CXR showed pulmonary edema. Pt was given lasix. Pt's home aspirin was started on POD#1 and ticagralor was " "restarted on POD#2. Other work up included UA was checked and this was negative. Pt was noted to have a slight increase in WBC to 11 on POD#7 and was started on levaquin for a possible pneumonia for which he is to complete 7 days. Last dose of levaquin is to be given on 3/7. Pt did have mild elevation in creatinine initially with diuresis, but this stabilized. Pt was able to tolerate further diuresis with a stable creatinine. Oxygen was significantly weaned down to needing only intermittently 1-2L NC by the time pt was discharged. Pt is to continue weaning off oxygent while at rehabilitation facility. Pt developed ecchymosis on his abdomen superior to pfannenstiel incision and right arm, therefore his home aspirin was held on 3/3 and pt is to continue holding aspirin for an additional week. Pt had minimal pain at the time of discharge and can take tylenol as needed. If pt develops constipation, pt can take miralax every few days as needed.     Patient is to follow up in the [facility] in 1-2 week with Kailey Quiles NP and then with Dr. Raza in 2-3 weeks after.\"    Today he is resting in his room at the TCU and has just finished PT/OT. He states he is feeling well and thinks everything is going well. He does not have any medical issues he would like to discuss today. He is excited to get strong and go home again.  denies any other concerns including fevers/chills, cough or cold symptoms, headaches, vision changes, chest pain/pressure, difficulty breathing, SOB, abdominal pain, nausea, vomiting, diarrhea, dysuria, increasing weakness, increasing pain.     Past Medical History:   Diagnosis Date     Acute, but ill-defined, cerebrovascular disease      CAD (coronary artery disease)      CKD (chronic kidney disease), stage IV      Colon cancer      Essential hypertension      History of CVA (cerebrovascular accident)      Intermittent claudication      Pulmonary nodules      Pure " hypercholesterolemia      PVD (peripheral vascular disease)      Rectosigmoid cancer      Subclavian arterial stenosis     left             Family History   Problem Relation Age of Onset     Coronary artery disease Father      Hypertension Father      Prostate cancer Father      Arthritis Sister      Coronary artery disease Brother      Hypertension Brother      Heart attack Brother      Social History     Social History     Marital status: Single     Spouse name: N/A     Number of children: N/A     Years of education: N/A     Social History Main Topics     Smoking status: Former Smoker     Packs/day: 3.00     Years: 30.00     Quit date: 8/5/1994     Smokeless tobacco: Former User     Quit date: 8/1/1994     Alcohol use 0.6 oz/week     1 Glasses of wine per week     Drug use: No     Sexual activity: No     Other Topics Concern     None     Social History Narrative       REVIEW OF SYSTEM:  Pertinent items are noted in HPI.    PHYSICAL EXAM:   /62  Pulse 82  Temp 97.3  F (36.3  C)  Resp 18  Wt 188 lb (85.3 kg)  SpO2 94%  General appearance: alert, appears stated age and cooperative  Head: Normocephalic, without obvious abnormality, atraumatic  Lungs: clear to auscultation bilaterally  Heart: regular rate and rhythm, S1, S2 normal, no murmur, click, rub or gallop  Abdomen: soft, non-tender; bowel sounds normal; no masses,  no organomegaly  Extremities: extremities normal, atraumatic, no cyanosis or edema  Pulses: 2+ and symmetric  Skin: PWDI except for incision above umbilicus which is clean, dry, and approximated  Neurologic: Grossly normal      LABS:   None today.     ASSESSMENT:      ICD-10-CM    1. Rectosigmoid cancer C19    2. Colon cancer C18.9    3. S/P colon resection Z90.49    4. Physical deconditioning R53.81        PLAN:    Admission history and physical per MD in the next week. At this time continue current care plan for all chronic medical conditions, as they are stable. Encouraged patient to  engage in PT/OT for strengthening and conditioning. Encouraged patient to work closely with nursing staff to ensure any medical complaints are quickly addressed. Follow up this week or sooner if needed. Will continue to monitor patient and work with nursing staff collaboratively to work toward positive patient outcomes.    Physical Deconditioning due to Colon Cancer/Rectosigmoid Cancer and recent Colon Resection  -Continue PT/OT and other therapies as per care plan.  -Encouraged good nutrition and movement habits.   -Discussed care plan and expected course of stay.   -Continue to follow-up per routine schedule or sooner if needed.     Greater than 45 minutes spent with patient with at least 55% of this time spent on counseling, education, and discussion of the above care plan with nursing staff, and patient.     Electronically signed by: Alysha Asencio CNP

## 2021-06-16 NOTE — PROGRESS NOTES
[facility]    Facility:   [facility] [[id number]]   Code Status: FULL CODE      CHIEF COMPLAINT/REASON FOR VISIT:  Chief Complaint   Patient presents with     Review Of Multiple Medical Conditions     s/p colon resection, physical deconditioning       HISTORY:      HPI: Jose is a 73 y.o. 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 He was admitted to [facility] on 2/22/2018 and was discharged on 3/6/2018.     Hospital course has been presented in previous notes. Today he is resting in his room at the TCU, he is just getting ready for bed. He does not have any medical issues he would like to discuss today. He denies any other concerns including fevers/chills, cough or cold symptoms, headaches, vision changes, chest pain/pressure, difficulty breathing, SOB, abdominal pain, nausea, vomiting, diarrhea, dysuria, increasing weakness, increasing pain.     Past Medical History:   Diagnosis Date     Acute, but ill-defined, cerebrovascular disease      CAD (coronary artery disease)      CKD (chronic kidney disease), stage IV      Colon cancer      Essential hypertension      History of CVA (cerebrovascular accident)      Intermittent claudication      Pulmonary nodules      Pure hypercholesterolemia      PVD (peripheral vascular disease)      Rectosigmoid cancer      Subclavian arterial stenosis     left             Family History   Problem Relation Age of Onset     Coronary artery disease Father      Hypertension Father      Prostate cancer Father      Arthritis Sister      Coronary artery disease Brother      Hypertension Brother      Heart attack Brother      Social History     Social History     Marital status: Single     Spouse name: N/A     Number of children: N/A     Years of education: N/A      Social History Main Topics     Smoking status: Former Smoker     Packs/day: 3.00     Years: 30.00     Quit date: 8/5/1994     Smokeless tobacco: Former User     Quit date: 8/1/1994     Alcohol use 0.6 oz/week     1 Glasses of wine per week     Drug use: No     Sexual activity: No     Other Topics Concern     None     Social History Narrative       REVIEW OF SYSTEM:  Pertinent items are noted in HPI.    PHYSICAL EXAM:   /63  Pulse 82  Temp 97.1  F (36.2  C)  Resp 16  Wt 186 lb (84.4 kg)  SpO2 99%  General appearance: alert, appears stated age and cooperative  Head: Normocephalic, without obvious abnormality, atraumatic  Lungs: clear to auscultation bilaterally  Heart: regular rate and rhythm, S1, S2 normal, no murmur, click, rub or gallop  Abdomen: soft, non-tender; bowel sounds normal; no masses,  no organomegaly  Extremities: extremities normal, atraumatic, no cyanosis or edema  Pulses: 2+ and symmetric  Skin: PWDI except for incision above umbilicus which is clean, dry, and approximated  Neurologic: Grossly normal      LABS:   None today.     ASSESSMENT:      ICD-10-CM    1. S/P colon resection Z90.49    2. Physical deconditioning R53.81        PLAN:   Physical Deconditioning due to Colon Cancer/Rectosigmoid Cancer and recent Colon Resection  -Continue PT/OT and other therapies as per care plan.  -Encouraged good nutrition and movement habits.   -Discussed care plan and expected course of stay.   -Continue to follow-up per routine schedule or sooner if needed.     Continue current care plan for all chronic medical conditions, as they are stable. Encouraged patient to engage in healthy lifestyle behaviors such as engaging in social activities, exercising, eating well, and following their care plan. Follow up for routine check-up, or sooner if needed. Will continue to monitor patient and work with NH staff collaboratively to work toward positive patient outcomes.    Greater than 25 minutes spent with  patient with at least 55% of this time spent on counseling, education, and discussion of the above care plan with nursing staff, and patient.     Electronically signed by: Alysha Asencio CNP

## 2021-06-16 NOTE — PROGRESS NOTES
[facility]      Facility:    [facility] [[id number]]  Code Status: FULL CODE      Chief Complaint/Reason for Visit:  Chief Complaint   Patient presents with     H & P       HPI:   Jose is a 73 y.o. 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  He does have loose stool and this is to be expected.  Shortly before the procedure he underwent coronary artery angiography with stents to his left anterior descending artery.  These were drug-eluting stents.  He has been on Brilinta and his cardiologist told him that he needs to be on this for at least 6 months after the procedure.  He thinks that he is having side effects to the medication wanted to stop the medication.  It was held for a few days around the time of the colon cancer surgery.    Current review of systems is negative for fevers or chills.  He is not having sore throat or nasal congestion.  He does not have chest pain or shortness of breath or cough.  No abdominal pain or nausea.  No dysuria.  No headache.    Past Medical History:  Past Medical History:   Diagnosis Date     Acute, but ill-defined, cerebrovascular disease      CAD (coronary artery disease)      CKD (chronic kidney disease), stage IV      Colon cancer      Essential hypertension      History of CVA (cerebrovascular accident)      Intermittent claudication      Pulmonary nodules      Pure hypercholesterolemia      PVD (peripheral vascular disease)      Rectosigmoid cancer      Subclavian arterial stenosis     left           Surgical History:  Past Surgical History:   Procedure Laterality Date     ANGIOPLASTY Left 10/2002     CAROTID STENT  2001     COLONOSCOPY  05/19/2017     ILIAC ARTERY STENT Right 11/2011     LAPAROSCOPIC COLECTOMY LOW ANTERIOR  02/22/2018     SIGMOIDOSCOPY  02/22/2018     THROMBOENDARTERECTOMY Bilateral        Family History:   Family History   Problem Relation  Age of Onset     Coronary artery disease Father      Hypertension Father      Prostate cancer Father      Arthritis Sister      Coronary artery disease Brother      Hypertension Brother      Heart attack Brother        Social History:    Social History     Social History     Marital status: Single     Spouse name: N/A     Number of children: N/A     Years of education: N/A     Social History Main Topics     Smoking status: Former Smoker     Packs/day: 3.00     Years: 30.00     Quit date: 8/5/1994     Smokeless tobacco: Former User     Quit date: 8/1/1994     Alcohol use 0.6 oz/week     1 Glasses of wine per week     Drug use: No     Sexual activity: No     Other Topics Concern     Not on file     Social History Narrative          Review of Systems   All other systems reviewed and are negative.      Vitals:    03/07/18 0751   BP: 117/60   Pulse: 77   Resp: 18   Temp: 97.9  F (36.6  C)   SpO2: 92%       Physical Exam   Constitutional: No distress.   HENT:   Nose: Nose normal.   Mouth/Throat: Oropharynx is clear and moist.   Eyes: Right eye exhibits no discharge. Left eye exhibits no discharge.   Neck: No thyromegaly present.   Cardiovascular: Normal rate, regular rhythm and normal heart sounds.    Pulmonary/Chest: Effort normal and breath sounds normal.   Abdominal: Soft. Bowel sounds are normal. He exhibits no distension. There is no tenderness.   Musculoskeletal: He exhibits no edema.   Lymphadenopathy:     He has no cervical adenopathy.   Neurological: He is alert.   Psychiatric: He has a normal mood and affect.   Nursing note and vitals reviewed.      Medication List:  Current Outpatient Prescriptions   Medication Sig     acetaminophen (TYLENOL) 500 MG tablet Take 500-1,000 mg by mouth 4 (four) times a day as needed for pain.     atorvastatin (LIPITOR) 40 MG tablet Take 40 mg by mouth at bedtime.     carvedilol (COREG) 25 MG tablet Take 25 mg by mouth 2 (two) times a day with meals.     HEPARIN SODIUM,PORCINE  "(HEPARIN, PORCINE,) 5,000 unit/mL injection Infuse 5,000 Units into a venous catheter every 12 (twelve) hours.     Lactobacillus rhamnosus GG (CULTURELLE) 10-15 Billion cell capsule Take 1 capsule by mouth daily.     MULTIVIT-MIN/IRON/FOLIC ACID/K (MULTI-DAY PLUS MINERALS ORAL) Take 1 tablet by mouth daily.     niacin 500 MG tablet Take 500 mg by mouth at bedtime.     polyethylene glycol (MIRALAX) 17 gram packet Take 17 g by mouth daily as needed.     saw palmetto 160 mg cap Take 1 capsule by mouth daily.     tamsulosin (FLOMAX) 0.4 mg Cp24 Take 0.4 mg by mouth daily.     ticagrelor (BRILINTA) 90 mg Tab Take 90 mg by mouth 2 (two) times a day.     traZODone (DESYREL) 50 MG tablet Take 50 mg by mouth at bedtime.       Labs:  No new laboratory testing    Assessment:    ICD-10-CM    1. S/P colon resection Z90.49    2. Rectosigmoid cancer C19    3. Coronary artery disease of native artery of native heart with stable angina pectoris I25.118    4. CKD (chronic kidney disease) stage 4, GFR 15-29 ml/min N18.4    5. History of stroke Z86.73    6. History of bilateral carotid endarterectomy Z98.890        Plan:  I explained to him and the friend who was with him in the room about the importance of maintaining Brilinta for at least 6 months as mentioned by his cardiologist.  Some say it should be a year and others say 2 years and others say it should be continued for life.  I emphasized the importance of the vessel that he has had stented explaining that another name for the left anterior descending vessel is the \" maker\"      Electronically signed by: Jr Vega MD  "

## 2021-06-16 NOTE — PROGRESS NOTES
[facility]    Facility:   [facility] [[id number]]   Code Status: DNR      CHIEF COMPLAINT/REASON FOR VISIT:  Chief Complaint   Patient presents with     Discharge Summary       HISTORY:      HPI: Jose is a 73 y.o. 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  He does have loose stool and this is to be expected.  Shortly before the procedure he underwent coronary artery angiography with stents to his left anterior descending artery.  These were drug-eluting stents.  He has been on Brilinta and his cardiologist told him that he needs to be on this for at least 6 months after the procedure.  He thinks that he is having side effects to the medication wanted to stop the medication.  It was held for a few days around the time of the colon cancer surgery.    He continues to think that blood thinners could cause other symptoms such as stool leakage.  Other than that, he has been feeling well in the postoperative time.  He has not had fevers or chills and he has not had sore throat and he has not had chest pain or cough or shortness of breath.  No abdominal pain or nausea.  No dysuria.    Past Medical History:   Diagnosis Date     Acute, but ill-defined, cerebrovascular disease      CAD (coronary artery disease)      CKD (chronic kidney disease), stage IV      Colon cancer      Essential hypertension      History of CVA (cerebrovascular accident)      Intermittent claudication      Pulmonary nodules      Pure hypercholesterolemia      PVD (peripheral vascular disease)      Rectosigmoid cancer      Subclavian arterial stenosis     left             Family History   Problem Relation Age of Onset     Coronary artery disease Father      Hypertension Father      Prostate cancer Father      Arthritis Sister      Coronary artery disease Brother      Hypertension Brother      Heart attack Brother      Social History      Social History     Marital status: Single     Spouse name: N/A     Number of children: N/A     Years of education: N/A     Social History Main Topics     Smoking status: Former Smoker     Packs/day: 3.00     Years: 30.00     Quit date: 8/5/1994     Smokeless tobacco: Former User     Quit date: 8/1/1994     Alcohol use 0.6 oz/week     1 Glasses of wine per week     Drug use: No     Sexual activity: No     Other Topics Concern     Not on file     Social History Narrative         Review of Systems    .  Vitals:    03/15/18 1654   BP: 111/62   Pulse: 68   Resp: 18   Temp: 97.4  F (36.3  C)   SpO2: 96%   Weight: 186 lb 9.6 oz (84.6 kg)       Physical Exam   Constitutional: No distress.   Eyes: Right eye exhibits no discharge. Left eye exhibits no discharge.   Cardiovascular: Normal heart sounds.    Pulmonary/Chest: Effort normal and breath sounds normal.   Abdominal: He exhibits no distension. There is no tenderness.   Musculoskeletal: He exhibits no edema.   Neurological: He is alert.   Skin:   Incisions are healing well without redness warmth or drainage   Psychiatric: He has a normal mood and affect.   Nursing note and vitals reviewed.        LABS:   No new laboratory testing    ASSESSMENT:      ICD-10-CM    1. S/P colon resection Z90.49    2. Rectosigmoid cancer C19    3. Coronary artery disease of native artery of native heart with stable angina pectoris I25.118    4. History of stroke Z86.73        PLAN:    I reinforced the importance of following his medications and explained that the stool problems are secondary to the colectomy and that after he has had complete healing of the colon surgery, then there may be other adaptations that his care providers could do to firm up stool but for now it is important to follow the directions of the surgeon in the immediate postoperative time and to continue with his cardiovascular medication regimen.      Electronically signed by: Jr Vega MD

## 2021-09-05 NOTE — PLAN OF CARE
"Problem: Patient Care Overview  Goal: Discharge Needs Assessment  Patient still on bedrest. Right groin site intact. No bleeding or hematoma noted. /73  Temp 98.3  F (36.8  C) (Oral)  Resp 16  Ht 1.651 m (5' 5\")  Wt 90.7 kg (200 lb)  SpO2 99%  BMI 33.28 kg/m2        " all other ROS negative except as per HPI

## (undated) DEVICE — SUCTION DRY CHEST DRAIN OASIS 3600-100

## (undated) DEVICE — DAVINCI XI FCP CADIERE 470049

## (undated) DEVICE — SU PLEDGET SOFT TFE 13MMX7MMX1.5MM D7044

## (undated) DEVICE — SU ETHIBOND 2-0 SHDA 30" X563H

## (undated) DEVICE — ADHESIVE SWIFTSET 0.8ML OCTYL SS6

## (undated) DEVICE — GLOVE PROTEXIS MICRO 7.0  2D73PM70

## (undated) DEVICE — DRSG TEGADERM 8X12" 1629

## (undated) DEVICE — SU ETHIBOND 5 LRDA 30" B499T

## (undated) DEVICE — SYR 10ML LL W/O NDL

## (undated) DEVICE — STPL CIRCULAR DST 28MM W/TILT TIP EEA28

## (undated) DEVICE — BLADE KNIFE BEAVER MICROSHARP GREEN 377515

## (undated) DEVICE — SYR 01ML 27GA 0.5" NDL TBC 309623

## (undated) DEVICE — ANTIFOG SOLUTION W/FOAM PAD 31142527

## (undated) DEVICE — DAVINCI XI DRAPE ARM 470015

## (undated) DEVICE — ENDO TROCAR BLUNT TIP KII BALLOON 12X100MM C0R47

## (undated) DEVICE — LIGHT HANDLE X1 31140133

## (undated) DEVICE — GLOVE NEOLON 2G NEOPRENE PF 7.5 LATEX FREE MSG6075

## (undated) DEVICE — PREP CHLORAPREP 26ML TINTED ORANGE  260815

## (undated) DEVICE — KIT PATIENT POSITIONING PIGAZZI LATEX FREE 40580

## (undated) DEVICE — SOL WATER IRRIG 1000ML BOTTLE 2F7114

## (undated) DEVICE — DRAPE IOBAN INCISE 23X17" 6650EZ

## (undated) DEVICE — PROTECTOR ARM ONE-STEP TRENDELENBURG 40418

## (undated) DEVICE — TUBING C02 INSUFFLATION LAP FILTER HEATER 6198

## (undated) DEVICE — SUTURE BOOTS 051003PBX

## (undated) DEVICE — KIT CONNECTOR FOR OLYMPUS ENDOSCOPES DEFENDO 100310

## (undated) DEVICE — ENDO TROCAR OPTICAL 12MM VERSAPORT PLUS W/FIX CAN ONB12STF

## (undated) DEVICE — LINEN TOWEL PACK X30 5481

## (undated) DEVICE — SU PDS II 0 CT-1 27" Z340H

## (undated) DEVICE — ESU GROUND PAD ADULT W/CORD E7507

## (undated) DEVICE — SU RETRACT-O-TAPE 1041

## (undated) DEVICE — NDL BLUNT 18GA 1" W/O FILTER 305181

## (undated) DEVICE — DAVINCI XI CLIP APPLIER SMALL 470401

## (undated) DEVICE — ENDO TROCAR 05MM VERSAONE BLADELESS W/STD FIX CAN NONB5STF

## (undated) DEVICE — BLADE CLIPPER SGL USE 9680

## (undated) DEVICE — LINEN TOWEL PACK X6 WHITE 5487

## (undated) DEVICE — LINEN GOWN XLG 5407

## (undated) DEVICE — DRAPE TIBURON CARDIOVASCULAR PERI-GROIN LF 9154

## (undated) DEVICE — PREP POVIDONE IODINE SOLUTION 10% 120ML

## (undated) DEVICE — ENDO SYSTEM WATER BOTTLE & TUBING W/CO2 FILTER 00711549

## (undated) DEVICE — SU PROLENE 4-0 RB-1DA 36" 8557H

## (undated) DEVICE — Device

## (undated) DEVICE — SU VICRYL 0 UR-6 27" J603H

## (undated) DEVICE — DRSG DRAIN 4X4" 7086

## (undated) DEVICE — BLOWER/MISTER CLEARVIEW 22150

## (undated) DEVICE — BLADE KNIFE BEAVER MINI STR BEAVER6900

## (undated) DEVICE — SU ETHIBOND 0 CT-1 CR 8X18" CX21D

## (undated) DEVICE — SU VICRYL 2-0 CT-1 27" UND J259H

## (undated) DEVICE — DAVINCI XI DRAPE COLUMN 470341

## (undated) DEVICE — TAPE MEDIPORE 4"X2YD 2864

## (undated) DEVICE — CLIP HORIZON SM RED WIDE SLOT 001201

## (undated) DEVICE — PREP SKIN SCRUB TRAY 4461A

## (undated) DEVICE — CLIP ENDO HEMO-LOC PURPLE LG 544240

## (undated) DEVICE — SU VICRYL 0 CTX 36" J370H

## (undated) DEVICE — DEFIB PRO-PADZ LVP LQD GEL ADULT 8900-2105-01

## (undated) DEVICE — GLOVE PROTEXIS BLUE W/NEU-THERA 7.5  2D73EB75

## (undated) DEVICE — PREP CHLORHEXIDINE 4% 4OZ (HIBICLENS) 57504

## (undated) DEVICE — SUCTION IRR STRYKERFLOW II W/TIP 250-070-520

## (undated) DEVICE — DRAPE MAYO STAND 23X54 8337

## (undated) DEVICE — NDL SPINAL 22GA 3.5" QUINCKE 405181

## (undated) DEVICE — JELLY LUBRICATING SURGILUBE 2OZ TUBE

## (undated) DEVICE — STPL CONTOUR CUT CVD GREEN CS40G

## (undated) DEVICE — SOL NACL 0.9% 10ML VIAL 0409-4888-02

## (undated) DEVICE — WAND SUCTION LP SOFT 15.2CM SU-22702

## (undated) DEVICE — SU VICRYL 2-0 TIE 12X18" J905T

## (undated) DEVICE — DAVINCI SEAL CANNULA AND STPL 12MM 470380

## (undated) DEVICE — DRAPE LEGGINGS CLEAR 8430

## (undated) DEVICE — ENDO CANNULA 05MM VERSAONE UNIVERSAL UNVCA5STF

## (undated) DEVICE — CLIP HORIZON MED BLUE 002200

## (undated) DEVICE — SYR 10ML SLIP TIP W/O NDL 303134

## (undated) DEVICE — ENDO SCOPE WARMER SEAL  C3101

## (undated) DEVICE — DECANTER BAG 2002S

## (undated) DEVICE — ENDO CAP AND TUBING STERILE FOR ENDOGATOR  100130

## (undated) DEVICE — SU PROLENE 2-0 SHDA 36" 8523H

## (undated) DEVICE — DRAPE WARMER 66X44" ORS-300

## (undated) DEVICE — CATH TRAY FOLEY SURESTEP 16FR W/URNE MTR STLK LATEX A303316A

## (undated) DEVICE — SUCTION MANIFOLD DORNOCH ULTRA CART UL-CL500

## (undated) DEVICE — SPONGE PEANUT

## (undated) DEVICE — SU VICRYL 3-0 SH 8X18" UND J864D

## (undated) DEVICE — GLOVE LINER HALF FINGER NYLON KP5015/50

## (undated) DEVICE — TIES BANDING T50R

## (undated) DEVICE — DRAPE CV SPLIT II 147X106" 9158

## (undated) DEVICE — SU DERMABOND ADVANCED .7ML DNX12

## (undated) DEVICE — TUBING INSUFFLATION W/FILTER CPC TO LUER 620-030-301

## (undated) DEVICE — SU VICRYL 3-0 SH 27" J316H

## (undated) DEVICE — SU VICRYL 2-0 TIE 54" J615H

## (undated) DEVICE — TUBING SUCTION 10'X3/16" N510

## (undated) DEVICE — SU ETHIBOND 0 TIE 6X30" X306H

## (undated) DEVICE — DRSG ABDOMINAL 07 1/2X8" 7197D

## (undated) DEVICE — ESU LIGASURE LAPAROSCOPIC BLUNT TIP SEALER 5MMX37CM LF1837

## (undated) DEVICE — SU PROLENE 7-0 BV-1DA 30" 8703H

## (undated) DEVICE — WIPES FOLEY CARE SURESTEP PROVON DFC100

## (undated) DEVICE — SYR 30ML SLIP TIP W/O NDL 302833

## (undated) DEVICE — CATH TRAY FOLEY SURESTEP 16FR W/TMP PRB STLK LATEX A319416AM

## (undated) DEVICE — SU PROLENE 6-0 C-1DA 30" 8706H

## (undated) DEVICE — SOL WATER IRRIG 3000ML BAG 2B7117

## (undated) DEVICE — ENDO SHEARS 5MM 176643

## (undated) DEVICE — DRAPE SHEET MED 44X70" 9355

## (undated) DEVICE — SU PROLENE 8-0 BV130-5DA 24" 8732H

## (undated) DEVICE — SU ETHIBOND 3-0 BBDA 36" X588H

## (undated) DEVICE — DAVINCI XI SEAL UNIVERSAL 5-8MM 470361

## (undated) DEVICE — SU MONOCRYL 4-0 PS-2 27" UND Y426H

## (undated) RX ORDER — EPHEDRINE SULFATE 50 MG/ML
INJECTION, SOLUTION INTRAMUSCULAR; INTRAVENOUS; SUBCUTANEOUS
Status: DISPENSED
Start: 2018-01-01

## (undated) RX ORDER — MUPIROCIN 20 MG/G
OINTMENT TOPICAL
Status: DISPENSED
Start: 2017-10-20

## (undated) RX ORDER — SODIUM CHLORIDE, SODIUM LACTATE, POTASSIUM CHLORIDE, CALCIUM CHLORIDE 600; 310; 30; 20 MG/100ML; MG/100ML; MG/100ML; MG/100ML
INJECTION, SOLUTION INTRAVENOUS
Status: DISPENSED
Start: 2018-01-01

## (undated) RX ORDER — NICARDIPINE HYDROCHLORIDE 2.5 MG/ML
INJECTION INTRAVENOUS
Status: DISPENSED
Start: 2017-01-01

## (undated) RX ORDER — NITROGLYCERIN 5 MG/ML
VIAL (ML) INTRAVENOUS
Status: DISPENSED
Start: 2017-01-01

## (undated) RX ORDER — DIPHENHYDRAMINE HYDROCHLORIDE 50 MG/ML
INJECTION INTRAMUSCULAR; INTRAVENOUS
Status: DISPENSED
Start: 2017-01-01

## (undated) RX ORDER — FENTANYL CITRATE 50 UG/ML
INJECTION, SOLUTION INTRAMUSCULAR; INTRAVENOUS
Status: DISPENSED
Start: 2017-08-31

## (undated) RX ORDER — ADENOSINE 3 MG/ML
INJECTION, SOLUTION INTRAVENOUS
Status: DISPENSED
Start: 2017-08-15

## (undated) RX ORDER — FENTANYL CITRATE 50 UG/ML
INJECTION, SOLUTION INTRAMUSCULAR; INTRAVENOUS
Status: DISPENSED
Start: 2018-01-01

## (undated) RX ORDER — PHENYLEPHRINE HCL IN 0.9% NACL 1 MG/10 ML
SYRINGE (ML) INTRAVENOUS
Status: DISPENSED
Start: 2018-01-01

## (undated) RX ORDER — SODIUM CHLORIDE 9 MG/ML
INJECTION, SOLUTION INTRAVENOUS
Status: DISPENSED
Start: 2017-08-15

## (undated) RX ORDER — HEPARIN SODIUM 1000 [USP'U]/ML
INJECTION, SOLUTION INTRAVENOUS; SUBCUTANEOUS
Status: DISPENSED
Start: 2017-01-01

## (undated) RX ORDER — FENTANYL CITRATE 50 UG/ML
INJECTION, SOLUTION INTRAMUSCULAR; INTRAVENOUS
Status: DISPENSED
Start: 2017-05-24

## (undated) RX ORDER — LABETALOL HYDROCHLORIDE 5 MG/ML
INJECTION, SOLUTION INTRAVENOUS
Status: DISPENSED
Start: 2018-01-01

## (undated) RX ORDER — OXYCODONE HYDROCHLORIDE 5 MG/1
TABLET ORAL
Status: DISPENSED
Start: 2018-01-01

## (undated) RX ORDER — FENTANYL CITRATE 50 UG/ML
INJECTION, SOLUTION INTRAMUSCULAR; INTRAVENOUS
Status: DISPENSED
Start: 2017-05-19

## (undated) RX ORDER — ONDANSETRON 2 MG/ML
INJECTION INTRAMUSCULAR; INTRAVENOUS
Status: DISPENSED
Start: 2018-01-01

## (undated) RX ORDER — DOBUTAMINE HYDROCHLORIDE 200 MG/100ML
INJECTION INTRAVENOUS
Status: DISPENSED
Start: 2017-08-04

## (undated) RX ORDER — ACETAMINOPHEN 325 MG/1
TABLET ORAL
Status: DISPENSED
Start: 2018-01-01

## (undated) RX ORDER — GABAPENTIN 300 MG/1
CAPSULE ORAL
Status: DISPENSED
Start: 2018-01-01

## (undated) RX ORDER — DIPHENHYDRAMINE HYDROCHLORIDE 50 MG/ML
INJECTION INTRAMUSCULAR; INTRAVENOUS
Status: DISPENSED
Start: 2017-05-24

## (undated) RX ORDER — HYDRALAZINE HYDROCHLORIDE 20 MG/ML
INJECTION INTRAMUSCULAR; INTRAVENOUS
Status: DISPENSED
Start: 2018-01-01

## (undated) RX ORDER — HEPARIN SODIUM 1000 [USP'U]/ML
INJECTION, SOLUTION INTRAVENOUS; SUBCUTANEOUS
Status: DISPENSED
Start: 2017-08-31

## (undated) RX ORDER — FENTANYL CITRATE 50 UG/ML
INJECTION, SOLUTION INTRAMUSCULAR; INTRAVENOUS
Status: DISPENSED
Start: 2017-01-01

## (undated) RX ORDER — ALBUMIN, HUMAN INJ 5% 5 %
SOLUTION INTRAVENOUS
Status: DISPENSED
Start: 2018-01-01

## (undated) RX ORDER — SIMETHICONE 40MG/0.6ML
SUSPENSION, DROPS(FINAL DOSAGE FORM)(ML) ORAL
Status: DISPENSED
Start: 2017-05-19

## (undated) RX ORDER — ASPIRIN 325 MG
TABLET ORAL
Status: DISPENSED
Start: 2017-01-01

## (undated) RX ORDER — FENTANYL CITRATE 50 UG/ML
INJECTION, SOLUTION INTRAMUSCULAR; INTRAVENOUS
Status: DISPENSED
Start: 2017-08-15

## (undated) RX ORDER — CEFAZOLIN SODIUM 2 G/100ML
INJECTION, SOLUTION INTRAVENOUS
Status: DISPENSED
Start: 2017-10-20

## (undated) RX ORDER — CHLORHEXIDINE GLUCONATE ORAL RINSE 1.2 MG/ML
SOLUTION DENTAL
Status: DISPENSED
Start: 2017-10-20

## (undated) RX ORDER — SODIUM CHLORIDE 9 MG/ML
INJECTION, SOLUTION INTRAVENOUS
Status: DISPENSED
Start: 2017-01-01

## (undated) RX ORDER — SODIUM CHLORIDE 9 MG/ML
INJECTION, SOLUTION INTRAVENOUS
Status: DISPENSED
Start: 2017-08-31

## (undated) RX ORDER — ADENOSINE 3 MG/ML
INJECTION, SOLUTION INTRAVENOUS
Status: DISPENSED
Start: 2017-01-01

## (undated) RX ORDER — SIMETHICONE 40MG/0.6ML
SUSPENSION, DROPS(FINAL DOSAGE FORM)(ML) ORAL
Status: DISPENSED
Start: 2017-05-24

## (undated) RX ORDER — NITROGLYCERIN 5 MG/ML
VIAL (ML) INTRAVENOUS
Status: DISPENSED
Start: 2017-08-31

## (undated) RX ORDER — NICARDIPINE HYDROCHLORIDE 2.5 MG/ML
INJECTION INTRAVENOUS
Status: DISPENSED
Start: 2017-08-15

## (undated) RX ORDER — LIDOCAINE HYDROCHLORIDE 20 MG/ML
INJECTION, SOLUTION EPIDURAL; INFILTRATION; INTRACAUDAL; PERINEURAL
Status: DISPENSED
Start: 2018-01-01

## (undated) RX ORDER — METOPROLOL TARTRATE 1 MG/ML
INJECTION, SOLUTION INTRAVENOUS
Status: DISPENSED
Start: 2017-08-04

## (undated) RX ORDER — NITROGLYCERIN 5 MG/ML
VIAL (ML) INTRAVENOUS
Status: DISPENSED
Start: 2017-08-15

## (undated) RX ORDER — HEPARIN SODIUM 1000 [USP'U]/ML
INJECTION, SOLUTION INTRAVENOUS; SUBCUTANEOUS
Status: DISPENSED
Start: 2017-08-15